# Patient Record
Sex: FEMALE | Race: BLACK OR AFRICAN AMERICAN | NOT HISPANIC OR LATINO | Employment: OTHER | URBAN - METROPOLITAN AREA
[De-identification: names, ages, dates, MRNs, and addresses within clinical notes are randomized per-mention and may not be internally consistent; named-entity substitution may affect disease eponyms.]

---

## 2017-02-16 ENCOUNTER — PATIENT MESSAGE (OUTPATIENT)
Dept: INTERNAL MEDICINE | Facility: CLINIC | Age: 72
End: 2017-02-16

## 2017-02-16 DIAGNOSIS — Z12.31 OTHER SCREENING MAMMOGRAM: Primary | ICD-10-CM

## 2017-02-24 ENCOUNTER — HOSPITAL ENCOUNTER (OUTPATIENT)
Dept: RADIOLOGY | Facility: HOSPITAL | Age: 72
Discharge: HOME | End: 2017-02-24
Attending: INTERNAL MEDICINE
Payer: MEDICARE

## 2017-02-24 DIAGNOSIS — Z12.31 OTHER SCREENING MAMMOGRAM: ICD-10-CM

## 2017-02-24 PROCEDURE — 77063 BREAST TOMOSYNTHESIS BI: CPT | Mod: 26,,, | Performed by: RADIOLOGY

## 2017-02-24 PROCEDURE — 77067 SCR MAMMO BI INCL CAD: CPT | Mod: TC

## 2017-02-24 PROCEDURE — 77067 SCR MAMMO BI INCL CAD: CPT | Mod: 26,,, | Performed by: RADIOLOGY

## 2017-03-01 ENCOUNTER — OFFICE VISIT (OUTPATIENT)
Dept: INTERNAL MEDICINE | Facility: CLINIC | Age: 72
End: 2017-03-01
Payer: MEDICARE

## 2017-03-01 VITALS
WEIGHT: 219.56 LBS | TEMPERATURE: 99 F | DIASTOLIC BLOOD PRESSURE: 94 MMHG | SYSTOLIC BLOOD PRESSURE: 147 MMHG | BODY MASS INDEX: 38.9 KG/M2 | HEART RATE: 63 BPM

## 2017-03-01 DIAGNOSIS — R03.0 ELEVATED BLOOD PRESSURE READING WITHOUT DIAGNOSIS OF HYPERTENSION: ICD-10-CM

## 2017-03-01 DIAGNOSIS — E78.5 DYSLIPIDEMIA: ICD-10-CM

## 2017-03-01 DIAGNOSIS — G47.33 OSA ON CPAP: ICD-10-CM

## 2017-03-01 DIAGNOSIS — E03.9 ACQUIRED HYPOTHYROIDISM: Primary | ICD-10-CM

## 2017-03-01 DIAGNOSIS — Z00.00 ENCOUNTER FOR PREVENTIVE HEALTH EXAMINATION: ICD-10-CM

## 2017-03-01 PROCEDURE — 99397 PER PM REEVAL EST PAT 65+ YR: CPT | Mod: S$GLB,,, | Performed by: INTERNAL MEDICINE

## 2017-03-01 PROCEDURE — 99999 PR PBB SHADOW E&M-EST. PATIENT-LVL III: CPT | Mod: PBBFAC,,, | Performed by: INTERNAL MEDICINE

## 2017-03-01 NOTE — PROGRESS NOTES
History of present illness:  71-year-old female in today for general health assessment.    Current medications:  All medications noted reviewed the electronic medical record medication list.    Review of systems:  General: no fever, chills, generalized body aches. No unexpected weight loss.  Eyes:  No visual disturbances.  HEENT:  No hoarseness, dysphagia, ear pain.  Respiratory:  No cough, no shortness of breath.  Cardiovascular: no chest pain, palpitations, cough, exertional limb pain. No edema.  GI: no nausea, vomiting.  No abdominal pain. No change in bowel habits.  No melena, no hematochezia.  : no dysuria. No change in the color or character of the urine. No urinary frequency.  Musculoskeletal: no joint pain or swelling.  Neurologic:  No focal neurological complaints.  No headaches.  Skin:  No rashes or other concerns.  Psych:  No emotional issues    Past medical history:  Hypothyroidism.  Dyslipidemia  GERD  Depression  Anxiety  History of sarcoidosis    Past surgical history, family medical history and social histories are all noted reviewed and are electronic medical record history sections.    Health screenings:  Colonoscopy August 2013.  Mammogram February 2017.  She has had 23 and 13 Valent pneumococcal vaccines.  Bone densitometry couple years ago normal    Physical examination:  GENERAL:  Alert, appropriately groomed, no acute distress.  VS: Blood pressure taken manually by this examiner is 142/86.  EYES: sclerae white ,nonicteric. PERRL.  HEENT:  Normocephalic. Ear canals and tympanic membranes normal. Mouth and pharynx normal. No thyromegaly. Trachea midline and freely mobile.  LUNGS:  Clear to ascultation and normal to percussion.  CARDIOVASCULAR:  Normal heart sounds.  No significant murmur. Carotids full bilaterally without bruit.  Pedal pulses intact .  No abdominal bruit.  No peripheral extremity edema.  GI: the abdomen is obese, soft, no distension. No masses , tenderness,  organomegaly.  LYMPHATIC:  No axillary, inguinal , cervical adenopathy.  MUSCULOSKELETAL:  Range of motion, stability and strength of the right and left upper and lower extremities normal. No swollen or tender joints  NEUROLOGIC:  DTR's normal. No gross motor or sensory deficits apparent, gait normal.  SKIN:  No rashes.   MS:  Alert, oriented , affect and mood all appropriate    Data:  Lab data noted and reviewed from February 24, 2017.    Impression:  71-year-old lady with obesity with associated comorbidities obstructive sleep apnea, dyslipidemia.  Hypothyroidism on replacement therapy.  Dyslipidemia on statin therapy.  Depression is recently controlled.  History of sarcoidosis quiescent.  Elevated blood pressure reading today without a diagnosis of hypertension.    Plan:  Encourage increased physical activity exercise and weight loss.  She does have a home blood pressure monitor and she'll start monitoring her pressures on a regular basis and keep a log.  She is advised to return to clinic in 2 months with that log and her blood pressure monitor for recheck and review.

## 2017-06-01 ENCOUNTER — OFFICE VISIT (OUTPATIENT)
Dept: INTERNAL MEDICINE | Facility: CLINIC | Age: 72
End: 2017-06-01
Payer: MEDICARE

## 2017-06-01 VITALS
HEIGHT: 63 IN | RESPIRATION RATE: 16 BRPM | BODY MASS INDEX: 38.09 KG/M2 | HEART RATE: 72 BPM | SYSTOLIC BLOOD PRESSURE: 145 MMHG | WEIGHT: 214.94 LBS | TEMPERATURE: 99 F | DIASTOLIC BLOOD PRESSURE: 89 MMHG

## 2017-06-01 DIAGNOSIS — I10 HTN (HYPERTENSION), BENIGN: Primary | ICD-10-CM

## 2017-06-01 DIAGNOSIS — E66.01 SEVERE OBESITY (BMI 35.0-39.9) WITH COMORBIDITY: ICD-10-CM

## 2017-06-01 PROCEDURE — 1159F MED LIST DOCD IN RCRD: CPT | Mod: S$GLB,,, | Performed by: INTERNAL MEDICINE

## 2017-06-01 PROCEDURE — 99999 PR PBB SHADOW E&M-EST. PATIENT-LVL III: CPT | Mod: PBBFAC,,, | Performed by: INTERNAL MEDICINE

## 2017-06-01 PROCEDURE — 99499 UNLISTED E&M SERVICE: CPT | Mod: S$GLB,,, | Performed by: INTERNAL MEDICINE

## 2017-06-01 PROCEDURE — 99214 OFFICE O/P EST MOD 30 MIN: CPT | Mod: S$GLB,,, | Performed by: INTERNAL MEDICINE

## 2017-06-01 RX ORDER — LOSARTAN POTASSIUM 25 MG/1
25 TABLET ORAL DAILY
Qty: 90 TABLET | Refills: 3 | Status: SHIPPED | OUTPATIENT
Start: 2017-06-01 | End: 2017-11-07 | Stop reason: DRUGHIGH

## 2017-06-01 RX ORDER — BRIMONIDINE TARTRATE, TIMOLOL MALEATE 2; 5 MG/ML; MG/ML
1 SOLUTION/ DROPS OPHTHALMIC 3 TIMES DAILY
COMMUNITY
Start: 2017-05-24 | End: 2018-07-31

## 2017-06-01 NOTE — PROGRESS NOTES
This office note has been dictated.  HISTORY OF PRESENT ILLNESS:  This is a 71-year-old lady following up today on   blood pressure issue.  On her last visit in March, her blood pressure reading   was a bit elevated.  She does not have a prior history of hypertension.  She   returns today with her home monitor for review and also home blood pressure   readings.  She does report she has modified her eating habits somewhat.  She has   lost 3 or 4 pounds since her last visit, though she remains obese at 38.08 BMI.    She feels fine with no chest pain, palpitations, syncope, cough, shortness of   breath.    CURRENT MEDICATIONS:  Noted and reviewed in the electronic medical record   medication list.    REVIEW OF SYSTEMS:  CONSTITUTIONAL:  No fever, no chills, no generalized body aches.  CARDIOVASCULAR:  No chest pain, no palpitations, no syncope.  RESPIRATORY:  No cough, no shortness of breath.    PAST MEDICAL HISTORY, PAST SURGICAL HISTORY, FAMILY AND SOCIAL HISTORY:  All   noted and reviewed in the electronic medical record history sections.    PHYSICAL EXAMINATION:  GENERAL:  Alert, pleasant, appropriately groomed lady in no acute distress.  VITAL SIGNS:  Blood pressure taken manually by this examiner 140/90 and blood   pressure taken by the patient's monitor here in the office today 140/85 and   131/93.  CARDIOVASCULAR:  Regular rate and rhythm.  No murmur, click, rub or gallop.  LUNGS:  Clear to auscultation.    ADDITIONAL DATA:  Home blood pressure readings are noted and reviewed and   majority of them are elevated.    IMPRESSION:  Mild primary hypertension, currently untreated.    PLAN:  1.  Begin losartan 25 mg daily.  2.  Continue efforts at weight loss.  3.  Return to clinic in 6-8 weeks for recheck and followup.      RICHIE  dd: 06/01/2017 11:07:31 (CDT)  td: 06/02/2017 02:12:28 (CDT)  Doc ID   #[id number]  Job ID #[id number]    CC:

## 2017-06-12 RX ORDER — SIMVASTATIN 40 MG/1
40 TABLET, FILM COATED ORAL NIGHTLY
Qty: 90 TABLET | Refills: 1 | OUTPATIENT
Start: 2017-06-12

## 2017-06-14 ENCOUNTER — PATIENT MESSAGE (OUTPATIENT)
Dept: INTERNAL MEDICINE | Facility: CLINIC | Age: 72
End: 2017-06-14

## 2017-06-15 ENCOUNTER — PATIENT MESSAGE (OUTPATIENT)
Dept: INTERNAL MEDICINE | Facility: CLINIC | Age: 72
End: 2017-06-15

## 2017-06-15 RX ORDER — SIMVASTATIN 40 MG/1
40 TABLET, FILM COATED ORAL NIGHTLY
Qty: 90 TABLET | Refills: 1 | Status: SHIPPED | OUTPATIENT
Start: 2017-06-15 | End: 2017-12-09 | Stop reason: SDUPTHER

## 2017-06-15 RX ORDER — SIMVASTATIN 40 MG/1
40 TABLET, FILM COATED ORAL NIGHTLY
Qty: 90 TABLET | Refills: 1 | OUTPATIENT
Start: 2017-06-15

## 2017-06-17 ENCOUNTER — PATIENT MESSAGE (OUTPATIENT)
Dept: INTERNAL MEDICINE | Facility: CLINIC | Age: 72
End: 2017-06-17

## 2017-06-19 RX ORDER — ESCITALOPRAM OXALATE 20 MG/1
20 TABLET ORAL DAILY
Qty: 90 TABLET | Refills: 1 | Status: SHIPPED | OUTPATIENT
Start: 2017-06-19 | End: 2017-12-18 | Stop reason: SDUPTHER

## 2017-06-24 ENCOUNTER — PATIENT MESSAGE (OUTPATIENT)
Dept: INTERNAL MEDICINE | Facility: CLINIC | Age: 72
End: 2017-06-24

## 2017-06-27 RX ORDER — LEVOTHYROXINE SODIUM 100 UG/1
100 TABLET ORAL DAILY
Qty: 90 TABLET | Refills: 1 | Status: SHIPPED | OUTPATIENT
Start: 2017-06-27 | End: 2017-10-03 | Stop reason: SDUPTHER

## 2017-07-12 ENCOUNTER — OFFICE VISIT (OUTPATIENT)
Dept: INTERNAL MEDICINE | Facility: CLINIC | Age: 72
End: 2017-07-12
Payer: MEDICARE

## 2017-07-12 VITALS
TEMPERATURE: 99 F | SYSTOLIC BLOOD PRESSURE: 98 MMHG | BODY MASS INDEX: 38.62 KG/M2 | DIASTOLIC BLOOD PRESSURE: 60 MMHG | HEART RATE: 71 BPM | HEIGHT: 63 IN | WEIGHT: 218 LBS | RESPIRATION RATE: 16 BRPM

## 2017-07-12 DIAGNOSIS — E03.9 ACQUIRED HYPOTHYROIDISM: ICD-10-CM

## 2017-07-12 DIAGNOSIS — H25.13 NUCLEAR SCLEROSIS OF BOTH EYES: ICD-10-CM

## 2017-07-12 DIAGNOSIS — H40.50X0 GLAUCOMA SECONDARY TO OTHER EYE DISORDER, UNSPECIFIED GLAUCOMA STAGE, UNSPECIFIED LATERALITY: ICD-10-CM

## 2017-07-12 DIAGNOSIS — I51.89 LEFT VENTRICULAR DIASTOLIC DYSFUNCTION WITH PRESERVED SYSTOLIC FUNCTION: ICD-10-CM

## 2017-07-12 DIAGNOSIS — F32.5 MAJOR DEPRESSIVE DISORDER WITH SINGLE EPISODE, IN FULL REMISSION: ICD-10-CM

## 2017-07-12 DIAGNOSIS — Z00.00 ENCOUNTER FOR PREVENTIVE HEALTH EXAMINATION: ICD-10-CM

## 2017-07-12 DIAGNOSIS — D86.89 SARCOID OF NOSE: ICD-10-CM

## 2017-07-12 DIAGNOSIS — E78.5 HYPERLIPIDEMIA, UNSPECIFIED HYPERLIPIDEMIA TYPE: ICD-10-CM

## 2017-07-12 DIAGNOSIS — G47.33 OSA ON CPAP: Primary | ICD-10-CM

## 2017-07-12 PROBLEM — H40.9 GLAUCOMA (INCREASED EYE PRESSURE): Status: ACTIVE | Noted: 2017-07-12

## 2017-07-12 PROCEDURE — 99499 UNLISTED E&M SERVICE: CPT | Mod: S$GLB,,, | Performed by: NURSE PRACTITIONER

## 2017-07-12 PROCEDURE — G0439 PPPS, SUBSEQ VISIT: HCPCS | Mod: S$GLB,,, | Performed by: NURSE PRACTITIONER

## 2017-07-12 PROCEDURE — 99999 PR PBB SHADOW E&M-EST. PATIENT-LVL V: CPT | Mod: PBBFAC,,, | Performed by: NURSE PRACTITIONER

## 2017-07-12 NOTE — PROGRESS NOTES
"Eboni Nicholas presented for a  Medicare AWV and comprehensive Health Risk Assessment today. The following components were reviewed and updated:    · Medical history  · Family History  · Social history  · Allergies and Current Medications  · Health Risk Assessment  · Health Maintenance  · Care Team   Followed by external ENT,OPTH- care team updated in epic    ** See Completed Assessments for Annual Wellness Visit within the encounter summary.**       The following assessments were completed:  · Living Situation  · CAGE  · Depression Screening  · Timed Get Up and Go  · Whisper Test  · Cognitive Function Screening  · Nutrition Screening  · ADL Screening  · PAQ Screening    Vitals:    07/12/17 1000   BP: 98/60   Pulse: 71   Resp: 16   Temp: 98.6 °F (37 °C)   TempSrc: Oral   Weight: 98.9 kg (218 lb)   Height: 5' 3" (1.6 m)     Body mass index is 38.62 kg/m².  Physical Exam   Constitutional: She is oriented to person, place, and time. She appears well-developed and well-nourished.   HENT:   Head: Normocephalic and atraumatic.   Cardiovascular: Normal rate, regular rhythm and normal heart sounds.    No murmur heard.  Pulmonary/Chest: Effort normal and breath sounds normal.   Abdominal: Soft. Bowel sounds are normal. There is no tenderness.   Musculoskeletal: She exhibits no edema.   Neurological: She is alert and oriented to person, place, and time.   Skin: Skin is warm and dry.   Psychiatric: She has a normal mood and affect. Her behavior is normal. Judgment and thought content normal.   Nursing note and vitals reviewed.        Diagnoses and health risks identified today and associated recommendations/orders:    1. CHIDI on CPAP  Stable- followed by PCP    2. Obesity, Class II, BMI 35-39.9, with comorbidity  CHronic with no recent weight loss.Reviewed current BMI & ideal BMI range. Encouraged weight loss,  diet and exercise. Followed by PCP.    3. Nuclear sclerosis of both eyes  Stable- followed by opthalmology    4. 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 Left ventricular diastolic dysfunction with preserved systolic function  Stable- followed by PCP    6. Hyperlipidemia, unspecified hyperlipidemia type  Stable- followed by PCP      7. Acquired hypothyroidism  Stable- followed by PCP      8. Encounter for preventive health examination  Assessments completed  Preventative health recommendations reviewed       9. 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 Sarcoid of nose  Stable- followed by external ENT      Provided Eboni with a 5-10 year written screening schedule and personal prevention plan. Recommendations were developed using the USPSTF age appropriate recommendations. Education, counseling, and referrals were provided as needed. After Visit Summary printed and given to patient which includes a list of additional screenings\tests needed.    Return in about 1 year (around 7/12/2018) for HRA.    Lori Valerio NP

## 2017-07-12 NOTE — Clinical Note
Primary Care Providers: CAMILO Elizondo MD, MD (General)  Your patient was seen today for a HRA visit. No Gap(s) in care (HEDIS gaps) have been identified during this visit that require additional testing and possible follow up.  No orders of the defined types were placed in this encounter.   I have included a copy of my visit note; please review the note and feel free to contact me with any questions.   Thank you for allowing me to participate in the care of your patients. Lori Valerio NP

## 2017-07-12 NOTE — PATIENT INSTRUCTIONS
Counseling and Referral of Other Preventative  (Italic type indicates deductible and co-insurance are waived)    Patient Name: Eboni Nicholas  Today's Date: 7/12/2017      SERVICE LIMITATIONS RECOMMENDATION    Vaccines    · Pneumococcal (once after 65)    · Influenza (annually)    · Hepatitis B (if medium/high risk)    · Prevnar 13      Hepatitis B medium/high risk factors:       - End-stage renal disease       - Hemophiliacs who received Factor VII or         IX concentrates       - Clients of institutions for the mentally             retarded       - Persons who live in the same house as          a HepB carrier       - Homosexual men       - Illicit injectable drug abusers     Pneumococcal: current     Influenza: current     Hepatitis B: N/A     Prevnar 13: current    Mammogram (biennial age 50-74)  Annually (age 40 or over)  2/24/17    Pap (up to age 70 and after 70 if unknown history or abnormal study last 10 years)      declined       Colorectal cancer screening (to age 75)    · Fecal occult blood test (annual)  · Flexible sigmoidoscopy (5y)  · Screening colonoscopy (10y)  · Barium enema   8/27/15- due again in 2025    Diabetes self-management training (no USPSTF recommendations)  Requires referral by treating physician for patient with diabetes or renal disease. 10 hours of initial DSMT sessions of no less than 30 minutes each in a continuous 12-month period. 2 hours of follow-up DSMT in subsequent years.  N/A    Bone mass measurements (age 65 & older, biennial)  Requires diagnosis related to osteoporosis or estrogen deficiency. Biennial benefit unless patient has history of long-term glucocorticoid  1/15/15- due in 2019    Glaucoma screening (no USPSTF recommendation)  Diabetes mellitus, family history   , age 50 or over    American, age 65 or over  current    Medical nutrition therapy for diabetes or renal disease (no recommended schedule)  Requires referral by treating physician for  patient with diabetes or renal disease or kidney transplant within the past 3 years.  Can be provided in same year as diabetes self-management training (DSMT), and CMS recommends medical nutrition therapy take place after DSMT. Up to 3 hours for initial year and 2 hours in subsequent years.  N/A    Cardiovascular screening blood tests (every 5 years)  · Fasting lipid panel  Order as a panel if possible  current    Diabetes screening tests (at least every 3 years, Medicare covers annually or at 6-month intervals for prediabetic patients)  · Fasting blood sugar (FBS) or glucose tolerance test (GTT)  Patient must be diagnosed with one of the following:       - Hypertension       - Dyslipidemia       - Obesity (BMI 30kg/m2)       - Previous elevated impaired FBS or GTT       ... or any two of the following:       - Overweight (BMI 25 but <30)       - Family history of diabetes       - Age 65 or older       - History of gestational diabetes or birth of baby weighing more than 9 pounds  current    Abdominal aortic aneurysm screening (once)  · Sonogram   Limited to patients who meet one of the following criteria:       - Men who are 65-75 years old and have smoked more than 100 cigarette in their lifetime       - Anyone with a family history of abdominal aortic aneurysm       - Anyone recommended for screening by the USPSTF  N/A    HIV screening (annually for increased risk patients)  · HIV-1 and HIV-2 by EIA, or ROBERTA, rapid antibody test or oral mucosa transudate  Patients must be at increased risk for HIV infection per USPSTF guidelines or pregnant. Tests covered annually for patient at increased risk or as requested by the patient. Pregnant patients may receive up to 3 tests during pregnancy. declined    Smoking cessation counseling (up to 8 sessions per year)  Patients must be asymptomatic of tobacco-related conditions to receive as a preventative service.  N/A    Subsequent annual wellness visit  At least 12 months  since last AWV  Return in one year     The following information is provided to all patients.  This information is to help you find resources for any of the problems found today that may be affecting your health:                Living healthy guide: www.[facility].[place].[facility]      Understanding Diabetes: www.diabetes.org      Eating healthy: www.cdc.gov/healthyweight      CDC home safety checklist: www.cdc.gov/steadi/patient.html      Agency on Aging: www.goea.[place].[facility]      Alcoholics anonymous (AA): www.aa.org      Physical Activity: www.bao.nih.gov/so4naji      Tobacco use: www.quitwithusla.org

## 2017-07-21 ENCOUNTER — LAB VISIT (OUTPATIENT)
Dept: LAB | Facility: HOSPITAL | Age: 72
End: 2017-07-21
Attending: INTERNAL MEDICINE
Payer: MEDICARE

## 2017-07-21 ENCOUNTER — OFFICE VISIT (OUTPATIENT)
Dept: INTERNAL MEDICINE | Facility: CLINIC | Age: 72
End: 2017-07-21
Payer: MEDICARE

## 2017-07-21 VITALS
DIASTOLIC BLOOD PRESSURE: 75 MMHG | HEIGHT: 63 IN | WEIGHT: 218.25 LBS | TEMPERATURE: 98 F | RESPIRATION RATE: 16 BRPM | HEART RATE: 80 BPM | BODY MASS INDEX: 38.67 KG/M2 | SYSTOLIC BLOOD PRESSURE: 116 MMHG

## 2017-07-21 DIAGNOSIS — I10 HTN (HYPERTENSION), BENIGN: Primary | ICD-10-CM

## 2017-07-21 DIAGNOSIS — I10 HTN (HYPERTENSION), BENIGN: ICD-10-CM

## 2017-07-21 PROBLEM — I15.9 SECONDARY HYPERTENSION: Status: ACTIVE | Noted: 2017-07-21

## 2017-07-21 LAB
ANION GAP SERPL CALC-SCNC: 7 MMOL/L
BUN SERPL-MCNC: 15 MG/DL
CALCIUM SERPL-MCNC: 9.5 MG/DL
CHLORIDE SERPL-SCNC: 106 MMOL/L
CO2 SERPL-SCNC: 28 MMOL/L
CREAT SERPL-MCNC: 1 MG/DL
EST. GFR  (AFRICAN AMERICAN): >60 ML/MIN/1.73 M^2
EST. GFR  (NON AFRICAN AMERICAN): 57.6 ML/MIN/1.73 M^2
GLUCOSE SERPL-MCNC: 80 MG/DL
POTASSIUM SERPL-SCNC: 4 MMOL/L
SODIUM SERPL-SCNC: 141 MMOL/L

## 2017-07-21 PROCEDURE — 99499 UNLISTED E&M SERVICE: CPT | Mod: S$GLB,,, | Performed by: INTERNAL MEDICINE

## 2017-07-21 PROCEDURE — 1159F MED LIST DOCD IN RCRD: CPT | Mod: S$GLB,,, | Performed by: INTERNAL MEDICINE

## 2017-07-21 PROCEDURE — 99213 OFFICE O/P EST LOW 20 MIN: CPT | Mod: S$GLB,,, | Performed by: INTERNAL MEDICINE

## 2017-07-21 PROCEDURE — 80048 BASIC METABOLIC PNL TOTAL CA: CPT

## 2017-07-21 PROCEDURE — 1126F AMNT PAIN NOTED NONE PRSNT: CPT | Mod: S$GLB,,, | Performed by: INTERNAL MEDICINE

## 2017-07-21 PROCEDURE — 99999 PR PBB SHADOW E&M-EST. PATIENT-LVL III: CPT | Mod: PBBFAC,,, | Performed by: INTERNAL MEDICINE

## 2017-07-21 PROCEDURE — 36415 COLL VENOUS BLD VENIPUNCTURE: CPT | Mod: PO

## 2017-07-21 NOTE — PROGRESS NOTES
This office note has been dictated.  HISTORY:  This is a 71-year-old lady following up on blood pressure.  We started   her on losartan recently for initially treated primary hypertension.  She   returns for followup with readings and also reporting no side effects or issues.    She states she feels good.    CURRENT MEDICATIONS:  Noted and reviewed in the electronic medical record   medication list.    REVIEW OF SYSTEMS:  RESPIRATORY:  No cough or shortness of breath.  HEENT:  No hoarseness, no dysphagia.    PAST MEDICAL HISTORY, PAST SURGICAL HISTORY, FAMILY HISTORY AND SOCIAL HISTORY:    All noted and reviewed in the electronic medical record history sections and   updated.    PHYSICAL EXAMINATION:  GENERAL:  Pleasant, alert, appropriately groomed lady, in no acute distress.  VITAL SIGNS:  Blood pressure taken manually by this examiner is 114/72.  Other   vital signs normal.  CARDIOVASCULAR:  Regular rate and rhythm.  No significant murmur.    IMPRESSION:  Hypertension, well controlled.    PLAN:  1.  Continue current medical regimen.  2.  Update BMP today.  3.  Return to clinic in January 2018 for general health assessment or before if   needed.      RICHIE  dd: 07/21/2017 15:16:51 (CDT)  td: 07/22/2017 03:04:26 (CDT)  Doc ID   #[id number]  Job ID #[id number]    CC:

## 2017-08-30 PROBLEM — E66.01 SEVERE OBESITY (BMI 35.0-39.9) WITH COMORBIDITY: Status: ACTIVE | Noted: 2017-03-01

## 2017-10-03 ENCOUNTER — PATIENT MESSAGE (OUTPATIENT)
Dept: INTERNAL MEDICINE | Facility: CLINIC | Age: 72
End: 2017-10-03

## 2017-10-03 RX ORDER — LEVOTHYROXINE SODIUM 100 UG/1
100 TABLET ORAL DAILY
Qty: 90 TABLET | Refills: 1 | Status: SHIPPED | OUTPATIENT
Start: 2017-10-03 | End: 2018-03-31 | Stop reason: SDUPTHER

## 2017-11-04 ENCOUNTER — NURSE TRIAGE (OUTPATIENT)
Dept: ADMINISTRATIVE | Facility: CLINIC | Age: 72
End: 2017-11-04

## 2017-11-04 NOTE — TELEPHONE ENCOUNTER
"    Reason for Disposition   BP  >= 160/100    Answer Assessment - Initial Assessment Questions  1. BLOOD PRESSURE: "What is the blood pressure?" "Did you take at least two measurements 5 minutes apart?"      B/P 183/116 rest 2 hours 152/101  2. ONSET: "When did you take your blood pressure?"      Today   3. HOW: "How did you obtain the blood pressure?" (e.g., visiting nurse, automatic home BP monitor)      Home monitor   4. HISTORY: "Do you have a history of high blood pressure?"      Yes   5. MEDICATIONS: "Are you taking any medications for blood pressure?" "Have you missed any doses recently?"      Yes   6. OTHER SYMPTOMS: "Do you have any symptoms?" (e.g., headache, chest pain, blurred vision, difficulty breathing, weakness)      No   7. PREGNANCY: "Is there any chance you are pregnant?" "When was your last menstrual period?"      --Age Consideration--    Protocols used: ST HIGH BLOOD PRESSURE-A-      "

## 2017-11-05 ENCOUNTER — OFFICE VISIT (OUTPATIENT)
Dept: URGENT CARE | Facility: CLINIC | Age: 72
End: 2017-11-05
Payer: MEDICARE

## 2017-11-05 VITALS
WEIGHT: 218 LBS | RESPIRATION RATE: 16 BRPM | TEMPERATURE: 99 F | HEIGHT: 63 IN | BODY MASS INDEX: 38.62 KG/M2 | DIASTOLIC BLOOD PRESSURE: 90 MMHG | OXYGEN SATURATION: 97 % | HEART RATE: 85 BPM | SYSTOLIC BLOOD PRESSURE: 140 MMHG

## 2017-11-05 DIAGNOSIS — Z86.79 HISTORY OF HYPERTENSION: ICD-10-CM

## 2017-11-05 DIAGNOSIS — R03.0 ELEVATED BLOOD PRESSURE READING: Primary | ICD-10-CM

## 2017-11-05 PROCEDURE — 99214 OFFICE O/P EST MOD 30 MIN: CPT | Mod: S$GLB,,, | Performed by: NURSE PRACTITIONER

## 2017-11-05 PROCEDURE — 99499 UNLISTED E&M SERVICE: CPT | Mod: S$GLB,,, | Performed by: NURSE PRACTITIONER

## 2017-11-05 NOTE — PROGRESS NOTES
Subjective:       Patient ID: Eboni Nicholas is a 72 y.o. female.    Vitals:    11/05/17 1005   BP: (!) 140/90   Pulse:    Resp:    Temp:        Chief Complaint: Hypertension    Pt states she has been monitoring her BP at home and it was 186/114 this am. Pt has an appt. on 11/7/17 with her PCP to address and re evaluate  BP control.  Pt has NO symptoms.  Denies changes to vision, speech, gait, headache, weakness, or numbness.  She did become anxious yesterday and monitored her BP more than 6 times, advised to just monitor daily and record and to also recheck if she does not feel well.        Hypertension   This is a new problem. The current episode started yesterday. The problem has been waxing and waning since onset. The problem is controlled. Pertinent negatives include no blurred vision, chest pain, headaches, malaise/fatigue or shortness of breath. Risk factors for coronary artery disease include obesity.     Review of Systems   Constitution: Negative for chills, fever and malaise/fatigue.   HENT: Negative for sore throat.    Eyes: Negative for blurred vision.   Cardiovascular: Negative for chest pain, dyspnea on exertion and leg swelling.   Respiratory: Negative for cough and shortness of breath.    Skin: Negative for rash.   Musculoskeletal: Negative for back pain and joint pain.   Gastrointestinal: Negative for abdominal pain, diarrhea, nausea and vomiting.   Neurological: Negative for disturbances in coordination, dizziness, focal weakness, headaches, light-headedness, loss of balance, numbness, paresthesias and sensory change.   Psychiatric/Behavioral: The patient is not nervous/anxious.        Objective:      Physical Exam   Constitutional: She is oriented to person, place, and time. Vital signs are normal. She appears well-developed and well-nourished. She is cooperative.  Non-toxic appearance. She does not have a sickly appearance. She does not appear ill. No distress.   HENT:   Head: Normocephalic and  atraumatic.   Right Ear: Hearing, tympanic membrane, external ear and ear canal normal.   Left Ear: Hearing, tympanic membrane, external ear and ear canal normal.   Nose: Nose normal. No mucosal edema, rhinorrhea or nasal deformity. No epistaxis. Right sinus exhibits no maxillary sinus tenderness and no frontal sinus tenderness. Left sinus exhibits no maxillary sinus tenderness and no frontal sinus tenderness.   Mouth/Throat: Uvula is midline, oropharynx is clear and moist and mucous membranes are normal. No trismus in the jaw. Normal dentition. No uvula swelling. No posterior oropharyngeal erythema.   Eyes: Conjunctivae, EOM and lids are normal. Pupils are equal, round, and reactive to light. No scleral icterus.   Sclera clear bilat   Neck: Trachea normal, full passive range of motion without pain and phonation normal. Neck supple.   Cardiovascular: Normal rate, regular rhythm, normal heart sounds and normal pulses.    Pulmonary/Chest: Effort normal and breath sounds normal. No respiratory distress.   Abdominal: Soft. Normal appearance and bowel sounds are normal. She exhibits no distension. There is no tenderness.   Musculoskeletal: Normal range of motion. She exhibits no edema or deformity.   Neurological: She is alert and oriented to person, place, and time. She has normal strength and normal reflexes. No cranial nerve deficit or sensory deficit. She exhibits normal muscle tone. Coordination and gait normal.   Skin: Skin is warm, dry and intact. She is not diaphoretic. No pallor.   Psychiatric: She has a normal mood and affect. Her speech is normal and behavior is normal. Judgment and thought content normal. Cognition and memory are normal.   Nursing note and vitals reviewed.      Assessment:       1. Elevated blood pressure reading    2. History of hypertension        Plan:       Eboni was seen today for hypertension.    Diagnoses and all orders for this visit:    Elevated blood pressure reading    History of  hypertension    Continue taking your current medications as directed.  Keep your follow up with your PCP on Tuesday.  Go to the ER for symptoms including headache, blurred vision, changes to vision/speech/gait, weakness, numbness, or tingling.  Otherwise continue to monitor your BP at home and write it down for your follow up daily.  Bring in your blood pressure cuff to your appointment to check accuracy.

## 2017-11-05 NOTE — PATIENT INSTRUCTIONS
Keep your follow up with your PCP on Tuesday.  Go to the ER for symptoms including headache, blurred vision, changes to vision/speech/gait, weakness, numbness, or tingling.  Otherwise continue to monitor your BP at home and write it down for your follow up daily.  Bring in your blood pressure cuff to your appointment to check accuracy.  Taking Your Blood Pressure  Blood pressure is the force of blood against the artery wall as it moves from the heart through the blood vessels. You can take your own blood pressure reading using a digital monitor. Take your readings the same each time, using the same arm. Take readings as often as your healthcare provider instructs.  About blood pressure monitors  Blood pressure monitors are designed for certain ages and cases. You can find monitors for older adults, for pregnant women, and for children. Make sure the one you choose is the right one for your age and situation.  The American Heart Association recommends an automatic cuff monitor that fits on your upper arm (bicep). The cuff should fit your arm size. A cuff thats too large or too small will not give an accurate reading. Measure around your upper arm to find your size.  Monitors that attach to your finger or wrist are not as accurate as monitors for your upper arm.  Ask your healthcare provider for help in choosing a monitor. Bring your monitor to your next provider visit if you need help in using it the correct way.  The steps below are general instructions for using an automatic digital monitor.  Step 1. Relax    · Take your blood pressure at the same time every day, such as in the morning or evening, or at the time your healthcare provider recommends.  · Wait at least a half-hour after smoking, eating, or exercising. Don't drink coffee, tea, soda, or other caffeinated beverages before checking your blood pressure.  · Sit comfortably at a table with both feet on the floor. Do not cross your legs or feet. Place the  monitor near you.  · Rest for a few minutes before you begin.  Step 2. Wrap the cuff    · Place your arm on the table, palm up. Your arm should be at the level of your heart. Wrap the cuff around your upper arm, just above your elbow. Its best done on bare skin, not over clothing. Most cuffs will indicate where the brachial artery (the blood vessel in the middle of the arm at the inner side of the elbow) should line up with the cuff. Look in your monitor's instruction booklet for an illustration. You can also bring your cuff to your healthcare provider and have them show you how to correctly place the cuff.  Step 3. Inflate the cuff    · Push the button that starts the pump.  · The cuff will tighten, then loosen.  · The numbers will change. When they stop changing, your blood pressure reading will appear.  · Take 2 or 3 readings one minute apart.  Step 4. Write down the results of each reading    · Write down your blood pressure numbers for each reading. Note the date and time. Keep your results in one place, such as a notebook. Even if your monitor has a built-in memory, keep a hard copy of the readings.  · Remove the cuff from your arm. Turn off the machine.  · Bring your blood pressure records with your healthcare providers at each visit.  · If you start a new blood pressure medicine, note the day you started the new medicine. Also note the day if you change the dose of your medicine. This information goes on your blood pressure recording sheet. This will help your healthcare provider monitor how well the medicine changes are working.  · Ask your healthcare provider what numbers should prompt you to call him or her. Also ask what numbers should prompt you to get help right away.  Date Last Reviewed: 11/1/2016  © 6488-0591 [employer]. [address] [place] [address], [place] [address]. All rights reserved. This information is not intended as a substitute for professional medical care. Always follow your  healthcare professional's instructions.        Uncontrolled High Blood Pressure (Established)    Your blood pressure was unusually high today. This can occur if youve missed doses of your blood pressure medicine. Or it can happen if you are taking other medicines. These include some asthma inhalers, decongestants, diet pills, and street drugs like cocaine and amphetamine.  Other causes include:  · Weight gain  · More salt in your diet  · Smoking  · Caffeine  Your blood pressure can also rise if you are emotionally upset or in intense pain. It may go back to normal after a period of rest.  A blood pressure reading is made up of 2 numbers. There is a top number over a bottom number. The top number is the systolic pressure. The bottom number is the diastolic pressure. A normal blood pressure is a systolic pressure of less than 120 over a diastolic pressure of less than 80. High blood pressure (hypertension) is when the top number is 140 or higher. Or it is when the bottom number is 90 or higher. You will see your blood pressure readings written together. For example, a person with a systolic pressure of 118 and a diastolic pressure of 78 will have 118/78 written in the medical record. To be high blood pressure, the numbers must be higher when tested over a period of time. The blood pressures between normal and hypertension are called prehypertension. Prehypertension is a warning sign. The information gives you a chance to make lifestyle changes (weight loss, more exercise) that can keep your blood pressure from going higher.  Home care  Its important to take steps to lower your blood pressure. If you are taking blood pressure medicine, the guidelines below may help you need less or no medicines in the future.  · Begin a weight-loss program if you are overweight.  · Cut back on the amount of salt in your diet:  ¨ Avoid high-salt foods like olives, pickles, smoked meats, and salted potato chips.  ¨ Dont add salt to  your food at the table.  ¨ Use only small amounts of salt when cooking.  · Begin an exercise program. Talk with your health care provider about what exercise program is best for you. It doesnt have to be difficult. Even brisk walking for 20 minutes 3 times a week is a good form of exercise.  · Avoid medicines that stimulates the heart. This includes many over-the-counter cold and sinus decongestant pills and sprays, as well as diet pills. Check the warnings about hypertension on the label. Before purchasing any over-the-counter medicines or supplements, always ask the pharmacist about the product's potential interaction with your high blood pressure and your medicines.  · Stimulants such as amphetamine or cocaine could be lethal for someone with hypertension. Never take these.  · Limit how much caffeine you drink. Or switch to noncaffeinated beverages.  · Stop smoking. If you are a long-time smoker, this can be hard. Enroll in a stop-smoking program to make it more likely that you will succeed. Talk with your provider about ways to quit.  · Learn how to handle stress better. This is an important part of any program to lower blood pressure. Learn ways to relax. These include meditation, yoga, and biofeedback.  · If medicines were prescribed, take them exactly as directed. Missing doses may cause your blood pressure to get out of control.  · If you miss a dose or doses of your medicines, check with your healthcare provider or pharmacist about what to do.  · Consider buying an automatic blood pressure machine. Your provider may recommend a certain type. You can get one of these at most pharmacies. Measure your blood pressure twice a day, in the morning, and in the late afternoon. Keep a written record of your home blood pressure readings and take the record to your medical appointments.  Here are some additional guidelines on home blood pressure monitoring from the American Heart Association.  · Don't smoke or drink  coffee for 30 minutes  · Go to the bathroom before the test.  · Relax for 5 minutes before taking the measurement.  · Sit correctly. Be sure your back is supported. Don't sit on a couch or soft chair. Uncross your feet and place them flat on the floor. Place your arm on a solid, flat surface like a table with the upper arm at heart level. Make certain the middle of the cuff is directly above the eye of the elbow. Check the monitor's instruction manual for an illustration.  · Take multiple readings. When you measure, take 2 or 3 readings one minute apart and record all of the results.  · Take your blood pressure at the same time every day, or as your healthcare provider recommends.  · Record the date, time, and blood pressure reading.  · Take the record with you to your next appointment. If your blood pressure monitor has a built-in memory, simply take the monitor with you to your next appointment.  · Call your provider if you have several high readings. Don't be frightened by a single high reading, but if you get several high readings, check in with your healthcare provider.  · Note: When blood pressure reaches a systolic (top number) of 180 or higher or a diastolic (bottom number) of 110 or higher, emergency medical treatment is required. Call your healthcare provider immediately.  Follow-up care  Regular visits to your own healthcare provider for blood pressure and medicine checks are an important part of your care. Make a follow-up appointment as directed. Bring the record of your home blood pressure readings to the appointment.  When to seek medical advice  Call your healthcare provider right away if any of these occur:  · Blood pressure reaches a systolic (top number) of 180 or higher or diastolic (bottom number) of 110 or higher, emergency medical treatment is required.  · Chest, arm, shoulder, neck, or upper back pain  · Shortness of breath  · Severe headache  · Throbbing or rushing sound in the  ears  · Nosebleed  · Extreme drowsiness, confusion, or fainting  · Dizziness or dizziness with spinning sensation (vertigo)  · Weakness in an arm or leg or on one side of the face  · Trouble speaking or seeing   Date Last Reviewed: 1/1/2017  © 9844-7527 [employer]. [address] [place] [address], [place] [address]. All rights reserved. This information is not intended as a substitute for professional medical care. Always follow your healthcare professional's instructions.

## 2017-11-07 ENCOUNTER — OFFICE VISIT (OUTPATIENT)
Dept: INTERNAL MEDICINE | Facility: CLINIC | Age: 72
End: 2017-11-07
Payer: MEDICARE

## 2017-11-07 VITALS
TEMPERATURE: 98 F | DIASTOLIC BLOOD PRESSURE: 86 MMHG | RESPIRATION RATE: 18 BRPM | WEIGHT: 215.63 LBS | HEIGHT: 63 IN | SYSTOLIC BLOOD PRESSURE: 126 MMHG | BODY MASS INDEX: 38.21 KG/M2 | HEART RATE: 85 BPM

## 2017-11-07 DIAGNOSIS — E66.01 SEVERE OBESITY WITH BODY MASS INDEX (BMI) OF 35.0 TO 39.9 WITH COMORBIDITY: ICD-10-CM

## 2017-11-07 DIAGNOSIS — I10 HTN (HYPERTENSION), BENIGN: Primary | ICD-10-CM

## 2017-11-07 PROCEDURE — 99999 PR PBB SHADOW E&M-EST. PATIENT-LVL III: CPT | Mod: PBBFAC,,, | Performed by: INTERNAL MEDICINE

## 2017-11-07 PROCEDURE — 99213 OFFICE O/P EST LOW 20 MIN: CPT | Mod: S$GLB,,, | Performed by: INTERNAL MEDICINE

## 2017-11-07 RX ORDER — LOSARTAN POTASSIUM 50 MG/1
50 TABLET ORAL DAILY
Qty: 90 TABLET | Refills: 3 | Status: SHIPPED | OUTPATIENT
Start: 2017-11-07 | End: 2018-01-19 | Stop reason: SDUPTHER

## 2017-11-08 NOTE — PROGRESS NOTES
This office note has been dictated.  HISTORY:  This is a 72-year-old lady with hypertension, hypothyroidism, in today   because of some recent elevated blood pressure readings.  She reports she has   been having some high blood pressure readings at home in the last few days and   brings in her numbers for review.  These are confirmed and are elevated; some of   the readings are in the 170 to 100 range.  She reports feeling fine with no   chest pain or palpitations.  No significant headache; though occasionally has a   feeling of fullness in her frontal region.  She denies any changes in her eating   habits.  No significant weight gain.  No anti-inflammatory medication use.  No   new medications, supplements or others.  She is taking medication as directed.    She did bring her home monitor in today for validation.    CURRENT MEDICATIONS:  Noted and reviewed in the electronic medical record   medication list.    REVIEW OF SYSTEMS:  CONSTITUTIONAL:  No fever, no chills, no generalized body aches.  CARDIOVASCULAR:  No chest pain, palpitation, syncope, edema.  NEUROLOGIC:  No significant headaches or focal neurological symptomatologies.    PAST MEDICAL HISTORY, PAST SURGICAL HISTORY, FAMILY MEDICAL HISTORY AND SOCIAL   HISTORY:  All noted and reviewed in the electronic medical record history   sections.    PHYSICAL EXAMINATION:  GENERAL:  Pleasant, alert, appropriately groomed lady, in no acute distress.  VITAL SIGNS:  Blood pressure taken manually by this examiner is 126/86, 122/82;   we took additional readings with the home blood pressure monitor with the   patient and obtained readings of 117/86 and 121/88.    IMPRESSION:  Normotensive today in the office, possibly some elevated readings   outside the office recently, etiology uncertain.    PLAN:  1.  Her blood pressure readings are a bit borderline regardless and thus we will   increase her losartan to 50 mg daily.  2.  She will observe numbers over the next few  weeks and give us an update.    Otherwise, we will see her back in January 2018 for general health assessment.      PB/HN  dd: 11/07/2017 18:26:50 (CST)  td: 11/08/2017 10:31:02 (CST)  Doc ID   #[id number]  Job ID #[id number]    CC:

## 2017-11-10 ENCOUNTER — TELEPHONE (OUTPATIENT)
Dept: URGENT CARE | Facility: CLINIC | Age: 72
End: 2017-11-10

## 2017-12-11 RX ORDER — SIMVASTATIN 40 MG/1
40 TABLET, FILM COATED ORAL NIGHTLY
Qty: 90 TABLET | Refills: 1 | Status: SHIPPED | OUTPATIENT
Start: 2017-12-11 | End: 2017-12-13 | Stop reason: SDUPTHER

## 2017-12-13 RX ORDER — SIMVASTATIN 40 MG/1
40 TABLET, FILM COATED ORAL NIGHTLY
Qty: 90 TABLET | Refills: 1 | Status: SHIPPED | OUTPATIENT
Start: 2017-12-13 | End: 2017-12-18 | Stop reason: SDUPTHER

## 2017-12-16 ENCOUNTER — PATIENT MESSAGE (OUTPATIENT)
Dept: INTERNAL MEDICINE | Facility: CLINIC | Age: 72
End: 2017-12-16

## 2017-12-18 RX ORDER — SIMVASTATIN 40 MG/1
40 TABLET, FILM COATED ORAL NIGHTLY
Qty: 90 TABLET | Refills: 1 | Status: SHIPPED | OUTPATIENT
Start: 2017-12-18 | End: 2018-06-18 | Stop reason: SDUPTHER

## 2017-12-18 RX ORDER — ESCITALOPRAM OXALATE 20 MG/1
20 TABLET ORAL DAILY
Qty: 90 TABLET | Refills: 1 | Status: SHIPPED | OUTPATIENT
Start: 2017-12-18 | End: 2018-06-13 | Stop reason: SDUPTHER

## 2018-01-04 ENCOUNTER — OFFICE VISIT (OUTPATIENT)
Dept: INTERNAL MEDICINE | Facility: CLINIC | Age: 73
End: 2018-01-04
Payer: MEDICARE

## 2018-01-04 VITALS
SYSTOLIC BLOOD PRESSURE: 102 MMHG | TEMPERATURE: 99 F | HEART RATE: 89 BPM | BODY MASS INDEX: 38.98 KG/M2 | OXYGEN SATURATION: 96 % | DIASTOLIC BLOOD PRESSURE: 66 MMHG | WEIGHT: 220 LBS | HEIGHT: 63 IN | RESPIRATION RATE: 16 BRPM

## 2018-01-04 DIAGNOSIS — H93.8X2 EAR FULLNESS, LEFT: ICD-10-CM

## 2018-01-04 DIAGNOSIS — J06.9 VIRAL URI WITH COUGH: Primary | ICD-10-CM

## 2018-01-04 PROCEDURE — 99213 OFFICE O/P EST LOW 20 MIN: CPT | Mod: S$GLB,,, | Performed by: INTERNAL MEDICINE

## 2018-01-04 PROCEDURE — 99999 PR PBB SHADOW E&M-EST. PATIENT-LVL III: CPT | Mod: PBBFAC,,,

## 2018-01-04 NOTE — PROGRESS NOTES
Subjective:       Patient ID: Eboni Nicholas is a 72 y.o. female.    Chief Complaint: URI (cough; ears feel clogged)    Ms Nicholas is a 72 F with a past medical history of HTN, glaucoma, sarcoid with nasal involvement who presents with a week long (7 day hx) of URI symptoms to inlcude cough w/ intermittent yellow to clear sputum worse at night, coryza but the patient denies fevers, myalgias, bloody nasal discharge that began acutely while she was on a cruise to [place]. She reports no sick contacts, and her chief complaint as her reason for presenting to clinic was ear fullness on the left, but she denies otalgia, discharge. She does mention some muffled hearing on that side. She reports that her symptoms are currently improving. She has nasal mucosal irritation from sarcoid disease and reports inability to tolerate Flonase because of bleeding. She does nasal saline daily. She is taking OTC decongestants with relief.       Review of Systems   Constitutional: Negative for chills, diaphoresis, fatigue, fever and unexpected weight change.   HENT: Positive for congestion, hearing loss and rhinorrhea. Negative for drooling, ear discharge, ear pain, sinus pain, sinus pressure, sore throat and trouble swallowing.    Eyes: Negative for photophobia and visual disturbance.   Respiratory: Positive for cough. Negative for apnea, chest tightness, shortness of breath and wheezing.    Cardiovascular: Negative for chest pain, palpitations and leg swelling.   Gastrointestinal: Negative for abdominal distention, blood in stool, constipation, diarrhea, nausea and vomiting.   Endocrine: Negative for polydipsia, polyphagia and polyuria.   Genitourinary: Negative for frequency, genital sores, menstrual problem and vaginal bleeding.   Musculoskeletal: Negative for arthralgias, myalgias, neck pain and neck stiffness.   Skin: Negative for pallor, rash and wound.   Allergic/Immunologic: Negative for immunocompromised state.   Neurological:  "Negative for dizziness, syncope, weakness and headaches.   Hematological: Negative for adenopathy.   Psychiatric/Behavioral: Negative for agitation.     /66 (BP Location: Right arm, Patient Position: Sitting, BP Method: Large (Manual))   Pulse 89   Temp 98.8 °F (37.1 °C) (Oral)   Resp 16   Ht 5' 3" (1.6 m)   Wt 99.8 kg (220 lb 0.3 oz)   SpO2 96%   BMI 38.97 kg/m²     Objective:      Physical Exam   Constitutional: She is oriented to person, place, and time. No distress.   HENT:   Right Ear: External ear normal.   Nose: Nose normal.   Mouth/Throat: Oropharynx is clear and moist. No oropharyngeal exudate.   Mild blunting of the light reflex on the L TM, mild erythema, no discharge, no effusion    Eyes: EOM are normal. Pupils are equal, round, and reactive to light. Right eye exhibits no discharge. Left eye exhibits no discharge. No scleral icterus.   Neck: Normal range of motion. No JVD present. No tracheal deviation present.   Cardiovascular: Normal rate, regular rhythm, normal heart sounds and intact distal pulses.  Exam reveals no gallop and no friction rub.    No murmur heard.  Pulmonary/Chest: Effort normal and breath sounds normal. No respiratory distress. She has no wheezes. She has no rales. She exhibits no tenderness.   Abdominal: Soft. Bowel sounds are normal. She exhibits no distension and no mass. There is no tenderness. There is no rebound and no guarding.   Musculoskeletal: Normal range of motion. She exhibits no edema, tenderness or deformity.   Neurological: She is alert and oriented to person, place, and time. She displays normal reflexes. No cranial nerve deficit or sensory deficit.   Skin: Skin is warm and dry. Capillary refill takes less than 2 seconds. No rash noted. She is not diaphoretic. No erythema. No pallor.   Psychiatric: She has a normal mood and affect.       Assessment:       1. Viral URI with cough    2. Ear fullness, left        Plan:       1. Viral URI with cough  -ear " complaints likely related to URI-relate mucosal inflammation; ear exam benign without concern for OM/serous effusion  -will call if no improvement with time, will prescribe antibiotics. Counseled on continued OTC therapy, discontinuing daytime decongestants because of HTN. Cough improving currently     Joce rDew MD  PGY-2 House Staff, Internal Medicine

## 2018-01-18 ENCOUNTER — PATIENT MESSAGE (OUTPATIENT)
Dept: INTERNAL MEDICINE | Facility: CLINIC | Age: 73
End: 2018-01-18

## 2018-01-19 ENCOUNTER — OFFICE VISIT (OUTPATIENT)
Dept: INTERNAL MEDICINE | Facility: CLINIC | Age: 73
End: 2018-01-19
Payer: MEDICARE

## 2018-01-19 ENCOUNTER — PATIENT MESSAGE (OUTPATIENT)
Dept: INTERNAL MEDICINE | Facility: CLINIC | Age: 73
End: 2018-01-19

## 2018-01-19 VITALS
HEART RATE: 102 BPM | TEMPERATURE: 99 F | OXYGEN SATURATION: 98 % | RESPIRATION RATE: 16 BRPM | BODY MASS INDEX: 39.3 KG/M2 | WEIGHT: 221.81 LBS | SYSTOLIC BLOOD PRESSURE: 114 MMHG | HEIGHT: 63 IN | DIASTOLIC BLOOD PRESSURE: 75 MMHG

## 2018-01-19 DIAGNOSIS — E66.01 SEVERE OBESITY (BMI 35.0-39.9) WITH COMORBIDITY: ICD-10-CM

## 2018-01-19 DIAGNOSIS — E03.9 ACQUIRED HYPOTHYROIDISM: ICD-10-CM

## 2018-01-19 DIAGNOSIS — I10 HYPERTENSION, UNSPECIFIED TYPE: Primary | ICD-10-CM

## 2018-01-19 DIAGNOSIS — I51.89 LEFT VENTRICULAR DIASTOLIC DYSFUNCTION WITH PRESERVED SYSTOLIC FUNCTION: ICD-10-CM

## 2018-01-19 PROCEDURE — 99214 OFFICE O/P EST MOD 30 MIN: CPT | Mod: S$GLB,,, | Performed by: INTERNAL MEDICINE

## 2018-01-19 PROCEDURE — 93010 ELECTROCARDIOGRAM REPORT: CPT | Mod: S$GLB,,, | Performed by: INTERNAL MEDICINE

## 2018-01-19 PROCEDURE — 99999 PR PBB SHADOW E&M-EST. PATIENT-LVL III: CPT | Mod: PBBFAC,,, | Performed by: INTERNAL MEDICINE

## 2018-01-19 PROCEDURE — 93005 ELECTROCARDIOGRAM TRACING: CPT | Mod: S$GLB,,, | Performed by: INTERNAL MEDICINE

## 2018-01-19 PROCEDURE — 99499 UNLISTED E&M SERVICE: CPT | Mod: S$GLB,,, | Performed by: INTERNAL MEDICINE

## 2018-01-19 RX ORDER — LOSARTAN POTASSIUM 50 MG/1
TABLET ORAL
Qty: 180 TABLET | Refills: 3 | Status: SHIPPED | OUTPATIENT
Start: 2018-01-19 | End: 2019-02-08 | Stop reason: SDUPTHER

## 2018-01-19 RX ORDER — IBUPROFEN 800 MG/1
TABLET ORAL
COMMUNITY
Start: 2017-11-28 | End: 2018-03-06 | Stop reason: ALTCHOICE

## 2018-01-19 RX ORDER — HYDROCODONE BITARTRATE AND ACETAMINOPHEN 5; 325 MG/1; MG/1
TABLET ORAL
COMMUNITY
Start: 2017-11-28 | End: 2018-03-06

## 2018-01-19 NOTE — TELEPHONE ENCOUNTER
"The following LINDA message sent to the pt to advise:    " Cristopher,    Dr. Eliznodo advised:    "Appears she came in today and saw Dr Leung.".    If this is not the case, please advise.    Thanks."    "

## 2018-01-19 NOTE — PROGRESS NOTES
CC: HTN ( spouse +)    72 y.o. female presents for HTN  Tx :losartan 50mg  Denied HA or dizziness; occ fullness in back of head  Denied recent  intake salt, extra w/ cruise in 12/2017  BP today==>114/75  BP @ home==>171/106  URi x 12/26 and coughing hard enough to cause incontinence    Co-morbidities:  HLD, LV diastolic dysfx w/ preserved systolic fx, CHIDI,  thyroid ds, and severe obesity  No PND or orthopnea or LE increased LE edema  No skipped heart beats , fast heartbeat  No heat or cold intolerance, increased fatigue- last TSH-1.997    MEDCARD: Reviewed  ROS:  No fever, chills ,or night sweats  No chest pain or palpitations  No cough or wheezing  No focal deficits or paresthesia  Remainder of review negative except as previously noted    PMHX: Reviewed  SHX: Reviewed  FHX: Reviewed    PE:  VSS:   GEN: WDWN, A&O, NAD, conversant and co-operative; pleasant  EYES: Conj/lids unremarkable, sclera anicteric, Pupils reactive  ENT: O/P w/o exudate or erythema  NECK: Supple w/o lymphadenopathy or thyromegaly  RESP: Efforts unlabored, lungs CTA  Cardiovascular: Heart RRR, no carotid bruits, 1+ pedal pulses, trace edema  MSK: Gait normal. No CCE  NEURO: CAMACHO. No tremor or facial asymmetry  SKIN: Warm and dry    IMPRESSION:  HTN, normal today  LV DD w/ preserved systolic fx, asx  Thyroid ds ,asx  Severe Obesity, w/ co-morbidity    PLAN:  EKG- no acute findings  Increase losartan to BID , take 2nd dose if BP >140/90  Avoid all salt  Pt to monitor BP and report back w/ readings next week

## 2018-03-06 ENCOUNTER — OFFICE VISIT (OUTPATIENT)
Dept: INTERNAL MEDICINE | Facility: CLINIC | Age: 73
End: 2018-03-06
Payer: MEDICARE

## 2018-03-06 ENCOUNTER — LAB VISIT (OUTPATIENT)
Dept: LAB | Facility: HOSPITAL | Age: 73
End: 2018-03-06
Attending: INTERNAL MEDICINE
Payer: MEDICARE

## 2018-03-06 VITALS
SYSTOLIC BLOOD PRESSURE: 110 MMHG | HEART RATE: 72 BPM | WEIGHT: 222.69 LBS | TEMPERATURE: 99 F | DIASTOLIC BLOOD PRESSURE: 80 MMHG | BODY MASS INDEX: 39.46 KG/M2 | HEIGHT: 63 IN

## 2018-03-06 DIAGNOSIS — I10 HTN (HYPERTENSION), BENIGN: ICD-10-CM

## 2018-03-06 DIAGNOSIS — Z12.39 BREAST SCREENING: ICD-10-CM

## 2018-03-06 DIAGNOSIS — E78.5 DYSLIPIDEMIA: ICD-10-CM

## 2018-03-06 DIAGNOSIS — E03.9 ACQUIRED HYPOTHYROIDISM: ICD-10-CM

## 2018-03-06 DIAGNOSIS — E66.01 SEVERE OBESITY WITH BODY MASS INDEX (BMI) OF 35.0 TO 39.9 WITH COMORBIDITY: ICD-10-CM

## 2018-03-06 DIAGNOSIS — Z00.00 ENCOUNTER FOR PREVENTIVE HEALTH EXAMINATION: Primary | ICD-10-CM

## 2018-03-06 LAB
ALBUMIN SERPL BCP-MCNC: 3.9 G/DL
ALP SERPL-CCNC: 57 U/L
ALT SERPL W/O P-5'-P-CCNC: 42 U/L
ANION GAP SERPL CALC-SCNC: 9 MMOL/L
AST SERPL-CCNC: 43 U/L
BASOPHILS # BLD AUTO: 0.05 K/UL
BASOPHILS NFR BLD: 0.8 %
BILIRUB SERPL-MCNC: 0.5 MG/DL
BUN SERPL-MCNC: 17 MG/DL
CALCIUM SERPL-MCNC: 10.2 MG/DL
CHLORIDE SERPL-SCNC: 105 MMOL/L
CHOLEST SERPL-MCNC: 191 MG/DL
CHOLEST/HDLC SERPL: 3.7 {RATIO}
CO2 SERPL-SCNC: 27 MMOL/L
CREAT SERPL-MCNC: 1 MG/DL
DIFFERENTIAL METHOD: ABNORMAL
EOSINOPHIL # BLD AUTO: 0.2 K/UL
EOSINOPHIL NFR BLD: 3.6 %
ERYTHROCYTE [DISTWIDTH] IN BLOOD BY AUTOMATED COUNT: 14.3 %
EST. GFR  (AFRICAN AMERICAN): >60 ML/MIN/1.73 M^2
EST. GFR  (NON AFRICAN AMERICAN): 57.2 ML/MIN/1.73 M^2
GLUCOSE SERPL-MCNC: 117 MG/DL
HCT VFR BLD AUTO: 39.4 %
HDLC SERPL-MCNC: 51 MG/DL
HDLC SERPL: 26.7 %
HGB BLD-MCNC: 12.9 G/DL
IMM GRANULOCYTES # BLD AUTO: 0.04 K/UL
IMM GRANULOCYTES NFR BLD AUTO: 0.6 %
LDLC SERPL CALC-MCNC: 112.4 MG/DL
LYMPHOCYTES # BLD AUTO: 1.6 K/UL
LYMPHOCYTES NFR BLD: 25.9 %
MCH RBC QN AUTO: 31.3 PG
MCHC RBC AUTO-ENTMCNC: 32.7 G/DL
MCV RBC AUTO: 96 FL
MONOCYTES # BLD AUTO: 0.5 K/UL
MONOCYTES NFR BLD: 8.1 %
NEUTROPHILS # BLD AUTO: 3.8 K/UL
NEUTROPHILS NFR BLD: 61 %
NONHDLC SERPL-MCNC: 140 MG/DL
NRBC BLD-RTO: 0 /100 WBC
PLATELET # BLD AUTO: 207 K/UL
PMV BLD AUTO: 11.2 FL
POTASSIUM SERPL-SCNC: 4.3 MMOL/L
PROT SERPL-MCNC: 7 G/DL
RBC # BLD AUTO: 4.12 M/UL
SODIUM SERPL-SCNC: 141 MMOL/L
T4 FREE SERPL-MCNC: 0.87 NG/DL
TRIGL SERPL-MCNC: 138 MG/DL
TSH SERPL DL<=0.005 MIU/L-ACNC: 5.04 UIU/ML
WBC # BLD AUTO: 6.17 K/UL

## 2018-03-06 PROCEDURE — 80061 LIPID PANEL: CPT

## 2018-03-06 PROCEDURE — 84439 ASSAY OF FREE THYROXINE: CPT

## 2018-03-06 PROCEDURE — 36415 COLL VENOUS BLD VENIPUNCTURE: CPT | Mod: PO

## 2018-03-06 PROCEDURE — 84443 ASSAY THYROID STIM HORMONE: CPT

## 2018-03-06 PROCEDURE — 85025 COMPLETE CBC W/AUTO DIFF WBC: CPT

## 2018-03-06 PROCEDURE — 80053 COMPREHEN METABOLIC PANEL: CPT

## 2018-03-06 PROCEDURE — 99999 PR PBB SHADOW E&M-EST. PATIENT-LVL III: CPT | Mod: PBBFAC,,, | Performed by: INTERNAL MEDICINE

## 2018-03-06 PROCEDURE — 99397 PER PM REEVAL EST PAT 65+ YR: CPT | Mod: S$GLB,,, | Performed by: INTERNAL MEDICINE

## 2018-03-06 PROCEDURE — 99499 UNLISTED E&M SERVICE: CPT | Mod: S$GLB,,, | Performed by: INTERNAL MEDICINE

## 2018-03-06 NOTE — PROGRESS NOTES
History of present illness:  72-year-old female in today for general health assessment.  Next    Current medication:  All medications are noted reviewed the electronic medical record medication list.    Review of systems:  General: no fever, chills, generalized body aches. No unexpected weight loss.  Eyes:  No visual disturbances.  HEENT:  No hoarseness, dysphagia, ear pain.  Respiratory:  No cough, no shortness of breath.  Cardiovascular: no chest pain, palpitations, cough, exertional limb pain. No edema.  GI: no nausea, vomiting.  No abdominal pain. No change in bowel habits.  No melena, no hematochezia.  : no dysuria. No change in the color or character of the urine. No urinary frequency.  Musculoskeletal: no joint pain or swelling.  Neurologic:  No focal neurological complaints.  No headaches.  Skin:  No rashes or other concerns.  Psych:  No emotional issues    Past medical history, past surgical history, family medical history and social histories are all noted and reviewed in the electronic medical record history section.    Health screenings:  Colonoscopy August 2013.  Mammogram February 2017.  Eye exam is up-to-date.  She's had 23 and 13 Valent pneumococcal vaccines.    Physical examination:  GENERAL:  Alert, appropriately groomed, no acute distress.  VS: Blood pressure taken manually by this examiner is 118/70  EYES: sclerae white ,nonicteric. PERRL.  HEENT:  Normocephalic. Ear canals and tympanic membranes normal. Mouth and pharynx normal. No thyromegaly. Trachea midline and freely mobile.  LUNGS:  Clear to ascultation and normal to percussion.  CARDIOVASCULAR:  Normal heart sounds.  No significant murmur. Carotids full bilaterally without bruit.  Pedal pulses intact .  No abdominal bruit.  No peripheral extremity edema.  GI: the abdomen is obese, soft, no distension. No masses , tenderness, organomegaly.   LYMPHATIC:  No axillary, inguinal , cervical adenopathy.  MUSCULOSKELETAL:  Range of motion,  stability and strength of the right and left upper and lower extremities normal. No swollen or tender joints  NEUROLOGIC:  DTR's normal. No gross motor or sensory deficits apparent, gait normal.  SKIN:  No rashes.   MS:  Alert, oriented , affect and mood all appropriate    Impression:  Generally healthy 72-year-old lady with a couple of chronic medical conditions.  Hypertension is well controlled on her current medications and home blood pressure readings in the last several weeks of all been normal.  Dyslipidemia on statin therapy.  Severe obesity with associated comorbidities.  Hypothyroidism on replacement therapy.  Anxiety is reasonably controlled.  GERD controlled.    Plan:  Update health maintenance laboratory data with CBC, chemistry profile, lipid profile, TSH, urinalysis.  Mammogram update.  Encouraged increase efforts at physical activity exercise and weight loss.  Return to clinic 6 months for follow-up

## 2018-03-08 ENCOUNTER — HOSPITAL ENCOUNTER (OUTPATIENT)
Dept: RADIOLOGY | Facility: HOSPITAL | Age: 73
Discharge: HOME | End: 2018-03-08
Attending: INTERNAL MEDICINE
Payer: MEDICARE

## 2018-03-08 DIAGNOSIS — Z12.39 BREAST SCREENING: ICD-10-CM

## 2018-03-08 PROCEDURE — 77063 BREAST TOMOSYNTHESIS BI: CPT | Mod: 26,,, | Performed by: RADIOLOGY

## 2018-03-08 PROCEDURE — 77067 SCR MAMMO BI INCL CAD: CPT | Mod: TC,PO

## 2018-03-08 PROCEDURE — 77067 SCR MAMMO BI INCL CAD: CPT | Mod: 26,,, | Performed by: RADIOLOGY

## 2018-04-02 RX ORDER — LEVOTHYROXINE SODIUM 100 UG/1
100 TABLET ORAL DAILY
Qty: 90 TABLET | Refills: 1 | Status: SHIPPED | OUTPATIENT
Start: 2018-04-02 | End: 2018-09-07 | Stop reason: SDUPTHER

## 2018-04-10 ENCOUNTER — PES CALL (OUTPATIENT)
Dept: ADMINISTRATIVE | Facility: CLINIC | Age: 73
End: 2018-04-10

## 2018-06-13 RX ORDER — ESCITALOPRAM OXALATE 20 MG/1
20 TABLET ORAL DAILY
Qty: 90 TABLET | Refills: 1 | Status: SHIPPED | OUTPATIENT
Start: 2018-06-13 | End: 2018-06-18 | Stop reason: SDUPTHER

## 2018-06-18 ENCOUNTER — PATIENT MESSAGE (OUTPATIENT)
Dept: INTERNAL MEDICINE | Facility: CLINIC | Age: 73
End: 2018-06-18

## 2018-06-18 RX ORDER — ESCITALOPRAM OXALATE 20 MG/1
20 TABLET ORAL DAILY
Qty: 90 TABLET | Refills: 1 | Status: SHIPPED | OUTPATIENT
Start: 2018-06-18 | End: 2018-09-17 | Stop reason: SDUPTHER

## 2018-06-18 RX ORDER — SIMVASTATIN 40 MG/1
40 TABLET, FILM COATED ORAL NIGHTLY
Qty: 90 TABLET | Refills: 1 | Status: SHIPPED | OUTPATIENT
Start: 2018-06-18 | End: 2018-09-17 | Stop reason: SDUPTHER

## 2018-06-21 ENCOUNTER — OFFICE VISIT (OUTPATIENT)
Dept: INTERNAL MEDICINE | Facility: CLINIC | Age: 73
End: 2018-06-21
Payer: MEDICARE

## 2018-06-21 VITALS
HEART RATE: 75 BPM | SYSTOLIC BLOOD PRESSURE: 114 MMHG | HEIGHT: 63 IN | OXYGEN SATURATION: 97 % | DIASTOLIC BLOOD PRESSURE: 70 MMHG | WEIGHT: 220 LBS | TEMPERATURE: 99 F | BODY MASS INDEX: 38.98 KG/M2 | RESPIRATION RATE: 15 BRPM

## 2018-06-21 DIAGNOSIS — E78.5 HYPERLIPIDEMIA, UNSPECIFIED HYPERLIPIDEMIA TYPE: ICD-10-CM

## 2018-06-21 DIAGNOSIS — E66.01 SEVERE OBESITY WITH BODY MASS INDEX (BMI) OF 35.0 TO 39.9 WITH COMORBIDITY: ICD-10-CM

## 2018-06-21 DIAGNOSIS — G47.33 OSA ON CPAP: ICD-10-CM

## 2018-06-21 DIAGNOSIS — E03.9 ACQUIRED HYPOTHYROIDISM: ICD-10-CM

## 2018-06-21 DIAGNOSIS — H40.10X0 OPEN-ANGLE GLAUCOMA, UNSPECIFIED GLAUCOMA STAGE, UNSPECIFIED LATERALITY, UNSPECIFIED OPEN-ANGLE GLAUCOMA TYPE: ICD-10-CM

## 2018-06-21 DIAGNOSIS — I15.9 SECONDARY HYPERTENSION: ICD-10-CM

## 2018-06-21 DIAGNOSIS — H25.13 NUCLEAR SCLEROSIS OF BOTH EYES: ICD-10-CM

## 2018-06-21 DIAGNOSIS — E66.01 SEVERE OBESITY (BMI 35.0-39.9) WITH COMORBIDITY: ICD-10-CM

## 2018-06-21 DIAGNOSIS — I51.89 LEFT VENTRICULAR DIASTOLIC DYSFUNCTION WITH PRESERVED SYSTOLIC FUNCTION: ICD-10-CM

## 2018-06-21 DIAGNOSIS — F32.5 MAJOR DEPRESSIVE DISORDER WITH SINGLE EPISODE, IN FULL REMISSION: ICD-10-CM

## 2018-06-21 DIAGNOSIS — Z00.00 ENCOUNTER FOR PREVENTIVE HEALTH EXAMINATION: Primary | ICD-10-CM

## 2018-06-21 DIAGNOSIS — D86.89 SARCOID OF NOSE: ICD-10-CM

## 2018-06-21 DIAGNOSIS — E78.5 DYSLIPIDEMIA: ICD-10-CM

## 2018-06-21 DIAGNOSIS — I10 HTN (HYPERTENSION), BENIGN: ICD-10-CM

## 2018-06-21 PROCEDURE — 99499 UNLISTED E&M SERVICE: CPT | Mod: S$GLB,,, | Performed by: NURSE PRACTITIONER

## 2018-06-21 PROCEDURE — 99999 PR PBB SHADOW E&M-EST. PATIENT-LVL IV: CPT | Mod: PBBFAC,,, | Performed by: NURSE PRACTITIONER

## 2018-06-21 PROCEDURE — 3074F SYST BP LT 130 MM HG: CPT | Mod: CPTII,S$GLB,, | Performed by: NURSE PRACTITIONER

## 2018-06-21 PROCEDURE — G0439 PPPS, SUBSEQ VISIT: HCPCS | Mod: S$GLB,,, | Performed by: NURSE PRACTITIONER

## 2018-06-21 PROCEDURE — 3078F DIAST BP <80 MM HG: CPT | Mod: CPTII,S$GLB,, | Performed by: NURSE PRACTITIONER

## 2018-06-21 NOTE — PROGRESS NOTES
"Eboni Nicholas presented for a  Medicare AWV and comprehensive Health Risk Assessment today. The following components were reviewed and updated:    · Medical history  · Family History  · Social history  · Allergies and Current Medications  · Health Risk Assessment  · Health Maintenance  · Care Team     ** See Completed Assessments for Annual Wellness Visit within the encounter summary.**       The following assessments were completed:  · Living Situation  · CAGE  · Depression Screening  · Timed Get Up and Go  · Whisper Test  · Cognitive Function Screening Patient performed clock drawing test - results-  Pt able to perform task with precision & without difficulty. Clock Drawing forwarded to medical records to be scanned into  Patient's chart & EPIC.  · Nutrition Screening  · ADL Screening  · PAQ Screening    Vitals:    06/21/18 1119   BP: 114/70   Pulse: 75   Resp: 15   Temp: 98.5 °F (36.9 °C)   TempSrc: Oral   SpO2: 97%   Weight: 99.8 kg (220 lb)   Height: 5' 3" (1.6 m)     Body mass index is 38.97 kg/m².  Physical Exam   Constitutional: She is oriented to person, place, and time. She appears well-developed and well-nourished.   HENT:   Head: Normocephalic and atraumatic.   Cardiovascular: Normal rate, regular rhythm and normal heart sounds.    No murmur heard.  Pulmonary/Chest: Effort normal and breath sounds normal.   Abdominal: Soft. Bowel sounds are normal. She exhibits no distension.   obese   Musculoskeletal: She exhibits no edema.   Neurological: She is alert and oriented to person, place, and time.   Skin: Skin is warm and dry.   Psychiatric: She has a normal mood and affect. Her behavior is normal. Judgment and thought content normal.   Nursing note and vitals reviewed.        Diagnoses and health risks identified today and associated recommendations/orders:    1. Left ventricular diastolic dysfunction with preserved systolic function  stable followed by PCP    2. Major depressive disorder with single " episode, in full remission  stable followed by PCP    3. Acquired hypothyroidism  stable followed by PCP    4. Sarcoid of nose  stable followed by external ENT    5. Nuclear sclerosis of both eyes  stable followed by external eye MD    6. Hyperlipidemia, unspecified hyperlipidemia type  stable followed by PCP    7. CHIDI -intolerant to cpap  stable followed by PCP    8. Severe obesity (BMI 35.0-39.9) with comorbidity  CHronic with no recent weight loss.Reviewed current BMI & ideal BMI range. Encouraged weight loss,low fat  diet and exercise. Followed by PCP.    9. Open-angle glaucoma, unspecified glaucoma stage, unspecified laterality, unspecified open-angle glaucoma type  stable followed by external eye MD    10. HTN (hypertension), benign  stable followed by PCP    11.  Dyslipidemia  stable followed by PCP    12. Encounter for preventive health examination  Assessments completed  Preventative health recommendations reviewed         Provided Eboni with a 5-10 year written screening schedule and personal prevention plan. Recommendations were developed using the USPSTF age appropriate recommendations. Education, counseling, and referrals were provided as needed. After Visit Summary printed and given to patient which includes a list of additional screenings\tests needed. Risk factor modification plan: obesity : Medical fitness program info given; [facility] program with [employer] contact info for program HTN & obesity; wt Dr kesha bush program nonsurgical mgt & to check with [employer] on wt loss program approved for members. Fall prevention handouts. Pt education on untreated sleep apnea- she will check into options : splint w dentist-intolerant to CPAP, wt loss encouraged.    Follow-up in about 1 year (around 6/21/2019) for HRA.    Lori Valerio NP

## 2018-06-21 NOTE — PATIENT INSTRUCTIONS
Counseling and Referral of Other Preventative  (Italic type indicates deductible and co-insurance are waived)    Patient Name: Eboni Nicholas  Today's Date: 6/21/2018    Health Maintenance       Date Due Completion Date    Zoster Vaccine cdc handout given   ---    TETANUS VACCINE In future for injury   ---    DEXA SCAN 1/15/19   1/15/2015    Influenza Vaccine 08/01/2018 in fall   9/12/2017    Mammogram 03/08/2019   3/8/2018    Lipid Panel 03/06/2019   3/6/2018    Colonoscopy 08/27/2023 8/27/2013        No orders of the defined types were placed in this encounter.    The following information is provided to all patients.  This information is to help you find resources for any of the problems found today that may be affecting your health:                Living healthy guide: www.[facility].[place].[facility]      Understanding Diabetes: www.diabetes.org      Eating healthy: www.cdc.gov/healthyweight      [facility] [place]note 2[place] home safety checklist: www.cdc.gov/steadi/patient.html      Agency on Aging: www.goea.[place].[facility]      Alcoholics anonymous (AA): www.aa.org      Physical Activity: www.bao.nih.gov/pt4nwxr      Tobacco use: www.quitwithusla.org     Understanding Advance Care Planning  Advance care planning is the process of deciding ones own future medical care. It helps to make sure that if you cant speak for yourself, your wishes can still be carried out. The plan is a series of legal documents that note a persons wishes. The documents vary by state. Advance care planning should be discussed at a regular office visit with your primary care provider before an acute illness. Advance care planning is encouraged when a person has a serious illness that is expected to get worse. It may also be done before major surgery. And it can help you and your family be prepared in case of a major illness or injury. Advance care planning helps with making decisions at these times.    A healthcare proxy is a person who acts as the voice of a patient  when the patient cant speak for himself or herself. The name of this role varies by state. It may be called a Durable Medical Power of  or Durable Power of  for Healthcare. It may be called an agent, surrogate, or advocate. Or it may be called a representative or decision maker. It is an official duty that is identified by a legal document. The document also varies by state.   Why is advance care planning important?  If a person communicates his or her healthcare wishes:  · He or she will be given medical care that matches his or her values and goals.  · Family members will not be forced to make decisions in a crisis with no guidance.  Creating a plan  Making an advance care plan is often done in 3 steps:  · Thinking about ones wishes. To create an advance care plan, you should think about what kind of medical treatment you would want if you lose the ability to communicate. Are there any situations in which you would refuse or stop treatment? Are there therapies you would want or not want? And whom do you want to make decisions for you? There are many places to learn more about how to plan for your care. Ask your healthcare provider or  for resources.  · Picking a healthcare proxy. This means choosing a trusted person to speak for you only when you cant speak for yourself. When you cannot make medical decisions, your proxy makes sure the instructions in your advance care plan are followed. A proxy does not make decisions based on his or her own opinions. They must put aside those opinions and values if needed, and carry out your wishes.  · Filling out the legal documents. There are several kinds of legal documents for advance care planning. Each one tells healthcare providers your wishes. The documents may vary by state. They must be signed and may need to be witnessed or notarized. You can cancel or change them whenever you wish. Depending on your state, the documents may include a  Healthcare Proxy form, Living Will, Durable Medical Power of , Advance Directive, or others.  The familys role  The best help a family can give is to support their loved ones wishes. Open and honest communication is vital. Family should express any concerns they have about the patients choices while the patient can still make decisions in the event that his or her illness prevents him or her from communicating those wishes at a later time.   Date Last Reviewed: 4/1/2017 © 2000-2017 [employer]. [address] [place] [id number]. All rights reserved. This information is not intended as a substitute for professional medical care. Always follow your healthcare professional's instructions.        Advance Medical Directive    An advance medical directive is a form that lets you plan ahead for the care youd want if you could no longer express your wishes. This statement outlines the medical treatment youd want or names the person youd wish to make healthcare decisions for you. Be aware that laws vary from state to state, and it may be worthwhile to talk with an .  Writing down your wishes  · An advance directive is important whether youre young or old. Injury or illness can strike at any age.  · Decide what is important to you and the kind of treatment youd want, or not want to have.  · Some states allow only one kind of advance directive. Some let you do both a Durable Power of  for Health Care and a Living Will. Some states put both kinds on the same form.  A Durable Power of  for Health Care  · This form lets you name someone else to be your agent.  · This person can decide on treatment for you only when you cant speak for yourself.  · You do not need to be at the end of your life. He or she could speak for you if you were in a coma but were likely to recover.  A Living Will  · This form lets you list the care you want at the end of your life.  · A living  will applies only if you wont live without medical treatment. It would apply if you had advanced cancer, a massive stroke, or other serious illness from which you will not recover.  · It takes effect only when you can no longer express your wishes yourself.  Date Last Reviewed: 2/13/2016  © 2093-5182 [employer]. [address] [place] [address]. All rights reserved. This information is not intended as a substitute for professional medical care. Always follow your healthcare professional's instructions.        Choosing an Agent    A durable power of  for health care is only as good as the person you name to be your agent. If this person knows your treatment wishes and is willing to carry them out, youll probably be well-represented. Be sure to tell your agent whats important to you.  Who to choose  Here are suggestions for choosing an agent:  · You can name a family member, close friend, , , or rabbi.  · You should name one person as your agent. Then name one or two alternates. You need a backup person in case your first choice cant be reached when needed.  · Talk to each person you are thinking of naming as your agent or alternate. Do this before you decide who should carry out your wishes.  Your agent should be...  · A competent adult, 18 years or older.  · Someone you trust and can talk to about the care you want and what is important to you.  · Someone who supports your treatment choices.  In many states, your agent cant be...  · Your healthcare provider.  · An employee of your healthcare provider or of a hospital, nursing home, or hospice program where you receive care.  Some states list other restrictions about who can be named as an agent for an advance directive.  Tip: It's a good idea to write down your wishes and give a copy to your agent.   Date Last Reviewed: 2/14/2016  © 7678-9365 [employer]. [address] [place] [address]. All  rights reserved. This information is not intended as a substitute for professional medical care. Always follow your healthcare professional's instructions.        Preventing Falls: Making Changes in Your Living Space  Is your living space filled with hazards that could cause you to fall? Changes can make you safer. They could even save your life. Take a careful look around your home. Change what you can on your own. Hire someone or ask friends or family to help with harder tasks.      Be sure to add a nonslip mat to the inside of your shower or bathtub. Always keep a nightlight on. Keep a clear path from your bed to the bathroom. Move items from higher shelves to lower ones.   Remove hazards  · Remove things that can trip you, like throw rugs, boxes, piles of paper, or cords.  · Nail down rugs or carpeting if you don't want to remove them.  · Don't store items on stairs.  · Keep walkways clear.  · Clean up spills right away.  · Replace glass tables with wooden ones. They're safer if you fall.  Add safety devices  · Add handrails to both sides of stairs.  · Buy a raised toilet seat.  · Add grab bars near the toilet and in the shower.  · Get grabbers to help you reach things and avoid climbing.  Improve lighting  · Add nightlights to halls, bedrooms, and bathrooms.   · Put light switches at the top and bottom of stairs.  · Be sure each room and flight of stairs has proper lighting.  · Use shades or curtains to cut glare from windows.  · Put flashlights in each room. Replace burned-out bulbs.  · Get glowing light switches for room entrances.  Take other precautions  · Use nonskid floor wax.  · Buy a nonslip mat and a liquid soap dispenser for the shower.  · Tack down carpets or use slip-resistant backing.  · Put most-used items within easy reach.  · Add bright paint or tape on the top front edge of steps.  · Save big jobs, such as moving furniture or other heavy objects, for family or friends.  · Get professional help  installing grab bars. They can be unsafe if not installed the right way.  Fix riskier rooms first  Don't tackle everything at once. Focus on one room at a time. The bathroom is a common spot for falls, so you may start there. Or start with a room you spend lots of time in, such as your bedroom. Make only a few changes at once. This will give you time to adjust to them.     Outside your home  You might arrange for these changes yourself, or you might need to talk to your  or homeowners' association about them.  · Have loose boards on porches or damaged stairs repaired.  · Have rough edges, holes, or large cracks in sidewalks or driveways repaired.  · Have hazards that could trip you, such as hoses or bran, removed.  · Use high-wattage light bulbs (100 or greater) near outside doors and stairs.  · Get handrails added to outside stairs. Have them extend beyond the bottom step.  · Get help in winter weather with ice or snow removal.   Date Last Reviewed: 6/12/2015  © 8400-9191 [employer]. [address] [place], [place] [id number]. All rights reserved. This information is not intended as a substitute for professional medical care. Always follow your healthcare professional's instructions.        Exercises to Prevent Falls  Certain types of exercises may help make you less likely to fall. Try the ones below. Or do other exercises that your health care provider suggests. Depending on your health, you may need to start slowly. Don't let that stop you. Even small amounts of exercise can help you. Be sure to talk to your health care provider before starting any exercise program.       Improve balance  Many types of exercise can help improve balance. Rosendo chi and yoga are good examples. Here's another one to try. You can do it anytime and almost anywhere.  · Stand next to a counter or solid support.  · Push yourself up onto your tiptoes.  · Hold for 5 seconds. If you start to lose your balance,  "hold on to the counter.  · Rest and repeat 5 times. Work up to holding for 20 to 30 seconds, if you can. Increase flexibility  Being more flexible makes it easier for you to move around safely. Try exercises like the seated hamstring stretch.  · Sit in a chair and put one foot on a stool.  · Straighten your leg and reach with both hands down either side of your leg. Reach as far down your leg as you can.  · Hold for about 20 seconds.  · Go back to the starting position. Then repeat 5 times. Switch legs. Build strength  "Resistance" exercises help build strength. You can do them without equipment. Or you can use weights, elastic bands, or special machines. One such exercise is called the biceps curl. You can hold a 1-pound weight or even a can of soup. Do this exercise at least 3 times a week. Strive for every day.  · Sit up straight in a chair.  · Keep your elbow close to your body and your wrist straight.  · Bend your arm, moving your hand up to your shoulder. Then slowly lower your arm.  · Repeat 5 times. Switch to the other arm.   Build your staying power  Aerobic exercises make your heart and lungs stronger so you can keep moving longer. Walking and swimming are two of the best types of exercises you can do. Using a stationary bike is great, too. Find an aerobic exercise that you enjoy. Start slowly and build up. Even 5 minutes is helpful. Aim for a goal of 30 minutes, at least 3 times a week. You don't have to do 30 minutes in 1 session. Break it up and walk a little throughout the day.  More helpful tips  · Start easy. Slowly work up to doing more.  · Talk with your health care provider about the best exercises for you.  · Call senior centers or health clubs about exercise programs.  · If needed, have a family member watch you walk every so often to check your stability.  · Exercise with a friend. Choose an activity you both enjoy.  · Consider albino chi or yoga to strengthen your balance.  · Try exercises that " you can do anytime, anywhere. Here are 2 examples. Have someone with you when you first try these:  ¨ Practice walking by placing 1 foot right in front of the other.  ¨ Stand up and sit down 10 times. Repeat this throughout the day.   Date Last Reviewed: 6/13/2015  © 9276-4540 [employer]. [address] [place] [address], [place] [address]. All rights reserved. This information is not intended as a substitute for professional medical care. Always follow your healthcare professional's instructions.

## 2018-07-30 ENCOUNTER — HOSPITAL ENCOUNTER (EMERGENCY)
Facility: HOSPITAL | Age: 73
Discharge: HOME | End: 2018-07-30
Attending: EMERGENCY MEDICINE
Payer: MEDICARE

## 2018-07-30 VITALS
BODY MASS INDEX: 38.98 KG/M2 | HEART RATE: 78 BPM | HEIGHT: 63 IN | SYSTOLIC BLOOD PRESSURE: 169 MMHG | RESPIRATION RATE: 20 BRPM | WEIGHT: 220 LBS | DIASTOLIC BLOOD PRESSURE: 71 MMHG | OXYGEN SATURATION: 96 % | TEMPERATURE: 99 F

## 2018-07-30 DIAGNOSIS — I10 HYPERTENSION: Primary | ICD-10-CM

## 2018-07-30 LAB
BUN SERPL-MCNC: 12 MG/DL (ref 6–30)
CHLORIDE SERPL-SCNC: 103 MMOL/L (ref 95–110)
CREAT SERPL-MCNC: 0.8 MG/DL (ref 0.5–1.4)
GLUCOSE SERPL-MCNC: 105 MG/DL (ref 70–110)
HCT VFR BLD CALC: 43 %PCV (ref 36–54)
POC IONIZED CALCIUM: 1.13 MMOL/L (ref 1.06–1.42)
POC TCO2 (MEASURED): 29 MMOL/L (ref 23–29)
POTASSIUM BLD-SCNC: 4.2 MMOL/L (ref 3.5–5.1)
SAMPLE: NORMAL
SODIUM BLD-SCNC: 140 MMOL/L (ref 136–145)

## 2018-07-30 PROCEDURE — 99284 EMERGENCY DEPT VISIT MOD MDM: CPT | Mod: 25

## 2018-07-30 PROCEDURE — 93005 ELECTROCARDIOGRAM TRACING: CPT

## 2018-07-30 PROCEDURE — 99284 EMERGENCY DEPT VISIT MOD MDM: CPT | Mod: ,,, | Performed by: EMERGENCY MEDICINE

## 2018-07-30 PROCEDURE — 93010 ELECTROCARDIOGRAM REPORT: CPT | Mod: ,,, | Performed by: INTERNAL MEDICINE

## 2018-07-31 ENCOUNTER — PATIENT MESSAGE (OUTPATIENT)
Dept: ADMINISTRATIVE | Facility: OTHER | Age: 73
End: 2018-07-31

## 2018-07-31 ENCOUNTER — OFFICE VISIT (OUTPATIENT)
Dept: INTERNAL MEDICINE | Facility: CLINIC | Age: 73
End: 2018-07-31
Payer: MEDICARE

## 2018-07-31 VITALS
HEART RATE: 83 BPM | TEMPERATURE: 99 F | SYSTOLIC BLOOD PRESSURE: 111 MMHG | WEIGHT: 223.13 LBS | RESPIRATION RATE: 14 BRPM | DIASTOLIC BLOOD PRESSURE: 75 MMHG | BODY MASS INDEX: 39.54 KG/M2 | HEIGHT: 63 IN

## 2018-07-31 DIAGNOSIS — I10 HTN (HYPERTENSION), BENIGN: Primary | ICD-10-CM

## 2018-07-31 PROCEDURE — 99214 OFFICE O/P EST MOD 30 MIN: CPT | Mod: S$GLB,,, | Performed by: INTERNAL MEDICINE

## 2018-07-31 PROCEDURE — 3078F DIAST BP <80 MM HG: CPT | Mod: CPTII,S$GLB,, | Performed by: INTERNAL MEDICINE

## 2018-07-31 PROCEDURE — 3074F SYST BP LT 130 MM HG: CPT | Mod: CPTII,S$GLB,, | Performed by: INTERNAL MEDICINE

## 2018-07-31 PROCEDURE — 99999 PR PBB SHADOW E&M-EST. PATIENT-LVL III: CPT | Mod: PBBFAC,,, | Performed by: INTERNAL MEDICINE

## 2018-07-31 NOTE — ED TRIAGE NOTES
Pt c/o Blood pressure increased at home. Pt denies any changes in Bp medication lately. Pt denies any diet changes recently. Pt states was 182/114 at one point at home. Pt denies any chest pain, SOB, headache, vision changes, dizziness, light headedness, or falls. Pt A&O x4 during triage. Pt ambulatory per self through facility without difficulty.

## 2018-07-31 NOTE — ED NOTES
Pt c/o Blood pressure increased at home. Pt denies any changes in Bp medication lately. Pt denies any diet changes recently. Pt states was 182/114 at one point at home. Pt denies any chest pain, SOB, headache, vision changes, dizziness, light headedness, or falls.    LOC: The patient is awake, alert, and oriented to place, time, situation. Affect is appropriate.  Speech is appropriate and clear.     APPEARANCE: Patient resting comfortably in no acute distress.  Patient is clean and well groomed.    SKIN: The skin is warm and dry; color consistent with ethnicity.  Patient has normal skin turgor and moist mucus membranes.  Skin intact; no breakdown or bruising noted.     MUSCULOSKELETAL: Patient moving upper and lower extremities without difficulty.  Denies weakness.     RESPIRATORY: Airway is open and patent. Respirations spontaneous, even, easy, and non-labored.  Patient has a normal effort and rate.  No accessory muscle use noted. Denies cough.     CARDIAC:  Normal rhythm and rate noted.  No peripheral edema noted. No complaints of chest pain.      ABDOMEN: Soft and non tender to palpation.  No distention noted.     NEUROLOGIC: Eyes open spontaneously.  Behavior appropriate to situation.  Follows commands; facial expression symmetrical.  Purposeful motor response noted; normal sensation in all extremities.

## 2018-07-31 NOTE — PROGRESS NOTES
"Subjective:       Patient ID: Eboni Nicholas is a 72 y.o. female.    Chief Complaint: Follow-up (ER, blood pressure)    HPI    She presents for follow up of ER visit on 7/30/18 for HTN.     HTN- She is currently taking losartan 50mg daily. She went to the ER yesterday for elevated BP of 179/110 and later 182/114 - she was asymptomatic at the time. She denies chest pain, headache, dyspnea. Home BP ranges from 110s-150s/70s-100s for the past month. She uses an arm BP cuff. She reports that she was traveling the two weeks prior to ER visit, but denies eating out more often or change in salt intake. ECG and BMP normal in ER yesterday.     Review of Systems   Constitutional: Negative for activity change and unexpected weight change.   Eyes: Negative for visual disturbance.   Respiratory: Negative for chest tightness and shortness of breath.    Cardiovascular: Negative for chest pain and leg swelling.   Gastrointestinal: Negative for abdominal pain and nausea.   Genitourinary: Negative for decreased urine volume and difficulty urinating.   Musculoskeletal: Negative for arthralgias and myalgias.   Skin: Negative for color change.   Allergic/Immunologic: Negative for immunocompromised state.   Neurological: Negative for weakness, numbness and headaches.   Hematological: Does not bruise/bleed easily.       Objective:        Vitals:    07/31/18 1132   BP: 111/75   BP Location: Left arm   Patient Position: Sitting   BP Method: Large (Automatic)   Pulse: 83   Resp: 14   Temp: 99 °F (37.2 °C)   TempSrc: Oral   Weight: 101.2 kg (223 lb 1.7 oz)   Height: 5' 3" (1.6 m)       Body mass index is 39.52 kg/m².    Physical Exam   Constitutional: She is oriented to person, place, and time. She appears well-developed and well-nourished. No distress.   HENT:   Head: Normocephalic and atraumatic.   Nose: Nose normal.   Eyes: Conjunctivae and EOM are normal. Right eye exhibits no discharge. Left eye exhibits no discharge.   Neck: Normal " range of motion. Neck supple.   Cardiovascular: Normal rate, regular rhythm, normal heart sounds and intact distal pulses.    Pulmonary/Chest: Effort normal and breath sounds normal.   Abdominal: Soft. Bowel sounds are normal.   Musculoskeletal: Normal range of motion. She exhibits no edema.   Neurological: She is alert and oriented to person, place, and time.   Skin: Skin is warm and dry. She is not diaphoretic. No erythema.   Psychiatric: She has a normal mood and affect. Her behavior is normal. Thought content normal.       Assessment:     1. HTN (hypertension), benign           Plan:         1. HTN (hypertension), benign  - continue losartan 50mg daily. I am hesitant to increase medication at this time given occasional low-normal BP at home and today in clinic. We discussed that although her BP ranges at home, most of the readings that were elevated were not high enough to cause acute symptoms (with the exception of yesterday) and that I would rather monitor her BP closely than overtreat and cause hypotension, which could cause acute problems such as syncope. She agrees with the plan and is eager to join the digital HTN program.   - Hypertension Digital Medicine (HDMP) Enrollment Order  - Hypertension Digital Medicine (HDMP): Assign Onboarding Questionnaires

## 2018-07-31 NOTE — PATIENT INSTRUCTIONS
HYPERTENSION (High Blood Pressure)  High blood pressure is one of the silent killers.  It usually causes no symptoms, but it can cause serious problems if left untreated--such as stroke, heart failure, heart attack, vision problems and kidney failure.    Lifestyle Changes are FUNDAMENTAL!  Lower your salt intake.  Ideally <2g (2000mg) per day.  Don't smoke cigarettes or use any tobacco product.  Lose weight if you're overweight.  Exercise >30 min per day most days of the week  Eat a healthy diet that includes lots of fruits and vegetables and is low in fat.  Limit your alcohol and caffeine intake.    [email or web address].[email or web address]    http://www.heart.org/HEARTORG/HealthyLiving/HealthyEating/Nutrition/The-American-Heart-Associations-Diet-and-Lifestyle-Recommendations_[facility]_305855_Article.jsp#.Ws-yrPmnGpo    Blood pressure monitor  Omron brand monitors are consistently good, but whichever brand you get, I recommend the arm monitor, not the wrist kind (tend to be inaccurate).  Available in any local drugstore.      Monitoring Your Blood Pressure    Both numbers are important, so if one number is too high, we should adjust your medication.  Keep a log of the pressures and what time of day you took them.  Even if you've been doing well for a while, still check maybe once a week just to make sure since changes can occur at any time.      http://www.heart.org/HEARTORG/Conditions/HighBloodPressure/KnowYourNumbers/Understanding-Blood-Pressure-Readings_[facility]_301764_Article.jsp#.Ws-wmvmnGpo     Don't Forget Your Eye Exam!  *Make sure to get a dilated Eye Exam yearly to check for any damage from high blood pressure.  Don't wait till you have vision problems, or it may be much harder or even impossible to treat at that point!        Let me know if you would be interested in [facility]'s digital HTN program. You can find out more information here: [email or web address]

## 2018-07-31 NOTE — ED PROVIDER NOTES
Encounter Date: 7/30/2018    SCRIBE #1 NOTE: I, Bhanu Treadwell, am scribing for, and in the presence of, Dr. Olmstead.       History     Chief Complaint   Patient presents with    Hypertension     Patient reports that her blood pressure has been elevated, reports a hx of HTN. Pt has been compliant with meds. Denies any symptoms     Time patient was seen by the provider: 7:39 PM    The patient is a 72 y.o. female with co-morbidities including: anxiety and GERD who presents to the ED with a complaint of an abnormally high blood pressure of initial onset earlier today. Pt states she has been complaint with all blood pressure medications including her 50 mg losartan. Patient denies any neurologic deficits, visual changes, lightheadedness, headaches, neck pain, falls or any trauma. Denies fevers, chills, nausea, vomiting, headache, chest pain, SOB, changes in medication, any recent trauma, history of kidney disease, and any change in diet/appetite.         The history is provided by the patient, the spouse and medical records.     Review of patient's allergies indicates:   Allergen Reactions    Phenobarbital Rash    Sulfa (sulfonamide antibiotics) Rash     Past Medical History:   Diagnosis Date    Anxiety     Benign essential HTN     Depression     GERD (gastroesophageal reflux disease)     Hyperlipidemia     Hypothyroid     Sarcoidosis      Past Surgical History:   Procedure Laterality Date    HYSTERECTOMY      THYROID SURGERY       Family History   Problem Relation Age of Onset    Heart disease Brother     Diabetes Maternal Grandmother     Stroke Mother     Transient ischemic attack Mother     Cancer Father         lung ca    Rheum arthritis Sister     No Known Problems Daughter     Diabetes Son     No Known Problems Daughter     Heart disease Brother     Liver disease Brother      Social History   Substance Use Topics    Smoking status: Never Smoker    Smokeless tobacco: Never Used    Alcohol use  Yes      Comment: rarely drink glass of wine with dinner/ not weekly     Review of Systems   Constitutional: Negative for fever.   HENT: Negative for sore throat.    Respiratory: Negative for shortness of breath.    Cardiovascular: Negative for chest pain.        Pt endorses high blood pressure.   Gastrointestinal: Negative for nausea and vomiting.   Genitourinary: Negative for dysuria.   Musculoskeletal: Negative for back pain.   Skin: Negative for rash.   Neurological: Negative for weakness.   Hematological: Does not bruise/bleed easily.       Physical Exam     Initial Vitals [07/30/18 1917]   BP Pulse Resp Temp SpO2   (!) 169/71 78 20 98.9 °F (37.2 °C) 96 %      MAP       --         Physical Exam    Nursing note and vitals reviewed.    Gen/Constitutional: Interactive. No acute distress  Head: Normocephalic, Atraumatic  Neck: supple, no masses or LAD  Eyes: PERRLA, conjunctiva clear  Ears, Nose and Throat: No rhinorrhea or stridor.  Cardiac: Reg Rhythm, No murmur  Pulmonary: CTA Bilat, no wheezes, rhonchi, rales.  GI: Abdomen soft, non-tender, non-distended; no rebound or guarding  : No CVA tenderness.  Musculoskeletal: Extremities warm, well perfused, no erythema, no edema  Skin: No rashes  Neuro: Alert and Oriented x 3; No focal motor or sensory deficits.    Psych: Normal affect      ED Course   Procedures  Labs Reviewed   ISTAT PROCEDURE   ISTAT CHEM8     EKG Readings: (Independently Interpreted)   NSR with a heart rate of 67 bpm. Normal intervals. No STEMI.         Medical Decision Making:   History:   Old Medical Records: I decided to obtain old medical records.  Initial Assessment:   The patient is a 72 y.o. female with co-morbidities including: anxiety and GERD who presents to the ED with a complaint of an abnormally high blood pressure of initial onset earlier today.  Differential Diagnosis:   My initial differential diagnoses include but are not limited to:  HTN, ACS, RABIA, hypertensive encephalopathy,  hypertensive emergency and PRES.   Independently Interpreted Test(s):   I have ordered and independently interpreted EKG Reading(s) - see prior notes  Clinical Tests:   Lab Tests: Ordered and Reviewed  This is a well-appearing female in no acute distress.  Asymptomatic hypertension currently on antihypertensive at home.  patient at 1-2 episodes of elevated blood pressure in the setting of blood pressure medication use.  Remained asymptomatic with no signs of end-organ dysfunction or neurologic dysfunction.  No signs of TIA or CVA.  No altered mental status, i-STAT Chem 8 panel with no elevated creatinine and normal electrolytes.  Given asymptomatic hypertension, will continue having patient monitor her blood pressures at home and follow up with PCP as scheduled tomorrow for repeat evaluation and further discussion regarding antihypertensive therapy.  Patient agreeable to outpatient plan and close follow-up. Patient agreeable to discharge plan. Strict ED precautions and return instructions discussed at length and patient verbalized understanding. All questions were answered and ample time was given for questions.           Scribe Attestation:   Scribe #1: I performed the above scribed service and the documentation accurately describes the services I performed. I attest to the accuracy of the note.    I, Dr. Brayan Vuong, personally performed the services described in this documentation. All medical record entries made by the scribe were at my direction and in my presence.  I have reviewed the chart and agree that the record reflects my personal performance and is accurate and complete.              Clinical Impression:   The encounter diagnosis was Hypertension.      Disposition:   Disposition: Discharged  Condition: Stable       Brayan Vuong DO  Dept of Emergency Medicine   [facility]  Spectralink: [id number]                   Brayan Vuong DO  07/30/18 2207

## 2018-07-31 NOTE — DISCHARGE INSTRUCTIONS
Please follow-up with your primary care physician as scheduled for repeat evaluation and continued blood pressure monitoring.  If you have any concerns, worsening symptoms or stroke-like symptoms return to the emergency department.

## 2018-07-31 NOTE — ED NOTES
Pt alert, oriented, and stable for discharge. Pt discharged with spouse. Discharge instructions, medications, and follow up car reviewed with patient.

## 2018-08-02 ENCOUNTER — PATIENT MESSAGE (OUTPATIENT)
Dept: ADMINISTRATIVE | Facility: OTHER | Age: 73
End: 2018-08-02

## 2018-08-09 ENCOUNTER — PATIENT OUTREACH (OUTPATIENT)
Dept: OTHER | Facility: OTHER | Age: 73
End: 2018-08-09

## 2018-08-09 NOTE — PROGRESS NOTES
Last 5 Patient Entered Readings                                      Current 30 Day Average: 128/84     Recent Readings 8/9/2018 8/8/2018 8/7/2018 8/6/2018 8/5/2018    SBP (mmHg) 112 119 141 136 150    DBP (mmHg) 81 90 84 89 86    Pulse 83 83 97 94 75        08/08: RCB in 30minutes

## 2018-08-09 NOTE — LETTER
Melissa Siegel, PharmD  [address] [place] [address]     Dear Eboni Nicholas,    Welcome to the [facility] Digital [facility] Program!           My name is Melissa Siegel PharmD and I am your dedicated Digital Medicine clinician.  As an expert in medication management, I will help ensure that the medications you are taking continue to provide you with the intended benefits.        I am Reese Elizondo and I will be your health  for the duration of the program.  My  job is to help you identify lifestyle changes to improve your blood pressure control.  We will talk about nutrition, exercise, and other ways that you may be able to adjust your current habits to better your health. Together, we will work to improve your overall health and encourage you to meet your goals for a healthier lifestyle.    What we expect from YOU:    You will need to take blood pressure readings multiple times a week and no less than one reading per week.   It is important that you take your measurements at different times during the day, when possible.     What you should expect from your Digital Medicine Care Team:   We will provide you with education about high blood pressure, including lifestyle changes that could help you to control your blood pressure.   We will review your weekly readings and provide you with monthly blood pressure progress reports after you have been in the program for more than 30 days.   We will send monthly progress reports on your blood pressure control to your physician so they can follow along with your progress as well.    You will be able to reach me by phone at [phone] or through your [facility] account by clicking my name under Care Team on the right side of the home screen.    I look forward to working with you to achieve your blood pressure goals!    Sincerely,    Melissa Siegel PharmD  Your personal clinician    Please visit  [email or web address] to learn more about high blood pressure and what you can do lower your blood pressure.                                                                                           Eboni Guooix  [address] [place] [address]

## 2018-08-09 NOTE — PROGRESS NOTES
Last 5 Patient Entered Readings                                      Current 30 Day Average: 128/84     Recent Readings 8/9/2018 8/8/2018 8/7/2018 8/6/2018 8/5/2018    SBP (mmHg) 112 119 141 136 150    DBP (mmHg) 81 90 84 89 86    Pulse 83 83 97 94 75          Mrs. Eboni Nicholas is a 72 y.o. female who is newly enrolled in the [facility]pertension [facility].     The following information was reviewed/updated:  Preferred pharmacy   [id number] - [place] - [address]  [facility]  [address]  [facility] [address]  Phone: [phone] Fax: [phone]    [id number] [place] [facility] [address]  [address]  [facility] [place] [address]  Phone: [phone] Fax: [phone]      Patient prefers a 90 days supply    HYPERTENSION    Explained that we expect patient to obtain several blood pressures per week at random times of day.   Our goal is to get  BP to consistently below 130/80mmHg and make the process convenient so patient can avoid extra trips to the office. Getting your blood pressure below 130/80mmHg (definition of control) will reduce your risk for heart attack, kidney failure, stroke and death (as well as kidney failure, eye disease, & dementia).     Patient is not meeting the goal already. Current BP average 128/84 mmHg.  When asked what the patient thinks is causing BP to be elevated, she states: not sure    Discussed appropriate BP measuring technique:  Before taking your blood pressure, find a quiet place. You will need to listen for your heartbeat.  Roll up the sleeve on your left arm or remove any tight-sleeved clothing, if needed. (It's best to take your blood pressure from your left arm if you are right-handed.You can use the other arm if you have been told by your health care provider to do so.)  Rest in a chair next to a table for 5 to 10 minutes. (Your left arm should rest comfortably at heart level.)  Sit up straight with your back against the  chair, legs uncrossed and on the ground.  Rest your forearm on the table with the palm of your hand facing up.  You should not talk, read the newspaper, or watch television during this process.    Last 5 Patient Entered Readings                                      Current 30 Day Average: 128/84     Recent Readings 8/9/2018 8/8/2018 8/7/2018 8/6/2018 8/5/2018    SBP (mmHg) 112 119 141 136 150    DBP (mmHg) 81 90 84 89 86    Pulse 83 83 97 94 75          Instructed patient not to allow anyone else to use phone and BP cuff or glucometer.   I'm not available for emergencies. Patient will call [facility] on-call ([phone] or [phone]) or [phone] if needed.     Patient and I agreed that she will continue to monitor blood pressure and sodium intake and continue to remain adherent to medications.     I will plan to follow-up with the patient in 3 weeks.   Emailed patient link to [facility] Hypertension webpages and my contact information in case she has any questions.    Lifestyle Assessment:    Diet: <2,000mg of sodium.  Patient states she has been diligent about sodium intake.  She and  have high blood pressure.  Loves to cook from scratch and will go out occasionally.  Will make chicken, beans, turkey and salad.  Interested in receiving low sodium recipes.  Will mail quick and easy low sodium recipes.    Exercise: Patient states that she would like to get more exercise.  She and  are members of [employer], but have not been back in a while due to  being sick.  Has been taking care of .  Encouraged patient to focus on diet and once  get better to go back to [place] and walk in the pool.    Alcohol/Tobacco: Reports no alcohol or tobacco use.    Medication: Patient reports no complaints with medication.    Reviewed technique.  Patient has been taking readings on sofa and resting arm on side table.  Informed patient to wait 5-10 minutes before taking a reading and to wait at least  an hour after taking medication.

## 2018-08-17 ENCOUNTER — PATIENT OUTREACH (OUTPATIENT)
Dept: OTHER | Facility: OTHER | Age: 73
End: 2018-08-17

## 2018-08-17 DIAGNOSIS — I10 HTN (HYPERTENSION), BENIGN: Primary | ICD-10-CM

## 2018-08-17 NOTE — PROGRESS NOTES
Last 5 Patient Entered Readings                                      Current 30 Day Average: 126/83     Recent Readings 8/17/2018 8/16/2018 8/15/2018 8/14/2018 8/13/2018    SBP (mmHg) 143 131 114 125 130    DBP (mmHg) 89 80 81 81 88    Pulse 86 87 85 81 74      Patient's BP average is above goal of <130/80.     Patient denies s/s of hypotension (lightheadedness, dizziness, nausea, fatigue) associated with low readings. Instructed patient to inform me if this occurs, patient confirms understanding.      Patient denies s/s of hypertension (SOB, CP, severe headaches, changes in vision) associated with high readings. Instructed patient to go to the ED if BP > 180/110 and accompanied by hypertensive s/s, patient confirms understanding.    Will continue to monitor regularly. Will follow up in 2-3 weeks, sooner if BP begins to trend upward or downward.    Patient has my contact information and knows to call with any concerns or clinical changes.     Current HTN regimen:  Hypertension Medications             losartan (COZAAR) 50 MG tablet Take one tablet daily, then may repeat the dose if BP > 140/90      71 yo female with a PMH of HTN, depression, HLD, CHIDI, glaucoma. Patient is not using a CPAP machine due to irritation. I sent a referral to [facility] sleep clinic for newer technology for treatment of CHIDI. Patient is taking her readings while sitting in a recliner and using a TV stand. I advised her to take her readings at a dinner room table sitting in a dining room chair versus in a recliner. She will begin the proper technique today. She started losartan 50mg daily in 2017 and has tolerated the medication.

## 2018-08-30 ENCOUNTER — PATIENT OUTREACH (OUTPATIENT)
Dept: OTHER | Facility: OTHER | Age: 73
End: 2018-08-30

## 2018-08-30 NOTE — PROGRESS NOTES
Last 5 Patient Entered Readings                                      Current 30 Day Average: 124/81     Recent Readings 8/30/2018 8/29/2018 8/29/2018 8/28/2018 8/27/2018    SBP (mmHg) 125 131 137 136 109    DBP (mmHg) 83 86 98 66 80    Pulse 80 78 80 74 72          08/30: Unable to LVM.  Will call back next week.

## 2018-08-30 NOTE — PROGRESS NOTES
Last 5 Patient Entered Readings                                      Current 30 Day Average: 124/81     Recent Readings 8/30/2018 8/29/2018 8/29/2018 8/28/2018 8/27/2018    SBP (mmHg) 125 131 137 136 109    DBP (mmHg) 83 86 98 66 80    Pulse 80 78 80 74 72      Chart reviewed and no BP medications require adjustments at this time. She is close to goal and on a downward trend. A repeat BMP needed in 3/19.

## 2018-09-06 NOTE — PROGRESS NOTES
Last 5 Patient Entered Readings                                      Current 30 Day Average: 122/80     Recent Readings 9/5/2018 9/2/2018 8/31/2018 8/30/2018 8/29/2018    SBP (mmHg) 120 127 118 125 131    DBP (mmHg) 80 82 74 83 86    Pulse 76 74 80 80 78          Digital Medicine: Health  Follow Up    Lifestyle Modifications:    1.Dietary Modifications (Sodium intake <2,000mg/day, food labels, dining out): States she is aware of what she is eating.  Cut out processed foods and fried foods.  Trying to drink more water but finds it difficult.  Encouraged patient to take small sips and put some fruit in the cup to add flavor.  States she will try cucumber.  Had questions about list of low sodium foods.  Expressed interest in grocery list and will send it through mail.  Has not tried recipes yet, but interested in pork chops.  Will follow up with recipes.    2.Physical Activity: Reports walking 30 min every other day.   has htn too so they are working together on this.    3.Medication Therapy: Patient has been compliant with the medication regimen.    4.Patient has the following medication side effects/concerns: none  (Frequency/Alleviating factors/Precipitating factors, etc.)     Follow up with Mrs. Eboni Guochloe completed. No further questions or concerns. Will continue follow up to achieve health goals.    States she is happy with her readings and has been working hard on lifestyle.

## 2018-09-07 RX ORDER — LEVOTHYROXINE SODIUM 100 UG/1
100 TABLET ORAL DAILY
Qty: 90 TABLET | Refills: 1 | Status: SHIPPED | OUTPATIENT
Start: 2018-09-07 | End: 2018-09-17 | Stop reason: SDUPTHER

## 2018-09-17 RX ORDER — ESCITALOPRAM OXALATE 20 MG/1
20 TABLET ORAL DAILY
Qty: 90 TABLET | Refills: 1 | Status: SHIPPED | OUTPATIENT
Start: 2018-09-17 | End: 2019-09-06 | Stop reason: SDUPTHER

## 2018-09-17 RX ORDER — LEVOTHYROXINE SODIUM 100 UG/1
100 TABLET ORAL DAILY
Qty: 90 TABLET | Refills: 1 | Status: SHIPPED | OUTPATIENT
Start: 2018-09-17 | End: 2019-02-25 | Stop reason: SDUPTHER

## 2018-09-17 RX ORDER — SIMVASTATIN 40 MG/1
40 TABLET, FILM COATED ORAL NIGHTLY
Qty: 90 TABLET | Refills: 1 | Status: SHIPPED | OUTPATIENT
Start: 2018-09-17 | End: 2019-12-07 | Stop reason: SDUPTHER

## 2018-09-28 ENCOUNTER — PATIENT OUTREACH (OUTPATIENT)
Dept: OTHER | Facility: OTHER | Age: 73
End: 2018-09-28

## 2018-09-28 NOTE — PROGRESS NOTES
Last 5 Patient Entered Readings                                      Current 30 Day Average: 119/79     Recent Readings 9/26/2018 9/24/2018 9/23/2018 9/21/2018 9/20/2018    SBP (mmHg) 100 117 133 121 100    DBP (mmHg) 52 79 85 87 70    Pulse 72 84 80 77 94          09/28: Unable to LVM.  Will follow up next week.

## 2018-10-01 NOTE — PROGRESS NOTES
Last 5 Patient Entered Readings                                      Current 30 Day Average: 118/78     Recent Readings 9/30/2018 9/28/2018 9/26/2018 9/24/2018 9/23/2018    SBP (mmHg) 120 114 100 117 133    DBP (mmHg) 78 73 52 79 85    Pulse 72 84 72 84 80          Digital Medicine: Health  Follow Up    Lifestyle Modifications:    1.Dietary Modifications (Sodium intake <2,000mg/day, food labels, dining out): States she is being more aware of the salt intake and what she is eating.  States she controls salt intake when going out by asking restaurants to not add salt to food.    2.Physical Activity: deferred    3.Medication Therapy: Patient has been compliant with the medication regimen.    4.Patient has the following medication side effects/concerns: none  (Frequency/Alleviating factors/Precipitating factors, etc.)     Follow up with Mrs. Eboni Nicholas completed. No further questions or concerns. Will continue to follow up to achieve health goals.    States she is so proud of her blood pressure numbers because she has been working hard on lifestyle.  States she will be in [place] from 10/4-10/13.  Asked to be placed on hiatus.  Will take blood pressure with husbands cuff.

## 2018-10-22 ENCOUNTER — CLINICAL SUPPORT (OUTPATIENT)
Dept: URGENT CARE | Facility: CLINIC | Age: 73
End: 2018-10-22
Payer: MEDICARE

## 2018-10-22 VITALS — TEMPERATURE: 99 F

## 2018-10-22 PROCEDURE — G0008 ADMIN INFLUENZA VIRUS VAC: HCPCS | Mod: S$GLB,,, | Performed by: EMERGENCY MEDICINE

## 2018-10-22 PROCEDURE — 90662 IIV NO PRSV INCREASED AG IM: CPT | Mod: S$GLB,,, | Performed by: EMERGENCY MEDICINE

## 2018-10-30 ENCOUNTER — PATIENT OUTREACH (OUTPATIENT)
Dept: OTHER | Facility: OTHER | Age: 73
End: 2018-10-30

## 2018-10-30 NOTE — PROGRESS NOTES
Last 5 Patient Entered Readings                                      Current 30 Day Average: 110/78     Recent Readings 10/28/2018 10/26/2018 10/25/2018 10/23/2018 10/22/2018    SBP (mmHg) 105 102 96 105 107    DBP (mmHg) 77 70 66 74 79    Pulse 77 81 80 77 83          10/30: LVM. Will call in 2 weeks.  Patient at goal.

## 2018-11-14 NOTE — PROGRESS NOTES
"Last 5 Patient Entered Readings                                      Current 30 Day Average: 110/78     Recent Readings 11/14/2018 11/13/2018 11/12/2018 11/10/2018 11/9/2018    SBP (mmHg) 86 107 124 110 95    DBP (mmHg) 62 79 81 78 67    Pulse 77 77 78 75 79          Digital Medicine: Health  Follow Up    Lifestyle Modifications:    1.Dietary Modifications (Sodium intake <2,000mg/day, food labels, dining out): States she has been doing well with her eating habits.  Cooking salmon with brussels sprouts for dinner tonight and using no salt seasonings and garlic.  States she is constantly aware of foods and  helps keep her accountable.     2.Physical Activity: States she has been trying to go on walks.  Feels she is not as successful as she would like to be due to what she calls "laziness."  States she would like to start walking around that block and just needs to put shoes on and "just do it."  Encouraged patient to think about how good she will feel after the walk and use it for motivation.    3.Medication Therapy: Patient has been compliant with the medication regimen.    4.Patient has the following medication side effects/concerns: none  (Frequency/Alleviating factors/Precipitating factors, etc.)     Follow up with Mrs. Eboni Guochloe completed. No further questions or concerns. Will continue to follow up to achieve health goals.    Had low reading today, but states she will take it again.  States she has a little headache today, but lower reading may be from walking inside from the cold weather.  "

## 2018-12-03 NOTE — PROGRESS NOTES
Last 5 Patient Entered Readings                                      Current 30 Day Average: 107/75     Recent Readings 12/2/2018 12/1/2018 12/1/2018 12/1/2018 11/30/2018    SBP (mmHg) 97 103 108 108 93    DBP (mmHg) 74 76 81 68 68    Pulse 77 82 89 89 89      Chart reviewed and no BP medications require adjustments at this time.

## 2018-12-19 ENCOUNTER — PATIENT OUTREACH (OUTPATIENT)
Dept: OTHER | Facility: OTHER | Age: 73
End: 2018-12-19

## 2018-12-19 NOTE — PROGRESS NOTES
Last 5 Patient Entered Readings                                      Current 30 Day Average: 107/75     Recent Readings 12/17/2018 12/16/2018 12/14/2018 12/14/2018 12/13/2018    SBP (mmHg) 105 114 113 86 108    DBP (mmHg) 74 82 64 76 70    Pulse 65 92 93 68 63          12/19: Unable to LVM.  Will call back in 4 weeks.  Patient at goal.

## 2018-12-27 ENCOUNTER — TELEPHONE (OUTPATIENT)
Dept: INTERNAL MEDICINE | Facility: CLINIC | Age: 73
End: 2018-12-27

## 2018-12-27 DIAGNOSIS — I10 ESSENTIAL HYPERTENSION, BENIGN: Primary | ICD-10-CM

## 2018-12-27 DIAGNOSIS — E78.5 HYPERLIPIDEMIA, UNSPECIFIED HYPERLIPIDEMIA TYPE: ICD-10-CM

## 2018-12-27 NOTE — TELEPHONE ENCOUNTER
----- Message from Sheila Fernandez sent at 12/27/2018 10:15 AM CST -----  Contact: Self [phone]  Patient is scheduled on 04- for labs, please link orders.  Physical scheduled on 04-.    Thanks

## 2019-01-16 NOTE — PROGRESS NOTES
"Last 5 Patient Entered Readings                                      Current 30 Day Average: 115/79     Recent Readings 1/16/2019 1/15/2019 1/14/2019 1/11/2019 1/11/2019    SBP (mmHg) 100 97 139 137 148    DBP (mmHg) 66 67 86 88 85    Pulse 69 69 64 62 64          Digital Medicine: Health  Follow Up    Lifestyle Modifications:    1.Dietary Modifications (Sodium intake <2,000mg/day, food labels, dining out): Patient states she is proud of herself for making good decisions while they were out of town.  States they were at a casino and stuck with good food choices, and assisted  to stay on track as well since he has diabetes and hypertension.    2.Physical Activity: Patient reports exercise has been "good and bad."  State she and her  are going to start going to [employer] MWF to swim.  Set as SM[employer].  Also going to try to walk around the block as instructed by patient's 's PCP.   has parkinson's so she is trying to motivate him to exercise which is helping her stay focused on exercise as well.    3.Medication Therapy: Patient has been compliant with the medication regimen.    4.Patient has the following medication side effects/concerns: none  (Frequency/Alleviating factors/Precipitating factors, etc.)     Follow up with Mrs. Eboni BROOKS Cristopher completed. No further questions or concerns. Will continue to follow up to achieve health goals.    Patient asked about fluctuations in readings.  Reviewed technique and informed patient to charge BP cuff 1x/2weeks.  "

## 2019-01-28 ENCOUNTER — INITIAL CONSULT (OUTPATIENT)
Dept: OPTOMETRY | Facility: CLINIC | Age: 74
End: 2019-01-28
Payer: MEDICARE

## 2019-01-28 DIAGNOSIS — H40.1131 PRIMARY OPEN ANGLE GLAUCOMA (POAG) OF BOTH EYES, MILD STAGE: Primary | ICD-10-CM

## 2019-01-28 PROCEDURE — 99999 PR PBB SHADOW E&M-EST. PATIENT-LVL II: CPT | Mod: PBBFAC,HCNC,, | Performed by: OPTOMETRIST

## 2019-01-28 PROCEDURE — 99999 PR PBB SHADOW E&M-EST. PATIENT-LVL II: ICD-10-PCS | Mod: PBBFAC,HCNC,, | Performed by: OPTOMETRIST

## 2019-01-28 PROCEDURE — 92004 COMPRE OPH EXAM NEW PT 1/>: CPT | Mod: HCNC,S$GLB,, | Performed by: OPTOMETRIST

## 2019-01-28 PROCEDURE — 92004 PR EYE EXAM, NEW PATIENT,COMPREHESV: ICD-10-PCS | Mod: HCNC,S$GLB,, | Performed by: OPTOMETRIST

## 2019-01-29 NOTE — PROGRESS NOTES
HPI     Pt is here for an IOP check OU. Pt sts she has been on Latanoprost 1 drop   OU qhs x 1 year from Dr. Collier and was seeing Dr. Collier q 3 mos   for glaucoma until recently when he stopped accepting her insurance. Pt   sts she also had laser surgery for narrow angles x many years ago. Pt had   a complete eye exam in Nov-Dec 2018. Pt denies any changes in vision. Pt   denies flashes and floaters OU.    Last edited by Sheri Montano, OD on 1/28/2019  4:12 PM. (History)            Assessment /Plan     For exam results, see Encounter Report.    Primary open angle glaucoma (POAG) of both eyes, mild stage  -     [place] Visual Field - Intermediate - OU - Both Eyes; Future  -     OCT, Optic Nerve - OU - Both Eyes; Future      Educated pt on findings. Large C/D OD>OS. Higher IOP OD>OS. Patient currently taking latanoprost 1gtt OU QHS. Patient to continue gtt and RTC for RNFL OCT + HVF.   Complete Pachy + gonio at f/u. IOP check as well to determine if another IOP lowering gtt needs to be added.   Requested patient to get records from previous eye care provider.     RTC in 2 weeks for further glc w/u or prn.

## 2019-02-04 ENCOUNTER — PES CALL (OUTPATIENT)
Dept: ADMINISTRATIVE | Facility: CLINIC | Age: 74
End: 2019-02-04

## 2019-02-06 ENCOUNTER — CLINICAL SUPPORT (OUTPATIENT)
Dept: OPHTHALMOLOGY | Facility: CLINIC | Age: 74
End: 2019-02-06
Payer: MEDICARE

## 2019-02-06 ENCOUNTER — OFFICE VISIT (OUTPATIENT)
Dept: OPTOMETRY | Facility: CLINIC | Age: 74
End: 2019-02-06
Payer: MEDICARE

## 2019-02-06 DIAGNOSIS — H40.039 ANATOMICAL NARROW ANGLE: ICD-10-CM

## 2019-02-06 DIAGNOSIS — H40.1131 PRIMARY OPEN ANGLE GLAUCOMA (POAG) OF BOTH EYES, MILD STAGE: ICD-10-CM

## 2019-02-06 DIAGNOSIS — H40.1131 PRIMARY OPEN ANGLE GLAUCOMA (POAG) OF BOTH EYES, MILD STAGE: Primary | ICD-10-CM

## 2019-02-06 PROCEDURE — 92082 INTERMEDIATE VISUAL FIELD XM: CPT | Mod: HCNC,S$GLB,, | Performed by: OPTOMETRIST

## 2019-02-06 PROCEDURE — 76514 ECHO EXAM OF EYE THICKNESS: CPT | Mod: HCNC,S$GLB,, | Performed by: OPTOMETRIST

## 2019-02-06 PROCEDURE — 99999 PR PBB SHADOW E&M-EST. PATIENT-LVL I: ICD-10-PCS | Mod: PBBFAC,HCNC,,

## 2019-02-06 PROCEDURE — 99999 PR PBB SHADOW E&M-EST. PATIENT-LVL I: CPT | Mod: PBBFAC,HCNC,,

## 2019-02-06 PROCEDURE — 99999 PR PBB SHADOW E&M-EST. PATIENT-LVL II: CPT | Mod: PBBFAC,HCNC,, | Performed by: OPTOMETRIST

## 2019-02-06 PROCEDURE — 92020 PR SPECIAL EYE EVAL,GONIOSCOPY: ICD-10-PCS | Mod: HCNC,S$GLB,, | Performed by: OPTOMETRIST

## 2019-02-06 PROCEDURE — 92133 CPTRZD OPH DX IMG PST SGM ON: CPT | Mod: HCNC,S$GLB,, | Performed by: OPTOMETRIST

## 2019-02-06 PROCEDURE — 92014 COMPRE OPH EXAM EST PT 1/>: CPT | Mod: HCNC,S$GLB,, | Performed by: OPTOMETRIST

## 2019-02-06 PROCEDURE — 92082 HUMPHREY VISUAL FIELD-OU-INTERMEDIATE-BOTH EYES: ICD-10-PCS | Mod: HCNC,S$GLB,, | Performed by: OPTOMETRIST

## 2019-02-06 PROCEDURE — 92020 GONIOSCOPY: CPT | Mod: HCNC,S$GLB,, | Performed by: OPTOMETRIST

## 2019-02-06 PROCEDURE — 92014 PR EYE EXAM, EST PATIENT,COMPREHESV: ICD-10-PCS | Mod: HCNC,S$GLB,, | Performed by: OPTOMETRIST

## 2019-02-06 PROCEDURE — 99999 PR PBB SHADOW E&M-EST. PATIENT-LVL II: ICD-10-PCS | Mod: PBBFAC,HCNC,, | Performed by: OPTOMETRIST

## 2019-02-06 PROCEDURE — 92133 OCT, OPTIC NERVE - OU - BOTH EYES: ICD-10-PCS | Mod: HCNC,S$GLB,, | Performed by: OPTOMETRIST

## 2019-02-06 PROCEDURE — 76514 PR  US, EYE, FOR CORNEAL THICKNESS: ICD-10-PCS | Mod: HCNC,S$GLB,, | Performed by: OPTOMETRIST

## 2019-02-06 RX ORDER — TIMOLOL MALEATE 2.5 MG/ML
1 SOLUTION/ DROPS OPHTHALMIC 2 TIMES DAILY
Qty: 5 ML | Refills: 3 | Status: SHIPPED | OUTPATIENT
Start: 2019-02-06 | End: 2019-06-10 | Stop reason: SDUPTHER

## 2019-02-06 NOTE — PROGRESS NOTES
HPI     DLS:19  Pt is here for her 2 week f/u for glc check, pty did her HVF and OCT   today.   No f/f   Latanoprost gtts ( can you add to her meds)     Last edited by Oralia Gipson MA on 2019  9:52 AM. (History)            Assessment /Plan     For exam results, see Encounter Report.    Primary open angle glaucoma (POAG) of both eyes, mild stage    Anatomical narrow angle    Other orders  -     timolol maleate 0.25% (TIMOPTIC) 0.25 % Drop; Place 1 drop into the right eye 2 (two) times daily.  Dispense: 5 mL; Refill: 3      Educated pt on findings. Significant RNFL thinning S/T/I OD + Inferior altitudinal VF defect OD. No thinning or visual field defect OS. Pachy WNL OU.   IOP still high today, 20 OU. Plan to  IOP OD to 12-14. Rx timolol 1 gtt BID OD to be added with latanoprost 1gtt OU QHS. Monitor IOP 1 month and decided if combo gtt should be initiated vs continue timolol vs SLT.   H/O narrow angles with PI OU. Gonio shows open angle. Discussed symptoms of angle closure and to RTC ASAP.     RTC in 1 month for IOP check + DFE or prn.

## 2019-02-08 RX ORDER — LOSARTAN POTASSIUM 50 MG/1
TABLET ORAL
Qty: 180 TABLET | Refills: 3 | Status: SHIPPED | OUTPATIENT
Start: 2019-02-08 | End: 2019-04-29 | Stop reason: SDUPTHER

## 2019-02-15 ENCOUNTER — PATIENT OUTREACH (OUTPATIENT)
Dept: OTHER | Facility: OTHER | Age: 74
End: 2019-02-15

## 2019-02-15 NOTE — PROGRESS NOTES
Last 5 Patient Entered Readings                                      Current 30 Day Average: 111/76     Recent Readings 2/14/2019 2/13/2019 2/12/2019 2/11/2019 2/10/2019    SBP (mmHg) 122 112 114 137 123    DBP (mmHg) 77 76 83 82 88    Pulse 67 65 81 72 81          Digital Medicine: Health  Follow Up    Lifestyle Modifications:    1.Dietary Modifications (Sodium intake <2,000mg/day, food labels, dining out): deferred    2.Physical Activity: States she and her  have been trying to walk 3x/week by the pool.  States they do not swim.  Feels she has more energy.  Patient on track with [facility] [place].    3.Medication Therapy: Patient has been compliant with the medication regimen.    4.Patient has the following medication side effects/concerns: none  (Frequency/Alleviating factors/Precipitating factors, etc.)     Follow up with Mrs. Eboni BROOKS Cristopher completed. No further questions or concerns. Will continue to follow up to achieve health goals.

## 2019-02-18 ENCOUNTER — PATIENT OUTREACH (OUTPATIENT)
Dept: OTHER | Facility: OTHER | Age: 74
End: 2019-02-18

## 2019-02-18 NOTE — PROGRESS NOTES
Last 5 Patient Entered Readings                                      Current 30 Day Average: 111/76     Recent Readings 2/17/2019 2/14/2019 2/13/2019 2/12/2019 2/11/2019    SBP (mmHg) 96 122 112 114 137    DBP (mmHg) 64 77 76 83 82    Pulse 72 67 65 81 72      HPI:  Called patient to follow up on her low blood pressure reading 2/17. Patient endorses adherence to medication regimen. Patient denies hypotensive s/sx (lightheadedness, dizziness, nausea, fatigue); patient denies hypertensive s/sx (SOB, CP, severe headaches, changes in vision). Instructed patient to seek medical care if BP > 180/110 and is accompanied by hypertensive s/sx associated, patient confirms understanding.     Assessment:  Reviewed recent readings. Per 2017 ACC/ AHA HTN guidelines (goal of BP < 130/80), current 30-day average is well controlled.     Plan:  Continue current medication regimen. I will continue to monitor regularly and will follow-up in 12 weeks, sooner if blood pressure begins to trend upward or downward. She will undergo a BMP 4/5.    Current medication regimen:  Hypertension Medications             losartan (COZAAR) 50 MG tablet Take one tablet daily, then may repeat the dose if BP > 140/90          Patient denies having questions or concerns. Patient has my contact information and knows to call with any concerns or clinical changes.

## 2019-02-19 ENCOUNTER — PATIENT MESSAGE (OUTPATIENT)
Dept: ADMINISTRATIVE | Facility: OTHER | Age: 74
End: 2019-02-19

## 2019-02-25 RX ORDER — LEVOTHYROXINE SODIUM 100 UG/1
100 TABLET ORAL DAILY
Qty: 90 TABLET | Refills: 1 | Status: SHIPPED | OUTPATIENT
Start: 2019-02-25 | End: 2019-04-29 | Stop reason: SDUPTHER

## 2019-03-06 ENCOUNTER — OFFICE VISIT (OUTPATIENT)
Dept: OPTOMETRY | Facility: CLINIC | Age: 74
End: 2019-03-06
Payer: MEDICARE

## 2019-03-06 DIAGNOSIS — H40.1112 PRIMARY OPEN ANGLE GLAUCOMA (POAG) OF RIGHT EYE, MODERATE STAGE: Primary | ICD-10-CM

## 2019-03-06 DIAGNOSIS — H40.039 ANATOMICAL NARROW ANGLE: ICD-10-CM

## 2019-03-06 DIAGNOSIS — H25.13 NUCLEAR SCLEROSIS OF BOTH EYES: ICD-10-CM

## 2019-03-06 DIAGNOSIS — H40.1121 PRIMARY OPEN ANGLE GLAUCOMA (POAG) OF LEFT EYE, MILD STAGE: ICD-10-CM

## 2019-03-06 PROCEDURE — 99999 PR PBB SHADOW E&M-EST. PATIENT-LVL II: CPT | Mod: PBBFAC,HCNC,, | Performed by: OPTOMETRIST

## 2019-03-06 PROCEDURE — 92014 COMPRE OPH EXAM EST PT 1/>: CPT | Mod: HCNC,S$GLB,, | Performed by: OPTOMETRIST

## 2019-03-06 PROCEDURE — 92014 PR EYE EXAM, EST PATIENT,COMPREHESV: ICD-10-PCS | Mod: HCNC,S$GLB,, | Performed by: OPTOMETRIST

## 2019-03-06 PROCEDURE — 99999 PR PBB SHADOW E&M-EST. PATIENT-LVL II: ICD-10-PCS | Mod: PBBFAC,HCNC,, | Performed by: OPTOMETRIST

## 2019-03-06 NOTE — PROGRESS NOTES
HPI     Timolol OD BID + Latanoprost OU QHS. Has not missed a dosing.  No issue   with current gtts.    Last edited by Sheri Montano, OD on 3/6/2019  1:13 PM. (History)            Assessment /Plan     For exam results, see Encounter Report.    Primary open angle glaucoma (POAG) of right eye, moderate stage    Primary open angle glaucoma (POAG) of left eye, mild stage    Anatomical narrow angle    Nuclear sclerosis of both eyes      1-2. Educated pt on findings. OD IOP dropped to target pressure of 14. Continue Timolol BID OD + Latanoprost QHS OU. Monitor IOP 3 months.    3. H/O PI OU. Monitor.   4. Educated pt on findings. Not visually significant. No need for removal at this time. Monitor yearly.     Pt has h/o of sarcoid but has never had ocular involvement. No signs on today's DFE as well. Monitor yearly.     RTC in 3 months for IOP check or prn.

## 2019-03-22 ENCOUNTER — PATIENT OUTREACH (OUTPATIENT)
Dept: OTHER | Facility: OTHER | Age: 74
End: 2019-03-22

## 2019-03-22 NOTE — PROGRESS NOTES
Last 5 Patient Entered Readings                                      Current 30 Day Average: 114/79     Recent Readings 3/22/2019 3/21/2019 3/20/2019 3/18/2019 3/17/2019    SBP (mmHg) 95 104 117 111 110    DBP (mmHg) 67 70 71 81 82    Pulse 69 78 72 61 93        3/22: Not available.  Will call back in 1 week.

## 2019-04-01 NOTE — PROGRESS NOTES
Last 5 Patient Entered Readings                                      Current 30 Day Average: 116/80     Recent Readings 3/31/2019 3/30/2019 3/29/2019 3/28/2019 3/27/2019    SBP (mmHg) 113 105 126 117 107    DBP (mmHg) 78 77 79 85 74    Pulse 76 77 74 80 63          Digital Medicine: Health  Follow Up    Lifestyle Modifications:    1.Dietary Modifications (Sodium intake <2,000mg/day, food labels, dining out): Patient reports she continues to watch her salt in her diet.    2.Physical Activity: Patient states she is about to take a walk.  Reports she and her  are walking 3x/week, meeting her SMG.      3.Medication Therapy: Patient has been compliant with the medication regimen.    4.Patient has the following medication side effects/concerns: none  (Frequency/Alleviating factors/Precipitating factors, etc.)     Follow up with Mrs. Eboni BROOKS Cristopher completed. No further questions or concerns. Will continue to follow up to achieve health goals.

## 2019-04-05 ENCOUNTER — LAB VISIT (OUTPATIENT)
Dept: LAB | Facility: HOSPITAL | Age: 74
End: 2019-04-05
Attending: INTERNAL MEDICINE
Payer: MEDICARE

## 2019-04-05 DIAGNOSIS — I10 ESSENTIAL HYPERTENSION, BENIGN: ICD-10-CM

## 2019-04-05 DIAGNOSIS — E78.5 HYPERLIPIDEMIA, UNSPECIFIED HYPERLIPIDEMIA TYPE: ICD-10-CM

## 2019-04-05 LAB
ALBUMIN SERPL BCP-MCNC: 4.1 G/DL (ref 3.5–5.2)
ALP SERPL-CCNC: 58 U/L (ref 55–135)
ALT SERPL W/O P-5'-P-CCNC: 42 U/L (ref 10–44)
ANION GAP SERPL CALC-SCNC: 8 MMOL/L (ref 8–16)
AST SERPL-CCNC: 54 U/L (ref 10–40)
BASOPHILS # BLD AUTO: 0.03 K/UL (ref 0–0.2)
BASOPHILS NFR BLD: 0.4 % (ref 0–1.9)
BILIRUB SERPL-MCNC: 0.6 MG/DL (ref 0.1–1)
BUN SERPL-MCNC: 15 MG/DL (ref 8–23)
CALCIUM SERPL-MCNC: 10 MG/DL (ref 8.7–10.5)
CHLORIDE SERPL-SCNC: 104 MMOL/L (ref 95–110)
CHOLEST SERPL-MCNC: 201 MG/DL (ref 120–199)
CHOLEST/HDLC SERPL: 3.6 {RATIO} (ref 2–5)
CO2 SERPL-SCNC: 27 MMOL/L (ref 23–29)
CREAT SERPL-MCNC: 0.9 MG/DL (ref 0.5–1.4)
DIFFERENTIAL METHOD: ABNORMAL
EOSINOPHIL # BLD AUTO: 0.3 K/UL (ref 0–0.5)
EOSINOPHIL NFR BLD: 4.4 % (ref 0–8)
ERYTHROCYTE [DISTWIDTH] IN BLOOD BY AUTOMATED COUNT: 14.3 % (ref 11.5–14.5)
EST. GFR  (AFRICAN AMERICAN): >60 ML/MIN/1.73 M^2
EST. GFR  (NON AFRICAN AMERICAN): >60 ML/MIN/1.73 M^2
GLUCOSE SERPL-MCNC: 110 MG/DL (ref 70–110)
HCT VFR BLD AUTO: 43.4 % (ref 37–48.5)
HDLC SERPL-MCNC: 56 MG/DL (ref 40–75)
HDLC SERPL: 27.9 % (ref 20–50)
HGB BLD-MCNC: 13.5 G/DL (ref 12–16)
IMM GRANULOCYTES # BLD AUTO: 0.04 K/UL (ref 0–0.04)
IMM GRANULOCYTES NFR BLD AUTO: 0.6 % (ref 0–0.5)
LDLC SERPL CALC-MCNC: 116 MG/DL (ref 63–159)
LYMPHOCYTES # BLD AUTO: 2.1 K/UL (ref 1–4.8)
LYMPHOCYTES NFR BLD: 29.8 % (ref 18–48)
MCH RBC QN AUTO: 30.8 PG (ref 27–31)
MCHC RBC AUTO-ENTMCNC: 31.1 G/DL (ref 32–36)
MCV RBC AUTO: 99 FL (ref 82–98)
MONOCYTES # BLD AUTO: 0.5 K/UL (ref 0.3–1)
MONOCYTES NFR BLD: 7.1 % (ref 4–15)
NEUTROPHILS # BLD AUTO: 4 K/UL (ref 1.8–7.7)
NEUTROPHILS NFR BLD: 57.7 % (ref 38–73)
NONHDLC SERPL-MCNC: 145 MG/DL
NRBC BLD-RTO: 0 /100 WBC
PLATELET # BLD AUTO: 241 K/UL (ref 150–350)
PMV BLD AUTO: 11.1 FL (ref 9.2–12.9)
POTASSIUM SERPL-SCNC: 4.8 MMOL/L (ref 3.5–5.1)
PROT SERPL-MCNC: 7.7 G/DL (ref 6–8.4)
RBC # BLD AUTO: 4.38 M/UL (ref 4–5.4)
SODIUM SERPL-SCNC: 139 MMOL/L (ref 136–145)
T4 FREE SERPL-MCNC: 1.02 NG/DL (ref 0.71–1.51)
TRIGL SERPL-MCNC: 145 MG/DL (ref 30–150)
TSH SERPL DL<=0.005 MIU/L-ACNC: 5.11 UIU/ML (ref 0.4–4)
WBC # BLD AUTO: 6.88 K/UL (ref 3.9–12.7)

## 2019-04-05 PROCEDURE — 80061 LIPID PANEL: CPT | Mod: HCNC

## 2019-04-05 PROCEDURE — 36415 COLL VENOUS BLD VENIPUNCTURE: CPT | Mod: HCNC,PO

## 2019-04-05 PROCEDURE — 80053 COMPREHEN METABOLIC PANEL: CPT | Mod: HCNC

## 2019-04-05 PROCEDURE — 84443 ASSAY THYROID STIM HORMONE: CPT | Mod: HCNC

## 2019-04-05 PROCEDURE — 84439 ASSAY OF FREE THYROXINE: CPT | Mod: HCNC

## 2019-04-05 PROCEDURE — 85025 COMPLETE CBC W/AUTO DIFF WBC: CPT | Mod: HCNC

## 2019-04-12 ENCOUNTER — HOSPITAL ENCOUNTER (OUTPATIENT)
Dept: RADIOLOGY | Facility: HOSPITAL | Age: 74
Discharge: HOME | End: 2019-04-12
Attending: INTERNAL MEDICINE
Payer: MEDICARE

## 2019-04-12 ENCOUNTER — OFFICE VISIT (OUTPATIENT)
Dept: INTERNAL MEDICINE | Facility: CLINIC | Age: 74
End: 2019-04-12
Payer: MEDICARE

## 2019-04-12 VITALS
WEIGHT: 222.25 LBS | TEMPERATURE: 99 F | SYSTOLIC BLOOD PRESSURE: 92 MMHG | RESPIRATION RATE: 16 BRPM | BODY MASS INDEX: 39.38 KG/M2 | HEIGHT: 63 IN | DIASTOLIC BLOOD PRESSURE: 70 MMHG

## 2019-04-12 DIAGNOSIS — E78.5 DYSLIPIDEMIA: ICD-10-CM

## 2019-04-12 DIAGNOSIS — Z12.39 BREAST CANCER SCREENING: ICD-10-CM

## 2019-04-12 DIAGNOSIS — I10 HTN (HYPERTENSION), BENIGN: ICD-10-CM

## 2019-04-12 DIAGNOSIS — Z00.00 ENCOUNTER FOR PREVENTIVE HEALTH EXAMINATION: Primary | ICD-10-CM

## 2019-04-12 DIAGNOSIS — E03.9 ACQUIRED HYPOTHYROIDISM: ICD-10-CM

## 2019-04-12 DIAGNOSIS — E66.01 SEVERE OBESITY WITH BODY MASS INDEX (BMI) OF 35.0 TO 39.9 WITH COMORBIDITY: ICD-10-CM

## 2019-04-12 DIAGNOSIS — G47.33 OSA (OBSTRUCTIVE SLEEP APNEA): ICD-10-CM

## 2019-04-12 PROCEDURE — 99999 PR PBB SHADOW E&M-EST. PATIENT-LVL III: ICD-10-PCS | Mod: PBBFAC,HCNC,, | Performed by: INTERNAL MEDICINE

## 2019-04-12 PROCEDURE — 77067 SCR MAMMO BI INCL CAD: CPT | Mod: 26,HCNC,, | Performed by: RADIOLOGY

## 2019-04-12 PROCEDURE — 99397 PER PM REEVAL EST PAT 65+ YR: CPT | Mod: HCNC,S$GLB,, | Performed by: INTERNAL MEDICINE

## 2019-04-12 PROCEDURE — 99499 UNLISTED E&M SERVICE: CPT | Mod: HCNC,S$GLB,, | Performed by: INTERNAL MEDICINE

## 2019-04-12 PROCEDURE — 99499 RISK ADDL DX/OHS AUDIT: ICD-10-PCS | Mod: HCNC,S$GLB,, | Performed by: INTERNAL MEDICINE

## 2019-04-12 PROCEDURE — 99999 PR PBB SHADOW E&M-EST. PATIENT-LVL III: CPT | Mod: PBBFAC,HCNC,, | Performed by: INTERNAL MEDICINE

## 2019-04-12 PROCEDURE — 77067 SCR MAMMO BI INCL CAD: CPT | Mod: TC,HCNC,PO

## 2019-04-12 PROCEDURE — 3074F SYST BP LT 130 MM HG: CPT | Mod: HCNC,CPTII,S$GLB, | Performed by: INTERNAL MEDICINE

## 2019-04-12 PROCEDURE — 3078F PR MOST RECENT DIASTOLIC BLOOD PRESSURE < 80 MM HG: ICD-10-PCS | Mod: HCNC,CPTII,S$GLB, | Performed by: INTERNAL MEDICINE

## 2019-04-12 PROCEDURE — 3074F PR MOST RECENT SYSTOLIC BLOOD PRESSURE < 130 MM HG: ICD-10-PCS | Mod: HCNC,CPTII,S$GLB, | Performed by: INTERNAL MEDICINE

## 2019-04-12 PROCEDURE — 77063 BREAST TOMOSYNTHESIS BI: CPT | Mod: 26,HCNC,, | Performed by: RADIOLOGY

## 2019-04-12 PROCEDURE — 3078F DIAST BP <80 MM HG: CPT | Mod: HCNC,CPTII,S$GLB, | Performed by: INTERNAL MEDICINE

## 2019-04-12 PROCEDURE — 77067 MAMMO DIGITAL SCREENING BILAT WITH TOMOSYNTHESIS_CAD: ICD-10-PCS | Mod: 26,HCNC,, | Performed by: RADIOLOGY

## 2019-04-12 PROCEDURE — 99397 PR PREVENTIVE VISIT,EST,65 & OVER: ICD-10-PCS | Mod: HCNC,S$GLB,, | Performed by: INTERNAL MEDICINE

## 2019-04-12 PROCEDURE — 77063 MAMMO DIGITAL SCREENING BILAT WITH TOMOSYNTHESIS_CAD: ICD-10-PCS | Mod: 26,HCNC,, | Performed by: RADIOLOGY

## 2019-04-12 RX ORDER — LATANOPROST 50 UG/ML
SOLUTION/ DROPS OPHTHALMIC
COMMUNITY
Start: 2019-04-06 | End: 2019-08-01 | Stop reason: ALTCHOICE

## 2019-04-12 NOTE — PROGRESS NOTES
History of present illness:  73-year-old lady in today for general health assessment.    Current medications:  All medications are noted in reviewed in the electronic medical record medication list.    Review of systems:  General: no fever, chills, generalized body aches. No unexpected weight loss.  Eyes:  No visual disturbances.  HEENT:  No hoarseness, dysphagia, ear pain. The patient does snore.  Previously diagnosed with obstructive sleep apnea years ago but does not have CPAP is not use such.  She does complain of daytime drowsiness and  describes episodes of apnea.  Respiratory:  No cough, no shortness of breath.  Cardiovascular: no chest pain, palpitations, cough, exertional limb pain. No edema.  GI: no nausea, vomiting.  No abdominal pain. No change in bowel habits.  No melena, no hematochezia.  : no dysuria. No change in the color or character of the urine. No urinary frequency.  Musculoskeletal: no joint pain or swelling.  Neurologic:  No focal neurological complaints.  No headaches.  Skin:  No rashes or other concerns.  Psych:  No emotional issues      Past medical history:  Hypertension.  Dyslipidemia.  Obesity.  GERD.  Hypothyroidism.  Obstructive sleep apnea currently untreated.  Distant past history of sarcoidosis.    Past surgical history, family medical history social history is are all noted reviewed the electronic medical record history sections.    Health screenings:  Colonoscopy 2013.  She has had both pneumococcal vaccines.  Mammogram March 2018.  Eye exam March 2019.  Bone densitometry 2014 normal    Physical examination:  GENERAL:  Alert, appropriately groomed, no acute distress.  VS:  Blood pressure taken manually by this examiner 118/62.  EYES: sclerae white ,nonicteric. PERRL.  HEENT:  Normocephalic. Ear canals and tympanic membranes normal. Mouth and pharynx normal. No thyromegaly. Trachea midline and freely mobile.  LUNGS:  Clear to ascultation and normal to  percussion.  CARDIOVASCULAR:  Normal heart sounds.  No significant murmur. Carotids full bilaterally without bruit.  Pedal pulses intact .  No abdominal bruit.  No peripheral extremity edema.  GI: the abdomen is obese, soft, no distension. No masses , tenderness, organomegaly.  LYMPHATIC:  No axillary, inguinal , cervical adenopathy.  MUSCULOSKELETAL:  Range of motion, stability and strength of the right and left upper and lower extremities normal. No swollen or tender joints  NEUROLOGIC:  DTR's normal. No gross motor or sensory deficits apparent, gait normal.  SKIN:  No rashes.   MS:  Alert, oriented , affect and mood all appropriate    Data:  Health maintenance laboratory data noted reviewed from April 5, 2018. All reasonable.    Impression:  73-year-old lady in reasonably good health with several chronic medical conditions.    Hypertension is well controlled.    Dyslipidemia on statin therapy.    Severe obesity with associated comorbidities-BMI 39.37    GERD controlled.    Hypothyroidism on replacement therapy.    Distant history sarcoidosis.    Obstructive sleep apnea currently untreated.      Plan:  The patient prefers annual mammogram in such is ordered.  Referral to Sleep Medicine.  Encouraged increased physical activity weight loss.  Return to clinic 6 months for follow-up.

## 2019-04-29 RX ORDER — LOSARTAN POTASSIUM 50 MG/1
TABLET ORAL
Qty: 180 TABLET | Refills: 3 | Status: SHIPPED | OUTPATIENT
Start: 2019-04-29 | End: 2020-12-08 | Stop reason: SDUPTHER

## 2019-04-29 RX ORDER — LEVOTHYROXINE SODIUM 100 UG/1
100 TABLET ORAL DAILY
Qty: 90 TABLET | Refills: 1 | Status: SHIPPED | OUTPATIENT
Start: 2019-04-29 | End: 2020-01-15

## 2019-05-06 ENCOUNTER — PATIENT OUTREACH (OUTPATIENT)
Dept: OTHER | Facility: OTHER | Age: 74
End: 2019-05-06

## 2019-05-10 ENCOUNTER — PATIENT MESSAGE (OUTPATIENT)
Dept: SLEEP MEDICINE | Facility: CLINIC | Age: 74
End: 2019-05-10

## 2019-05-10 ENCOUNTER — TELEPHONE (OUTPATIENT)
Dept: INTERNAL MEDICINE | Facility: CLINIC | Age: 74
End: 2019-05-10

## 2019-05-13 ENCOUNTER — OFFICE VISIT (OUTPATIENT)
Dept: INTERNAL MEDICINE | Facility: CLINIC | Age: 74
End: 2019-05-13
Payer: MEDICARE

## 2019-05-13 VITALS
WEIGHT: 224.63 LBS | HEIGHT: 63 IN | BODY MASS INDEX: 39.8 KG/M2 | HEART RATE: 75 BPM | DIASTOLIC BLOOD PRESSURE: 80 MMHG | SYSTOLIC BLOOD PRESSURE: 120 MMHG

## 2019-05-13 DIAGNOSIS — E66.01 SEVERE OBESITY (BMI 35.0-39.9) WITH COMORBIDITY: ICD-10-CM

## 2019-05-13 DIAGNOSIS — E78.5 HYPERLIPIDEMIA, UNSPECIFIED HYPERLIPIDEMIA TYPE: ICD-10-CM

## 2019-05-13 DIAGNOSIS — I10 HTN (HYPERTENSION), BENIGN: ICD-10-CM

## 2019-05-13 DIAGNOSIS — I51.89 LEFT VENTRICULAR DIASTOLIC DYSFUNCTION WITH PRESERVED SYSTOLIC FUNCTION: ICD-10-CM

## 2019-05-13 DIAGNOSIS — G47.33 OSA (OBSTRUCTIVE SLEEP APNEA): ICD-10-CM

## 2019-05-13 DIAGNOSIS — Z00.00 ENCOUNTER FOR PREVENTIVE HEALTH EXAMINATION: Primary | ICD-10-CM

## 2019-05-13 DIAGNOSIS — Z78.0 ASYMPTOMATIC POSTMENOPAUSAL STATE: ICD-10-CM

## 2019-05-13 DIAGNOSIS — F32.5 MAJOR DEPRESSIVE DISORDER WITH SINGLE EPISODE, IN FULL REMISSION: ICD-10-CM

## 2019-05-13 DIAGNOSIS — H40.10X0 OPEN-ANGLE GLAUCOMA, UNSPECIFIED GLAUCOMA STAGE, UNSPECIFIED LATERALITY, UNSPECIFIED OPEN-ANGLE GLAUCOMA TYPE: ICD-10-CM

## 2019-05-13 DIAGNOSIS — E03.9 ACQUIRED HYPOTHYROIDISM: ICD-10-CM

## 2019-05-13 PROCEDURE — 99499 RISK ADDL DX/OHS AUDIT: ICD-10-PCS | Mod: HCNC,S$GLB,, | Performed by: NURSE PRACTITIONER

## 2019-05-13 PROCEDURE — 3074F PR MOST RECENT SYSTOLIC BLOOD PRESSURE < 130 MM HG: ICD-10-PCS | Mod: HCNC,CPTII,S$GLB, | Performed by: NURSE PRACTITIONER

## 2019-05-13 PROCEDURE — 99499 UNLISTED E&M SERVICE: CPT | Mod: HCNC,S$GLB,, | Performed by: NURSE PRACTITIONER

## 2019-05-13 PROCEDURE — 3074F SYST BP LT 130 MM HG: CPT | Mod: HCNC,CPTII,S$GLB, | Performed by: NURSE PRACTITIONER

## 2019-05-13 PROCEDURE — G0439 PPPS, SUBSEQ VISIT: HCPCS | Mod: HCNC,S$GLB,, | Performed by: NURSE PRACTITIONER

## 2019-05-13 PROCEDURE — G0439 PR MEDICARE ANNUAL WELLNESS SUBSEQUENT VISIT: ICD-10-PCS | Mod: HCNC,S$GLB,, | Performed by: NURSE PRACTITIONER

## 2019-05-13 PROCEDURE — 99999 PR PBB SHADOW E&M-EST. PATIENT-LVL IV: ICD-10-PCS | Mod: PBBFAC,HCNC,, | Performed by: NURSE PRACTITIONER

## 2019-05-13 PROCEDURE — 3079F PR MOST RECENT DIASTOLIC BLOOD PRESSURE 80-89 MM HG: ICD-10-PCS | Mod: HCNC,CPTII,S$GLB, | Performed by: NURSE PRACTITIONER

## 2019-05-13 PROCEDURE — 3079F DIAST BP 80-89 MM HG: CPT | Mod: HCNC,CPTII,S$GLB, | Performed by: NURSE PRACTITIONER

## 2019-05-13 PROCEDURE — 99999 PR PBB SHADOW E&M-EST. PATIENT-LVL IV: CPT | Mod: PBBFAC,HCNC,, | Performed by: NURSE PRACTITIONER

## 2019-05-13 NOTE — PROGRESS NOTES
"Eboni Nicholas presented for a  Medicare AWV and comprehensive Health Risk Assessment today. The following components were reviewed and updated:    · Medical history  · Family History  · Social history  · Allergies and Current Medications  · Health Risk Assessment  · Health Maintenance  · Care Team     ** See Completed Assessments for Annual Wellness Visit within the encounter summary.**       The following assessments were completed:  · Living Situation  · CAGE  · Depression Screening  · Timed Get Up and Go  · Whisper Test  · Cognitive Function Screening  · Nutrition Screening  · ADL Screening  · PAQ Screening        Vitals:    05/13/19 1421   BP: 120/80   BP Location: Right arm   Patient Position: Sitting   BP Method: Large (Manual)   Pulse: 75   Weight: 101.9 kg (224 lb 10.4 oz)   Height: 5' 3" (1.6 m)     Body mass index is 39.79 kg/m².     Physical Exam   Constitutional: She is oriented to person, place, and time. She appears well-developed and well-nourished. No distress.   Obese stature   HENT:   Head: Normocephalic and atraumatic.   Eyes: No scleral icterus.   Neck: Normal range of motion.   Cardiovascular: Normal rate, regular rhythm, normal heart sounds and intact distal pulses.   Pulmonary/Chest: Effort normal and breath sounds normal. No respiratory distress.   Abdominal: She exhibits no distension.   Musculoskeletal: She exhibits no edema.   Neurological: She is alert and oriented to person, place, and time.   Skin: Skin is warm and dry. She is not diaphoretic.   Psychiatric: She has a normal mood and affect. Her behavior is normal.       Diagnoses and health risks identified today and associated recommendations/orders:    1. Encounter for preventive health examination  Assessments completed  Preventive health recommendations reviewed  Shingles and tetanus vaccines discussed    2. Major depressive disorder with single episode, in full remission  Stable.   Controlled with current medical therapy  Followed " by PCP.     3. Severe obesity (BMI 35.0-39.9) with comorbidity  Stable.   Healthy lifestyle and exercise encouaraged  Followed by PCP.     4. HTN (hypertension), benign  Stable.   Controlled with current medical therapy  Followed by PCP.     5. Hyperlipidemia, unspecified hyperlipidemia type  Stable.   Controlled with current medical therapy  Followed by PCP.     6. Left ventricular diastolic dysfunction with preserved systolic function  Stable.   Followed by PCP.     7. Acquired hypothyroidism  Stable.   Controlled with current medical therapy  Followed by PCP.     8. Open-angle glaucoma, unspecified glaucoma stage, unspecified laterality, unspecified open-angle glaucoma type  Stable.   Controlled with current medical therapy  Followed by optometry     9. CHIDI (obstructive sleep apnea)  Stable.   Not currently wearing a cpap machine, has an appointment scheduled with sleep medicine  Followed by sleep medicine.     10. Asymptomatic postmenopausal state  - DXA Bone Density Spine And Hip; Future      Provided Eboni with a 5-10 year written screening schedule and personal prevention plan. Recommendations were developed using the USPSTF age appropriate recommendations. Education, counseling, and referrals were provided as needed. After Visit Summary printed and given to patient which includes a list of additional screenings\tests needed.    Follow up in about 5 months (around 10/13/2019) for a routine visit with your primary care provider or sooner if problems arise. .    Chhaya Fry NP

## 2019-05-13 NOTE — PATIENT INSTRUCTIONS
Counseling and Referral of Other Preventative  (Italic type indicates deductible and co-insurance are waived)    Patient Name: Eboni Nicholas  Today's Date: 5/13/2019    Health Maintenance       Date Due Completion Date    TETANUS VACCINE 10/10/1965 ---    Shingles Vaccine (1 of 2) 10/10/1997 ---    DEXA SCAN 01/15/2018 1/15/2015    Influenza Vaccine 08/01/2019 10/22/2018    Mammogram 04/12/2021 4/12/2019    Colonoscopy 08/27/2023 8/27/2013    Lipid Panel 04/05/2024 4/5/2019        Orders Placed This Encounter   Procedures    DXA Bone Density Spine And Hip     The following information is provided to all patients.  This information is to help you find resources for any of the problems found today that may be affecting your health:                Living healthy guide: www.[facility].[place].[facility]      Understanding Diabetes: www.diabetes.org      Eating healthy: www.cdc.gov/healthyweight      CDC home safety checklist: www.cdc.gov/steadi/patient.html      Agency on Aging: www.goea.[place].[facility]      Alcoholics anonymous (AA): www.aa.org      Physical Activity: www.bao.nih.gov/sy4ncuc      Tobacco use: www.quitwithusla.org

## 2019-05-14 ENCOUNTER — OFFICE VISIT (OUTPATIENT)
Dept: SLEEP MEDICINE | Facility: CLINIC | Age: 74
End: 2019-05-14
Payer: MEDICARE

## 2019-05-14 VITALS
BODY MASS INDEX: 39.54 KG/M2 | HEIGHT: 63 IN | SYSTOLIC BLOOD PRESSURE: 108 MMHG | DIASTOLIC BLOOD PRESSURE: 76 MMHG | HEART RATE: 72 BPM | WEIGHT: 223.13 LBS

## 2019-05-14 DIAGNOSIS — Z71.89 CPAP USE COUNSELING: ICD-10-CM

## 2019-05-14 DIAGNOSIS — G47.30 SLEEP APNEA, UNSPECIFIED TYPE: Primary | ICD-10-CM

## 2019-05-14 PROCEDURE — 99203 PR OFFICE/OUTPT VISIT, NEW, LEVL III, 30-44 MIN: ICD-10-PCS | Mod: HCNC,S$GLB,, | Performed by: NURSE PRACTITIONER

## 2019-05-14 PROCEDURE — 99999 PR PBB SHADOW E&M-EST. PATIENT-LVL IV: ICD-10-PCS | Mod: PBBFAC,HCNC,, | Performed by: NURSE PRACTITIONER

## 2019-05-14 PROCEDURE — 3078F PR MOST RECENT DIASTOLIC BLOOD PRESSURE < 80 MM HG: ICD-10-PCS | Mod: HCNC,CPTII,S$GLB, | Performed by: NURSE PRACTITIONER

## 2019-05-14 PROCEDURE — 99999 PR PBB SHADOW E&M-EST. PATIENT-LVL IV: CPT | Mod: PBBFAC,HCNC,, | Performed by: NURSE PRACTITIONER

## 2019-05-14 PROCEDURE — 3074F SYST BP LT 130 MM HG: CPT | Mod: HCNC,CPTII,S$GLB, | Performed by: NURSE PRACTITIONER

## 2019-05-14 PROCEDURE — 3074F PR MOST RECENT SYSTOLIC BLOOD PRESSURE < 130 MM HG: ICD-10-PCS | Mod: HCNC,CPTII,S$GLB, | Performed by: NURSE PRACTITIONER

## 2019-05-14 PROCEDURE — 99203 OFFICE O/P NEW LOW 30 MIN: CPT | Mod: HCNC,S$GLB,, | Performed by: NURSE PRACTITIONER

## 2019-05-14 PROCEDURE — 3078F DIAST BP <80 MM HG: CPT | Mod: HCNC,CPTII,S$GLB, | Performed by: NURSE PRACTITIONER

## 2019-05-14 PROCEDURE — 1101F PT FALLS ASSESS-DOCD LE1/YR: CPT | Mod: HCNC,CPTII,S$GLB, | Performed by: NURSE PRACTITIONER

## 2019-05-14 PROCEDURE — 1101F PR PT FALLS ASSESS DOC 0-1 FALLS W/OUT INJ PAST YR: ICD-10-PCS | Mod: HCNC,CPTII,S$GLB, | Performed by: NURSE PRACTITIONER

## 2019-05-14 NOTE — PROGRESS NOTES
Eboni Nicholas  was seen as a new patient at the request of CAMILO Elizondo MD for the evaluation of obstructive sleep apnea.    CHIEF COMPLAINT:    Chief Complaint   Patient presents with    Sleep Apnea       05/14/2019 KRISTYN Madrid NP: Initial HISTORY OF PRESENT ILLNESS: Eboni Nicholas is a 73 y.o. female is here for sleep evaluation.      Previously diagnosed with CHIDI via PSG prompted by snoring, witnessed apneas, unrefreshing sleep, daytime sleepiness and fatigue.   Used CPAP x 2 years with resolution of sleep complaints. Mask began to have overt air leaks and air hunger, pt stopped use  Per patient results of 2010 PSG never explained to her. Didn't know maintenance of therapy  Would like to resume PAP treatment   Discussed in detail PSG results. [    Due to break in usage, pt understands requirement to complete requal PSG  Face-to-face visit completed today     Denies symptoms of restless legs or kicking during sleep.    Occupation: retired     [place] Sleepiness Scale score during initial sleep evaluation was 16.    SLEEP ROUTINE:    Bed partner:     Time to bed:  11 pm   Sleep onset latency: quickly         Disruptions or awakenings:  1 - 2, bathroom, not difficult to fall back asleep    Wakeup time:    8:30 am  Perceived sleep quality: poor         02/11/2010 Split  lb. The overall AHI was 86.6 with an oxygen ashley of 72%. Effective control of sleep disordered breathing was seen at a pressure of 16 cm of water, including supine REM stage sleep.      PAST MEDICAL HISTORY:    Active Ambulatory Problems     Diagnosis Date Noted    Acquired hypothyroidism 01/22/2016    Major depressive disorder with single episode, in full remission 10/17/2016    Sarcoid of nose 10/17/2016    Left ventricular diastolic dysfunction with preserved systolic function 10/17/2016    Nuclear sclerosis of both eyes 10/17/2016    Hyperlipemia 12/15/2016    Severe obesity (BMI 35.0-39.9) with comorbidity 03/01/2017     Glaucoma (increased eye pressure) 07/12/2017    Secondary hypertension 07/21/2017    HTN (hypertension), benign 07/21/2017    Dyslipidemia 03/06/2018    CHIDI (obstructive sleep apnea) 04/12/2019     Resolved Ambulatory Problems     Diagnosis Date Noted    Anorectal skin tags 08/29/2013    Left lateral knee pain 02/27/2014    Dyslipidemia 01/22/2016    Non morbid obesity due to excess calories 10/17/2016    Obesity (BMI 30.0-34.9) 10/17/2016     Past Medical History:   Diagnosis Date    Anxiety     Benign essential HTN     Depression     GERD (gastroesophageal reflux disease)     Hyperlipidemia     Hypothyroid     CHIDI (obstructive sleep apnea)     Sarcoidosis                 PAST SURGICAL HISTORY:    Past Surgical History:   Procedure Laterality Date    COLONOSCOPY N/A 8/27/2013    Performed by Tomás Franco [facility] at [facility] (4[facility] F[facility])    HYSTERECTOMY      THYROID SURGERY           FAMILY HISTORY:                Family History   Problem Relation Age of Onset    Heart disease Brother     Diabetes Maternal Grandmother     Stroke Mother     Transient ischemic attack Mother     Cancer Father         lung ca    Rheum arthritis Sister     No Known Problems Daughter     Diabetes Son     No Known Problems Daughter     Heart disease Brother     Liver disease Brother        SOCIAL HISTORY:          Tobacco:   Social History     Tobacco Use   Smoking Status Never Smoker   Smokeless Tobacco Never Used       Alcohol use:    Social History     Substance and Sexual Activity   Alcohol Use Yes    Comment: rarely drink glass of wine with dinner/ not weekly                 ALLERGIES:    Review of patient's allergies indicates:   Allergen Reactions    Phenobarbital Rash    Sulfa (sulfonamide antibiotics) Rash       CURRENT MEDICATIONS:    Current Outpatient Medications   Medication Sig Dispense Refill    aspirin (ECOTRIN) 81 MG EC tablet Take 81 mg by mouth once daily.      calcium-vitamin D  "250-100 mg-unit per tablet Take 1 tablet by mouth once daily.       escitalopram oxalate (LEXAPRO) 20 MG tablet Take 1 tablet (20 mg total) by mouth once daily. 90 tablet 1    latanoprost 0.005 % ophthalmic solution       levothyroxine (SYNTHROID) 100 MCG tablet Take 1 tablet (100 mcg total) by mouth once daily. 90 tablet 1    losartan (COZAAR) 50 MG tablet Take one tablet daily, then may repeat the dose if BP > 140/90 180 tablet 3    multivitamin (THERAGRAN) per tablet Take 1 tablet by mouth once daily.      simvastatin (ZOCOR) 40 MG tablet Take 1 tablet (40 mg total) by mouth every evening. 90 tablet 1    timolol maleate 0.25% (TIMOPTIC) 0.25 % Drop Place 1 drop into the right eye 2 (two) times daily. 5 mL 3     No current facility-administered medications for this visit.                   REVIEW OF SYSTEMS:     Sleep related symptoms as per HPI.  CONST:Denies weight gain    HEENT: Denies sinus congestion  PULM: Denies dyspnea  CARD:  Denies palpitations   GI:  Denies acid reflux  : Denies polyuria  NEURO: Denies headaches  PSYCH: Denies mood disturbance  HEME: Denies anemia    Otherwise, a balance of 10 systems reviewed is negative.          PHYSICAL EXAM:  Vitals:    05/14/19 0942   BP: 108/76   Pulse: 72   Weight: 101.2 kg (223 lb 1.7 oz)   Height: 5' 3" (1.6 m)   PainSc: 0-No pain     Body mass index is 39.52 kg/m².     GENERAL: Obese development, well groomed  HEENT:  Conjunctivae are non-erythematous; Pupils equal, round, and reactive to light; Nose is symmetrical; Nasal mucosa is pink and moist; Septum is midline; Inferior turbinates are normal; Nasal airflow is normal; Posterior pharynx is pink; Modified Mallampati: IV; Posterior palate is normal; Tonsils +1; Uvula is normal and pink;Tongue is normal; Dentition is fair; No TMJ tenderness; Jaw opening and protrusion without click and without discomfort.  NECK: Supple. Neck circumference is 14 inches. No thyromegaly. No palpable nodes.    SKIN: On " face and neck: No abrasions, no rashes, no lesions.  No subcutaneous nodules are palpable.  RESPIRATORY: Chest is clear to auscultation.  Normal chest expansion and non-labored breathing at rest.  CARDIOVASCULAR: Normal S1, S2.  No murmurs, gallops or rubs. No carotid bruits bilaterally.  EXTREMITIES: No edema. No clubbing. No cyanosis. Station normal. Gait normal.        NEURO/PSYCH: Oriented to time, place and person. Normal attention span and concentration. Affect is full. Mood is normal.                                              ASSESSMENT:    Obstructive sleep apnea, severe by AHI.  The patient symptomatically hasnoring, witnessed apneas, unrefreshing sleep, daytime sleepiness and fatigue with findings of crowded oral airway and elevated body mass index. Medical co-morbidities: depression, glaucoma, HLD, HTN, sarcoidosis, and obesity.  This warrants treatment for obstructive sleep apnea. Due to break in CPAP usage proceed with requal PSG then set up with PAP.      PLAN:    Diagnostic: Requal Polysomnogram. The nature of this procedure and its indication was discussed with the patient. Patient will be contacted after sleep study is done.  Call with results, order PAP machine @ [facility]. RTC 31 - 90 days after PAP .     Education: During our discussion today, we talked about the etiology of obstructive sleep apnea as well as the potential ramifications of untreated sleep apnea, which could include daytime sleepiness, hypertension, heart disease and/or stroke. We discussed potential treatment options, which could include weight loss, body positioning, continuous positive airway pressure (CPAP), OA, EPAP, or referral for surgical consideration.     Precautions: The patient was advised to abstain from driving should they feel sleepy  or drowsy.     Thank you for allowing me the opportunity to participate in the care of your patient.

## 2019-05-14 NOTE — LETTER
May 14, 2019      CAMILO Elizondo MD  [address] [place]  [place] [address]           [facility] [place] [address] [facility] [address]  [address]  [facility] [address]  Phone: [phone]          Patient: Eboni Nicholas   MR Number: [id number]   YOB: 1945   Date of Visit: 5/14/2019       Dear Dr. CAMILO Elizondo:    Thank you for referring Eboni Nicholas to me for evaluation. Attached you will find relevant portions of my assessment and plan of care.    If you have questions, please do not hesitate to call me. I look forward to following Eboni Nicholas along with you.    Sincerely,    Avery Madrid NP    Enclosure  CC:  No Recipients    If you would like to receive this communication electronically, please contact [email or web address] or [phone] to request more information on [employer] access.    For providers and/or their staff who would like to refer a patient to [facility], please contact us through our one-stop-shop provider referral line, [facility], at [phone].    If you feel you have received this communication in error or would no longer like to receive these types of communications, please e-mail [email or web address]

## 2019-05-14 NOTE — PROGRESS NOTES
Last 5 Patient Entered Readings                                      Current 30 Day Average:      Recent Readings 4/4/2019 4/4/2019 4/3/2019 4/2/2019 4/1/2019    SBP (mmHg) 120 122 104 112 101    DBP (mmHg) 85 95 70 79 77    Pulse 77 61 75 80 78        5/14: Following up about lack of readings.  Patient states she has been taking readings every day. States she has not logged into [employer] on her phone recently.  Informed patient to log in at least 1x/week so readings can transmit.  Patient confirmed understanding.  States she has an appt this morning.  Will check later today if readings transmit.

## 2019-05-14 NOTE — PATIENT INSTRUCTIONS
Ruddy or Arielle will contact you to schedule your sleep study. Their number is [phone] (ext 2). The [facility] Sleep Lab is located on 7th floor of the [facility].    If you have not been contacted regarding scheduling your sleep test after one week from today, please notify me via [facility] or by phone by calling [phone] (ext 1).     We will call you when the sleep study results are ready - if you have not heard from us by 2 weeks from the date of the study, please call [phone] (ext 1).    You are advised to abstain from driving should you feel sleepy or drowsy.

## 2019-05-15 NOTE — PROGRESS NOTES
Last 5 Patient Entered Readings                                      Current 30 Day Average: 121/82     Recent Readings 5/14/2019 5/13/2019 5/12/2019 5/11/2019 5/9/2019    SBP (mmHg) 108 120 113 125 120    DBP (mmHg) 76 80 82 73 85    Pulse 78 90 73 59 74          5/15: readings are transmitting.  Will follow up in 4 weeks.  Patient close to goal.

## 2019-05-27 ENCOUNTER — TELEPHONE (OUTPATIENT)
Dept: SLEEP MEDICINE | Facility: OTHER | Age: 74
End: 2019-05-27

## 2019-06-10 ENCOUNTER — OFFICE VISIT (OUTPATIENT)
Dept: OPTOMETRY | Facility: CLINIC | Age: 74
End: 2019-06-10
Payer: MEDICARE

## 2019-06-10 ENCOUNTER — HOSPITAL ENCOUNTER (OUTPATIENT)
Dept: SLEEP MEDICINE | Facility: OTHER | Age: 74
Discharge: HOME | End: 2019-06-10
Attending: INTERNAL MEDICINE
Payer: MEDICARE

## 2019-06-10 DIAGNOSIS — G47.30 SLEEP APNEA, UNSPECIFIED TYPE: ICD-10-CM

## 2019-06-10 DIAGNOSIS — G47.33 OSA (OBSTRUCTIVE SLEEP APNEA): ICD-10-CM

## 2019-06-10 DIAGNOSIS — H40.1112 PRIMARY OPEN ANGLE GLAUCOMA (POAG) OF RIGHT EYE, MODERATE STAGE: Primary | ICD-10-CM

## 2019-06-10 DIAGNOSIS — H40.1121 PRIMARY OPEN ANGLE GLAUCOMA (POAG) OF LEFT EYE, MILD STAGE: ICD-10-CM

## 2019-06-10 DIAGNOSIS — H40.039 ANATOMICAL NARROW ANGLE: ICD-10-CM

## 2019-06-10 PROCEDURE — 92012 PR EYE EXAM, EST PATIENT,INTERMED: ICD-10-PCS | Mod: HCNC,S$GLB,, | Performed by: OPTOMETRIST

## 2019-06-10 PROCEDURE — 92012 INTRM OPH EXAM EST PATIENT: CPT | Mod: HCNC,S$GLB,, | Performed by: OPTOMETRIST

## 2019-06-10 PROCEDURE — 99999 PR PBB SHADOW E&M-EST. PATIENT-LVL I: CPT | Mod: PBBFAC,HCNC,, | Performed by: OPTOMETRIST

## 2019-06-10 PROCEDURE — 99999 PR PBB SHADOW E&M-EST. PATIENT-LVL I: ICD-10-PCS | Mod: PBBFAC,HCNC,, | Performed by: OPTOMETRIST

## 2019-06-10 PROCEDURE — 95811 PR POLYSOMNOGRAPHY W/CPAP: ICD-10-PCS | Mod: 26,HCNC,, | Performed by: PSYCHIATRY & NEUROLOGY

## 2019-06-10 PROCEDURE — 95811 POLYSOM 6/>YRS CPAP 4/> PARM: CPT | Mod: HCNC

## 2019-06-10 PROCEDURE — 95811 POLYSOM 6/>YRS CPAP 4/> PARM: CPT | Mod: 26,HCNC,, | Performed by: PSYCHIATRY & NEUROLOGY

## 2019-06-10 RX ORDER — TIMOLOL MALEATE 2.5 MG/ML
1 SOLUTION/ DROPS OPHTHALMIC 2 TIMES DAILY
Qty: 5 ML | Refills: 3 | Status: SHIPPED | OUTPATIENT
Start: 2019-06-10 | End: 2019-06-25 | Stop reason: SDUPTHER

## 2019-06-10 NOTE — PROGRESS NOTES
HPI     No issues with taking gtts.  Timolol OU Qday  Latanotprost OU QHS    Last edited by Sheri Montano, OD on 6/10/2019 12:59 PM. (History)            Assessment /Plan     For exam results, see Encounter Report.    Primary open angle glaucoma (POAG) of right eye, moderate stage    Primary open angle glaucoma (POAG) of left eye, mild stage    Anatomical narrow angle    Other orders  -     timolol maleate 0.25% (TIMOPTIC) 0.25 % Drop; Place 1 drop into the right eye 2 (two) times daily.  Dispense: 5 mL; Refill: 3      1-3. Educated pt on findings. Continue using latanoprost 1gtt QHS OU. Increase timolol to BID OD in order to decreased IOP OD. Target IOP 12-14. Last exam (3 months ago) IOP was 14. Today was 17. Monitor 1 month and determine if continue timolol vs start combo gtt vs. SLT.     RTC in 1 month for IOP check or prn.

## 2019-06-11 PROBLEM — G47.30 SLEEP APNEA: Status: ACTIVE | Noted: 2019-04-12

## 2019-06-12 ENCOUNTER — PATIENT OUTREACH (OUTPATIENT)
Dept: OTHER | Facility: OTHER | Age: 74
End: 2019-06-12

## 2019-06-12 NOTE — PROGRESS NOTES
Last 5 Patient Entered Readings                                      Current 30 Day Average: 118/78     Recent Readings 6/11/2019 6/9/2019 6/7/2019 6/5/2019 6/2/2019    SBP (mmHg) 111 134 132 130 122    DBP (mmHg) 85 86 90 66 78    Pulse 77 60 76 69 65        HPI:  Called patient to follow up on her upward trend.   Patient endorses adherence to medication regimen.   Patient denies hypotensive s/sx (lightheadedness, dizziness, nausea, fatigue); patient denies hypertensive s/sx (SOB, CP, severe headaches, changes in vision). Instructed patient to seek medical care if BP > 180/110 and is accompanied by hypertensive s/sx associated, patient confirms understanding.     Assessment:  Reviewed recent readings. Per 2017 ACC/ AHA HTN guidelines (goal of BP < 130/80), current 30-day average needs to be addressed more thoroughly today.   She was in [place] for two weeks attributing to her upward trend.    Plan:  Continue current medication regimen.   Charge your BP cuff to confirm accuracy of your BP readings.  I will continue to monitor regularly and will follow-up in 2 to 3 weeks, sooner if blood pressure begins to trend upward or downward.     Current medication regimen:  Hypertension Medications             losartan (COZAAR) 50 MG tablet Take one tablet daily, then may repeat the dose if BP > 140/90          Patient denies having questions or concerns. Patient has my contact information and knows to call with any concerns or clinical changes.

## 2019-06-19 ENCOUNTER — PATIENT OUTREACH (OUTPATIENT)
Dept: OTHER | Facility: OTHER | Age: 74
End: 2019-06-19

## 2019-06-19 NOTE — PROGRESS NOTES
Last 5 Patient Entered Readings                                      Current 30 Day Average: 121/80     Recent Readings 6/18/2019 6/17/2019 6/14/2019 6/13/2019 6/12/2019    SBP (mmHg) 114 107 131 120 120    DBP (mmHg) 78 77 90 76 78    Pulse 67 64 79 65 71          6/19: Unable to LVM.  Will call in 4 weeks.  Patient at goal.  Spoke with PharmD last week.

## 2019-06-20 ENCOUNTER — PATIENT MESSAGE (OUTPATIENT)
Dept: SLEEP MEDICINE | Facility: CLINIC | Age: 74
End: 2019-06-20

## 2019-06-20 DIAGNOSIS — G47.33 OSA (OBSTRUCTIVE SLEEP APNEA): Primary | ICD-10-CM

## 2019-06-20 NOTE — PROCEDURES
"Dear Referring Provider,    The sleep study that you ordered is complete. You have ordered sleep LAB services to perform the sleep study for Eboni Nicholas.     Please find Sleep Study result in "Chart Review" under the "Media tab."     As the ordering provider, you are responsible for reviewing the results and implementing a treatment plan with your patient. If you need a Sleep Medicine provider to explain the sleep study findings and arrange treatment for the patient, please refer patient for consultation to our Sleep Clinic via [facility] with Ambulatory Consult Sleep.    To do that please place an order for an "Ambulatory Consult Sleep" - it will go to our clinic work queue for our Medical Assistant to contact the patient for an appointment.     For any questions, please contact our clinic staff at [phone] to talk to clinical staff.      "

## 2019-06-20 NOTE — TELEPHONE ENCOUNTER
Please notify pt that 06/10/2019 sleep study still consistent with severe sleep apnea with 91 breathing events per hour and lowest oxygen level of 75%. I have ordered CPAP as planned. Pending insurance approval in 10 - 14 business days, [facility] will contact pt regarding cost/set up. RTC 31 - 90 days after PAP , make appt after actually receiving machine.

## 2019-06-25 RX ORDER — TIMOLOL MALEATE 2.5 MG/ML
1 SOLUTION/ DROPS OPHTHALMIC 2 TIMES DAILY
Qty: 5 ML | Refills: 3 | Status: SHIPPED | OUTPATIENT
Start: 2019-06-25 | End: 2019-07-24 | Stop reason: ALTCHOICE

## 2019-07-17 NOTE — PROGRESS NOTES
"Last 5 Patient Entered Readings                                      Current 30 Day Average: 120/80     Recent Readings 7/15/2019 7/13/2019 7/12/2019 7/11/2019 7/9/2019    SBP (mmHg) 112 110 121 114 122    DBP (mmHg) 79 80 83 85 89    Pulse 70 72 68 70 69          Digital Medicine: Health  Follow Up    Lifestyle Modifications:    1.Dietary Modifications (Sodium intake <2,000mg/day, food labels, dining out): Reports she has a hard time drinking water.  States she drinks about 16oz per day.  States she does not like the taste of water.  Encouraged patient to try to add fruit like cucumber or lemon.  States it is not the taste, but feels "intimidated" by a full bottle of water.  Encouraged patient to slowly drink it throughout the day.    2.Physical Activity: Reports she is having a pain in her leg, so she has not been walking.  Reports she has tried taking baths with epsom salt and that has helped.  Encouraged patient to drink water and stretch.    3.Medication Therapy: Patient has been compliant with the medication regimen.    4.Patient has the following medication side effects/concerns: none  (Frequency/Alleviating factors/Precipitating factors, etc.)     Follow up with Mrs. Eboni Guochloe completed. No further questions or concerns. Will continue to follow up to achieve health goals.  "

## 2019-07-24 ENCOUNTER — OFFICE VISIT (OUTPATIENT)
Dept: OPTOMETRY | Facility: CLINIC | Age: 74
End: 2019-07-24
Payer: MEDICARE

## 2019-07-24 ENCOUNTER — PATIENT MESSAGE (OUTPATIENT)
Dept: OPTOMETRY | Facility: CLINIC | Age: 74
End: 2019-07-24

## 2019-07-24 DIAGNOSIS — H40.039 ANATOMICAL NARROW ANGLE: ICD-10-CM

## 2019-07-24 DIAGNOSIS — H40.1121 PRIMARY OPEN ANGLE GLAUCOMA (POAG) OF LEFT EYE, MILD STAGE: ICD-10-CM

## 2019-07-24 DIAGNOSIS — H40.1112 PRIMARY OPEN ANGLE GLAUCOMA (POAG) OF RIGHT EYE, MODERATE STAGE: Primary | ICD-10-CM

## 2019-07-24 PROCEDURE — 92012 PR EYE EXAM, EST PATIENT,INTERMED: ICD-10-PCS | Mod: HCNC,S$GLB,, | Performed by: OPTOMETRIST

## 2019-07-24 PROCEDURE — 99999 PR PBB SHADOW E&M-EST. PATIENT-LVL II: ICD-10-PCS | Mod: PBBFAC,HCNC,, | Performed by: OPTOMETRIST

## 2019-07-24 PROCEDURE — 99999 PR PBB SHADOW E&M-EST. PATIENT-LVL II: CPT | Mod: PBBFAC,HCNC,, | Performed by: OPTOMETRIST

## 2019-07-24 PROCEDURE — 92012 INTRM OPH EXAM EST PATIENT: CPT | Mod: HCNC,S$GLB,, | Performed by: OPTOMETRIST

## 2019-07-24 RX ORDER — BRIMONIDINE TARTRATE AND TIMOLOL MALEATE 2; 5 MG/ML; MG/ML
1 SOLUTION OPHTHALMIC 2 TIMES DAILY
Qty: 5 ML | Refills: 3 | Status: SHIPPED | OUTPATIENT
Start: 2019-07-24 | End: 2019-07-26 | Stop reason: ALTCHOICE

## 2019-07-24 NOTE — PROGRESS NOTES
HPI     IOP Check. Pt using drops correctly. Timolol BID OD + latanoprost QHS OU       Last edited by Sheri Montano, OD on 7/24/2019  1:24 PM. (History)            Assessment /Plan     For exam results, see Encounter Report.    Primary open angle glaucoma (POAG) of right eye, moderate stage    Primary open angle glaucoma (POAG) of left eye, mild stage    Anatomical narrow angle    Other orders  -     brimonidine-timolol (COMBIGAN) 0.2-0.5 % Drop; Place 1 drop into the right eye 2 (two) times daily.  Dispense: 5 mL; Refill: 3      1-3. Educated pt on findings. Continue using latanoprost 1gtt QHS OU. Change timolol to combigan BID OD in order to decrease IOP OD. Target IOP OD 12-14. Timolol only OD would not provide target pressure. Last two exams IOP was 17/18 OD. Monitor 1 month and determine if combo gtt will work or if need referral to specialist to consider SLT.      RTC in 1 month for IOP check or prn.     *Addendum: Combigan too expensive for patient. Will order the combo drops separately. Pt to take timolol 1gtt OD QAM + brimonidine 1gtt OD BID. Monitor IOP in 1 month.*    *Addendum #2: Brimonidine too expensive for patient. Will continue timolol 1gtt BID OD and switch latanoprost to lumigan 1gtt QHS OU. Keep IOP check in a few weeks to monitor IOP.*     *Addendum #3: Pt willing to shop around for best price on drop. Return to original plan of 1gtt latanoprost QHS OU + 1gtt combigan BID OD. Pt will come to  printed Rx at [facility].

## 2019-07-26 RX ORDER — BRIMONIDINE TARTRATE 1.5 MG/ML
1 SOLUTION/ DROPS OPHTHALMIC 2 TIMES DAILY
Qty: 1 BOTTLE | Refills: 2 | Status: SHIPPED | OUTPATIENT
Start: 2019-07-26 | End: 2019-08-01

## 2019-07-26 RX ORDER — TIMOLOL MALEATE 5 MG/ML
1 SOLUTION/ DROPS OPHTHALMIC DAILY
Qty: 3 ML | Refills: 2 | Status: SHIPPED | OUTPATIENT
Start: 2019-07-26 | End: 2019-08-05 | Stop reason: ALTCHOICE

## 2019-07-30 ENCOUNTER — OFFICE VISIT (OUTPATIENT)
Dept: INTERNAL MEDICINE | Facility: CLINIC | Age: 74
End: 2019-07-30
Payer: MEDICARE

## 2019-07-30 ENCOUNTER — HOSPITAL ENCOUNTER (OUTPATIENT)
Dept: RADIOLOGY | Facility: HOSPITAL | Age: 74
Discharge: HOME | End: 2019-07-30
Attending: INTERNAL MEDICINE
Payer: MEDICARE

## 2019-07-30 ENCOUNTER — TELEPHONE (OUTPATIENT)
Dept: INTERNAL MEDICINE | Facility: CLINIC | Age: 74
End: 2019-07-30

## 2019-07-30 VITALS
TEMPERATURE: 99 F | DIASTOLIC BLOOD PRESSURE: 86 MMHG | WEIGHT: 224.19 LBS | SYSTOLIC BLOOD PRESSURE: 122 MMHG | BODY MASS INDEX: 39.72 KG/M2 | HEIGHT: 63 IN | HEART RATE: 71 BPM

## 2019-07-30 DIAGNOSIS — M25.552 LEFT HIP PAIN: ICD-10-CM

## 2019-07-30 DIAGNOSIS — M25.552 LEFT HIP PAIN: Primary | ICD-10-CM

## 2019-07-30 DIAGNOSIS — S76.012A STRAIN OF LEFT HIP, INITIAL ENCOUNTER: Primary | ICD-10-CM

## 2019-07-30 PROCEDURE — 1101F PR PT FALLS ASSESS DOC 0-1 FALLS W/OUT INJ PAST YR: ICD-10-PCS | Mod: HCNC,CPTII,S$GLB, | Performed by: INTERNAL MEDICINE

## 2019-07-30 PROCEDURE — 3074F SYST BP LT 130 MM HG: CPT | Mod: HCNC,CPTII,S$GLB, | Performed by: INTERNAL MEDICINE

## 2019-07-30 PROCEDURE — 99999 PR PBB SHADOW E&M-EST. PATIENT-LVL III: ICD-10-PCS | Mod: PBBFAC,HCNC,, | Performed by: INTERNAL MEDICINE

## 2019-07-30 PROCEDURE — 99999 PR PBB SHADOW E&M-EST. PATIENT-LVL III: CPT | Mod: PBBFAC,HCNC,, | Performed by: INTERNAL MEDICINE

## 2019-07-30 PROCEDURE — 1101F PT FALLS ASSESS-DOCD LE1/YR: CPT | Mod: HCNC,CPTII,S$GLB, | Performed by: INTERNAL MEDICINE

## 2019-07-30 PROCEDURE — 73502 XR HIP 2 VIEW LEFT: ICD-10-PCS | Mod: 26,HCNC,LT, | Performed by: RADIOLOGY

## 2019-07-30 PROCEDURE — 73502 X-RAY EXAM HIP UNI 2-3 VIEWS: CPT | Mod: 26,HCNC,LT, | Performed by: RADIOLOGY

## 2019-07-30 PROCEDURE — 73502 X-RAY EXAM HIP UNI 2-3 VIEWS: CPT | Mod: TC,HCNC,PO,LT

## 2019-07-30 PROCEDURE — 3079F DIAST BP 80-89 MM HG: CPT | Mod: HCNC,CPTII,S$GLB, | Performed by: INTERNAL MEDICINE

## 2019-07-30 PROCEDURE — 99213 PR OFFICE/OUTPT VISIT, EST, LEVL III, 20-29 MIN: ICD-10-PCS | Mod: HCNC,S$GLB,, | Performed by: INTERNAL MEDICINE

## 2019-07-30 PROCEDURE — 99213 OFFICE O/P EST LOW 20 MIN: CPT | Mod: HCNC,S$GLB,, | Performed by: INTERNAL MEDICINE

## 2019-07-30 PROCEDURE — 3079F PR MOST RECENT DIASTOLIC BLOOD PRESSURE 80-89 MM HG: ICD-10-PCS | Mod: HCNC,CPTII,S$GLB, | Performed by: INTERNAL MEDICINE

## 2019-07-30 PROCEDURE — 3074F PR MOST RECENT SYSTOLIC BLOOD PRESSURE < 130 MM HG: ICD-10-PCS | Mod: HCNC,CPTII,S$GLB, | Performed by: INTERNAL MEDICINE

## 2019-07-30 RX ORDER — MELOXICAM 7.5 MG/1
TABLET ORAL
Qty: 30 TABLET | Refills: 1 | Status: SHIPPED | OUTPATIENT
Start: 2019-07-30 | End: 2019-09-22 | Stop reason: SDUPTHER

## 2019-07-30 NOTE — PROGRESS NOTES
This office note has been dictated.  HISTORY OF PRESENT ILLNESS:  This is a 73-year-old lady with hypothyroidism,   hypertension and others, is in today primarily because of left hip pain.  The   patient gives a two-week history of left anterior hip pain.  It hurts with   certain activities, transitions.  No history of trauma.  No change in her   activities.  The pain radiates to the medial thigh.  There is no change in   appearance.  No prior history of same.    CURRENT MEDICATIONS:  All medications are noted and reviewed in the electronic   medical record medication list.    REVIEW OF SYSTEMS:  CONSTITUTIONAL:  No fever, no chills, no generalized body aches.  CARDIOVASCULAR:  No chest pain, no palpitations, no syncope.  No claudication.  GASTROINTESTINAL:  No nausea or vomiting, no abdominal pain, no diarrhea, no   change in bowel habits.  GENITOURINARY:  No dysuria, frequency or change in color or character of urine.    PAST MEDICAL HISTORY, PAST SURGICAL HISTORY, FAMILY MEDICAL HISTORY AND SOCIAL   HISTORY:  All noted and reviewed in the electronic medical record history   sections.    PHYSICAL EXAMINATION:  GENERAL:  Alert, pleasant and appropriately groomed lady, in no acute distress.  VITAL SIGNS:  All noted and reviewed as normal.  MUSCULOSKELETAL:  Left hip, no gross deformity.  There is mild tenderness to   palpation anteriorly into resisted hip flexion.  Range of motion is generally   full.  The lower extremities are vascularly intact.    IMPRESSION:  Left anterior hip pain - possible joint related pain versus   musculoligamentous strain.    PLAN:  1.  X-ray of the left hip.  2.  Meloxicam 7.5 mg daily with food.  3.  Review films and consider referral to Orthopedics for possible physical   therapy and other.      CATHERINE/JEANINE  dd: 07/30/2019 12:09:39 (CDT)  td: 07/31/2019 01:01:02 (CDT)  Doc ID   #[id number]  Job ID #[id number]    CC:

## 2019-07-30 NOTE — TELEPHONE ENCOUNTER
Advise pt hip x-ray looks fine.  I rec PT since I suspect this a musculoligamentous strain .  Order entered

## 2019-07-31 NOTE — TELEPHONE ENCOUNTER
Spoke with pt to advise of results and MD recommendations.    Verbalized understanding and states that she was contacted by PT this morning.

## 2019-08-05 ENCOUNTER — PATIENT MESSAGE (OUTPATIENT)
Dept: OPTOMETRY | Facility: CLINIC | Age: 74
End: 2019-08-05

## 2019-08-05 RX ORDER — BRIMONIDINE TARTRATE AND TIMOLOL MALEATE 2; 5 MG/ML; MG/ML
1 SOLUTION OPHTHALMIC 2 TIMES DAILY
Qty: 3 ML | Refills: 3 | Status: SHIPPED | OUTPATIENT
Start: 2019-08-05 | End: 2019-08-06 | Stop reason: SDUPTHER

## 2019-08-05 RX ORDER — BRIMONIDINE TARTRATE AND TIMOLOL MALEATE 2; 5 MG/ML; MG/ML
1 SOLUTION OPHTHALMIC 2 TIMES DAILY
Qty: 3 ML | Refills: 3 | Status: SHIPPED | OUTPATIENT
Start: 2019-08-05 | End: 2019-08-05 | Stop reason: SDUPTHER

## 2019-08-05 RX ORDER — LATANOPROST 50 UG/ML
1 SOLUTION/ DROPS OPHTHALMIC DAILY
Qty: 2.5 ML | Refills: 1 | Status: SHIPPED | OUTPATIENT
Start: 2019-08-05 | End: 2019-08-06 | Stop reason: SDUPTHER

## 2019-08-06 ENCOUNTER — CLINICAL SUPPORT (OUTPATIENT)
Dept: REHABILITATION | Facility: HOSPITAL | Age: 74
End: 2019-08-06
Attending: INTERNAL MEDICINE
Payer: MEDICARE

## 2019-08-06 DIAGNOSIS — M62.89 MUSCLE TIGHTNESS: ICD-10-CM

## 2019-08-06 DIAGNOSIS — M25.552 LEFT HIP PAIN: ICD-10-CM

## 2019-08-06 DIAGNOSIS — M62.81 MUSCLE WEAKNESS OF LOWER EXTREMITY: ICD-10-CM

## 2019-08-06 PROCEDURE — G8978 MOBILITY CURRENT STATUS: HCPCS | Mod: CK,HCNC,PO

## 2019-08-06 PROCEDURE — G8979 MOBILITY GOAL STATUS: HCPCS | Mod: CK,HCNC,PO

## 2019-08-06 PROCEDURE — 97110 THERAPEUTIC EXERCISES: CPT | Mod: HCNC,PO

## 2019-08-06 PROCEDURE — 97161 PT EVAL LOW COMPLEX 20 MIN: CPT | Mod: HCNC,PO

## 2019-08-06 RX ORDER — LATANOPROST 50 UG/ML
1 SOLUTION/ DROPS OPHTHALMIC DAILY
Qty: 2.5 ML | Refills: 3 | Status: SHIPPED | OUTPATIENT
Start: 2019-08-06 | End: 2019-12-17 | Stop reason: SDUPTHER

## 2019-08-06 RX ORDER — BRIMONIDINE TARTRATE AND TIMOLOL MALEATE 2; 5 MG/ML; MG/ML
1 SOLUTION OPHTHALMIC 2 TIMES DAILY
Qty: 3 ML | Refills: 3 | Status: SHIPPED | OUTPATIENT
Start: 2019-08-06 | End: 2020-09-03 | Stop reason: SDUPTHER

## 2019-08-06 NOTE — PROGRESS NOTES
[facility]  Physical Therapy Initial Evaluation    Name: Eboni Nicholas  Clinic Number: [id number]    Therapy Diagnosis:   Encounter Diagnoses   Name Primary?    Left hip pain     Muscle tightness     Muscle weakness of lower extremity      Physician: CAMILO Elizondo MD    Physician Orders: PT Eval and Treat  Medical Diagnosis from Referral: Strain of left hip, initial encounter  Evaluation Date: 8/6/2019  Authorization Period Expiration: 7/29/20  Plan of Care Expiration: 10/6/19  Visit # / Visits authorized: 1/ 1    Time In: 1100  Time Out: 1148  Total Billable Time: 48 minutes    Precautions: Standard     See full physical therapy evaluation in POC.     CMS Impairment/Limitation/Restriction for FOTO hip Survey    Therapist reviewed FOTO scores for Eboni Nicholas on 8/6/2019.   FOTO documents entered into [employer] - see Media section.    Limitation Score: 56%  Category: Mobility    Current : CK = at least 40% but < 60% impaired, limited or restricted  Goal: CK = at least 40% but < 60% impaired, limited or restricted         TREATMENT   Treatment Time In: 1120  Treatment Time Out: 1148  Total Treatment time separate from Evaluation: 28 minutes    Eboni received therapeutic exercises to develop strength, endurance, ROM, flexibility, posture and core stabilization for 28 minutes including:    Supine L hip flexor str EOB w strap 3 x 30 sec  Supine L hip flexor stretch oscillations EOB by therapist x 30  Supine march 2 x 10  Bridges x 20  Supine hip abduction on slideboard x 20  Supine hip flexion on slideboard x 20  Prone HS curls x 20    Home Exercises and Patient Education Provided    Education provided:   - hip flexor pathology  - importance of performing HEP to tolerance    Written Home Exercises Provided: yes. (supine hip flexor stretch EOB, supine march, bridges, supine hip abd, heel slides, prone quad stretch, prone HS curls, standing hip flexor stretch on wall)  Exercises were  reviewed and Eboni was able to demonstrate them prior to the end of the session.  Eboni demonstrated good  understanding of the education provided.     See EMR under Patient Instructions for exercises provided 8/6/2019.    Assessment   Eboni is a 73 y.o. female referred to outpatient Physical Therapy with a medical diagnosis of Strain of left hip, initial encounter. Pt presents with reports of L hip pain, tenderness in distal L hip flexor tendons, LE weakness, muscle tightness, and postural imbalance. Improvement in symptoms following treatment.     Pt prognosis is Good.   Pt will benefit from skilled outpatient Physical Therapy to address the deficits stated above and in the chart below, provide pt/family education, and to maximize pt's level of independence.     Plan of care discussed with patient: Yes  Pt's spiritual, cultural and educational needs considered and patient is agreeable to the plan of care and goals as stated below:     Anticipated Barriers for therapy: none    Medical Necessity is demonstrated by the following  History  Co-morbidities and personal factors that may impact the plan of care Co-morbidities:   high BMI    Personal Factors:   lifestyle     moderate   Examination  Body Structures and Functions, activity limitations and participation restrictions that may impact the plan of care Body Regions:   back  lower extremities  trunk    Body Systems:    gross symmetry  ROM  strength  motor control  motor learning    Participation Restrictions:   ADLs, IADLs, domestic duties    Activity limitations:   Learning and applying knowledge  no deficits    General Tasks and Commands  no deficits    Communication  no deficits    Mobility  walking    Self care  no deficits    Domestic Life  cooking  doing house work (cleaning house, washing dishes, laundry)    Interactions/Relationships  no deficits    Life Areas  no deficits    Community and Social Life  recreation and leisure         high   Clinical  Presentation stable and uncomplicated low   Decision Making/ Complexity Score: low     Medical necessity is demonstrated by the following IMPAIRMENTS/PROBLEM LIST:   1) Increase in pain level limiting function   2) LE weakness   3) Difficulty walking long distances   4) Difficulty with car transfers   5) Lack of HEP    GOALS: Short Term Goals:  4 weeks  1. Report decreased L hip pain  <  / =  5/10 at worst to increase tolerance for prolonged standing.   2. Pt will be able to tolerate multi-directional LE strengthening in order to improve ability to perform household chores.  3. Pt will report 50% improvement in ability to walk long distances since start of care to indicate improved functional mobility.   4. Pt will perform marches without pain to indicate improvement in ability to lift leg into car for transfers.   5. Pt to tolerate HEP to improve ROM and independence with ADL's.    Long Term Goals: 8 weeks  1. Report decreased L hip pain  <  / =  3/10 at worst to increase tolerance for prolonged standing.   2. Pt will be able to perform 2 x 10 multi-directional LE strengthening without fatigue in order to improve ability to perform household chores.  3. Pt will report 80% improvement in ability to walk long distances since start of care to indicate improved functional mobility.   4. Pt will report no difficulties with car transfers to indicate improvement in hip flexor strength.   5. Pt to be Independent with HEP to improve ROM and independence with ADL's.    Plan   Plan of care Certification: 8/6/2019 to 10/6/19.    Outpatient Physical Therapy 2 times weekly for 8 weeks to include the following interventions: Cervical/Lumbar Traction, Gait Training, Manual Therapy, Moist Heat/ Ice, Neuromuscular Re-ed, Patient Education, Therapeutic Activites and Therapeutic Exercise.     Shelby Bauer, PT

## 2019-08-07 ENCOUNTER — PATIENT MESSAGE (OUTPATIENT)
Dept: OPTOMETRY | Facility: CLINIC | Age: 74
End: 2019-08-07

## 2019-08-07 ENCOUNTER — PATIENT MESSAGE (OUTPATIENT)
Dept: ADMINISTRATIVE | Facility: OTHER | Age: 74
End: 2019-08-07

## 2019-08-07 PROBLEM — M62.89 MUSCLE TIGHTNESS: Status: ACTIVE | Noted: 2019-08-07

## 2019-08-07 PROBLEM — M25.552 LEFT HIP PAIN: Status: ACTIVE | Noted: 2019-08-07

## 2019-08-07 PROBLEM — M62.81 MUSCLE WEAKNESS OF LOWER EXTREMITY: Status: ACTIVE | Noted: 2019-08-07

## 2019-08-07 NOTE — PLAN OF CARE
"[facility]  Physical Therapy Initial Evaluation    Name: Eboni Nicholas  Clinic Number: [id number]    Therapy Diagnosis:   Encounter Diagnoses   Name Primary?    Left hip pain     Muscle tightness     Muscle weakness of lower extremity      Physician: CAMILO Elizondo MD    Physician Orders: PT Eval and Treat  Medical Diagnosis from Referral: Strain of left hip, initial encounter  Evaluation Date: 2019  Authorization Period Expiration: 20  Plan of Care Expiration: 10/6/19  Visit # / Visits authorized:     Time In: 1100  Time Out: 1148  Total Billable Time: 48 minutes    Precautions: Standard    Subjective   Date of onset: insidious about 3 weeks ago  History of current condition - Eboni reports: since initial onset her pain has been progressively worsening. Her symptoms feel muscular in natural and starts from anterior L hip through groin and stops at anterior thigh. She used to sleep on her stomach but not sleeps on er back and side due to use of CPAP. It is hard for her to lift her leg at times. Her back is bothersome after standing for a long time.      Medical History:   Past Medical History:   Diagnosis Date    Anxiety     Benign essential HTN     Depression     GERD (gastroesophageal reflux disease)     Hyperlipidemia     Hypothyroid     CHIDI (obstructive sleep apnea)     Sarcoidosis      Surgical History:   Eboni Nicholas  has a past surgical history that includes Hysterectomy and Thyroid surgery.    Medications:   Eboni has a current medication list which includes the following prescription(s): aspirin, brimonidine-timolol, calcium-vitamin d, escitalopram oxalate, latanoprost, levothyroxine, losartan, meloxicam, multivitamin, and simvastatin.    Allergies:   Review of patient's allergies indicates:   Allergen Reactions    Phenobarbital Rash    Sulfa (sulfonamide antibiotics) Rash      Imagin19 L hip x-ray: "Hip joint spaces appear adequately " "maintained on both sides, without significant narrowing. No conventional radiographic evidence to specifically suggest avascular necrosis of either femoral head.  Remaining osseous structures are unremarkable as well, with no evidence of recent or healing fracture or lytic destructive process observed. SI joints appear unremarkable.  Some facet arthropathy at L4-5 and L5-S1 is suggested."    Prior Therapy: none  Physical activity: walked in pool 2-3x/pool at [employer], every other day walking about 30 minutes  Social History: lives with   Home environment:  1 story home with 1 step to enter  Occupation: not currently employed  Prior Level of Function: independent with all ADLs, currently driving  Current Level of Function: difficulty with car transfers, walking long distances, prolonged standing, household chores    Pain:  Current 0/10, worst 8/10, best 0/10   Location: left hip   Description: Sharp  Aggravating Factors: Standing, Walking and Getting out of bed/chair  Easing Factors: sitting, rest, hot shower   Radicular symptoms: none  No increase in pain when cough/sneeze    Pts goals: to get back to walking without pain    Objective     Observation: pleasant and cooperative    Posture: forward head, rounded shoulders, anterior pelvic tilt    Hip Range of Motion:   Right AROM Left AROM   Flexion  deg   Abduction WNL 40 deg   Extension WNL 15 deg   Ext. Rotation WNL 35 deg   Int. Rotation WNL 30 deg     Lower Extremity Strength  Right LE  Left LE    Knee extension: 5/5 Knee extension: 4+/5   Knee flexion: 5/5 Knee flexion: 5/5   Hip flexion: 4/5 Hip flexion: 4-/5*   Hip Internal Rotation:  4+/5    Hip Internal Rotation: 4+/5      Hip External Rotation: 5/5    Hip External Rotation: 4+/5      Hip extension:  5/5 Hip extension: 5/5   Hip abduction: 4/5 Hip abduction: 4-/5*   Hip adduction: 4-/5 Hip adduction: 4-/5   Ankle dorsiflexion: 5/5 Ankle dorsiflexion: 5/5   Ankle plantarflexion: 5/5 " Ankle plantarflexion: 5/5       Flexibility:    Ely's test: R = 100 degrees ; L = 100 degrees   Hamstring length 90/90: R = -35 degrees ; L = -45 degrees    Palpation: L hip flexor distal tendon TTP    Sensation: B LEs grossly intact to light touch    CMS Impairment/Limitation/Restriction for FOTO hip Survey    Therapist reviewed FOTO scores for Eboni Nicholas on 8/6/2019.   FOTO documents entered into [employer] - see Media section.    Limitation Score: 56%  Category: Mobility    Current : CK = at least 40% but < 60% impaired, limited or restricted  Goal: CK = at least 40% but < 60% impaired, limited or restricted         TREATMENT   Treatment Time In: 1120  Treatment Time Out: 1148  Total Treatment time separate from Evaluation: 28 minutes    Eboni received therapeutic exercises to develop strength, endurance, ROM, flexibility, posture and core stabilization for 28 minutes including:    Supine L hip flexor str EOB w strap 3 x 30 sec  Supine L hip flexor stretch oscillations EOB by therapist x 30  Supine march 2 x 10  Bridges x 20  Supine hip abduction on slideboard x 20  Supine hip flexion on slideboard x 20  Prone HS curls x 20    Home Exercises and Patient Education Provided    Education provided:   - hip flexor pathology  - importance of performing HEP to tolerance    Written Home Exercises Provided: yes. (supine hip flexor stretch EOB, supine march, bridges, supine hip abd, heel slides, prone quad stretch, prone HS curls, standing hip flexor stretch on wall)  Exercises were reviewed and Eboni was able to demonstrate them prior to the end of the session.  Eboni demonstrated good  understanding of the education provided.     See EMR under Patient Instructions for exercises provided 8/6/2019.    Assessment   Eboni is a 73 y.o. female referred to outpatient Physical Therapy with a medical diagnosis of Strain of left hip, initial encounter. Pt presents with reports of L hip pain, tenderness in distal L hip flexor  tendons, LE weakness, muscle tightness, and postural imbalance. Improvement in symptoms following treatment.     Pt prognosis is Good.   Pt will benefit from skilled outpatient Physical Therapy to address the deficits stated above and in the chart below, provide pt/family education, and to maximize pt's level of independence.     Plan of care discussed with patient: Yes  Pt's spiritual, cultural and educational needs considered and patient is agreeable to the plan of care and goals as stated below:     Anticipated Barriers for therapy: none    Medical Necessity is demonstrated by the following  History  Co-morbidities and personal factors that may impact the plan of care Co-morbidities:   high BMI    Personal Factors:   lifestyle     moderate   Examination  Body Structures and Functions, activity limitations and participation restrictions that may impact the plan of care Body Regions:   back  lower extremities  trunk    Body Systems:    gross symmetry  ROM  strength  motor control  motor learning    Participation Restrictions:   ADLs, IADLs, domestic duties    Activity limitations:   Learning and applying knowledge  no deficits    General Tasks and Commands  no deficits    Communication  no deficits    Mobility  walking    Self care  no deficits    Domestic Life  cooking  doing house work (cleaning house, washing dishes, laundry)    Interactions/Relationships  no deficits    Life Areas  no deficits    Community and Social Life  recreation and leisure         high   Clinical Presentation stable and uncomplicated low   Decision Making/ Complexity Score: low     Medical necessity is demonstrated by the following IMPAIRMENTS/PROBLEM LIST:   1) Increase in pain level limiting function   2) LE weakness   3) Difficulty walking long distances   4) Difficulty with car transfers   5) Lack of HEP    GOALS: Short Term Goals:  4 weeks  1. Report decreased L hip pain  <  / =  5/10 at worst to increase tolerance for prolonged  standing.   2. Pt will be able to tolerate multi-directional LE strengthening in order to improve ability to perform household chores.  3. Pt will report 50% improvement in ability to walk long distances since start of care to indicate improved functional mobility.   4. Pt will perform marches without pain to indicate improvement in ability to lift leg into car for transfers.   5. Pt to tolerate HEP to improve ROM and independence with ADL's.    Long Term Goals: 8 weeks  1. Report decreased L hip pain  <  / =  3/10 at worst to increase tolerance for prolonged standing.   2. Pt will be able to perform 2 x 10 multi-directional LE strengthening without fatigue in order to improve ability to perform household chores.  3. Pt will report 80% improvement in ability to walk long distances since start of care to indicate improved functional mobility.   4. Pt will report no difficulties with car transfers to indicate improvement in hip flexor strength.   5. Pt to be Independent with HEP to improve ROM and independence with ADL's.    Plan   Plan of care Certification: 8/6/2019 to 10/6/19.    Outpatient Physical Therapy 2 times weekly for 8 weeks to include the following interventions: Cervical/Lumbar Traction, Gait Training, Manual Therapy, Moist Heat/ Ice, Neuromuscular Re-ed, Patient Education, Therapeutic Activites and Therapeutic Exercise.     Shelby Bauer, PT

## 2019-08-09 ENCOUNTER — TELEPHONE (OUTPATIENT)
Dept: OPTOMETRY | Facility: CLINIC | Age: 74
End: 2019-08-09

## 2019-08-12 ENCOUNTER — PATIENT OUTREACH (OUTPATIENT)
Dept: ADMINISTRATIVE | Facility: OTHER | Age: 74
End: 2019-08-12

## 2019-08-12 ENCOUNTER — PATIENT OUTREACH (OUTPATIENT)
Dept: OTHER | Facility: OTHER | Age: 74
End: 2019-08-12

## 2019-08-12 NOTE — PROGRESS NOTES
Last 5 Patient Entered Readings                                      Current 30 Day Average: 121/79     Recent Readings 8/11/2019 8/11/2019 8/9/2019 8/9/2019 8/8/2019    SBP (mmHg) 125 144 158 155 130    DBP (mmHg) 77 86 83 91 73    Pulse 54 56 55 55 73        HPI:  Called patient to follow up on her upward trend.   Patient endorses adherence to medication regimen.   Patient pulled a muscle in her hip and is in pain most likely causing her elevated BP. She has been suffering with the pain for about two weeks and the last couple of days have been the worst. She is taking an NSAID, meloxicam, and is undergoing PT.  Patient denies hypotensive s/sx (lightheadedness, dizziness, nausea, fatigue); patient denies hypertensive s/sx (SOB, CP, severe headaches, changes in vision). Instructed patient to seek medical care if BP > 180/110 and is accompanied by hypertensive s/sx associated, patient confirms understanding.     Assessment:  Reviewed recent readings. Per 2017 ACC/ AHA HTN guidelines (goal of BP < 130/80), current 30-day average is well controlled.     Plan:  Continue current medication regimen.   I will continue to monitor regularly and will follow-up in 2 to 3 weeks, sooner if blood pressure begins to trend upward or downward.     Current medication regimen:  Hypertension Medications             losartan (COZAAR) 50 MG tablet Take one tablet daily, then may repeat the dose if BP > 140/90          Patient denies having questions or concerns. Patient has my contact information and knows to call with any concerns or clinical changes.

## 2019-08-13 ENCOUNTER — PATIENT MESSAGE (OUTPATIENT)
Dept: INTERNAL MEDICINE | Facility: CLINIC | Age: 74
End: 2019-08-13

## 2019-08-13 ENCOUNTER — PATIENT MESSAGE (OUTPATIENT)
Dept: SLEEP MEDICINE | Facility: CLINIC | Age: 74
End: 2019-08-13

## 2019-08-14 ENCOUNTER — OFFICE VISIT (OUTPATIENT)
Dept: SLEEP MEDICINE | Facility: CLINIC | Age: 74
End: 2019-08-14
Payer: MEDICARE

## 2019-08-14 VITALS
SYSTOLIC BLOOD PRESSURE: 123 MMHG | WEIGHT: 223.13 LBS | HEART RATE: 57 BPM | BODY MASS INDEX: 39.54 KG/M2 | HEIGHT: 63 IN | DIASTOLIC BLOOD PRESSURE: 79 MMHG

## 2019-08-14 DIAGNOSIS — G47.33 OSA (OBSTRUCTIVE SLEEP APNEA): Primary | ICD-10-CM

## 2019-08-14 PROCEDURE — 99214 OFFICE O/P EST MOD 30 MIN: CPT | Mod: HCNC,S$GLB,, | Performed by: INTERNAL MEDICINE

## 2019-08-14 PROCEDURE — 99999 PR PBB SHADOW E&M-EST. PATIENT-LVL III: CPT | Mod: PBBFAC,HCNC,, | Performed by: INTERNAL MEDICINE

## 2019-08-14 PROCEDURE — 99999 PR PBB SHADOW E&M-EST. PATIENT-LVL III: ICD-10-PCS | Mod: PBBFAC,HCNC,, | Performed by: INTERNAL MEDICINE

## 2019-08-14 PROCEDURE — 3074F PR MOST RECENT SYSTOLIC BLOOD PRESSURE < 130 MM HG: ICD-10-PCS | Mod: HCNC,CPTII,S$GLB, | Performed by: INTERNAL MEDICINE

## 2019-08-14 PROCEDURE — 1101F PT FALLS ASSESS-DOCD LE1/YR: CPT | Mod: HCNC,CPTII,S$GLB, | Performed by: INTERNAL MEDICINE

## 2019-08-14 PROCEDURE — 99214 PR OFFICE/OUTPT VISIT, EST, LEVL IV, 30-39 MIN: ICD-10-PCS | Mod: HCNC,S$GLB,, | Performed by: INTERNAL MEDICINE

## 2019-08-14 PROCEDURE — 3074F SYST BP LT 130 MM HG: CPT | Mod: HCNC,CPTII,S$GLB, | Performed by: INTERNAL MEDICINE

## 2019-08-14 PROCEDURE — 1101F PR PT FALLS ASSESS DOC 0-1 FALLS W/OUT INJ PAST YR: ICD-10-PCS | Mod: HCNC,CPTII,S$GLB, | Performed by: INTERNAL MEDICINE

## 2019-08-14 PROCEDURE — 3078F DIAST BP <80 MM HG: CPT | Mod: HCNC,CPTII,S$GLB, | Performed by: INTERNAL MEDICINE

## 2019-08-14 PROCEDURE — 3078F PR MOST RECENT DIASTOLIC BLOOD PRESSURE < 80 MM HG: ICD-10-PCS | Mod: HCNC,CPTII,S$GLB, | Performed by: INTERNAL MEDICINE

## 2019-08-14 NOTE — PROGRESS NOTES
08/14/2019    Pt is following in regards to the CHIDI has hx of severe CHIDI AHI 91 on CPAP 15 cm H20 and she is having dry mouth and does have nocturia. She denies having headache on waking up. She feels the mode is good and memory is fine. She denies drowsy driving. She denies bloated on waking up.       Compliance Summary  Days with Device Usage: 25 days  Percentage of Days >=4 Hours: 80.0%  Average Usage (Days Used): 6 hrs. 56 mins. 25 secs.  Average Usage (All Days): 5 hrs. 47 mins. 1 secs.  Apnea Indices  Average AHI: 11.8  Average OA Index: 7.4  Average CA Index: 0.4  Ventilator Statistics  Average Breath Rate: 15.5 bpm  Average % Patient Triggered Breaths: N/A  Average Tidal Volume: 291.6 ml  Average Minute Vent: N/A    Large Leak  Average Time in Large Leak: 2 mins. 5 secs.  Average % of Night in Large Leak: 0.5%  Periodic Breathing  Average % of Night in PB: 4.9%         Respiratory: Loud snoring was present. There was significant CHIDI (obstructive sleep apnea) based on AHI (apnea hypopnea index) criteria. The overall AHI was 91.5 with an oxygen ashley of 75.0%.        IMPRESSION:   1. Significant CHIDI (G47.33), severe, based on AHI criteria. The patient qualified for split night.   2. Adequate control of sleep disordered breathing was achieved with CPAP at 15 cm of water. An effective pressure was not conclusively determined.     RECOMMENDATION:   1. CPAP at 15 cm, full face mask of patients choice, chin strap, and heated humidification, which is essential in conjunction with PAP to prevent airway drying and irritation.   2. Consider dedicated titration sleep study to confirm optimum pressure setting.       EPWORTH SLEEPINESS SCALE 8/13/2019   Sitting and reading 2   Watching TV 2   Sitting, inactive in a public place (e.g. a theatre or a meeting) 2   As a passenger in a car for an hour without a break 2   Lying down to rest in the afternoon when circumstances permit 2   Sitting and talking to someone 0    Sitting quietly after a lunch without alcohol 1   In a car, while stopped for a few minutes in traffic 0   Total score 11         Eboni Nicholas  was seen as a new patient at the request of No ref. provider found for the evaluation of obstructive sleep apnea.      CHIEF COMPLAINT:    No chief complaint on file.      05/14/2019 KRISTYN Madrid NP: Initial HISTORY OF PRESENT ILLNESS: Eboni Nicholas is a 73 y.o. female is here for sleep evaluation.      Previously diagnosed with CHIDI via PSG prompted by snoring, witnessed apneas, unrefreshing sleep, daytime sleepiness and fatigue.   Used CPAP x 2 years with resolution of sleep complaints. Mask began to have overt air leaks and air hunger, pt stopped use  Per patient results of 2010 PSG never explained to her. Didn't know maintenance of therapy  Would like to resume PAP treatment   Discussed in detail PSG results. [    Due to break in usage, pt understands requirement to complete requal PSG  Face-to-face visit completed today     Denies symptoms of restless legs or kicking during sleep.    Occupation: retired     [place] Sleepiness Scale score during initial sleep evaluation was 16.    SLEEP ROUTINE:    Bed partner:     Time to bed:  11 pm   Sleep onset latency: quickly         Disruptions or awakenings:  1 - 2, bathroom, not difficult to fall back asleep    Wakeup time:    8:30 am  Perceived sleep quality: poor         02/11/2010 Split  lb. The overall AHI was 86.6 with an oxygen ashley of 72%. Effective control of sleep disordered breathing was seen at a pressure of 16 cm of water, including supine REM stage sleep.      PAST MEDICAL HISTORY:    Active Ambulatory Problems     Diagnosis Date Noted    Acquired hypothyroidism 01/22/2016    Major depressive disorder with single episode, in full remission 10/17/2016    Sarcoid of nose 10/17/2016    Left ventricular diastolic dysfunction with preserved systolic function 10/17/2016    Nuclear sclerosis of both  eyes 10/17/2016    Hyperlipemia 12/15/2016    Severe obesity (BMI 35.0-39.9) with comorbidity 03/01/2017    Glaucoma (increased eye pressure) 07/12/2017    Secondary hypertension 07/21/2017    HTN (hypertension), benign 07/21/2017    Dyslipidemia 03/06/2018    CHIDI (obstructive sleep apnea) 04/12/2019    Left hip pain 08/07/2019    Muscle tightness 08/07/2019    Muscle weakness of lower extremity 08/07/2019     Resolved Ambulatory Problems     Diagnosis Date Noted    Anorectal skin tags 08/29/2013    Left lateral knee pain 02/27/2014    Dyslipidemia 01/22/2016    Non morbid obesity due to excess calories 10/17/2016    Obesity (BMI 30.0-34.9) 10/17/2016     Past Medical History:   Diagnosis Date    Anxiety     Benign essential HTN     Depression     GERD (gastroesophageal reflux disease)     Hyperlipidemia     Hypothyroid     CHIDI (obstructive sleep apnea)     Sarcoidosis                 PAST SURGICAL HISTORY:    Past Surgical History:   Procedure Laterality Date    COLONOSCOPY N/A 8/27/2013    Performed by Tomás Franco MD at [facility] (4TH FLR)    HYSTERECTOMY      THYROID SURGERY           FAMILY HISTORY:                Family History   Problem Relation Age of Onset    Heart disease Brother     Diabetes Maternal Grandmother     Stroke Mother     Transient ischemic attack Mother     Cancer Father         lung ca    Rheum arthritis Sister     No Known Problems Daughter     Diabetes Son     No Known Problems Daughter     Heart disease Brother     Liver disease Brother        SOCIAL HISTORY:          Tobacco:   Social History     Tobacco Use   Smoking Status Never Smoker   Smokeless Tobacco Never Used       Alcohol use:    Social History     Substance and Sexual Activity   Alcohol Use Yes    Comment: rarely drink glass of wine with dinner/ not weekly                 ALLERGIES:    Review of patient's allergies indicates:   Allergen Reactions    Phenobarbital Rash    Sulfa  "(sulfonamide antibiotics) Rash       CURRENT MEDICATIONS:    Current Outpatient Medications   Medication Sig Dispense Refill    aspirin (ECOTRIN) 81 MG EC tablet Take 81 mg by mouth once daily.      brimonidine-timolol (COMBIGAN) 0.2-0.5 % Drop Place 1 drop into the right eye 2 (two) times daily. 3 mL 3    calcium-vitamin D 250-100 mg-unit per tablet Take 1 tablet by mouth once daily.       escitalopram oxalate (LEXAPRO) 20 MG tablet Take 1 tablet (20 mg total) by mouth once daily. 90 tablet 1    latanoprost (XALATAN) 0.005 % ophthalmic solution Place 1 drop into both eyes once daily. 2.5 mL 3    levothyroxine (SYNTHROID) 100 MCG tablet Take 1 tablet (100 mcg total) by mouth once daily. 90 tablet 1    losartan (COZAAR) 50 MG tablet Take one tablet daily, then may repeat the dose if BP > 140/90 180 tablet 3    meloxicam (MOBIC) 7.5 MG tablet 1 po qd with food for joint pain 30 tablet 1    multivitamin (THERAGRAN) per tablet Take 1 tablet by mouth once daily.      simvastatin (ZOCOR) 40 MG tablet Take 1 tablet (40 mg total) by mouth every evening. 90 tablet 1     No current facility-administered medications for this visit.                   REVIEW OF SYSTEMS:     Sleep related symptoms as per HPI.            PHYSICAL EXAM:  Vitals:    08/14/19 1156   BP: 123/79   Pulse: (!) 57   Weight: 101.2 kg (223 lb 1.7 oz)   Height: 5' 3" (1.6 m)   PainSc:   6   PainLoc: Hip     Body mass index is 39.52 kg/m².     GENERAL: Well groomed; Normally developed;  Physical Exam  Neck size: 14 inch  Oral: barry IV, high arch, mild over bite.  Nose: mild nasal congestion b/l  CVS: S1+S2 ,negative murmur/ gallop, regularly regular  Lungs: CTA B/L  Abdomen: BS+, no guarding, - rigidity.  Ext: negative pedal edema B/L LE     ASSESSMENT:    Obstructive sleep apnea, severe by AHI.  The patient symptomatically hasnoring, witnessed apneas, unrefreshing sleep, daytime sleepiness and fatigue with findings of crowded oral airway and " elevated body mass index. Medical co-morbidities: depression, glaucoma, HLD, HTN, sarcoidosis, and obesity. Currently on CPAP 15 cm water pressure with >4 hours (80%) compliance and residual AHI 11.8. Will suggest following    PLAN:  CHIDI  Discussed the results of the compliance with patient in view of elevated AHI. Will switch patient to auto CPAP between 12 to 20 cm water pressure. Suggested to avoid supine sleep. Suggested to use xylimelts for dry mouth and provided prescription for heated tubing for dry mouth. Suggested to lose weight and follow good sleep hygiene. Suggested to avoid drowsy driving, if continue to have elevated ESS despite of having AHI within normal limit, will require stimulant treatment to stay awake.       Education: During our discussion today, we talked about the etiology of obstructive sleep apnea as well as the potential ramifications of untreated sleep apnea, which could include daytime sleepiness, hypertension, heart disease and/or stroke. We discussed potential treatment options, which could include weight loss, body positioning, continuous positive airway pressure (CPAP), OA, EPAP, or referral for surgical consideration.     Precautions: The patient was advised to abstain from driving should they feel sleepy  or drowsy.     Thank you for allowing me the opportunity to participate in the care of your patient.    Follow up- 1 month      Samira Mejias MD, FACP  Phone: [phone]  Fax: [phone]

## 2019-08-14 NOTE — TELEPHONE ENCOUNTER
Do not have any good medication recommendations at this time. If PT is not gradually helpful then I would rec ref to ortho

## 2019-08-14 NOTE — PATIENT INSTRUCTIONS
XyliMelts Discs for Dry Mouth Mild Mint.        Sleep Hygiene Practices    1. Try going to bed only when you are drowsy.    ?    2. If you are unable to fall asleep or stay asleep, leave your bedroom and engage in a quiet activity elsewhere. Do not permit yourself to fall asleep outside the bedroom. Return to bed when and only when you are sleepy. Repeat this process as often as necessary throughout night.    3. Maintain regular wake-up time, even on days off work & weekends    4. Use your bedroom for sleep and sex    5. Avoid napping during the daytime. If daytime sleepiness becomes overwhelming, limit nap time to a single nap of less than 1hr, no later than 3pm.    6. Distract your mind. Avoid clock watching. Lying in bed unable to sleep and frustrated needs to be avoided. Try reading or watching a videotape or listening to books on tape. It may be necessary to go into another room to do these.    7. Avoid caffeine within 4-6hrs of bedtime    8. Avoid use of nicotine close to bedtime    9. do not drink alcoholic beverages within 4-6hrs of bedtime    10. While a light snack before bedtime can help promote sound sleep, avoid large meals.    11. Obtain regular exercise, but avoid strenuous exercise within 4hrs of bedtime    12. Minimize light, noise, and extremes in temperature in the bedroom.    13. Precautions: The patient was advised to abstain from driving should they feel sleepy or drowsy.  ?    * This information is provided had been directly obtained from the American Academy of [employer] wellness booklet on sleep hygiene. ([facility], [address] [place] [address]

## 2019-08-15 ENCOUNTER — PATIENT OUTREACH (OUTPATIENT)
Dept: OTHER | Facility: OTHER | Age: 74
End: 2019-08-15

## 2019-08-15 NOTE — PROGRESS NOTES
Last 5 Patient Entered Readings                                      Current 30 Day Average: 123/78     Recent Readings 8/14/2019 8/11/2019 8/11/2019 8/9/2019 8/9/2019    SBP (mmHg) 127 125 144 158 155    DBP (mmHg) 78 77 86 83 91    Pulse 58 54 56 55 55          Digital Medicine: Health  Follow Up    Lifestyle Modifications:    1.Dietary Modifications (Sodium intake <2,000mg/day, food labels, dining out): Patient reports she has been focusing on diet since she is not able to walk.    2.Physical Activity: Reports her leg has been hurting her for 3 weeks.  States she started PT and is doing the exercises at home.  Encouraged patient to continue with exercises and focus on diet.    3.Medication Therapy: Patient has been compliant with the medication regimen.    4.Patient has the following medication side effects/concerns: none  (Frequency/Alleviating factors/Precipitating factors, etc.)     Follow up with Mrs. Eboni BROOKS Cristopher completed. No further questions or concerns. Will continue to follow up to achieve health goals.

## 2019-08-20 ENCOUNTER — CLINICAL SUPPORT (OUTPATIENT)
Dept: REHABILITATION | Facility: HOSPITAL | Age: 74
End: 2019-08-20
Attending: INTERNAL MEDICINE
Payer: MEDICARE

## 2019-08-20 DIAGNOSIS — M62.81 MUSCLE WEAKNESS OF LOWER EXTREMITY: ICD-10-CM

## 2019-08-20 DIAGNOSIS — M62.89 MUSCLE TIGHTNESS: ICD-10-CM

## 2019-08-20 DIAGNOSIS — M25.552 LEFT HIP PAIN: ICD-10-CM

## 2019-08-20 PROCEDURE — 97110 THERAPEUTIC EXERCISES: CPT | Mod: HCNC,PO

## 2019-08-20 NOTE — PROGRESS NOTES
"  Physical Therapy Daily Treatment Note     Name: Eboni Nicholas  Clinic Number: [id number]    Therapy Diagnosis:   Encounter Diagnoses   Name Primary?    Left hip pain     Muscle tightness     Muscle weakness of lower extremity      Physician: CAMILO Elizondo MD    Visit Date: 8/20/2019    Physician Orders: PT Eval and Treat  Medical Diagnosis from Referral: Strain of left hip, initial encounter  Evaluation Date: 8/6/2019  Authorization Period Expiration: 7/29/20  Plan of Care Expiration: 10/6/19  Visit # / Visits authorized: 2 /   PTA Visit Number: 1    Time In: 10:00 am   Time Out: 10:  Total Billable Time:  minutes  Charges: TE - 2     Precautions: Standard    Subjective     Pt reports: Increased pain currently. States " I can hardly walk"  She was somewhat compliant with home exercise program due to pain on some days   Response to previous treatment: Tolerated evaluation   Functional change: Increased pain     Pain: 8/10  Location: left leg and left hip     Objective     Eboni received therapeutic exercises to develop strength, endurance, ROM, flexibility, posture and core stabilization for 35 minutes including:    Recumbent bike: x 7 minutes   Supine L hip flexor str EOB w strap 3 x 30 sec  Supine L hip flexor stretch oscillations EOB by therapist x 30  Supine march 2 x 10  Bridges x 20  Supine hip abduction on slideboard x 20  Supine hip flexion on slideboard x 20  Prone HS curls x 20       Home Exercises Provided and Patient Education Provided     Education provided:       Written Home Exercises Provided: Patient instructed to cont prior HEP.  Exercises were reviewed and Eboni was able to demonstrate them prior to the end of the session.  Eboni demonstrated fair  understanding of the education provided.     See EMR under Patient Instructions for exercises provided prior visit.    Assessment     Patient with fair / limited tolerance for therapy session due to increased pain today. Pt presents with " reports of L hip pain, tenderness in distal L hip flexor tendons, LE weakness, muscle tightness, and postural imbalance. Improvement in symptoms following treatment.      Eboni is progressing well towards her goals.   Pt prognosis is Good.     Pt will continue to benefit from skilled outpatient physical therapy to address the deficits listed in the problem list box on initial evaluation, provide pt/family education and to maximize pt's level of independence in the home and community environment.     Pt's spiritual, cultural and educational needs considered and pt agreeable to plan of care and goals.     Anticipated barriers to physical therapy: None     Goals:     GOALS: Short Term Goals:  4 weeks  1. Report decreased L hip pain  <  / =  5/10 at worst to increase tolerance for prolonged standing.   2. Pt will be able to tolerate multi-directional LE strengthening in order to improve ability to perform household chores.  3. Pt will report 50% improvement in ability to walk long distances since start of care to indicate improved functional mobility.   4. Pt will perform marches without pain to indicate improvement in ability to lift leg into car for transfers.   5. Pt to tolerate HEP to improve ROM and independence with ADL's.     Long Term Goals: 8 weeks  1. Report decreased L hip pain  <  / =  3/10 at worst to increase tolerance for prolonged standing.   2. Pt will be able to perform 2 x 10 multi-directional LE strengthening without fatigue in order to improve ability to perform household chores.  3. Pt will report 80% improvement in ability to walk long distances since start of care to indicate improved functional mobility.   4. Pt will report no difficulties with car transfers to indicate improvement in hip flexor strength.   5. Pt to be Independent with HEP to improve ROM and independence with ADL's.    Plan     Cont with PT POC     Evelyn Chadwick, ANNALISE

## 2019-08-21 ENCOUNTER — HOSPITAL ENCOUNTER (OUTPATIENT)
Dept: RADIOLOGY | Facility: HOSPITAL | Age: 74
Discharge: HOME | End: 2019-08-21
Attending: INTERNAL MEDICINE
Payer: MEDICARE

## 2019-08-21 DIAGNOSIS — M25.562 ACUTE PAIN OF LEFT KNEE: ICD-10-CM

## 2019-08-21 DIAGNOSIS — M25.562 ACUTE PAIN OF LEFT KNEE: Primary | ICD-10-CM

## 2019-08-21 PROCEDURE — 73560 X-RAY EXAM OF KNEE 1 OR 2: CPT | Mod: TC,HCNC,PO,LT

## 2019-08-21 PROCEDURE — 73560 X-RAY EXAM OF KNEE 1 OR 2: CPT | Mod: 26,HCNC,LT, | Performed by: RADIOLOGY

## 2019-08-21 PROCEDURE — 73560 XR KNEE 1 OR 2 VIEW LEFT: ICD-10-PCS | Mod: 26,HCNC,LT, | Performed by: RADIOLOGY

## 2019-08-21 RX ORDER — TRAMADOL HYDROCHLORIDE 50 MG/1
50 TABLET ORAL EVERY 6 HOURS PRN
Qty: 30 TABLET | Refills: 0 | Status: SHIPPED | OUTPATIENT
Start: 2019-08-21 | End: 2021-05-10

## 2019-08-22 ENCOUNTER — HOSPITAL ENCOUNTER (OUTPATIENT)
Dept: RADIOLOGY | Facility: HOSPITAL | Age: 74
Discharge: HOME | End: 2019-08-22
Attending: FAMILY MEDICINE
Payer: MEDICARE

## 2019-08-22 ENCOUNTER — OFFICE VISIT (OUTPATIENT)
Dept: SPORTS MEDICINE | Facility: CLINIC | Age: 74
End: 2019-08-22
Payer: MEDICARE

## 2019-08-22 VITALS — WEIGHT: 223 LBS | TEMPERATURE: 99 F | HEIGHT: 63 IN | BODY MASS INDEX: 39.51 KG/M2

## 2019-08-22 DIAGNOSIS — M25.562 CHRONIC PAIN OF LEFT KNEE: ICD-10-CM

## 2019-08-22 DIAGNOSIS — M17.12 PRIMARY OSTEOARTHRITIS OF LEFT KNEE: Primary | ICD-10-CM

## 2019-08-22 DIAGNOSIS — G89.29 CHRONIC PAIN OF LEFT KNEE: ICD-10-CM

## 2019-08-22 DIAGNOSIS — M16.12 PRIMARY OSTEOARTHRITIS OF LEFT HIP: ICD-10-CM

## 2019-08-22 PROCEDURE — 73564 X-RAY EXAM KNEE 4 OR MORE: CPT | Mod: 26,HCNC,RT, | Performed by: RADIOLOGY

## 2019-08-22 PROCEDURE — 99999 PR PBB SHADOW E&M-EST. PATIENT-LVL III: CPT | Mod: PBBFAC,HCNC,, | Performed by: FAMILY MEDICINE

## 2019-08-22 PROCEDURE — 1101F PT FALLS ASSESS-DOCD LE1/YR: CPT | Mod: HCNC,CPTII,S$GLB, | Performed by: FAMILY MEDICINE

## 2019-08-22 PROCEDURE — 1101F PR PT FALLS ASSESS DOC 0-1 FALLS W/OUT INJ PAST YR: ICD-10-PCS | Mod: HCNC,CPTII,S$GLB, | Performed by: FAMILY MEDICINE

## 2019-08-22 PROCEDURE — 73564 XR KNEE ORTHO RIGHT WITH FLEXION: ICD-10-PCS | Mod: 26,HCNC,RT, | Performed by: RADIOLOGY

## 2019-08-22 PROCEDURE — 20611 DRAIN/INJ JOINT/BURSA W/US: CPT | Mod: HCNC,LT,S$GLB, | Performed by: FAMILY MEDICINE

## 2019-08-22 PROCEDURE — 20611 LARGE JOINT ASPIRATION/INJECTION: L KNEE: ICD-10-PCS | Mod: HCNC,LT,S$GLB, | Performed by: FAMILY MEDICINE

## 2019-08-22 PROCEDURE — 99204 PR OFFICE/OUTPT VISIT, NEW, LEVL IV, 45-59 MIN: ICD-10-PCS | Mod: 25,HCNC,S$GLB, | Performed by: FAMILY MEDICINE

## 2019-08-22 PROCEDURE — 73562 X-RAY EXAM OF KNEE 3: CPT | Mod: TC,HCNC,FY,PO,LT

## 2019-08-22 PROCEDURE — 73562 X-RAY EXAM OF KNEE 3: CPT | Mod: 26,59,HCNC,LT | Performed by: RADIOLOGY

## 2019-08-22 PROCEDURE — 99999 PR PBB SHADOW E&M-EST. PATIENT-LVL III: ICD-10-PCS | Mod: PBBFAC,HCNC,, | Performed by: FAMILY MEDICINE

## 2019-08-22 PROCEDURE — 73562 XR KNEE ORTHO RIGHT WITH FLEXION: ICD-10-PCS | Mod: 26,59,HCNC,LT | Performed by: RADIOLOGY

## 2019-08-22 PROCEDURE — 99204 OFFICE O/P NEW MOD 45 MIN: CPT | Mod: 25,HCNC,S$GLB, | Performed by: FAMILY MEDICINE

## 2019-08-22 RX ORDER — TRIAMCINOLONE ACETONIDE 40 MG/ML
40 INJECTION, SUSPENSION INTRA-ARTICULAR; INTRAMUSCULAR
Status: DISCONTINUED | OUTPATIENT
Start: 2019-08-22 | End: 2019-08-22 | Stop reason: HOSPADM

## 2019-08-22 RX ADMIN — TRIAMCINOLONE ACETONIDE 40 MG: 40 INJECTION, SUSPENSION INTRA-ARTICULAR; INTRAMUSCULAR at 12:08

## 2019-08-22 NOTE — PROGRESS NOTES
Eboni Nicholas, a 73 y.o. female, presents today for evaluation of her Left knee. Patient was seen by Ortho in 2014 for bilateral knee pain, got a Rx for mobic and did not receive any injections at that time. She is currently in fPT for her left hip     History of Present Illness (HPI)  Location: global knee, left  Onset: Chronic, 3 weeks  Palliative:    Relative rest   Oral analgesics  Provocative:    ADLS   Prolonged ambulation  Prior: No hx of Orthopaedic surgery  Progression: worsening discomfort  Quality:    sharp pain  Radiation: none  Severity: per nursing documentation  Timing: intermittent w/ use  Trauma: none    Review of Systems (ROS)  A 10+ review of systems was performed with pertinent positives and negatives noted above in the history of present illness. Other systems were negative unless otherwise specified.    Physical Examination (PE)  General:  The patient is alert and oriented x 3. Mood is pleasant. Observation of ears, eyes and nose reveal no gross abnormalities. HEENT: NCAT, sclera anicteric.   Lungs: Respirations are equal and unlabored.  Gait is coordinated. Patient can toe walk and heel walk without difficulty.    LEFT KNEE EXAMINATION    Observation/Inspection  Gait:   Nonantalgic   Alignment:  Neutral   Scars:   None   Muscle atrophy: Mild  Effusion:  None   Warmth:  None   Discoloration:   none     Tenderness / Crepitus (T / C):         T / C      T / C  Patella   - / -   Lateral joint line   - / -     Peripatellar medial  -  Medial joint line    + / -  Peripatellar lateral -  Medial plica   - / -  Patellar tendon -   Popliteal fossa   - / -  Quad tendon   -   Gastrocnemius   -  Prepatellar Bursa - / -   Quadricep   -  Tibial tubercle  -  Thigh/hamstring  -  Pes anserine/HS -  Fibula    -  ITB   - / -  Tibia     -  Tib/fib joint  - / -  LCL    -    MFC   - / -   MCL: Proximal  -    LFC   - / -   Distal    -          ROM: (* = pain)  PASSIVE   ACTIVE    Left :   5 / 0 / 145   5 / 0 / 145      Right :    5 / 0 / 145   5 / 0 / 145    Patellofemoral examination:  See above noted areas of tenderness.   Patella position    Subluxation / dislocation: Centered        Sup. / Inf;   Normal   Crepitus (PF):    Absent   Patellar Mobility:       Medial-lateral:   Normal    Superior-inferior:  Normal    Inferior tilt   Normal    Patellar tendon:  Normal   Lateral tilt:    Normal   J-sign:     None   Patellofemoral grind:   No pain     Meniscal Signs:     Pain on terminal extension:  +  Pain on terminal flexion:  +  Carsons maneuver:  +*  Squat     NT  Thessaly    NT    Ligament Examination:  ACL / Lachman:  WNL  PCL-Post.  drawer: normal 0 to 2mm  MCL- Valgus:  normal 0 to 2mm  LCL- Varus:    normal 0 to 2mm  Pivot shift:  guarding   Dial Test:   difference c/w other side   At 30° flexion: normal (< 5°)    At 90° flexion: normal (< 5°)   Reverse Pivot Shift:   normal (Equal)     Strength: (* = with pain) Painful Side  Quadriceps   5/5  Hamstrin/5    Extremity Neuro-vascular Examination:   Sensation:  Grossly intact to light touch all dermatomal regions.   Motor Function:  Fully intact motor function at hip, knee, foot and ankle    DTRs;  quadriceps and  achilles 2+.  No clonus and downgoing Babinski.    Vascular status:  DP and PT pulses 2+, brisk capillary refill, symmetric.     Other Findings:    ASSESSMENT & PLAN  Assessment:   #1 Tonnis Grade II osteoarthritis of hip, left  #2 Kellgren-Robert Grade II osteoarthritis of knee, brianne ant compartment, left    No evidence of vascular pathology    Imaging studies reviewed:  X-ray hip, left 19.07   X-ray knee, bilateral 19.08    Plan:    We discussed the importance of appropriate diet, weight, and regular exercise including quadriceps strengthening     We discussed options including:  #1 watchful waiting  #2 t physical therapy aimed at:   Core stability   RoM knee   Strengthening quadriceps   Gait training   #3 injection therapy:   JETT rader     Right,      Left,    VSI iaknee    Right,     Left,    Orthobiologics   #4 consultation      The patient chooses #2 and #3 csi iaknee left    Pain management: handout given  Bracing: rolling walker, continue  Physical therapy: [facility], @ [facility], t as above   Activity (e.g. sports, work) restrictions: as tolerated  school/vocation:   Retired  Train to [place] in 10/2019    Follow up   X-RAY SPINE LUMBAR  1) for hip  2) for knee  Should symptoms worsen or fail to resolve, consider:  Revisiting the above options

## 2019-08-22 NOTE — LETTER
August 22, 2019      CAMILO Elizondo MD  [address] [facility] [place] [address]           [address]  [facility] [address]  Phone: [phone]          Patient: Eboni Nicholas   MR Number: [id number]   YOB: 1945   Date of Visit: 8/22/2019       Dear Dr. CAMILO Elizondo:    Thank you for referring Eboni Nicholas to me for evaluation. Attached you will find relevant portions of my assessment and plan of care.    If you have questions, please do not hesitate to call me. I look forward to following Eboni Nicholas along with you.    Sincerely,    Wm Lewis MD    Enclosure  CC:  No Recipients    If you would like to receive this communication electronically, please contact [email or web address] or [phone] to request more information on [employer] access.    For providers and/or their staff who would like to refer a patient to [facility], please contact us through our one-stop-shop provider referral line, [facility], at [phone].    If you feel you have received this communication in error or would no longer like to receive these types of communications, please e-mail [email or web address]

## 2019-08-22 NOTE — PROCEDURES
"Large Joint Aspiration/Injection: L knee  Date/Time: 8/22/2019 12:38 PM  Performed by: Wm Lewis MD  Authorized by: Wm Lewis MD     Consent Done?:  Yes (Verbal)  Indications:  Pain  Procedure site marked: Yes    Timeout: Prior to procedure the correct patient, procedure, and site was verified      Location:  Knee  Site:  L knee  Prep: Patient was prepped and draped in usual sterile fashion    Needle size:  20 G  Ultrasonic Guidance for needle placement: Yes  Images are saved and documented.  Approach:  Lateral  Medications:  40 mg triamcinolone acetonide 40 mg/mL  Patient tolerance:  Patient tolerated the procedure well with no immediate complications    Additional Comments: Description of ultrasound utilization for needle guidance:   Ultrasound guidance used for needle localization. Images saved and stored for documentation. The knee joint was visualized. Dynamic visualization of the 20g x 3.5" needle was continuous throughout the procedure.      "

## 2019-08-27 ENCOUNTER — CLINICAL SUPPORT (OUTPATIENT)
Dept: REHABILITATION | Facility: HOSPITAL | Age: 74
End: 2019-08-27
Attending: INTERNAL MEDICINE
Payer: MEDICARE

## 2019-08-27 DIAGNOSIS — M62.81 MUSCLE WEAKNESS OF LOWER EXTREMITY: ICD-10-CM

## 2019-08-27 DIAGNOSIS — M25.552 LEFT HIP PAIN: ICD-10-CM

## 2019-08-27 DIAGNOSIS — M62.89 MUSCLE TIGHTNESS: ICD-10-CM

## 2019-08-27 PROCEDURE — 97110 THERAPEUTIC EXERCISES: CPT | Mod: HCNC,PO

## 2019-08-27 NOTE — PROGRESS NOTES
Physical Therapy Daily Treatment Note     Name: Eboni Nicholas  Clinic Number: [id number]    Therapy Diagnosis:   Encounter Diagnoses   Name Primary?    Left hip pain     Muscle tightness     Muscle weakness of lower extremity      Physician: CAMILO Elizondo MD    Visit Date: 8/27/2019    Physician Orders: PT Eval and Treat  Medical Diagnosis from Referral: Strain of left hip, initial encounter  Evaluation Date: 8/6/2019  Authorization Period Expiration: 7/29/20  Plan of Care Expiration: 10/6/19  Visit # / Visits authorized: 3 / 20   PTA Visit Number: 2    Time In: 11:25 am   Time Out: 12:00 am   Total Billable Time:  35 minutes  Charges: TE - 2     Precautions: Standard    Subjective     Pt reports: Continued left knee pain and left groin / hip. Patient reports she received an injection in the left knee last Thursday. States the MD put in PT orders for the knee as well.   She was somewhat compliant with home exercise program due to pain on some days   Response to previous treatment: Tolerated evaluation   Functional change: Increased pain     Pain: 9 / 10  Location: left leg and left hip     Objective     Eboni received therapeutic exercises to develop strength, endurance, ROM, flexibility, posture and core stabilization for 35 minutes including:      Recumbent bike: x 5 minutes   Supine L hip flexor str EOB w strap 3 x 30 sec  Supine L hip flexor stretch oscillations EOB by therapist x 30 - Not performed today   Supine march 2 x 10  Bridges x 20  Supine hip abduction on slideboard x 20  Supine hip flexion on slideboard x 20  Prone HS curls x 20       Home Exercises Provided and Patient Education Provided     Education provided:       Written Home Exercises Provided: Patient instructed to cont prior HEP.  Exercises were reviewed and Eboni was able to demonstrate them prior to the end of the session.  Eboni demonstrated fair  understanding of the education provided.     See EMR under Patient Instructions  for exercises provided prior visit.    Assessment     Patient with fair / limited tolerance for therapy session due to increased pain today. Pt presents with reports of L hip pain, tenderness in distal L hip flexor tendons, LE weakness, muscle tightness, and postural imbalance. Improvement in symptoms following treatment      Eboni is progressing well towards her goals.   Pt prognosis is Good.     Pt will continue to benefit from skilled outpatient physical therapy to address the deficits listed in the problem list box on initial evaluation, provide pt/family education and to maximize pt's level of independence in the home and community environment.     Pt's spiritual, cultural and educational needs considered and pt agreeable to plan of care and goals.     Anticipated barriers to physical therapy: None     Goals:     GOALS: Short Term Goals:  4 weeks  1. Report decreased L hip pain  <  / =  5/10 at worst to increase tolerance for prolonged standing.   2. Pt will be able to tolerate multi-directional LE strengthening in order to improve ability to perform household chores.  3. Pt will report 50% improvement in ability to walk long distances since start of care to indicate improved functional mobility.   4. Pt will perform marches without pain to indicate improvement in ability to lift leg into car for transfers.   5. Pt to tolerate HEP to improve ROM and independence with ADL's.     Long Term Goals: 8 weeks  1. Report decreased L hip pain  <  / =  3/10 at worst to increase tolerance for prolonged standing.   2. Pt will be able to perform 2 x 10 multi-directional LE strengthening without fatigue in order to improve ability to perform household chores.  3. Pt will report 80% improvement in ability to walk long distances since start of care to indicate improved functional mobility.   4. Pt will report no difficulties with car transfers to indicate improvement in hip flexor strength.   5. Pt to be Independent with HEP  to improve ROM and independence with ADL's.    Plan     Cont with PT POC     Evelyn Chadwick, PTA

## 2019-08-28 ENCOUNTER — OFFICE VISIT (OUTPATIENT)
Dept: OPTOMETRY | Facility: CLINIC | Age: 74
End: 2019-08-28
Payer: MEDICARE

## 2019-08-28 DIAGNOSIS — H40.039 ANATOMICAL NARROW ANGLE: ICD-10-CM

## 2019-08-28 DIAGNOSIS — H40.1112 PRIMARY OPEN ANGLE GLAUCOMA (POAG) OF RIGHT EYE, MODERATE STAGE: Primary | ICD-10-CM

## 2019-08-28 DIAGNOSIS — H40.1121 PRIMARY OPEN ANGLE GLAUCOMA (POAG) OF LEFT EYE, MILD STAGE: ICD-10-CM

## 2019-08-28 PROCEDURE — 99999 PR PBB SHADOW E&M-EST. PATIENT-LVL II: CPT | Mod: PBBFAC,HCNC,, | Performed by: OPTOMETRIST

## 2019-08-28 PROCEDURE — 99999 PR PBB SHADOW E&M-EST. PATIENT-LVL II: ICD-10-PCS | Mod: PBBFAC,HCNC,, | Performed by: OPTOMETRIST

## 2019-08-28 PROCEDURE — 92012 INTRM OPH EXAM EST PATIENT: CPT | Mod: HCNC,S$GLB,, | Performed by: OPTOMETRIST

## 2019-08-28 PROCEDURE — 92012 PR EYE EXAM, EST PATIENT,INTERMED: ICD-10-PCS | Mod: HCNC,S$GLB,, | Performed by: OPTOMETRIST

## 2019-08-28 NOTE — PROGRESS NOTES
SARAH BETH     BERNARDO: 07/24/19 with Dr. Montano  Ms Nicholas is here for an IOP check. She is using Combigan OD BID and   Latanoprost OU QHS. She reports good compliance.       Last edited by Alexsandra Zurita on 8/28/2019  1:52 PM. (History)            Assessment /Plan     For exam results, see Encounter Report.    Primary open angle glaucoma (POAG) of right eye, moderate stage    Primary open angle glaucoma (POAG) of left eye, mild stage    Anatomical narrow angle      Educated pt on findings. IOP OD at target pressure with addition of combo drop. Continue use of latanoprost 1 gtt OU QHS + Combigan 1 gtt BID OD only. Monitor 3 months.     RTC in 3 months for IOP check or prn.

## 2019-08-29 ENCOUNTER — CLINICAL SUPPORT (OUTPATIENT)
Dept: REHABILITATION | Facility: HOSPITAL | Age: 74
End: 2019-08-29
Attending: INTERNAL MEDICINE
Payer: MEDICARE

## 2019-08-29 DIAGNOSIS — M25.552 LEFT HIP PAIN: ICD-10-CM

## 2019-08-29 DIAGNOSIS — M62.89 MUSCLE TIGHTNESS: ICD-10-CM

## 2019-08-29 DIAGNOSIS — M62.81 MUSCLE WEAKNESS OF LOWER EXTREMITY: ICD-10-CM

## 2019-08-29 PROCEDURE — 97110 THERAPEUTIC EXERCISES: CPT | Mod: HCNC,PO

## 2019-08-29 NOTE — PROGRESS NOTES
"  Physical Therapy Daily Treatment Note     Name: Eboni Nicholas  Clinic Number: [id number]    Therapy Diagnosis:   Encounter Diagnoses   Name Primary?    Left hip pain     Muscle tightness     Muscle weakness of lower extremity      Physician: CAMILO Elizondo MD    Visit Date: 8/29/2019    Physician Orders: PT Eval and Treat  Medical Diagnosis from Referral: Strain of left hip, initial encounter  Evaluation Date: 8/6/2019  Authorization Period Expiration: 7/29/20  Plan of Care Expiration: 10/6/19  Visit # / Visits authorized: 4 / 20   PTA Visit Number: 3    Time In: 12:00 pm   Time Out: 01:00  pm   Total Billable Time:  35 minutes  Charges: TE - 2     Precautions: Standard    Subjective     Pt reports: "I feel much better today"  She was somewhat compliant with home exercise program due to pain on some days   Response to previous treatment: Tolerated evaluation   Functional change: Increased pain     Pain: 6 / 10  Location: left leg and left hip     Objective     Eboni received therapeutic exercises to develop strength, endurance, ROM, flexibility, posture and core stabilization for 35 minutes including:      Recumbent bike: x 5 minutes   Supine L hip flexor str EOB w strap 3 x 30 sec  Supine L hip flexor stretch oscillations EOB by therapist x 30  Supine march 2 x 10  Bridges x 20  Supine hip abduction on slideboard x 20  Supine hip flexion on slideboard x 20  Prone HS curls x 20  Lateral Weight shifts: x 2 minutes   Standing hip abduction: 2 x 10      Gait training on level surface with emphasis and cueing to improve step length, stance time and decreased gait derivations        Home Exercises Provided and Patient Education Provided     Education provided:       Written Home Exercises Provided: Patient instructed to cont prior HEP.  Exercises were reviewed and Eboni was able to demonstrate them prior to the end of the session.  Eboni demonstrated fair  understanding of the education provided.     See EMR " under Patient Instructions for exercises provided prior visit.    Assessment     Patient with improved  tolerance for therapy session, able to progress with closed chain activities today. . Pt presents with reports of L hip pain, tenderness in distal L hip flexor tendons, LE weakness, muscle tightness, and postural imbalance. Improvement in symptoms following treatment      Eboni is progressing well towards her goals.   Pt prognosis is Good.     Pt will continue to benefit from skilled outpatient physical therapy to address the deficits listed in the problem list box on initial evaluation, provide pt/family education and to maximize pt's level of independence in the home and community environment.     Pt's spiritual, cultural and educational needs considered and pt agreeable to plan of care and goals.     Anticipated barriers to physical therapy: None     Goals:     GOALS: Short Term Goals:  4 weeks  1. Report decreased L hip pain  <  / =  5/10 at worst to increase tolerance for prolonged standing.   2. Pt will be able to tolerate multi-directional LE strengthening in order to improve ability to perform household chores.  3. Pt will report 50% improvement in ability to walk long distances since start of care to indicate improved functional mobility.   4. Pt will perform marches without pain to indicate improvement in ability to lift leg into car for transfers.   5. Pt to tolerate HEP to improve ROM and independence with ADL's.     Long Term Goals: 8 weeks  1. Report decreased L hip pain  <  / =  3/10 at worst to increase tolerance for prolonged standing.   2. Pt will be able to perform 2 x 10 multi-directional LE strengthening without fatigue in order to improve ability to perform household chores.  3. Pt will report 80% improvement in ability to walk long distances since start of care to indicate improved functional mobility.   4. Pt will report no difficulties with car transfers to indicate improvement in hip  flexor strength.   5. Pt to be Independent with HEP to improve ROM and independence with ADL's.    Plan     Cont with PT POC     Evelyn Chadwick, PTA

## 2019-09-04 ENCOUNTER — CLINICAL SUPPORT (OUTPATIENT)
Dept: REHABILITATION | Facility: HOSPITAL | Age: 74
End: 2019-09-04
Attending: INTERNAL MEDICINE
Payer: MEDICARE

## 2019-09-04 DIAGNOSIS — M25.552 LEFT HIP PAIN: ICD-10-CM

## 2019-09-04 DIAGNOSIS — M62.81 MUSCLE WEAKNESS OF LOWER EXTREMITY: ICD-10-CM

## 2019-09-04 DIAGNOSIS — M62.89 MUSCLE TIGHTNESS: ICD-10-CM

## 2019-09-04 PROCEDURE — 97110 THERAPEUTIC EXERCISES: CPT | Mod: HCNC,PO

## 2019-09-04 NOTE — PROGRESS NOTES
Physical Therapy Daily Treatment Note     Name: Eboni Nicholas  Clinic Number: [id number]    Therapy Diagnosis:   Encounter Diagnoses   Name Primary?    Left hip pain     Muscle tightness     Muscle weakness of lower extremity      Physician: CAMILO Elizondo MD    Visit Date: 9/4/2019    Physician Orders: PT Eval and Treat  Medical Diagnosis from Referral: Strain of left hip, initial encounter  Evaluation Date: 8/6/2019  Authorization Period Expiration: 7/29/20  Plan of Care Expiration: 10/6/19  Visit # / Visits authorized: 5 / 20   PTA Visit Number: 4    Time In: 01:00 pm   Time Out: 02:00  pm   Total Billable Time:  30 minutes  Charges: TE - 2     Precautions: Standard    Subjective     Pt reports: Continued left LE pain but states it has gotten better since starting therapy   She was somewhat compliant with home exercise program due to pain on some days   Response to previous treatment: Tolerated evaluation   Functional change: Increased pain     Pain: 3 / 10  Location: left leg and left hip     Objective     Eboni received therapeutic exercises to develop strength, endurance, ROM, flexibility, posture and core stabilization for 35 minutes including:      Recumbent bike: x 5 minutes   Supine L hip flexor str EOB w strap 3 x 30 sec  Supine L hip flexor stretch oscillations EOB by therapist x 30  Supine march 2 x 10  Bridges x 20  Supine hip abduction on slideboard x 20  Supine hip flexion on slideboard x 20  Prone HS curls x 20  Lateral Weight shifts: x 2 minutes   Standing hip abduction: 2 x 10  Sit to stand from gold chair: 2 x 10       Gait training on level surface with emphasis and cueing to improve step length, stance time and decreased gait derivations        Home Exercises Provided and Patient Education Provided     Education provided:       Written Home Exercises Provided: Patient instructed to cont prior HEP.  Exercises were reviewed and Eboni was able to demonstrate them prior to the end of the  session.  Eboni demonstrated fair  understanding of the education provided.     See EMR under Patient Instructions for exercises provided prior visit.    Assessment     Patient tolerated therapy session fairly well, requires extra time for task due to LLE pain however able to complete all therex. Remains with significant LE weakness and will continue to benefit from skilled physical therapy to address.      Eboni is progressing well towards her goals.   Pt prognosis is Good.     Pt will continue to benefit from skilled outpatient physical therapy to address the deficits listed in the problem list box on initial evaluation, provide pt/family education and to maximize pt's level of independence in the home and community environment.     Pt's spiritual, cultural and educational needs considered and pt agreeable to plan of care and goals.     Anticipated barriers to physical therapy: None     Goals:     GOALS: Short Term Goals:  4 weeks  1. Report decreased L hip pain  <  / =  5/10 at worst to increase tolerance for prolonged standing.   2. Pt will be able to tolerate multi-directional LE strengthening in order to improve ability to perform household chores.  3. Pt will report 50% improvement in ability to walk long distances since start of care to indicate improved functional mobility.   4. Pt will perform marches without pain to indicate improvement in ability to lift leg into car for transfers.   5. Pt to tolerate HEP to improve ROM and independence with ADL's.     Long Term Goals: 8 weeks  1. Report decreased L hip pain  <  / =  3/10 at worst to increase tolerance for prolonged standing.   2. Pt will be able to perform 2 x 10 multi-directional LE strengthening without fatigue in order to improve ability to perform household chores.  3. Pt will report 80% improvement in ability to walk long distances since start of care to indicate improved functional mobility.   4. Pt will report no difficulties with car transfers  to indicate improvement in hip flexor strength.   5. Pt to be Independent with HEP to improve ROM and independence with ADL's.    Plan     Cont with PT POC     Evelyn Chadwick, PTA

## 2019-09-05 ENCOUNTER — DOCUMENTATION ONLY (OUTPATIENT)
Dept: REHABILITATION | Facility: HOSPITAL | Age: 74
End: 2019-09-05

## 2019-09-06 RX ORDER — ESCITALOPRAM OXALATE 20 MG/1
TABLET ORAL
Qty: 90 TABLET | Refills: 1 | Status: SHIPPED | OUTPATIENT
Start: 2019-09-06 | End: 2020-03-09

## 2019-09-10 ENCOUNTER — CLINICAL SUPPORT (OUTPATIENT)
Dept: REHABILITATION | Facility: HOSPITAL | Age: 74
End: 2019-09-10
Attending: INTERNAL MEDICINE
Payer: MEDICARE

## 2019-09-10 DIAGNOSIS — M62.81 MUSCLE WEAKNESS OF LOWER EXTREMITY: ICD-10-CM

## 2019-09-10 DIAGNOSIS — M25.552 LEFT HIP PAIN: ICD-10-CM

## 2019-09-10 DIAGNOSIS — M62.89 MUSCLE TIGHTNESS: ICD-10-CM

## 2019-09-10 PROCEDURE — G8978 MOBILITY CURRENT STATUS: HCPCS | Mod: CK,HCNC,PO

## 2019-09-10 PROCEDURE — 97110 THERAPEUTIC EXERCISES: CPT | Mod: HCNC,PO

## 2019-09-10 PROCEDURE — G8979 MOBILITY GOAL STATUS: HCPCS | Mod: CK,HCNC,PO

## 2019-09-10 NOTE — PROGRESS NOTES
Physical Therapy Daily Treatment Note     Name: Eboni Nicholas  Clinic Number: [id number]    Therapy Diagnosis:   Encounter Diagnoses   Name Primary?    Left hip pain     Muscle tightness     Muscle weakness of lower extremity      Physician: CAMILO Elizondo MD    Visit Date: 9/10/2019    Physician Orders: PT Eval and Treat  Medical Diagnosis from Referral: Strain of left hip, initial encounter  Evaluation Date: 8/6/2019  Last PN: 9/10/19 (visit 6)  Authorization Period Expiration: 7/29/20  Plan of Care Expiration: 10/6/19  Visit # / Visits authorized: 6 / 20   PTA Visit Number: 0    Time In: 1102  Time Out: 1157   Total Billable Time: 27 minutes  Charges: TE - 2     Precautions: Standard    Subjective     Pt reports: she was hurting yesterday due to stiffness and couldn't walk. She couldn't performed HEP yesterday due to pain. 40% improvement in ability to walk long distances since start of care. Pain is at worst 6/10.   She was somewhat compliant with home exercise program due to pain on some days.  Response to previous treatment: good  Functional change: walking is better and easier    Pain: 5 / 10  Location: left leg and left hip     Objective     Eboni received therapeutic exercises to develop strength, endurance, ROM, flexibility, posture and core stabilization for 55 minutes including:    +Upright bike: x 8 minutes   +SLR x 20 ea (L with min A for 1st 10)  Supine L hip flexor str EOB w strap 3 x 30 sec  Supine L hip flexor stretch oscillations EOB by therapist x 30  Supine march 2 x 10  Bridges x 20  +SL clams x 20 ea  Supine hip abduction on slideboard x 20  Supine hip flexion on slideboard x 20  Prone HS curls x 20  +Prone quad stretch w strap 3 x 30 sec  Lateral Weight shifts: x 2 minutes   Standing hip abduction: 2 x 10 ea  +Standing march x 20 ea  +Shuttle squats 2.5 bands x 30  Sit to stand from gold chair: 2 x 10     Gait training on level surface with emphasis and cueing to improve step  length, stance time and decreased gait derivations - NP     Home Exercises Provided and Patient Education Provided     Education provided:   - importance of performing HEP to tolerance    Written Home Exercises Provided: yes. (standing march, standing hip abd, SLR, prone quad stretch, SL clams)  Exercises were reviewed and Eboni was able to demonstrate them prior to the end of the session.  Eboni demonstrated fair  understanding of the education provided.     See EMR under Patient Instructions for exercises provided prior visit.    CMS Impairment/Limitation/Restriction for FOTO hip Survey    Therapist reviewed FOTO scores for Eboni Nicholas on 9/10/2019.   FOTO documents entered into [facility] [employer] - see Media section.    Limitation Score: 51%  Category: Mobility    Current : CK = at least 40% but < 60% impaired, limited or restricted  Goal: CK = at least 40% but < 60% impaired, limited or restricted       Assessment     Patient was re-assessed today with 3/5 STGs being met indicating improvements in tolerance for LE strengthening and HEP and ability to perform march without pain since start of care. She continues with LE weakness, L hip pain, muscle tightness, and impaired functional mobility. Pt could benefit from continued physical therapy services to address deficits.     She had good tolerance to treatment today with no adverse effects. Post-treatment L hip pain rated as 3/10. Pt with initial pain with standing marches but improvements with repetition. She reports significant targeted pull with prone quad stretching indicating some tightness. No difficulties with addition of new therapeutic exercises to program. Good response to SL clams.      Eboni is progressing well towards her goals.   Pt prognosis is Good.     Pt will continue to benefit from skilled outpatient physical therapy to address the deficits listed in the problem list box on initial evaluation, provide pt/family education and to maximize pt's level of  independence in the home and community environment.     Pt's spiritual, cultural and educational needs considered and pt agreeable to plan of care and goals.     Anticipated barriers to physical therapy: None     Goals:     GOALS: Short Term Goals:  4 weeks  1. Report decreased L hip pain  <  / =  5/10 at worst to increase tolerance for prolonged standing. - in progress  2. Pt will be able to tolerate multi-directional LE strengthening in order to improve ability to perform household chores.- met 9/10/19  3. Pt will report 50% improvement in ability to walk long distances since start of care to indicate improved functional mobility.- in progress   4. Pt will perform marches without pain to indicate improvement in ability to lift leg into car for transfers.- met 9/10/19   5. Pt to tolerate HEP to improve ROM and independence with ADL's.- met 9/10/19     Long Term Goals: 8 weeks  1. Report decreased L hip pain  <  / =  3/10 at worst to increase tolerance for prolonged standing.- in progress   2. Pt will be able to perform 2 x 10 multi-directional LE strengthening without fatigue in order to improve ability to perform household chores.- in progress  3. Pt will report 80% improvement in ability to walk long distances since start of care to indicate improved functional mobility.- in progress   4. Pt will report no difficulties with car transfers to indicate improvement in hip flexor strength.- in progress   5. Pt to be Independent with HEP to improve ROM and independence with ADL's.- in progress    Plan     Progress LE strengthening.     Shelby Bauer, PT

## 2019-09-12 ENCOUNTER — CLINICAL SUPPORT (OUTPATIENT)
Dept: REHABILITATION | Facility: HOSPITAL | Age: 74
End: 2019-09-12
Attending: INTERNAL MEDICINE
Payer: MEDICARE

## 2019-09-12 DIAGNOSIS — M25.552 LEFT HIP PAIN: ICD-10-CM

## 2019-09-12 DIAGNOSIS — M62.89 MUSCLE TIGHTNESS: ICD-10-CM

## 2019-09-12 DIAGNOSIS — M62.81 MUSCLE WEAKNESS OF LOWER EXTREMITY: ICD-10-CM

## 2019-09-12 PROCEDURE — 97110 THERAPEUTIC EXERCISES: CPT | Mod: HCNC,PO

## 2019-09-12 NOTE — PROGRESS NOTES
Physical Therapy Daily Treatment Note     Name: Eboni Nicholas  Clinic Number: [id number]    Therapy Diagnosis:   Encounter Diagnoses   Name Primary?    Left hip pain     Muscle tightness     Muscle weakness of lower extremity      Physician: CAMILO Elizondo MD    Visit Date: 9/12/2019    Physician Orders: PT Eval and Treat  Medical Diagnosis from Referral: Strain of left hip, initial encounter  Evaluation Date: 8/6/2019  Last PN: 9/10/19 (visit 6)  Authorization Period Expiration: 7/29/20  Plan of Care Expiration: 10/6/19  Visit # / Visits authorized: 7 / 20   PTA Visit Number: 0    Time In: 1300  Time Out: 1358  Total Billable Time: 45 minutes  Charges: TE - 3    Precautions: Standard    Subjective     Pt reports: she has difficulty lifting leg up.  She was somewhat compliant with home exercise program due to pain on some days.  Response to previous treatment: good- no soreness  Functional change: none to note    Pain: 3 / 10  Location: left leg and left hip     Objective     BOLD= performed today    Eboni received therapeutic exercises to develop strength, endurance, ROM, flexibility, posture and core stabilization for 58 minutes including:    Upright bike: x 8 minutes   SLR x 20 ea (L with min A for 1st 10)  Supine L hip flexor str EOB w strap 3 x 30 sec  Supine L hip flexor stretch oscillations EOB by therapist x 30  Supine march 2 x 10  Bridges x 20  SL clams x 20 ea  SL hip abduction x 20 ea  Supine hip abduction on slideboard x 20  Supine hip flexion on slideboard x 20  Prone HS curls x 20  Prone quad stretch w strap 3 x 30 sec  +Prone hip ext x 20 ea  +Stair tap 6 in x 20  +Standing L hip flexor stretch 3 x 30 sec  Lateral Weight shifts: x 2 minutes   Standing hip abduction: 2 x 10 ea  Standing march x 20 ea  Shuttle squats 2.5 bands x 30  Sit to stand from gold chair: 2 x 10     Gait training on level surface with emphasis and cueing to improve step length, stance time and decreased gait  derivations - NP     Home Exercises Provided and Patient Education Provided     Education provided:   - importance of performing HEP to tolerance    Written Home Exercises Provided: yes. (standing march, standing hip abd, SLR, prone quad stretch, SL clams)  Exercises were reviewed and Eboni was able to demonstrate them prior to the end of the session.  Eboni demonstrated fair  understanding of the education provided.     See EMR under Patient Instructions for exercises provided prior visit.    Assessment     Patient had good tolerance to treatment today with no adverse effects. Post-treatment L hip pain rated as 0/10. Pt continues to be challenged with SLR requiring manual assist. Improvements in hip flexor strength with treatment. Good response to standing hip flexor stretching. Able to progress with exercise program without exacerbation of symptoms.     Eboni is progressing well towards her goals.   Pt prognosis is Good.     Pt will continue to benefit from skilled outpatient physical therapy to address the deficits listed in the problem list box on initial evaluation, provide pt/family education and to maximize pt's level of independence in the home and community environment.     Pt's spiritual, cultural and educational needs considered and pt agreeable to plan of care and goals.     Anticipated barriers to physical therapy: None     Goals:     GOALS: Short Term Goals:  4 weeks  1. Report decreased L hip pain  <  / =  5/10 at worst to increase tolerance for prolonged standing. - in progress  2. Pt will be able to tolerate multi-directional LE strengthening in order to improve ability to perform household chores.- met 9/10/19  3. Pt will report 50% improvement in ability to walk long distances since start of care to indicate improved functional mobility.- in progress   4. Pt will perform marches without pain to indicate improvement in ability to lift leg into car for transfers.- met 9/10/19   5. Pt to tolerate  HEP to improve ROM and independence with ADL's.- met 9/10/19     Long Term Goals: 8 weeks  1. Report decreased L hip pain  <  / =  3/10 at worst to increase tolerance for prolonged standing.- in progress   2. Pt will be able to perform 2 x 10 multi-directional LE strengthening without fatigue in order to improve ability to perform household chores.- in progress  3. Pt will report 80% improvement in ability to walk long distances since start of care to indicate improved functional mobility.- in progress   4. Pt will report no difficulties with car transfers to indicate improvement in hip flexor strength.- in progress   5. Pt to be Independent with HEP to improve ROM and independence with ADL's.- in progress    Plan     Progress LE strengthening and hip flexor stretching.     Shelby Bauer, PT

## 2019-09-12 NOTE — PROGRESS NOTES
Physical Therapy Daily Treatment Note     Name: Eboni Nicholas  Clinic Number: [id number]    Therapy Diagnosis:   Encounter Diagnoses   Name Primary?    Left hip pain     Muscle tightness     Muscle weakness of lower extremity      Physician: CAMILO Elizondo MD    Visit Date: 9/17/2019    Physician Orders: PT Eval and Treat  Medical Diagnosis from Referral: Strain of left hip, initial encounter  Evaluation Date: 8/6/2019  Last PN: 9/10/19 (visit 6)  Authorization Period Expiration: 7/29/20  Plan of Care Expiration: 10/6/19  Visit # / Visits authorized: 8 / 20   PTA Visit Number: 0    Time In: 1100  Time Out: 1156  Total Billable Time: 56 minutes  Charges: TE - 4    Precautions: Standard    Subjective     Pt reports:she had a good weekend and feels a lot better since last treatment.   She was compliant with home exercise program.  Response to previous treatment: good- slight soreness that has resolved  Functional change: walking better    Pain: 2 / 10  Location: left leg and left hip     Objective     BOLD= performed today    Eboni received therapeutic exercises to develop strength, endurance, ROM, flexibility, posture and core stabilization for 56 minutes including:    Upright bike: x 8 minutes level 2  SLR x 20 ea  Supine L hip flexor str EOB w strap 3 x 30 sec  Supine L hip flexor stretch oscillations EOB by therapist x 30  Supine march 2 x 10  Bridges x 20  SL clams x 20 ea  SL hip abduction x 20 ea  Supine hip abduction on slideboard x 20  Supine hip flexion on slideboard x 20  Prone HS curls x 20  Prone quad stretch w strap 3 x 30 sec  Prone hip ext x 20 ea  Stair tap 6 in x 20  Standing L hip flexor stretch 3 x 30 sec  Lateral Weight shifts: x 2 minutes   Standing hip abduction: 2 x 10 ea  Standing march x 20 ea   Shuttle squats 2.5 bands x 30  Sit to stand from gold chair: 2 x 10     Gait training on level surface with emphasis and cueing to improve step length, stance time and decreased gait  deviations - NP     Home Exercises Provided and Patient Education Provided     Education provided:   - importance of performing HEP to tolerance    Written Home Exercises Provided: yes. (standing march, standing hip abd, SLR, prone quad stretch, SL clams)  Exercises were reviewed and Eboni was able to demonstrate them prior to the end of the session.  Eboni demonstrated fair  understanding of the education provided.     See EMR under Patient Instructions for exercises provided prior visit.    Assessment     Patient had good tolerance to treatment today with no adverse effects. Post-treatment L hip pain rated as 0/10. Pt with appropriate exercise-induced fatigue achieved by end of session. She required no assistance with SLR on L indicating improvements in L hip flexor strength and endurance. Verbal cueing required to increased L weight shift with standing hip strengthening. She continues with gait impairments but no pain. Able to improve gait with verbal cueing to increase L weight shift and promote equal step length.     Eboni is progressing well towards her goals.   Pt prognosis is Good.     Pt will continue to benefit from skilled outpatient physical therapy to address the deficits listed in the problem list box on initial evaluation, provide pt/family education and to maximize pt's level of independence in the home and community environment.     Pt's spiritual, cultural and educational needs considered and pt agreeable to plan of care and goals.     Anticipated barriers to physical therapy: None     Goals:     GOALS: Short Term Goals:  4 weeks  1. Report decreased L hip pain  <  / =  5/10 at worst to increase tolerance for prolonged standing. - in progress  2. Pt will be able to tolerate multi-directional LE strengthening in order to improve ability to perform household chores.- met 9/10/19  3. Pt will report 50% improvement in ability to walk long distances since start of care to indicate improved functional  mobility.- in progress   4. Pt will perform marches without pain to indicate improvement in ability to lift leg into car for transfers.- met 9/10/19   5. Pt to tolerate HEP to improve ROM and independence with ADL's.- met 9/10/19     Long Term Goals: 8 weeks  1. Report decreased L hip pain  <  / =  3/10 at worst to increase tolerance for prolonged standing.- in progress   2. Pt will be able to perform 2 x 10 multi-directional LE strengthening without fatigue in order to improve ability to perform household chores.- in progress  3. Pt will report 80% improvement in ability to walk long distances since start of care to indicate improved functional mobility.- in progress   4. Pt will report no difficulties with car transfers to indicate improvement in hip flexor strength.- in progress   5. Pt to be Independent with HEP to improve ROM and independence with ADL's.- in progress    Plan     Progress LE strengthening and hip flexor stretching.     Shelby Bauer, PT

## 2019-09-17 ENCOUNTER — CLINICAL SUPPORT (OUTPATIENT)
Dept: REHABILITATION | Facility: HOSPITAL | Age: 74
End: 2019-09-17
Attending: INTERNAL MEDICINE
Payer: MEDICARE

## 2019-09-17 DIAGNOSIS — M62.81 MUSCLE WEAKNESS OF LOWER EXTREMITY: ICD-10-CM

## 2019-09-17 DIAGNOSIS — M25.552 LEFT HIP PAIN: ICD-10-CM

## 2019-09-17 DIAGNOSIS — M62.89 MUSCLE TIGHTNESS: ICD-10-CM

## 2019-09-17 PROCEDURE — 97110 THERAPEUTIC EXERCISES: CPT | Mod: HCNC,PO

## 2019-09-18 ENCOUNTER — HOSPITAL ENCOUNTER (OUTPATIENT)
Dept: RADIOLOGY | Facility: HOSPITAL | Age: 74
Discharge: HOME | End: 2019-09-18
Attending: FAMILY MEDICINE
Payer: MEDICARE

## 2019-09-18 ENCOUNTER — OFFICE VISIT (OUTPATIENT)
Dept: SPORTS MEDICINE | Facility: CLINIC | Age: 74
End: 2019-09-18
Payer: MEDICARE

## 2019-09-18 VITALS — HEIGHT: 63 IN | TEMPERATURE: 98 F | WEIGHT: 223 LBS | BODY MASS INDEX: 39.51 KG/M2

## 2019-09-18 DIAGNOSIS — M54.50 LUMBAR SPINE PAIN: ICD-10-CM

## 2019-09-18 DIAGNOSIS — M70.62 GREATER TROCHANTERIC BURSITIS, LEFT: ICD-10-CM

## 2019-09-18 DIAGNOSIS — M25.552 LEFT HIP PAIN: ICD-10-CM

## 2019-09-18 DIAGNOSIS — M16.12 PRIMARY OSTEOARTHRITIS OF LEFT HIP: Primary | ICD-10-CM

## 2019-09-18 PROCEDURE — 1101F PR PT FALLS ASSESS DOC 0-1 FALLS W/OUT INJ PAST YR: ICD-10-PCS | Mod: HCNC,CPTII,S$GLB, | Performed by: FAMILY MEDICINE

## 2019-09-18 PROCEDURE — 99214 OFFICE O/P EST MOD 30 MIN: CPT | Mod: 25,HCNC,S$GLB, | Performed by: FAMILY MEDICINE

## 2019-09-18 PROCEDURE — 99999 PR PBB SHADOW E&M-EST. PATIENT-LVL III: CPT | Mod: PBBFAC,HCNC,, | Performed by: FAMILY MEDICINE

## 2019-09-18 PROCEDURE — 99999 PR PBB SHADOW E&M-EST. PATIENT-LVL III: ICD-10-PCS | Mod: PBBFAC,HCNC,, | Performed by: FAMILY MEDICINE

## 2019-09-18 PROCEDURE — 72110 X-RAY EXAM L-2 SPINE 4/>VWS: CPT | Mod: TC,HCNC,FY,PO

## 2019-09-18 PROCEDURE — 20611 LARGE JOINT ASPIRATION/INJECTION: L GREATER TROCHANTERIC BURSA: ICD-10-PCS | Mod: HCNC,LT,S$GLB, | Performed by: FAMILY MEDICINE

## 2019-09-18 PROCEDURE — 1101F PT FALLS ASSESS-DOCD LE1/YR: CPT | Mod: HCNC,CPTII,S$GLB, | Performed by: FAMILY MEDICINE

## 2019-09-18 PROCEDURE — 72110 XR LUMBAR SPINE COMPLETE 5 VIEW: ICD-10-PCS | Mod: 26,HCNC,, | Performed by: RADIOLOGY

## 2019-09-18 PROCEDURE — 20611 DRAIN/INJ JOINT/BURSA W/US: CPT | Mod: HCNC,LT,S$GLB, | Performed by: FAMILY MEDICINE

## 2019-09-18 PROCEDURE — 72110 X-RAY EXAM L-2 SPINE 4/>VWS: CPT | Mod: 26,HCNC,, | Performed by: RADIOLOGY

## 2019-09-18 PROCEDURE — 99214 PR OFFICE/OUTPT VISIT, EST, LEVL IV, 30-39 MIN: ICD-10-PCS | Mod: 25,HCNC,S$GLB, | Performed by: FAMILY MEDICINE

## 2019-09-18 RX ORDER — TRIAMCINOLONE ACETONIDE 40 MG/ML
40 INJECTION, SUSPENSION INTRA-ARTICULAR; INTRAMUSCULAR
Status: DISCONTINUED | OUTPATIENT
Start: 2019-09-18 | End: 2019-09-18 | Stop reason: HOSPADM

## 2019-09-18 RX ADMIN — TRIAMCINOLONE ACETONIDE 40 MG: 40 INJECTION, SUSPENSION INTRA-ARTICULAR; INTRAMUSCULAR at 09:09

## 2019-09-18 NOTE — PROGRESS NOTES
Eboni Nicholas, a 73 y.o. female, is here for evaluation of left hip.     HISTORY OF PRESENT ILLNESS   Location: anterior/lateral thigh, left  Onset: chronic  Palliative:    Relative rest   Oral analgesics   fPT, duration: 8/20 vistis , moderate Improvement  Provocative:   ADLs  ambulation  Prior: No hx of Orthopaedic surgery  Progression: worsening discomfort   Quality:    sharp  Radiation: none  Severity: per nursing documentation  Timing: intermittent with use  Trauma: none specific      Review of systems (ROS):  A 10+ review of systems was performed with pertinent positives and negatives noted above in the history of present illness. Other systems were negative unless otherwise specified.      PHYSICAL EXAMINATION  General:  The patient is alert and oriented x 3.  Mood is pleasant.  Observation of ears, eyes and nose reveal no gross abnormalities.  HEENT: NCAT, sclera nonicteric  Lungs: Respirations are equal and unlabored.   Gait is coordinated. Patient can toe walk and heel walk without difficulty.    HIP/PELVIS EXAMINATION    Observation/Inspection  Gait:   Nonantalgic   Alignment:  Neutral   Scars:   None   Muscle atrophy: None   Effusion:  None   Warmth:  None   Discoloration:   None   Leg lengths:   Equal   Pelvis:    Level     Tenderness/Crepitus (T/C):      T / C  Trochanteric bursa   + / -  Piriformis    - / -  SI joint    - / -  Psoas tendon   - / -  Rectus insertion  - / -  Adductor insertion  - / -  Pubic symphysis  - / -    ROM: (* = pain)    Flexion:      120 degrees *  External rotation:   40 degrees *  Internal rotation with axial load:  30 degrees *  Internal rotation without axial load:  40 degrees *  Abduction:    45 degrees  Adduction:     20 degrees    Special Tests:  Pain w/ forced internal rotation (FADIR):  +  Pain w/ forced external rotation (FAMILIA):  +   Circumduction test:     -  Stinchfield test:     -   Log roll:       -   Snapping hip (internal):    -   Sit-up pain:      -    Resisted sit-up pain:     -   Resisted sit-up with adductor contraction pain:  -   Step-down test:     +  Trendelenburg test:     -  Bridge test      +     Extremity Neuro-vascular Examination:   Sensation:  Grossly intact to light touch all dermatomal regions.   Motor Function:  Fully intact motor function at hip, knee, foot and ankle    DTRs;  quadriceps and  achilles 2+.  No clonus and downgoing Babinski.    Vascular status:  DP and PT pulses 2+, brisk capillary refill, symmetric.    Skin:  intact, compartments soft.    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, brianne L5/S1  #2 Tonnis Grade II osteoarthritis of hip, left   W/ greater trochanteric bursitis    No evidence of neurologic pathology  No evidence of vascular pathology    Imaging studies reviewed:   X-ray pelvis and hip, left 19.07  lumbar spine 19.09    Plan:    We discussed the importance of appropriate diet, weight, and regular exercise including quadriceps strengthening     We discussed options including:  #1 watchful waiting  #2 t physical therapy aimed at:   Core stability   RoM hip   Strengthening quadriceps   Gait training   #3 injection therapy:   CSI GTB    Right,     Left,    CSI iaHip    Right,     Left,    Orthobiologics   #4 MRI for further evaluation      The patient chooses #2 and #3 csi gtb left    Pain management: handout given  Bracing:   Physical therapy: fPT, @ [facility], t as above   Activity (e.g. sports, work) restrictions: as tolerated  school/vocation:   retired  Train to [place] in 10/2019    Follow up BEFORE trip to [place]  I = csi iahip   Should symptoms worsen or fail to resolve, consider:  Revisiting the above options

## 2019-09-18 NOTE — PROCEDURES
"Large Joint Aspiration/Injection: L greater trochanteric bursa  Date/Time: 9/18/2019 9:44 AM  Performed by: Wm Lewis MD  Authorized by: Wm Lewis MD     Consent Done?:  Yes (Verbal)  Indications:  Pain  Procedure site marked: Yes    Timeout: Prior to procedure the correct patient, procedure, and site was verified      Location:  Hip  Site:  L greater trochanteric bursa  Prep: Patient was prepped and draped in usual sterile fashion    Needle size:  22 G  Ultrasonic Guidance for needle placement: Yes  Images are saved and documented.  Approach:  Lateral  Medications:  40 mg triamcinolone acetonide 40 mg/mL  Patient tolerance:  Patient tolerated the procedure well with no immediate complications    Additional Comments: Description of ultrasound utilization for needle guidance:   Ultrasound guidance used for needle localization. Images saved and stored for documentation. The greater trochanter and its bursa were visualized. Dynamic visualization of the 22g x 3.5" needle was continuous throughout the procedure.      "

## 2019-09-19 ENCOUNTER — CLINICAL SUPPORT (OUTPATIENT)
Dept: REHABILITATION | Facility: HOSPITAL | Age: 74
End: 2019-09-19
Attending: INTERNAL MEDICINE
Payer: MEDICARE

## 2019-09-19 DIAGNOSIS — M62.81 MUSCLE WEAKNESS OF LOWER EXTREMITY: ICD-10-CM

## 2019-09-19 DIAGNOSIS — M62.89 MUSCLE TIGHTNESS: ICD-10-CM

## 2019-09-19 DIAGNOSIS — M25.552 LEFT HIP PAIN: ICD-10-CM

## 2019-09-19 PROCEDURE — 97110 THERAPEUTIC EXERCISES: CPT | Mod: HCNC,PO

## 2019-09-19 NOTE — PROGRESS NOTES
Physical Therapy Daily Treatment Note     Name: Eboni Nicholas  Clinic Number: [id number]    Therapy Diagnosis:   Encounter Diagnoses   Name Primary?    Left hip pain     Muscle tightness     Muscle weakness of lower extremity      Physician: CAMILO Elizondo MD    Visit Date: 9/19/2019    Physician Orders: PT Eval and Treat  Medical Diagnosis from Referral: Strain of left hip, initial encounter  Evaluation Date: 8/6/2019  Last PN: 9/10/19 (visit 6)  Authorization Period Expiration: 7/29/20  Plan of Care Expiration: 10/6/19  Visit # / Visits authorized: 9 / 20   PTA Visit Number: 1    Time In: 1100  Time Out: 1156  Total Billable Time: 56 minutes  Charges: TE - 4    Precautions: Standard    Subjective     Pt reports:overall she is feeling better and walking better   She was compliant with home exercise program.  Response to previous treatment: good- slight soreness that has resolved  Functional change: walking better    Pain: 2 / 10  Location: left leg and left hip     Objective     BOLD= performed today    Eboni received therapeutic exercises to develop strength, endurance, ROM, flexibility, posture and core stabilization for 60 minutes including:    Upright bike: x 8 minutes level 2  SLR x 20 ea  Supine L hip flexor str EOB w strap 3 x 30 sec  Supine L hip flexor stretch oscillations EOB by therapist x 30  Supine march 2 x 10  Bridges x 20  SL clams x 20 each  SL hip abduction x 20 each  Supine hip abduction on slideboard x 20  Supine hip flexion on slideboard x 20  Prone HS curls x 20  Prone quad stretch w strap 3 x 30 seconds   Prone hip ext x 20 ea  Stair tap 6 in x 20  Standing L hip flexor stretch 3 x 30 sec  Standing hip abduction: 2 x 10 ea  Standing march x 20 ea   Shuttle squats 2.5 bands x 30  Sit to stand from gold chair: 2 x 10     Gait training on level surface with emphasis and cueing to improve step length, stance time and decreased gait deviations - NP     Home Exercises Provided and Patient  Education Provided     Education provided:   - importance of performing HEP to tolerance    Written Home Exercises Provided: yes. (standing march, standing hip abd, SLR, prone quad stretch, SL clams)  Exercises were reviewed and Eboni was able to demonstrate them prior to the end of the session.  Eboni demonstrated fair  understanding of the education provided.     See EMR under Patient Instructions for exercises provided prior visit.    Assessment     Patient with good tolerance for todays treatment session. Patient with noted improvements in strength and endurance.     Eboni is progressing well towards her goals.   Pt prognosis is Good.     Pt will continue to benefit from skilled outpatient physical therapy to address the deficits listed in the problem list box on initial evaluation, provide pt/family education and to maximize pt's level of independence in the home and community environment.     Pt's spiritual, cultural and educational needs considered and pt agreeable to plan of care and goals.     Anticipated barriers to physical therapy: None     Goals:     GOALS: Short Term Goals:  4 weeks  1. Report decreased L hip pain  <  / =  5/10 at worst to increase tolerance for prolonged standing. - in progress  2. Pt will be able to tolerate multi-directional LE strengthening in order to improve ability to perform household chores.- met 9/10/19  3. Pt will report 50% improvement in ability to walk long distances since start of care to indicate improved functional mobility.- in progress   4. Pt will perform marches without pain to indicate improvement in ability to lift leg into car for transfers.- met 9/10/19   5. Pt to tolerate HEP to improve ROM and independence with ADL's.- met 9/10/19     Long Term Goals: 8 weeks  1. Report decreased L hip pain  <  / =  3/10 at worst to increase tolerance for prolonged standing.- in progress   2. Pt will be able to perform 2 x 10 multi-directional LE strengthening without  fatigue in order to improve ability to perform household chores.- in progress  3. Pt will report 80% improvement in ability to walk long distances since start of care to indicate improved functional mobility.- in progress   4. Pt will report no difficulties with car transfers to indicate improvement in hip flexor strength.- in progress   5. Pt to be Independent with HEP to improve ROM and independence with ADL's.- in progress    Plan     Progress LE strengthening and hip flexor stretching.     Evelyn Chadwick, PTA

## 2019-09-23 RX ORDER — MELOXICAM 7.5 MG/1
TABLET ORAL
Qty: 30 TABLET | Refills: 1 | Status: SHIPPED | OUTPATIENT
Start: 2019-09-23 | End: 2021-02-12

## 2019-09-24 ENCOUNTER — CLINICAL SUPPORT (OUTPATIENT)
Dept: REHABILITATION | Facility: HOSPITAL | Age: 74
End: 2019-09-24
Attending: INTERNAL MEDICINE
Payer: MEDICARE

## 2019-09-24 DIAGNOSIS — M62.89 MUSCLE TIGHTNESS: ICD-10-CM

## 2019-09-24 DIAGNOSIS — M25.552 LEFT HIP PAIN: ICD-10-CM

## 2019-09-24 DIAGNOSIS — M62.81 MUSCLE WEAKNESS OF LOWER EXTREMITY: ICD-10-CM

## 2019-09-24 PROCEDURE — 97110 THERAPEUTIC EXERCISES: CPT | Mod: HCNC,PO

## 2019-09-24 NOTE — PROGRESS NOTES
Physical Therapy Daily Treatment Note     Name: Eboni Nicholas  Clinic Number: [id number]    Therapy Diagnosis:   Encounter Diagnoses   Name Primary?    Left hip pain     Muscle tightness     Muscle weakness of lower extremity      Physician: CAMILO Elizondo MD    Visit Date: 9/24/2019    Physician Orders: PT Eval and Treat  Medical Diagnosis from Referral: Strain of left hip, initial encounter  Evaluation Date: 8/6/2019  Last PN: 9/10/19 (visit 6)  Authorization Period Expiration: 7/29/20  Plan of Care Expiration: 10/6/19  Visit # / Visits authorized: 10 / 20   PTA Visit Number: 0    Time In: 1100  Time Out: 1157  Total Billable Time: 34 minutes  Charges: TE - 2    Precautions: Standard    Subjective     Pt reports: she just has a cramp on the lateral side of her L hip but no pain. Things are better for her.   She was compliant with home exercise program.  Response to previous treatment: good  Functional change: able to don shoes and socks and able to step into tub with more ease    Pain: 0 / 10  Location: left leg and left hip     Objective     BOLD= performed today    Eboni received therapeutic exercises to develop strength, endurance, ROM, flexibility, posture and core stabilization for 57 minutes including:    Upright bike: x 8 minutes level 2  SLR x 20 ea  Supine L hip flexor str EOB w strap 3 x 30 sec  Supine L hip flexor stretch oscillations EOB by therapist x 30  Supine march 2 x 10  Bridges x 20  SL clams x 20 each  SL hip abduction x 20 each  Supine hip abduction on slideboard x 20  Supine hip flexion on slideboard x 20  Prone HS curls x 20  Prone quad stretch w strap 3 x 30 seconds   Prone hip ext x 20 ea  Stair tap 6 in x 20  Standing L hip flexor stretch 3 x 30 sec  Standing hip abduction: 2 x 10 ea  Standing march x 20 ea   Shuttle squats 2.5 bands x 30  Sit to stand from gold chair: 2 x 10     Gait training on level surface with emphasis and cueing to improve step length, stance time and  decreased gait deviations - NP     Home Exercises Provided and Patient Education Provided     Education provided:   - importance of performing HEP to tolerance    Written Home Exercises Provided: yes. (standing march, standing hip abd, SLR, prone quad stretch, SL clams)  Exercises were reviewed and Eboni was able to demonstrate them prior to the end of the session.  Eboni demonstrated fair  understanding of the education provided.     See EMR under Patient Instructions for exercises provided prior visit.    Assessment     Patient had good tolerance to treatment today with no adverse effects. Post-treatment L hip pain rated as 0/10. Improvements noted in pt's gait pattern when cued to ambulate with equal step length and stance phase. Pt with tendency to limp with first few initial steps. She demonstrates increased ease with hip flexion but decreased endurance noted.     Eboni is progressing well towards her goals.   Pt prognosis is Good.     Pt will continue to benefit from skilled outpatient physical therapy to address the deficits listed in the problem list box on initial evaluation, provide pt/family education and to maximize pt's level of independence in the home and community environment.     Pt's spiritual, cultural and educational needs considered and pt agreeable to plan of care and goals.     Anticipated barriers to physical therapy: None     Goals:     GOALS: Short Term Goals:  4 weeks  1. Report decreased L hip pain  <  / =  5/10 at worst to increase tolerance for prolonged standing. - in progress  2. Pt will be able to tolerate multi-directional LE strengthening in order to improve ability to perform household chores.- met 9/10/19  3. Pt will report 50% improvement in ability to walk long distances since start of care to indicate improved functional mobility.- in progress   4. Pt will perform marches without pain to indicate improvement in ability to lift leg into car for transfers.- met 9/10/19   5.  Pt to tolerate HEP to improve ROM and independence with ADL's.- met 9/10/19     Long Term Goals: 8 weeks  1. Report decreased L hip pain  <  / =  3/10 at worst to increase tolerance for prolonged standing.- in progress   2. Pt will be able to perform 2 x 10 multi-directional LE strengthening without fatigue in order to improve ability to perform household chores.- in progress  3. Pt will report 80% improvement in ability to walk long distances since start of care to indicate improved functional mobility.- in progress   4. Pt will report no difficulties with car transfers to indicate improvement in hip flexor strength.- in progress   5. Pt to be Independent with HEP to improve ROM and independence with ADL's.- in progress    Plan     Progress LE strengthening and endurance.     Shelby Bauer, PT

## 2019-09-26 ENCOUNTER — CLINICAL SUPPORT (OUTPATIENT)
Dept: REHABILITATION | Facility: HOSPITAL | Age: 74
End: 2019-09-26
Attending: INTERNAL MEDICINE
Payer: MEDICARE

## 2019-09-26 DIAGNOSIS — M62.81 MUSCLE WEAKNESS OF LOWER EXTREMITY: ICD-10-CM

## 2019-09-26 DIAGNOSIS — M25.552 LEFT HIP PAIN: ICD-10-CM

## 2019-09-26 DIAGNOSIS — M62.89 MUSCLE TIGHTNESS: ICD-10-CM

## 2019-09-26 PROCEDURE — 97110 THERAPEUTIC EXERCISES: CPT | Mod: HCNC,PO

## 2019-09-26 NOTE — PROGRESS NOTES
"  Physical Therapy Daily Treatment Note     Name: Eboni Nicholas  Clinic Number: [id number]    Therapy Diagnosis:   Encounter Diagnoses   Name Primary?    Left hip pain     Muscle tightness     Muscle weakness of lower extremity      Physician: CAMILO Elizondo MD    Visit Date: 9/26/2019    Physician Orders: PT Eval and Treat  Medical Diagnosis from Referral: Strain of left hip, initial encounter  Evaluation Date: 8/6/2019  Last PN: 9/10/19 (visit 6)  Authorization Period Expiration: 7/29/20  Plan of Care Expiration: 10/6/19  Visit # / Visits authorized: 11 / 20   PTA Visit Number: 1    Time In: 08:00 am  Time Out: 09:00 am   Total Billable Time: 30  minutes  Charges: TE - 2    Precautions: Standard    Subjective     Pt reports: "It's not too bad today"   She was compliant with home exercise program.  Response to previous treatment: good  Functional change: able to don shoes and socks and able to step into tub with more ease    Pain: 1.5 / 10  Location: left leg and left hip     Objective     BOLD= performed today    Eboni received therapeutic exercises to develop strength, endurance, ROM, flexibility, posture and core stabilization for 57 minutes including:    Upright bike: x 8 minutes level 2  SLR x 20 ea  Supine L hip flexor str EOB w strap 3 x 30 sec  Supine L hip flexor stretch oscillations EOB by therapist x 30  Supine march 2 x 10  Bridges x 20  SL clams x 20 each  SL hip abduction x 20 each  Supine hip abduction on slideboard x 20  Supine hip flexion on slideboard x 20  Prone HS curls x 20  Prone quad stretch w strap 3 x 30 seconds   Prone hip ext x 20 each  Stair tap 6 in x 20  Standing L hip flexor stretch 3 x 30 sec  Standing hip abduction: 2 x 10 each  Standing march x 20 each   Shuttle squats 2.5 bands x 30  Sit to stand from gold chair: 2 x 10     Gait training on level surface with emphasis and cueing to improve step length, stance time and decreased gait deviations - NP     Home Exercises " Provided and Patient Education Provided     Education provided:   - importance of performing HEP to tolerance    Written Home Exercises Provided: yes. (standing march, standing hip abd, SLR, prone quad stretch, SL clams)  Exercises were reviewed and Eboni was able to demonstrate them prior to the end of the session.  Eboni demonstrated fair  understanding of the education provided.     See EMR under Patient Instructions for exercises provided prior visit.    Assessment     Patient tolerated therapy session fairly well. Remains with noted antalgic gait brianne with first few initial steps. Encouraged compliance with Home exercise program to aid in improving overall strength and mobility.      Eboni is progressing well towards her goals.   Pt prognosis is Good.     Pt will continue to benefit from skilled outpatient physical therapy to address the deficits listed in the problem list box on initial evaluation, provide pt/family education and to maximize pt's level of independence in the home and community environment.     Pt's spiritual, cultural and educational needs considered and pt agreeable to plan of care and goals.     Anticipated barriers to physical therapy: None     Goals:     GOALS: Short Term Goals:  4 weeks  1. Report decreased L hip pain  <  / =  5/10 at worst to increase tolerance for prolonged standing. - in progress  2. Pt will be able to tolerate multi-directional LE strengthening in order to improve ability to perform household chores.- met 9/10/19  3. Pt will report 50% improvement in ability to walk long distances since start of care to indicate improved functional mobility.- in progress   4. Pt will perform marches without pain to indicate improvement in ability to lift leg into car for transfers.- met 9/10/19   5. Pt to tolerate HEP to improve ROM and independence with ADL's.- met 9/10/19     Long Term Goals: 8 weeks  1. Report decreased L hip pain  <  / =  3/10 at worst to increase tolerance for  prolonged standing.- in progress   2. Pt will be able to perform 2 x 10 multi-directional LE strengthening without fatigue in order to improve ability to perform household chores.- in progress  3. Pt will report 80% improvement in ability to walk long distances since start of care to indicate improved functional mobility.- in progress   4. Pt will report no difficulties with car transfers to indicate improvement in hip flexor strength.- in progress   5. Pt to be Independent with HEP to improve ROM and independence with ADL's.- in progress    Plan     Progress LE strengthening and endurance.     Evelyn Chadwick, PTA

## 2019-10-01 ENCOUNTER — DOCUMENTATION ONLY (OUTPATIENT)
Dept: REHABILITATION | Facility: HOSPITAL | Age: 74
End: 2019-10-01

## 2019-10-01 ENCOUNTER — CLINICAL SUPPORT (OUTPATIENT)
Dept: REHABILITATION | Facility: HOSPITAL | Age: 74
End: 2019-10-01
Attending: INTERNAL MEDICINE
Payer: MEDICARE

## 2019-10-01 DIAGNOSIS — M62.81 MUSCLE WEAKNESS OF LOWER EXTREMITY: ICD-10-CM

## 2019-10-01 DIAGNOSIS — M62.89 MUSCLE TIGHTNESS: ICD-10-CM

## 2019-10-01 DIAGNOSIS — M25.552 LEFT HIP PAIN: ICD-10-CM

## 2019-10-01 PROCEDURE — 97110 THERAPEUTIC EXERCISES: CPT | Mod: HCNC,PO

## 2019-10-01 NOTE — PROGRESS NOTES
Physical Therapy Daily Treatment Note     Name: Eboni Nicholas  Clinic Number: [id number]    Therapy Diagnosis:   Encounter Diagnoses   Name Primary?    Left hip pain     Muscle tightness     Muscle weakness of lower extremity      Physician: CAMILO Elizondo MD    Visit Date: 10/1/2019    Physician Orders: PT Eval and Treat  Medical Diagnosis from Referral: Strain of left hip, initial encounter  Evaluation Date: 8/6/2019  Last PN: 9/10/19 (visit 6)  Authorization Period Expiration: 7/29/20  Plan of Care Expiration: 10/6/19  Visit # / Visits authorized: 12 / 20   PTA Visit Number: 0    Time In: 1100  Time Out: 1200  Total Billable Time: 38 minutes  Charges: TE - 3    Precautions: Standard    Subjective     Pt reports: improvements in symptoms. She feels she is walking with decreased limb.   She was compliant with home exercise program.  Response to previous treatment: good  Functional change: walking better    Pain: 0/10 and 1-2/10  Location: left hip and L knee    Objective     BOLD= performed today    Eboni received therapeutic exercises to develop strength, endurance, ROM, flexibility, posture and core stabilization for 60 minutes including:    Upright bike: x 8 minutes level 2  SLR x 20 ea  Supine L hip flexor str EOB w strap 3 x 30 sec  Supine L hip flexor stretch oscillations EOB by therapist x 30  Supine march 2 x 10  Bridges x 20  SL clams x 20 each  SL hip abduction x 20 each  Prone HS curls x 20  Prone quad stretch w strap 3 x 30 seconds   Prone hip ext x 20 each  Stair tap 6 in x 20  +Step ups 6 in x 20 ea  Standing L hip flexor stretch 3 x 30 sec  Standing hip abduction: 2 x 10 each  Standing march x 20 each   Shuttle squats 2.5 bands x 30  Sit to stand from gold chair: 2 x 10   +Unilateral farmer carries 5# x 1 lap around clinic ea  +SL L hip flexor/IT band stretch w knee bent by therapist 3 x 30 sec  +Stepping over estefani forward x 20 ea  +Stepping over estefani lateral x 20 ea    Gait training on  level surface with emphasis and cueing to improve step length, stance time and decreased gait deviations - NP     Home Exercises Provided and Patient Education Provided     Education provided:   - importance of performing HEP to tolerance    Written Home Exercises Provided: yes. (standing march, standing hip abd, SLR, prone quad stretch, SL clams)  Exercises were reviewed and Eboni was able to demonstrate them prior to the end of the session.  Eboni demonstrated fair  understanding of the education provided.     See EMR under Patient Instructions for exercises provided prior visit.    Assessment     Patient had good tolerance to treatment today with no adverse effects. Post-treatment L hip pain rated as 0/10. Pt demonstrates increased ease with SLR indicating hip flexor strengthening. Improvements in stability with decreased UE support with standing hip strengthening. She continues to require verbal cueing to decrease excessive lateral trunk lean with ambulation. Good response to progression of exercise program.     Eboni is progressing well towards her goals.   Pt prognosis is Good.     Pt will continue to benefit from skilled outpatient physical therapy to address the deficits listed in the problem list box on initial evaluation, provide pt/family education and to maximize pt's level of independence in the home and community environment.     Pt's spiritual, cultural and educational needs considered and pt agreeable to plan of care and goals.     Anticipated barriers to physical therapy: None     Goals:     GOALS: Short Term Goals:  4 weeks  1. Report decreased L hip pain  <  / =  5/10 at worst to increase tolerance for prolonged standing. - in progress  2. Pt will be able to tolerate multi-directional LE strengthening in order to improve ability to perform household chores.- met 9/10/19  3. Pt will report 50% improvement in ability to walk long distances since start of care to indicate improved functional  mobility.- in progress   4. Pt will perform marches without pain to indicate improvement in ability to lift leg into car for transfers.- met 9/10/19   5. Pt to tolerate HEP to improve ROM and independence with ADL's.- met 9/10/19     Long Term Goals: 8 weeks  1. Report decreased L hip pain  <  / =  3/10 at worst to increase tolerance for prolonged standing.- in progress   2. Pt will be able to perform 2 x 10 multi-directional LE strengthening without fatigue in order to improve ability to perform household chores.- in progress  3. Pt will report 80% improvement in ability to walk long distances since start of care to indicate improved functional mobility.- in progress   4. Pt will report no difficulties with car transfers to indicate improvement in hip flexor strength.- in progress   5. Pt to be Independent with HEP to improve ROM and independence with ADL's.- in progress    Plan     Progress LE strengthening and endurance.     Shelby Bauer, PT

## 2019-10-03 ENCOUNTER — CLINICAL SUPPORT (OUTPATIENT)
Dept: REHABILITATION | Facility: HOSPITAL | Age: 74
End: 2019-10-03
Attending: INTERNAL MEDICINE
Payer: MEDICARE

## 2019-10-03 DIAGNOSIS — M62.89 MUSCLE TIGHTNESS: ICD-10-CM

## 2019-10-03 DIAGNOSIS — H40.1121 PRIMARY OPEN ANGLE GLAUCOMA (POAG) OF LEFT EYE, MILD STAGE: ICD-10-CM

## 2019-10-03 DIAGNOSIS — H40.039 ANATOMICAL NARROW ANGLE: ICD-10-CM

## 2019-10-03 DIAGNOSIS — M62.81 MUSCLE WEAKNESS OF LOWER EXTREMITY: ICD-10-CM

## 2019-10-03 DIAGNOSIS — M25.552 LEFT HIP PAIN: ICD-10-CM

## 2019-10-03 DIAGNOSIS — H40.1112 PRIMARY OPEN ANGLE GLAUCOMA (POAG) OF RIGHT EYE, MODERATE STAGE: ICD-10-CM

## 2019-10-03 PROCEDURE — 97110 THERAPEUTIC EXERCISES: CPT | Mod: HCNC,PO

## 2019-10-03 RX ORDER — LATANOPROST 50 UG/ML
1 SOLUTION/ DROPS OPHTHALMIC DAILY
Qty: 2.5 ML | Refills: 1 | OUTPATIENT
Start: 2019-10-03

## 2019-10-03 NOTE — PROGRESS NOTES
Physical Therapy Daily Treatment Note     Name: Eboni Nicholas  Clinic Number: [id number]    Therapy Diagnosis:   Encounter Diagnoses   Name Primary?    Left hip pain     Muscle tightness     Muscle weakness of lower extremity      Physician: CAMILO Elizondo MD    Visit Date: 10/3/2019    Physician Orders: PT Eval and Treat  Medical Diagnosis from Referral: Strain of left hip, initial encounter  Evaluation Date: 8/6/2019  Last PN: 9/10/19 (visit 6)  Authorization Period Expiration: 7/29/20  Plan of Care Expiration: 10/6/19  Visit # / Visits authorized: 13 / 20   PTA Visit Number: 1    Time In: 11:00 am   Time Out: 11:55 am   Total Billable Time: 55 minutes  Charges: TE - 3    Precautions: Standard    Subjective     Pt reports: improvements in symptoms. She feels she is walking with decreased limb.   She was compliant with home exercise program.  Response to previous treatment: good  Functional change: walking better    Pain: 0/10 and 1-2/10  Location: left hip and L knee    Objective     BOLD= performed today    Eboni received therapeutic exercises to develop strength, endurance, ROM, flexibility, posture and core stabilization for 60 minutes including:    Upright bike: x 8 minutes level 2  SLR x 20 ea  Supine L hip flexor str EOB w strap 3 x 30 seconds  Supine L hip flexor stretch oscillations EOB by therapist x 30 seconds   Supine march 2 x 10  Bridges x 20  SL clams x 20 each   SL hip abduction x 20 each  Prone HS curls x 20  Prone quad stretch w strap 3 x 30 seconds   Prone hip ext x 20 each  Stair tap 6 in x 20  +Step ups 6 in x 20 ea  Standing L hip flexor stretch 3 x 30 sec  Standing hip abduction: 2 x 10 each  Standing march x 20 each   Shuttle squats 2.5 bands x 30  Sit to stand from gold chair: 2 x 10   +Unilateral farmer carries 5# x 1 lap around clinic ea  +SL L hip flexor/IT band stretch w knee bent by therapist 3 x 30 sec  +Stepping over estefani forward x 20 ea  +Stepping over estefani lateral x 20  ea    Gait training on level surface with emphasis and cueing to improve step length, stance time and decreased gait deviations - NP     Home Exercises Provided and Patient Education Provided     Education provided:   - importance of performing HEP to tolerance    Written Home Exercises Provided: yes. (standing march, standing hip abd, SLR, prone quad stretch, SL clams)  Exercises were reviewed and Eboni was able to demonstrate them prior to the end of the session.  Eboni demonstrated fair  understanding of the education provided.     See EMR under Patient Instructions for exercises provided prior visit.    Assessment     Patient tolerated therapy session well. Patient with decreased overall pain and improved gait mechanics.     Eboni is progressing well towards her goals.   Pt prognosis is Good.     Pt will continue to benefit from skilled outpatient physical therapy to address the deficits listed in the problem list box on initial evaluation, provide pt/family education and to maximize pt's level of independence in the home and community environment.     Pt's spiritual, cultural and educational needs considered and pt agreeable to plan of care and goals.     Anticipated barriers to physical therapy: None     Goals:     GOALS: Short Term Goals:  4 weeks  1. Report decreased L hip pain  <  / =  5/10 at worst to increase tolerance for prolonged standing. - in progress  2. Pt will be able to tolerate multi-directional LE strengthening in order to improve ability to perform household chores.- met 9/10/19  3. Pt will report 50% improvement in ability to walk long distances since start of care to indicate improved functional mobility.- in progress   4. Pt will perform marches without pain to indicate improvement in ability to lift leg into car for transfers.- met 9/10/19   5. Pt to tolerate HEP to improve ROM and independence with ADL's.- met 9/10/19     Long Term Goals: 8 weeks  1. Report decreased L hip pain  <  / =   3/10 at worst to increase tolerance for prolonged standing.- in progress   2. Pt will be able to perform 2 x 10 multi-directional LE strengthening without fatigue in order to improve ability to perform household chores.- in progress  3. Pt will report 80% improvement in ability to walk long distances since start of care to indicate improved functional mobility.- in progress   4. Pt will report no difficulties with car transfers to indicate improvement in hip flexor strength.- in progress   5. Pt to be Independent with HEP to improve ROM and independence with ADL's.- in progress    Plan     Progress LE strengthening and endurance.     Evelyn Chadwick, PTA

## 2019-10-04 ENCOUNTER — PATIENT OUTREACH (OUTPATIENT)
Dept: OTHER | Facility: OTHER | Age: 74
End: 2019-10-04

## 2019-10-04 NOTE — PROGRESS NOTES
[facility]: Health  Follow-Up    Patient reports she is going on a trip Oct 23-Nov 3.    The history is provided by the patient.     Follow Up  Follow-up reason(s): routine education      Routine Education Topics: physical activity  Reports her leg is feeling a lot better since starting PT.          Physical Activity:   When asked if exercising, patient responded: yes    Patient participates in the following activities: physical therapy/rehab    Reports she has been going to every session of PT and feels it is helping her leg pain.    Medication Adherence:       Reports she is taking pain medication for her leg.      [facility]    INTERVENTION(S)  recommend physical activity and encouragement/support    PLAN  patient verbalizes understanding    Encouraged patient to continue with PT so her leg can progressively heal.      There are no preventive care reminders to display for this patient.    Last 5 Patient Entered Readings                                      Current 30 Day Average: 125/76     Recent Readings 9/30/2019 9/27/2019 9/23/2019 9/20/2019 9/18/2019    SBP (mmHg) 124 122 123 123 122    DBP (mmHg) 73 73 73 74 74    Pulse 59 68 58 63 58

## 2019-10-07 NOTE — PROGRESS NOTES
Physical Therapy Daily Treatment Note     Name: Eboni Nicholas  Clinic Number: [id number]    Therapy Diagnosis:   Encounter Diagnoses   Name Primary?    Left hip pain     Muscle tightness     Muscle weakness of lower extremity      Physician: CAMILO Elizondo MD    Visit Date: 10/8/2019    Physician Orders: PT Eval and Treat  Medical Diagnosis from Referral: Strain of left hip, initial encounter  Evaluation Date: 8/6/2019  Last PN: 10/8/19 (visit 14)  Authorization Period Expiration: 7/29/20  Plan of Care Expiration: 10/6/19  Visit # / Visits authorized: 14 / 20   PTA Visit Number: 0    Time In: 0848  Time Out: 0948  Total Billable Time: 30 minutes  Charges: TE - 2    Precautions: Standard    Subjective     Pt reports: occasional tightness in anterior portion of L hip. Pain is at worst 2/10. 80% improvement in ability to walk long distances since start of care. No longer having difficulties with car transfers. She would like to do 2 more visits and then discharge.   She was compliant with home exercise program.  Response to previous treatment: good  Functional change: walking with less limp    Pain: 0/10 and 0/10  Location: left hip and L knee    Objective     BOLD= performed today    Eboni received therapeutic exercises to develop strength, endurance, ROM, flexibility, posture and core stabilization for 60 minutes including:    Upright bike: x 8 minutes level 2  SLR 3 x 10 ea  Supine L hip flexor str EOB w strap 3 x 30 seconds  Supine L hip flexor stretch oscillations EOB by therapist x 30 seconds   Supine march 2 x 10  Bridges x 20  SL clams x 20 each   SL hip abduction x 20 each  Prone HS curls x 20  Prone quad stretch w strap 3 x 30 seconds   Prone hip ext x 20 each  Stair tap 6 in x 20  Step ups 6 in x 20 ea  Standing L hip flexor stretch 3 x 30 sec  Standing hip abduction: 2 x 10 each  +Standing hip ext x 20 ea  Standing march x 20 each   Shuttle squats 2.5 bands x 30  Sit to stand from gold chair: 2 x  10   Unilateral farmer carries 5# x 1 lap around clinic ea  SL L hip flexor/IT band stretch w knee bent by therapist 3 x 30 sec  Stepping over estefani forward x 20 ea  Stepping over estefani lateral x 20 ea    Gait training on level surface with emphasis and cueing to improve step length, stance time and decreased gait deviations - NP     Home Exercises Provided and Patient Education Provided     Education provided:   - importance of performing HEP to tolerance    Written Home Exercises Provided: yes. (standing march, standing hip abd, SLR, prone quad stretch, SL clams)  Exercises were reviewed and Ebnoi was able to demonstrate them prior to the end of the session.  Eboni demonstrated fair  understanding of the education provided.     See EMR under Patient Instructions for exercises provided prior visit.    CMS Impairment/Limitation/Restriction for FOTO hip Survey    Therapist reviewed FOTO scores for Eboni Nicholas on 10/8/2019.   FOTO documents entered into [employer] - see Media section.    Limitation Score: 44%  Category: Mobility    Current : CK = at least 40% but < 60% impaired, limited or restricted  Goal: CK = at least 40% but < 60% impaired, limited or restricted       Assessment     Patient was re-assessed today with 5/5 STGs and 4/5 LTGs being met indicating improvements in L hip pain, ability to walk long distances, ability to perform car transfers, and LE strengthening since start of care. She continues with impaired gait, LE weakness, and decreased functional mobility. Pt could benefit from continued physical therapy services to address deficits.     She had good tolerance to treatment today with no adverse effects. Post-treatment L hip pain rated as 0/10. Appropriate exercise-induced fatigue achieved by end of session but decreased need for rest breaks indicating improved endurance. Pt demonstrates improvement in gait pattern compared to previous visits. She was able to perform increased repetitions with  SLR with good form.     Eboni is progressing well towards her goals.   Pt prognosis is Good.     Pt will continue to benefit from skilled outpatient physical therapy to address the deficits listed in the problem list box on initial evaluation, provide pt/family education and to maximize pt's level of independence in the home and community environment.     Pt's spiritual, cultural and educational needs considered and pt agreeable to plan of care and goals.     Anticipated barriers to physical therapy: None     Goals:     GOALS: Short Term Goals:  4 weeks  1. Report decreased L hip pain  <  / =  5/10 at worst to increase tolerance for prolonged standing. - met 10/8/19  2. Pt will be able to tolerate multi-directional LE strengthening in order to improve ability to perform household chores.- met 9/10/19  3. Pt will report 50% improvement in ability to walk long distances since start of care to indicate improved functional mobility.- 10/8/19  4. Pt will perform marches without pain to indicate improvement in ability to lift leg into car for transfers.- met 9/10/19   5. Pt to tolerate HEP to improve ROM and independence with ADL's.- met 9/10/19     Long Term Goals: 8 weeks  1. Report decreased L hip pain  <  / =  3/10 at worst to increase tolerance for prolonged standing.- met 10/8/19  2. Pt will be able to perform 2 x 10 multi-directional LE strengthening without fatigue in order to improve ability to perform household chores.- met 10/8/19  3. Pt will report 80% improvement in ability to walk long distances since start of care to indicate improved functional mobility.- met 10/8/19  4. Pt will report no difficulties with car transfers to indicate improvement in hip flexor strength.- met 10/8/19  5. Pt to be Independent with HEP to improve ROM and independence with ADL's.- in progress    Plan     Progress LE strengthening and endurance. Plan for DC on 10/15/19.    Shelby Bauer, PT

## 2019-10-08 ENCOUNTER — TELEPHONE (OUTPATIENT)
Dept: SLEEP MEDICINE | Facility: CLINIC | Age: 74
End: 2019-10-08

## 2019-10-08 ENCOUNTER — CLINICAL SUPPORT (OUTPATIENT)
Dept: REHABILITATION | Facility: HOSPITAL | Age: 74
End: 2019-10-08
Attending: INTERNAL MEDICINE
Payer: MEDICARE

## 2019-10-08 ENCOUNTER — OFFICE VISIT (OUTPATIENT)
Dept: INTERNAL MEDICINE | Facility: CLINIC | Age: 74
End: 2019-10-08
Payer: MEDICARE

## 2019-10-08 VITALS
HEART RATE: 68 BPM | DIASTOLIC BLOOD PRESSURE: 64 MMHG | TEMPERATURE: 98 F | WEIGHT: 222.69 LBS | HEIGHT: 63 IN | SYSTOLIC BLOOD PRESSURE: 116 MMHG | RESPIRATION RATE: 18 BRPM | BODY MASS INDEX: 39.46 KG/M2

## 2019-10-08 DIAGNOSIS — E78.5 DYSLIPIDEMIA: ICD-10-CM

## 2019-10-08 DIAGNOSIS — M25.552 LEFT HIP PAIN: ICD-10-CM

## 2019-10-08 DIAGNOSIS — I10 HTN (HYPERTENSION), BENIGN: Primary | ICD-10-CM

## 2019-10-08 DIAGNOSIS — E66.01 SEVERE OBESITY (BMI 35.0-39.9) WITH COMORBIDITY: ICD-10-CM

## 2019-10-08 DIAGNOSIS — M62.89 MUSCLE TIGHTNESS: ICD-10-CM

## 2019-10-08 DIAGNOSIS — M62.81 MUSCLE WEAKNESS OF LOWER EXTREMITY: ICD-10-CM

## 2019-10-08 DIAGNOSIS — E03.9 ACQUIRED HYPOTHYROIDISM: ICD-10-CM

## 2019-10-08 PROCEDURE — G0008 FLU VACCINE - HIGH DOSE (65+) PRESERVATIVE FREE IM: ICD-10-PCS | Mod: HCNC,S$GLB,, | Performed by: INTERNAL MEDICINE

## 2019-10-08 PROCEDURE — 99999 PR PBB SHADOW E&M-EST. PATIENT-LVL III: ICD-10-PCS | Mod: PBBFAC,HCNC,, | Performed by: INTERNAL MEDICINE

## 2019-10-08 PROCEDURE — 90662 IIV NO PRSV INCREASED AG IM: CPT | Mod: HCNC,S$GLB,, | Performed by: INTERNAL MEDICINE

## 2019-10-08 PROCEDURE — 3078F PR MOST RECENT DIASTOLIC BLOOD PRESSURE < 80 MM HG: ICD-10-PCS | Mod: HCNC,CPTII,S$GLB, | Performed by: INTERNAL MEDICINE

## 2019-10-08 PROCEDURE — 3074F PR MOST RECENT SYSTOLIC BLOOD PRESSURE < 130 MM HG: ICD-10-PCS | Mod: HCNC,CPTII,S$GLB, | Performed by: INTERNAL MEDICINE

## 2019-10-08 PROCEDURE — 1101F PT FALLS ASSESS-DOCD LE1/YR: CPT | Mod: HCNC,CPTII,S$GLB, | Performed by: INTERNAL MEDICINE

## 2019-10-08 PROCEDURE — 3074F SYST BP LT 130 MM HG: CPT | Mod: HCNC,CPTII,S$GLB, | Performed by: INTERNAL MEDICINE

## 2019-10-08 PROCEDURE — G8979 MOBILITY GOAL STATUS: HCPCS | Mod: CK,HCNC,PO

## 2019-10-08 PROCEDURE — 99213 PR OFFICE/OUTPT VISIT, EST, LEVL III, 20-29 MIN: ICD-10-PCS | Mod: 25,HCNC,S$GLB, | Performed by: INTERNAL MEDICINE

## 2019-10-08 PROCEDURE — 3078F DIAST BP <80 MM HG: CPT | Mod: HCNC,CPTII,S$GLB, | Performed by: INTERNAL MEDICINE

## 2019-10-08 PROCEDURE — G8978 MOBILITY CURRENT STATUS: HCPCS | Mod: CK,HCNC,PO

## 2019-10-08 PROCEDURE — 90662 FLU VACCINE - HIGH DOSE (65+) PRESERVATIVE FREE IM: ICD-10-PCS | Mod: HCNC,S$GLB,, | Performed by: INTERNAL MEDICINE

## 2019-10-08 PROCEDURE — G0008 ADMIN INFLUENZA VIRUS VAC: HCPCS | Mod: HCNC,S$GLB,, | Performed by: INTERNAL MEDICINE

## 2019-10-08 PROCEDURE — 97110 THERAPEUTIC EXERCISES: CPT | Mod: HCNC,PO

## 2019-10-08 PROCEDURE — 99213 OFFICE O/P EST LOW 20 MIN: CPT | Mod: 25,HCNC,S$GLB, | Performed by: INTERNAL MEDICINE

## 2019-10-08 PROCEDURE — 99999 PR PBB SHADOW E&M-EST. PATIENT-LVL III: CPT | Mod: PBBFAC,HCNC,, | Performed by: INTERNAL MEDICINE

## 2019-10-08 PROCEDURE — 1101F PR PT FALLS ASSESS DOC 0-1 FALLS W/OUT INJ PAST YR: ICD-10-PCS | Mod: HCNC,CPTII,S$GLB, | Performed by: INTERNAL MEDICINE

## 2019-10-08 RX ORDER — ACETAMINOPHEN 500 MG
TABLET ORAL
COMMUNITY
Start: 2019-08-29

## 2019-10-08 NOTE — TELEPHONE ENCOUNTER
----- Message from Meghan Beltran sent at 10/8/2019 11:48 AM CDT -----  Contact: Self   Name of Who is Calling: Eboni Nicholas      What is the request in detail: pt states that she received a messages asking to r/s her 10/18 appointment. Pt would like to be r/s sooner than 12/5/19. Please contact to further discuss and advise.      Can the clinic reply by [facility]: N       What Number to Call Back if not in [facility]: [phone]

## 2019-10-08 NOTE — TELEPHONE ENCOUNTER
Pt was scheduled on 10/18/19. I rescheduled her to December. She was wondering if she could get a travel CPAP machine. She using [facility].Please advise!

## 2019-10-10 ENCOUNTER — CLINICAL SUPPORT (OUTPATIENT)
Dept: REHABILITATION | Facility: HOSPITAL | Age: 74
End: 2019-10-10
Attending: INTERNAL MEDICINE
Payer: MEDICARE

## 2019-10-10 DIAGNOSIS — M62.81 MUSCLE WEAKNESS OF LOWER EXTREMITY: ICD-10-CM

## 2019-10-10 DIAGNOSIS — M25.552 LEFT HIP PAIN: ICD-10-CM

## 2019-10-10 DIAGNOSIS — M62.89 MUSCLE TIGHTNESS: ICD-10-CM

## 2019-10-10 PROCEDURE — 97110 THERAPEUTIC EXERCISES: CPT | Mod: HCNC,PO

## 2019-10-10 NOTE — PROGRESS NOTES
History of present illness:  74-year-old lady with hypertension, dyslipidemia, obesity hypothyroidism following up on those issues six-month follow-up.  The patient reports generally all is doing well at present.  No chest pain palpitations syncope cough shortness of breath or claudication.  Taking all medications as directed.      Current medications:  All medications are noted and reviewed in the electronic medical record medication list.    Review of systems:  Constitutional:  No fever no chills no unexpected weight changes.  Respiratory:  No cough shortness of breath.  Cardiovascular:  No chest pain palpitations syncope claudication.  :  No dysuria frequency change in the color character of her urine.  GI:  No nausea vomiting abdominal pain or changes in bowel habits.    Past medical history, past surgical history, family medical history social history is are all noted and reviewed in the electronic medical record history sections.    Physical examination:  General:  Pleasant alert appropriately groomed lady no acute distress.  Vital signs:  Blood pressure taken manually by this examiner is 122/70.  Other vital signs normal.  HEENT:  Normocephalic.  Neck supple no masses no thyromegaly.  Lungs:  Clear to auscultation.  Cardiovascular:  Regular rate rhythm. No significant murmur.  Carotids full bilaterally bruits.  No ischemic changes lower extremities.  Mental status:  Alert oriented affect mood all appropriate    Impression:  Hypertension well controlled.  Dyslipidemia on statin therapy.  Hypothyroidism on replacement therapy.  Obesity with associated comorbidities-BMI 39.44    Plan:  Continue current pharmacologic regimens.  Encouraged increased physical activity and weight loss.  Return to clinic in April 2020 for general health assessment.

## 2019-10-10 NOTE — PROGRESS NOTES
Physical Therapy Daily Treatment Note     Name: Eboni Nicholas  Clinic Number: [id number]    Therapy Diagnosis:   Encounter Diagnoses   Name Primary?    Left hip pain     Muscle tightness     Muscle weakness of lower extremity      Physician: CAMILO Elizondo MD    Visit Date: 10/10/2019    Physician Orders: PT Eval and Treat  Medical Diagnosis from Referral: Strain of left hip, initial encounter  Evaluation Date: 8/6/2019  Last PN: 10/8/19 (visit 14)  Authorization Period Expiration: 7/29/20  Plan of Care Expiration: 10/6/19  Visit # / Visits authorized: 15 / 20   PTA Visit Number: 1    Time In: 11:00 am   Time Out: 12:00 pm   Total Billable Time: 30 minutes  Charges: TE - 2    Precautions: Standard    Subjective     Pt reports: decreased pain overall. States she is confident in doing home exercise program   She was compliant with home exercise program.  Response to previous treatment: good  Functional change: walking with less limp    Pain: 0/10 and 0/10  Location: left hip and L knee    Objective     BOLD= performed today    Eboni received therapeutic exercises to develop strength, endurance, ROM, flexibility, posture and core stabilization for 60 minutes including:    Upright bike: x 8 minutes level 2  SLR 3 x 10 each  Supine L hip flexor str EOB w strap 3 x 30 seconds  Supine L hip flexor stretch oscillations EOB by therapist x 30 seconds   Supine march 2 x 10  Bridges x 20  SL clams x 20 each   SL hip abduction x 20 each  Prone HS curls x 20  Prone quad stretch w strap 3 x 30 seconds   Prone hip ext x 20 each  Stair tap 6 in x 20  Step ups 6 in x 20 ea  Standing L hip flexor stretch 3 x 30 sec  Standing hip abduction: 2 x 10 each  Standing hip ext x 20 each  Standing march x 20 each   Shuttle squats 2.5 bands x 30  Sit to stand from gold chair: 2 x 10   Unilateral farmer carries 5# x 1 lap around clinic ea  SL L hip flexor/IT band stretch w knee bent by therapist 3 x 30 sec  Stepping over estefani forward  x 20 ea  Stepping over estefani lateral x 20 ea    Gait training on level surface with emphasis and cueing to improve step length, stance time and decreased gait deviations - NP     Home Exercises Provided and Patient Education Provided     Education provided:   - importance of performing HEP to tolerance    Written Home Exercises Provided: yes. (standing march, standing hip abd, SLR, prone quad stretch, SL clams)  Exercises were reviewed and Eboni was able to demonstrate them prior to the end of the session.  Eboni demonstrated fair  understanding of the education provided.     See EMR under Patient Instructions for exercises provided prior visit.    CMS Impairment/Limitation/Restriction for FOTO hip Survey    Therapist reviewed FOTO scores for Eboni Nicholas on 10/10/2019.   FOTO documents entered into [employer] - see Media section.    Limitation Score: 44%  Category: Mobility    Current : CK = at least 40% but < 60% impaired, limited or restricted  Goal: CK = at least 40% but < 60% impaired, limited or restricted       Assessment     Patient with excellent tolerance for therapy session no increase in pain or adverse symptoms. Patient with improved strength and gait mechanics.     Eboni is progressing well towards her goals.   Pt prognosis is Good.     Pt will continue to benefit from skilled outpatient physical therapy to address the deficits listed in the problem list box on initial evaluation, provide pt/family education and to maximize pt's level of independence in the home and community environment.     Pt's spiritual, cultural and educational needs considered and pt agreeable to plan of care and goals.     Anticipated barriers to physical therapy: None     Goals:     GOALS: Short Term Goals:  4 weeks  1. Report decreased L hip pain  <  / =  5/10 at worst to increase tolerance for prolonged standing. - met 10/8/19  2. Pt will be able to tolerate multi-directional LE strengthening in order to improve ability to  perform household chores.- met 9/10/19  3. Pt will report 50% improvement in ability to walk long distances since start of care to indicate improved functional mobility.- 10/8/19  4. Pt will perform marches without pain to indicate improvement in ability to lift leg into car for transfers.- met 9/10/19   5. Pt to tolerate HEP to improve ROM and independence with ADL's.- met 9/10/19     Long Term Goals: 8 weeks  1. Report decreased L hip pain  <  / =  3/10 at worst to increase tolerance for prolonged standing.- met 10/8/19  2. Pt will be able to perform 2 x 10 multi-directional LE strengthening without fatigue in order to improve ability to perform household chores.- met 10/8/19  3. Pt will report 80% improvement in ability to walk long distances since start of care to indicate improved functional mobility.- met 10/8/19  4. Pt will report no difficulties with car transfers to indicate improvement in hip flexor strength.- met 10/8/19  5. Pt to be Independent with HEP to improve ROM and independence with ADL's.- in progress    Plan     Progress LE strengthening and endurance. Plan for DC on 10/15/19.    Evelyn Chadwick, PTA

## 2019-10-14 NOTE — PROGRESS NOTES
Physical Therapy Daily Treatment Note     Name: Eboni Nicholas  Clinic Number: [id number]    Therapy Diagnosis:   Encounter Diagnoses   Name Primary?    Left hip pain     Muscle tightness     Muscle weakness of lower extremity      Physician: CAMILO Elizondo MD    Visit Date: 10/15/2019    Physician Orders: PT Eval and Treat  Medical Diagnosis from Referral: Strain of left hip, initial encounter  Evaluation Date: 8/6/2019  Last PN: 10/8/19 (visit 14)  Authorization Period Expiration: 7/29/20  Plan of Care Expiration: 10/6/19  Visit # / Visits authorized: 16 / 20   PTA Visit Number: 0    Time In: 1300  Time Out: 1354  Total Billable Time: 54 minutes  Charges: TE - 4    Precautions: Standard    Subjective     Pt reports: she feels a lot better and is ready for discharge today.   She was compliant with home exercise program.  Response to previous treatment: good   Functional change: walking with more ease    Pain: 0/10 and 0/10  Location: left hip and L knee    Objective     BOLD= performed today    Eboni received therapeutic exercises to develop strength, endurance, ROM, flexibility, posture and core stabilization for 54 minutes including:    Upright bike: x 10 minutes level 2  SLR 2.5# 3 x 10 each  Supine L hip flexor str EOB w strap 3 x 30 seconds  Supine L hip flexor stretch oscillations EOB by therapist x 30 seconds   Supine march 2 x 10  Bridges x 20  SL clams x 20 each   SL hip abduction x 20 each  Prone HS curls x 20  Prone quad stretch w strap 3 x 30 seconds   Prone hip ext x 20 each  Stair tap 6 in x 20  Step ups 6 in x 20 ea  Standing L hip flexor stretch 3 x 30 sec  Standing hip abduction: 2.5# 2 x 10 each  Standing hip ext 2.5#x 20 each  Standing march 2.5# x 20 each   Shuttle squats 2.5 bands x 30  Sit to stand from gold chair: 2 x 10   Unilateral farmer carries 10# x 1 lap around clinic ea  SL L hip flexor/IT band stretch w knee bent by therapist 3 x 30 sec  Stepping over estefani forward x 20  ea  Stepping over estefani lateral x 20 ea    Gait training on level surface with emphasis and cueing to improve step length, stance time and decreased gait deviations - NP     Home Exercises Provided and Patient Education Provided     Education provided:   - importance of performing HEP to tolerance    Written Home Exercises Provided: yes. (standing march, standing hip abd, SLR, prone quad stretch, SL clams)  Exercises were reviewed and Eboni was able to demonstrate them prior to the end of the session.  Eboni demonstrated fair  understanding of the education provided.     See EMR under Patient Instructions for exercises provided prior visit.    CMS Impairment/Limitation/Restriction for FOTO hip Survey    Therapist reviewed FOTO scores for Eboni Nicholas on 10/15/2019.   FOTO documents entered into [employer] - see Media section.    Limitation Score: 45%  Category: Mobility    Current : CK = at least 40% but < 60% impaired, limited or restricted  Goal: CK = at least 40% but < 60% impaired, limited or restricted  Discharge: CK = at least 40% but < 60% impaired, limited or restricted       Assessment     Patient was re-assessed today with 5/5 STGs and 5/5 LTGs being met indicating improvements in L hip pain, tolerance for LE strength, ability to walk long distances, hip flexor strength, and independence with HEP since start of care. She had good tolerance to treatment today with no adverse effects. Post-treatment L hip pain rated as 0/10. Appropriate exercise-induced fatigue achieved by end of session. Good response to addition of ankle weight to standing hip strengthening and SLR indicating improvement in LE strength. Able to perform farmer carries with heavier weight with little gait deficits. She demonstrates increased independence with exercise program indicating good compliance with HEP. Pt is appropriate for discharge that this time with self-management of condition.     Goals:     GOALS: Short Term Goals:  4  weeks  1. Report decreased L hip pain  <  / =  5/10 at worst to increase tolerance for prolonged standing. - met 10/8/19  2. Pt will be able to tolerate multi-directional LE strengthening in order to improve ability to perform household chores.- met 9/10/19  3. Pt will report 50% improvement in ability to walk long distances since start of care to indicate improved functional mobility.- 10/8/19  4. Pt will perform marches without pain to indicate improvement in ability to lift leg into car for transfers.- met 9/10/19   5. Pt to tolerate HEP to improve ROM and independence with ADL's.- met 9/10/19     Long Term Goals: 8 weeks  1. Report decreased L hip pain  <  / =  3/10 at worst to increase tolerance for prolonged standing.- met 10/8/19  2. Pt will be able to perform 2 x 10 multi-directional LE strengthening without fatigue in order to improve ability to perform household chores.- met 10/8/19  3. Pt will report 80% improvement in ability to walk long distances since start of care to indicate improved functional mobility.- met 10/8/19  4. Pt will report no difficulties with car transfers to indicate improvement in hip flexor strength.- met 10/8/19  5. Pt to be Independent with HEP to improve ROM and independence with ADL's.- met 10/15/19    Plan     Pt is discharged from physical therapy services.    Shelby Bauer, PT

## 2019-10-15 ENCOUNTER — CLINICAL SUPPORT (OUTPATIENT)
Dept: REHABILITATION | Facility: HOSPITAL | Age: 74
End: 2019-10-15
Attending: INTERNAL MEDICINE
Payer: MEDICARE

## 2019-10-15 DIAGNOSIS — M62.89 MUSCLE TIGHTNESS: ICD-10-CM

## 2019-10-15 DIAGNOSIS — M25.552 LEFT HIP PAIN: ICD-10-CM

## 2019-10-15 DIAGNOSIS — M62.81 MUSCLE WEAKNESS OF LOWER EXTREMITY: ICD-10-CM

## 2019-10-15 PROCEDURE — G8979 MOBILITY GOAL STATUS: HCPCS | Mod: CK,HCNC,PO

## 2019-10-15 PROCEDURE — 97110 THERAPEUTIC EXERCISES: CPT | Mod: HCNC,PO

## 2019-10-15 PROCEDURE — G8980 MOBILITY D/C STATUS: HCPCS | Mod: CK,HCNC,PO

## 2019-10-18 ENCOUNTER — PATIENT OUTREACH (OUTPATIENT)
Dept: ADMINISTRATIVE | Facility: OTHER | Age: 74
End: 2019-10-18

## 2019-10-21 ENCOUNTER — OFFICE VISIT (OUTPATIENT)
Dept: SPORTS MEDICINE | Facility: CLINIC | Age: 74
End: 2019-10-21
Payer: MEDICARE

## 2019-10-21 VITALS — HEIGHT: 63 IN | BODY MASS INDEX: 39.34 KG/M2 | WEIGHT: 222 LBS | TEMPERATURE: 98 F

## 2019-10-21 DIAGNOSIS — M25.552 LEFT HIP PAIN: Primary | ICD-10-CM

## 2019-10-21 DIAGNOSIS — M16.12 PRIMARY OSTEOARTHRITIS OF LEFT HIP: ICD-10-CM

## 2019-10-21 PROCEDURE — 99214 PR OFFICE/OUTPT VISIT, EST, LEVL IV, 30-39 MIN: ICD-10-PCS | Mod: 25,HCNC,S$GLB, | Performed by: FAMILY MEDICINE

## 2019-10-21 PROCEDURE — 20611 DRAIN/INJ JOINT/BURSA W/US: CPT | Mod: HCNC,LT,S$GLB, | Performed by: FAMILY MEDICINE

## 2019-10-21 PROCEDURE — 20611 LARGE JOINT ASPIRATION/INJECTION: L HIP JOINT: ICD-10-PCS | Mod: HCNC,LT,S$GLB, | Performed by: FAMILY MEDICINE

## 2019-10-21 PROCEDURE — 99214 OFFICE O/P EST MOD 30 MIN: CPT | Mod: 25,HCNC,S$GLB, | Performed by: FAMILY MEDICINE

## 2019-10-21 PROCEDURE — 1101F PR PT FALLS ASSESS DOC 0-1 FALLS W/OUT INJ PAST YR: ICD-10-PCS | Mod: HCNC,CPTII,S$GLB, | Performed by: FAMILY MEDICINE

## 2019-10-21 PROCEDURE — 99999 PR PBB SHADOW E&M-EST. PATIENT-LVL III: ICD-10-PCS | Mod: PBBFAC,HCNC,, | Performed by: FAMILY MEDICINE

## 2019-10-21 PROCEDURE — 1101F PT FALLS ASSESS-DOCD LE1/YR: CPT | Mod: HCNC,CPTII,S$GLB, | Performed by: FAMILY MEDICINE

## 2019-10-21 PROCEDURE — 99999 PR PBB SHADOW E&M-EST. PATIENT-LVL III: CPT | Mod: PBBFAC,HCNC,, | Performed by: FAMILY MEDICINE

## 2019-10-21 RX ORDER — TRIAMCINOLONE ACETONIDE 40 MG/ML
40 INJECTION, SUSPENSION INTRA-ARTICULAR; INTRAMUSCULAR
Status: DISCONTINUED | OUTPATIENT
Start: 2019-10-21 | End: 2019-10-21 | Stop reason: HOSPADM

## 2019-10-21 RX ADMIN — TRIAMCINOLONE ACETONIDE 40 MG: 40 INJECTION, SUSPENSION INTRA-ARTICULAR; INTRAMUSCULAR at 02:10

## 2019-10-21 NOTE — PROCEDURES
"Large Joint Aspiration/Injection: L hip joint  Date/Time: 10/21/2019 2:15 PM  Performed by: Wm Lewis MD  Authorized by: Wm Lewis MD     Consent Done?:  Yes (Verbal)  Indications:  Pain  Procedure site marked: Yes    Timeout: Prior to procedure the correct patient, procedure, and site was verified      Location:  Hip  Site:  L hip joint  Prep: Patient was prepped and draped in usual sterile fashion    Needle size:  20 G  Ultrasonic Guidance for needle placement: Yes  Images are saved and documented.  Approach:  Anterior  Medications:  40 mg triamcinolone acetonide 40 mg/mL  Patient tolerance:  Patient tolerated the procedure well with no immediate complications    Additional Comments: Description of ultrasound utilization for needle guidance:   Ultrasound guidance used for needle localization. Images saved and stored for documentation. The hip joint was visualized. Dynamic visualization of the 20g x 6.0" needle was continuous throughout the procedure.    Precautionary:  The patient's femoral artery, vein, and nerve were identified, marked with a skin marker, and visualized throughout the procedure, so as to avoid needle contact with those structures.      "

## 2019-10-21 NOTE — PROGRESS NOTES
Eboni Nicholas, a 74 y.o. female, is here for evaluation of left hip.     HISTORY OF PRESENT ILLNESS   Location: anterior/lateral thigh, left  Onset: chronic  Palliative:    Relative rest   Oral analgesics   fPT, duration: 8/20 vistis , moderate Improvement   ADONAY FRAUSTO, 09.18.2019, minimal improvement  Provocative:   ADLs  ambulation  Prior: No hx of Orthopaedic surgery  Progression: worsening discomfort   Quality:    sharp  Radiation: none  Severity: per nursing documentation  Timing: intermittent with use  Trauma: none specific      Review of systems (ROS):  A 10+ review of systems was performed with pertinent positives and negatives noted above in the history of present illness. Other systems were negative unless otherwise specified.      PHYSICAL EXAMINATION  General:  The patient is alert and oriented x 3.  Mood is pleasant.  Observation of ears, eyes and nose reveal no gross abnormalities.  HEENT: NCAT, sclera nonicteric  Lungs: Respirations are equal and unlabored.   Gait is coordinated. Patient can toe walk and heel walk without difficulty.    HIP/PELVIS EXAMINATION    Observation/Inspection  Gait:   Nonantalgic   Alignment:  Neutral   Scars:   None   Muscle atrophy: None   Effusion:  None   Warmth:  None   Discoloration:   None   Leg lengths:   Equal   Pelvis:    Level     Tenderness/Crepitus (T/C):      T / C  Trochanteric bursa   + / -  Piriformis    - / -  SI joint    - / -  Psoas tendon   - / -  Rectus insertion  - / -  Adductor insertion  - / -  Pubic symphysis  - / -    ROM: (* = pain)    Flexion:      120 degrees *  External rotation:   40 degrees *  Internal rotation with axial load:  30 degrees *  Internal rotation without axial load:  40 degrees *  Abduction:    45 degrees  Adduction:     20 degrees    Special Tests:  Pain w/ forced internal rotation (FADIR):  +  Pain w/ forced external rotation (FAMILIA):  +   Circumduction test:     -  Stinchfield test:     -   Log roll:       -   Snapping  hip (internal):    -   Sit-up pain:      -   Resisted sit-up pain:     -   Resisted sit-up with adductor contraction pain:  -   Step-down test:     +  Trendelenburg test:     -  Bridge test      +     Extremity Neuro-vascular Examination:   Sensation:  Grossly intact to light touch all dermatomal regions.   Motor Function:  Fully intact motor function at hip, knee, foot and ankle    DTRs;  quadriceps and  achilles 2+.  No clonus and downgoing Babinski.    Vascular status:  DP and PT pulses 2+, brisk capillary refill, symmetric.    Skin:  intact, compartments soft.    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, brianne L5/S1  #2 Tonnis Grade II osteoarthritis of hip, left   W/ greater trochanteric bursitis    No evidence of neurologic pathology  No evidence of vascular pathology    Imaging studies reviewed:   X-ray pelvis and hip, left 19.07  lumbar spine 19.09    Plan:    We discussed the importance of appropriate diet, weight, and regular exercise including quadriceps strengthening     We discussed options including:  #1 watchful waiting  #2 r physical therapy aimed at:   Core stability   RoM hip   Strengthening quadriceps   Gait training   #3 injection therapy:   CSI GTB    Right,     Left,    CSI iaHip    Right,     Left,    Orthobiologics   #4 MRI for further evaluation      The patient chooses #3 csi iahip left    Pain management: handout given  Bracing:   Physical therapy: fPELISABET, @ [facility], p as above   Activity (e.g. sports, work) restrictions: as tolerated  school/vocation:   retired  Train to [place] in 10/2019    Follow up in 12w  How was train to [place]??  Should symptoms worsen or fail to resolve, consider:  Revisiting the above options

## 2019-11-14 ENCOUNTER — PATIENT OUTREACH (OUTPATIENT)
Dept: ADMINISTRATIVE | Facility: OTHER | Age: 74
End: 2019-11-14

## 2019-11-15 ENCOUNTER — PATIENT OUTREACH (OUTPATIENT)
Dept: OTHER | Facility: OTHER | Age: 74
End: 2019-11-15

## 2019-11-15 NOTE — PROGRESS NOTES
Digital Medicine: Health  Follow-Up    Patient reports she and her  had a great time on their vacation to visit family.  Reports she is not experiencing knee pain and plans to have more injections.    The history is provided by the patient.     Follow Up  Follow-up reason(s): routine education      Routine Education Topics: physical activity        INTERVENTION(S)  recommend physical activity and encouragement/support    PLAN  patient verbalizes understanding      There are no preventive care reminders to display for this patient.    Last 5 Patient Entered Readings                                      Current 30 Day Average: 115/78     Recent Readings 11/15/2019 11/13/2019 11/11/2019 10/22/2019 10/20/2019    SBP (mmHg) 109 120 122 110 108    DBP (mmHg) 88 85 88 66 72    Pulse 60 68 69 54 60                      Physical Activity Screening   When asked if exercising, patient responded: yes4 day(s) a week.      Patient participates in the following activities: physical therapy/rehab    States she is done with PT, but she has been doing exercises every other day.      SDOH

## 2019-11-18 ENCOUNTER — OFFICE VISIT (OUTPATIENT)
Dept: SPORTS MEDICINE | Facility: CLINIC | Age: 74
End: 2019-11-18
Payer: MEDICARE

## 2019-11-18 VITALS — HEIGHT: 63 IN | BODY MASS INDEX: 39.34 KG/M2 | TEMPERATURE: 98 F | WEIGHT: 222 LBS

## 2019-11-18 DIAGNOSIS — M25.562 CHRONIC PAIN OF LEFT KNEE: ICD-10-CM

## 2019-11-18 DIAGNOSIS — G89.29 CHRONIC PAIN OF LEFT KNEE: ICD-10-CM

## 2019-11-18 DIAGNOSIS — M17.12 PRIMARY OSTEOARTHRITIS OF LEFT KNEE: Primary | ICD-10-CM

## 2019-11-18 PROCEDURE — 99999 PR PBB SHADOW E&M-EST. PATIENT-LVL III: ICD-10-PCS | Mod: PBBFAC,HCNC,, | Performed by: FAMILY MEDICINE

## 2019-11-18 PROCEDURE — 99999 PR PBB SHADOW E&M-EST. PATIENT-LVL III: CPT | Mod: PBBFAC,HCNC,, | Performed by: FAMILY MEDICINE

## 2019-11-18 PROCEDURE — 99214 OFFICE O/P EST MOD 30 MIN: CPT | Mod: 25,HCNC,S$GLB, | Performed by: FAMILY MEDICINE

## 2019-11-18 PROCEDURE — 20611 LARGE JOINT ASPIRATION/INJECTION: L KNEE: ICD-10-PCS | Mod: HCNC,LT,S$GLB, | Performed by: FAMILY MEDICINE

## 2019-11-18 PROCEDURE — 20611 DRAIN/INJ JOINT/BURSA W/US: CPT | Mod: HCNC,LT,S$GLB, | Performed by: FAMILY MEDICINE

## 2019-11-18 PROCEDURE — 1101F PT FALLS ASSESS-DOCD LE1/YR: CPT | Mod: HCNC,CPTII,S$GLB, | Performed by: FAMILY MEDICINE

## 2019-11-18 PROCEDURE — 1101F PR PT FALLS ASSESS DOC 0-1 FALLS W/OUT INJ PAST YR: ICD-10-PCS | Mod: HCNC,CPTII,S$GLB, | Performed by: FAMILY MEDICINE

## 2019-11-18 PROCEDURE — 99214 PR OFFICE/OUTPT VISIT, EST, LEVL IV, 30-39 MIN: ICD-10-PCS | Mod: 25,HCNC,S$GLB, | Performed by: FAMILY MEDICINE

## 2019-11-18 RX ORDER — TRIAMCINOLONE ACETONIDE 40 MG/ML
40 INJECTION, SUSPENSION INTRA-ARTICULAR; INTRAMUSCULAR
Status: DISCONTINUED | OUTPATIENT
Start: 2019-11-18 | End: 2019-11-18 | Stop reason: HOSPADM

## 2019-11-18 RX ADMIN — TRIAMCINOLONE ACETONIDE 40 MG: 40 INJECTION, SUSPENSION INTRA-ARTICULAR; INTRAMUSCULAR at 01:11

## 2019-11-18 NOTE — PROGRESS NOTES
Eboni Nicholas, a 74 y.o. female, presents today for evaluation of her Left knee. Patient was seen by Ortho in 2014 for bilateral knee pain, got a Rx for mobic and did not receive any injections at that time. She is currently in fPT for her left hip     History of Present Illness (HPI)  Location: global knee, left  Onset: Chronic, 3 weeks  Palliative:    Relative rest   Oral analgesics   ADONAY FRAUSTO, 08.22.2019,  100% Improvement for 12 weeks  Provocative:    ADLS   Prolonged ambulation  Prior: No hx of Orthopaedic surgery  Progression: worsening discomfort  Quality:    sharp pain  Radiation: none  Severity: per nursing documentation  Timing: intermittent w/ use  Trauma: none    Review of Systems (ROS)  A 10+ review of systems was performed with pertinent positives and negatives noted above in the history of present illness. Other systems were negative unless otherwise specified.    Physical Examination (PE)  General:  The patient is alert and oriented x 3. Mood is pleasant. Observation of ears, eyes and nose reveal no gross abnormalities. HEENT: NCAT, sclera anicteric.   Lungs: Respirations are equal and unlabored.  Gait is coordinated. Patient can toe walk and heel walk without difficulty.    LEFT KNEE EXAMINATION    Observation/Inspection  Gait:   Nonantalgic   Alignment:  Neutral   Scars:   None   Muscle atrophy: Mild  Effusion:  None   Warmth:  None   Discoloration:   none     Tenderness / Crepitus (T / C):         T / C      T / C  Patella   - / -   Lateral joint line   - / -     Peripatellar medial  -  Medial joint line    + / -  Peripatellar lateral -  Medial plica   - / -  Patellar tendon -   Popliteal fossa   - / -  Quad tendon   -   Gastrocnemius   -  Prepatellar Bursa - / -   Quadricep   -  Tibial tubercle  -  Thigh/hamstring  -  Pes anserine/HS -  Fibula    -  ITB   - / -  Tibia     -  Tib/fib joint  - / -  LCL    -    MFC   - / -   MCL: Proximal  -    LFC   - / -   Distal    -          ROM: (* =  pain)  PASSIVE   ACTIVE    Left :   5 / 0 / 145   5 / 0 / 145     Right :    5 / 0 / 145   5 / 0 / 145    Patellofemoral examination:  See above noted areas of tenderness.   Patella position    Subluxation / dislocation: Centered        Sup. / Inf;   Normal   Crepitus (PF):    Absent   Patellar Mobility:       Medial-lateral:   Normal    Superior-inferior:  Normal    Inferior tilt   Normal    Patellar tendon:  Normal   Lateral tilt:    Normal   J-sign:     None   Patellofemoral grind:   No pain     Meniscal Signs:     Pain on terminal extension:  +  Pain on terminal flexion:  +  Carsons maneuver:  +*  Squat     NT  Thessaly    NT    Ligament Examination:  ACL / Lachman:  WNL  PCL-Post.  drawer: normal 0 to 2mm  MCL- Valgus:  normal 0 to 2mm  LCL- Varus:    normal 0 to 2mm  Pivot shift:  guarding   Dial Test:   difference c/w other side   At 30° flexion: normal (< 5°)    At 90° flexion: normal (< 5°)   Reverse Pivot Shift:   normal (Equal)     Strength: (* = with pain) Painful Side  Quadriceps   5/5  Hamstrin/5    Extremity Neuro-vascular Examination:   Sensation:  Grossly intact to light touch all dermatomal regions.   Motor Function:  Fully intact motor function at hip, knee, foot and ankle    DTRs;  quadriceps and  achilles 2+.  No clonus and downgoing Babinski.    Vascular status:  DP and PT pulses 2+, brisk capillary refill, symmetric.     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, brianne ant compartment, left    No evidence of vascular pathology    Imaging studies reviewed:  X-ray knee, bilateral 19.08    Plan:    We discussed the importance of appropriate diet, weight, and regular exercise including quadriceps strengthening     We discussed options including:  #1 watchful waiting  #2 r physical therapy aimed at:   Core stability   RoM knee   Strengthening quadriceps   Gait training   #3 injection therapy:   CSI iashell     Right,     Left,     VSI iaknee    Right,     Left,    Orthobiologics   #4 consultation      The patient chooses #3 csi iaknee left    Pain management: handout given  Bracing: rolling walker, continue  Physical therapy: Mer[facility], @ [facility], p as above   Activity (e.g. sports, work) restrictions: as tolerated  school/vocation:   Retired    Follow up in 12w  Should symptoms worsen or fail to resolve, consider:  Revisiting the above options

## 2019-11-18 NOTE — PROCEDURES
"Large Joint Aspiration/Injection: L knee  Date/Time: 11/18/2019 1:00 PM  Performed by: Wm Lewis MD  Authorized by: Wm Lewis MD     Consent Done?:  Yes (Verbal)  Indications:  Pain  Procedure site marked: Yes    Timeout: Prior to procedure the correct patient, procedure, and site was verified      Location:  Knee  Site:  L knee  Prep: Patient was prepped and draped in usual sterile fashion    Needle size:  20 G  Ultrasonic Guidance for needle placement: Yes  Images are saved and documented.  Approach:  Lateral  Medications:  40 mg triamcinolone acetonide 40 mg/mL  Patient tolerance:  Patient tolerated the procedure well with no immediate complications    Additional Comments: Description of ultrasound utilization for needle guidance:   Ultrasound guidance used for needle localization. Images saved and stored for documentation. The knee joint was visualized. Dynamic visualization of the 20g x 3.5" needle was continuous throughout the procedure.      "

## 2019-12-05 ENCOUNTER — PATIENT OUTREACH (OUTPATIENT)
Dept: ADMINISTRATIVE | Facility: OTHER | Age: 74
End: 2019-12-05

## 2019-12-06 ENCOUNTER — OFFICE VISIT (OUTPATIENT)
Dept: SLEEP MEDICINE | Facility: CLINIC | Age: 74
End: 2019-12-06
Payer: MEDICARE

## 2019-12-06 VITALS
BODY MASS INDEX: 39.16 KG/M2 | WEIGHT: 221 LBS | HEART RATE: 66 BPM | DIASTOLIC BLOOD PRESSURE: 60 MMHG | SYSTOLIC BLOOD PRESSURE: 99 MMHG | HEIGHT: 63 IN

## 2019-12-06 DIAGNOSIS — G47.33 OBSTRUCTIVE SLEEP APNEA: Primary | ICD-10-CM

## 2019-12-06 DIAGNOSIS — E66.01 SEVERE OBESITY (BMI 35.0-39.9) WITH COMORBIDITY: ICD-10-CM

## 2019-12-06 DIAGNOSIS — I10 HTN (HYPERTENSION), BENIGN: ICD-10-CM

## 2019-12-06 PROCEDURE — 99214 OFFICE O/P EST MOD 30 MIN: CPT | Mod: HCNC,S$GLB,, | Performed by: NURSE PRACTITIONER

## 2019-12-06 PROCEDURE — 3074F PR MOST RECENT SYSTOLIC BLOOD PRESSURE < 130 MM HG: ICD-10-PCS | Mod: HCNC,CPTII,S$GLB, | Performed by: NURSE PRACTITIONER

## 2019-12-06 PROCEDURE — 3288F PR FALLS RISK ASSESSMENT DOCUMENTED: ICD-10-PCS | Mod: HCNC,CPTII,S$GLB, | Performed by: NURSE PRACTITIONER

## 2019-12-06 PROCEDURE — 3074F SYST BP LT 130 MM HG: CPT | Mod: HCNC,CPTII,S$GLB, | Performed by: NURSE PRACTITIONER

## 2019-12-06 PROCEDURE — 1100F PTFALLS ASSESS-DOCD GE2>/YR: CPT | Mod: HCNC,CPTII,S$GLB, | Performed by: NURSE PRACTITIONER

## 2019-12-06 PROCEDURE — 1126F AMNT PAIN NOTED NONE PRSNT: CPT | Mod: HCNC,S$GLB,, | Performed by: NURSE PRACTITIONER

## 2019-12-06 PROCEDURE — 99214 PR OFFICE/OUTPT VISIT, EST, LEVL IV, 30-39 MIN: ICD-10-PCS | Mod: HCNC,S$GLB,, | Performed by: NURSE PRACTITIONER

## 2019-12-06 PROCEDURE — 1126F PR PAIN SEVERITY QUANTIFIED, NO PAIN PRESENT: ICD-10-PCS | Mod: HCNC,S$GLB,, | Performed by: NURSE PRACTITIONER

## 2019-12-06 PROCEDURE — 3078F PR MOST RECENT DIASTOLIC BLOOD PRESSURE < 80 MM HG: ICD-10-PCS | Mod: HCNC,CPTII,S$GLB, | Performed by: NURSE PRACTITIONER

## 2019-12-06 PROCEDURE — 99999 PR PBB SHADOW E&M-EST. PATIENT-LVL III: CPT | Mod: PBBFAC,HCNC,, | Performed by: NURSE PRACTITIONER

## 2019-12-06 PROCEDURE — 3288F FALL RISK ASSESSMENT DOCD: CPT | Mod: HCNC,CPTII,S$GLB, | Performed by: NURSE PRACTITIONER

## 2019-12-06 PROCEDURE — 1159F PR MEDICATION LIST DOCUMENTED IN MEDICAL RECORD: ICD-10-PCS | Mod: HCNC,S$GLB,, | Performed by: NURSE PRACTITIONER

## 2019-12-06 PROCEDURE — 3078F DIAST BP <80 MM HG: CPT | Mod: HCNC,CPTII,S$GLB, | Performed by: NURSE PRACTITIONER

## 2019-12-06 PROCEDURE — 99999 PR PBB SHADOW E&M-EST. PATIENT-LVL III: ICD-10-PCS | Mod: PBBFAC,HCNC,, | Performed by: NURSE PRACTITIONER

## 2019-12-06 PROCEDURE — 1100F PR PT FALLS ASSESS DOC 2+ FALLS/FALL W/INJURY/YR: ICD-10-PCS | Mod: HCNC,CPTII,S$GLB, | Performed by: NURSE PRACTITIONER

## 2019-12-06 PROCEDURE — 1159F MED LIST DOCD IN RCRD: CPT | Mod: HCNC,S$GLB,, | Performed by: NURSE PRACTITIONER

## 2019-12-06 NOTE — PROGRESS NOTES
Eboni Nicholas returns today for mgt of CHIDI, accompanied by her . Initial visit with her    She continues to use PAP (setup 7/2019), change to 12-20cm at last visit. Using qhs. Mask leak at times. Wants maybe new mask so she can still use with her glasses. +oral drying despite climate hose/xylimints. Does not feel sleepy anymore during daytime and her BP is reduced. Snoring resolved  Interrogation- avg use  8-9.3h/n. Mago view. 90-94% mask fit. 2-3% PB. 30/30d>4h. AHI 4.1  wgt loss and bp is optimal today      08/14/2019  Pt is following in regards to the CHIDI has hx of severe CHIDI AHI 91 on CPAP 15 cm H20 and she is having dry mouth and does have nocturia. She denies having headache on waking up. She feels the mode is good and memory is fine. She denies drowsy driving. She denies bloated on waking up.       Compliance Summary  Days with Device Usage: 25 days  Percentage of Days >=4 Hours: 80.0%  Average Usage (Days Used): 6 hrs. 56 mins. 25 secs.  Average Usage (All Days): 5 hrs. 47 mins. 1 secs.  Apnea Indices  Average AHI: 11.8  Average OA Index: 7.4  Average CA Index: 0.4  Ventilator Statistics  Average Breath Rate: 15.5 bpm  Average % Patient Triggered Breaths: N/A  Average Tidal Volume: 291.6 ml  Average Minute Vent: N/A    Large Leak  Average Time in Large Leak: 2 mins. 5 secs.  Average % of Night in Large Leak: 0.5%  Periodic Breathing  Average % of Night in PB: 4.9%         Respiratory: Loud snoring was present. There was significant CHIDI (obstructive sleep apnea) based on AHI (apnea hypopnea index) criteria. The overall AHI was 91.5 with an oxygen ashley of 75.0%.        IMPRESSION:   1. Significant CHIDI (G47.33), severe, based on AHI criteria. The patient qualified for split night.   2. Adequate control of sleep disordered breathing was achieved with CPAP at 15 cm of water. An effective pressure was not conclusively determined.     RECOMMENDATION:   1. CPAP at 15 cm, full face mask of patients  choice, chin strap, and heated humidification, which is essential in conjunction with PAP to prevent airway drying and irritation.   2. Consider dedicated titration sleep study to confirm optimum pressure setting.       EPWORTH SLEEPINESS SCALE 8/13/2019   Sitting and reading 2   Watching TV 2   Sitting, inactive in a public place (e.g. a theatre or a meeting) 2   As a passenger in a car for an hour without a break 2   Lying down to rest in the afternoon when circumstances permit 2   Sitting and talking to someone 0   Sitting quietly after a lunch without alcohol 1   In a car, while stopped for a few minutes in traffic 0   Total score 11         Eboni Nicholas  was seen as a new patient at the request of No ref. provider found for the evaluation of obstructive sleep apnea.      CHIEF COMPLAINT:    Chief Complaint   Patient presents with    Follow-up       05/14/2019 KRISTYN Madrid NP: Initial HISTORY OF PRESENT ILLNESS: Eboni Nicholas is a 74 y.o. female is here for sleep evaluation.      Previously diagnosed with CHIDI via PSG prompted by snoring, witnessed apneas, unrefreshing sleep, daytime sleepiness and fatigue.   Used CPAP x 2 years with resolution of sleep complaints. Mask began to have overt air leaks and air hunger, pt stopped use  Per patient results of 2010 PSG never explained to her. Didn't know maintenance of therapy  Would like to resume PAP treatment   Discussed in detail PSG results. [    Due to break in usage, pt understands requirement to complete requal PSG  Face-to-face visit completed today     Denies symptoms of restless legs or kicking during sleep.    Occupation: retired     [place] Sleepiness Scale score during initial sleep evaluation was 16.    SLEEP ROUTINE:    Bed partner:     Time to bed:  11 pm   Sleep onset latency: quickly         Disruptions or awakenings:  1 - 2, bathroom, not difficult to fall back asleep    Wakeup time:    8:30 am  Perceived sleep quality: poor      "    02/11/2010 Split  lb. The overall AHI was 86.6 with an oxygen ashley of 72%. Effective control of sleep disordered breathing was seen at a pressure of 16 cm of water, including supine REM stage sleep.          PHYSICAL EXAM:  Vitals:    12/06/19 1332   BP: 99/60   Pulse: 66   Weight: 100.3 kg (221 lb 0.2 oz)   Height: 5' 3" (1.6 m)   PainSc: 0-No pain     Body mass index is 39.15 kg/m².   GENERAL: Well groomed; Normally developed;obese      ASSESSMENT:    Obstructive sleep apnea, severe by AHI. Excellent adherence with autopap, benefiting from therapy. AHI<5. Oral drying problematic  She has medical co-morbidities: depression, glaucoma, HLD, HTN, sarcoidosis, and severe obesity      PLAN:  Continue apap 12 to 20 cm , [facility] DME today for alternative style FFM   Discussed etiology of CHIDI and potential ramifications of untreated CHIDI, including heart disease, hypertension, cognitive difficulties, stroke, and diabetes.    rtc 1 yr. See PCP re: HTN mgt/continue meds and encouraged weight loss efforts for potential improvement of CHIDI and overall health benefits          "

## 2019-12-09 RX ORDER — SIMVASTATIN 40 MG/1
TABLET, FILM COATED ORAL
Qty: 90 TABLET | Refills: 1 | Status: SHIPPED | OUTPATIENT
Start: 2019-12-09 | End: 2020-06-16 | Stop reason: SDUPTHER

## 2019-12-11 ENCOUNTER — TELEPHONE (OUTPATIENT)
Dept: INTERNAL MEDICINE | Facility: CLINIC | Age: 74
End: 2019-12-11

## 2019-12-11 DIAGNOSIS — E03.9 ACQUIRED HYPOTHYROIDISM: ICD-10-CM

## 2019-12-11 DIAGNOSIS — I10 HTN (HYPERTENSION), BENIGN: Primary | ICD-10-CM

## 2019-12-11 DIAGNOSIS — E78.5 DYSLIPIDEMIA: ICD-10-CM

## 2019-12-11 NOTE — TELEPHONE ENCOUNTER
----- Message from Nae Wolfe sent at 12/11/2019  2:41 PM CST -----  Contact: self   Doctor appointment and lab have been scheduled.  Please link lab orders to the lab appointment.  Date of doctor appointment:  06/10  Date of lab appointment:  06/3  Physical or EP: Epp  Comments:

## 2019-12-12 ENCOUNTER — PATIENT OUTREACH (OUTPATIENT)
Dept: ADMINISTRATIVE | Facility: OTHER | Age: 74
End: 2019-12-12

## 2019-12-17 ENCOUNTER — OFFICE VISIT (OUTPATIENT)
Dept: OPTOMETRY | Facility: CLINIC | Age: 74
End: 2019-12-17
Payer: MEDICARE

## 2019-12-17 DIAGNOSIS — H40.039 ANATOMICAL NARROW ANGLE: ICD-10-CM

## 2019-12-17 DIAGNOSIS — Z98.890 HISTORY OF LASER IRIDOTOMY: ICD-10-CM

## 2019-12-17 DIAGNOSIS — H40.1121 PRIMARY OPEN ANGLE GLAUCOMA (POAG) OF LEFT EYE, MILD STAGE: ICD-10-CM

## 2019-12-17 DIAGNOSIS — H40.1112 PRIMARY OPEN ANGLE GLAUCOMA (POAG) OF RIGHT EYE, MODERATE STAGE: Primary | ICD-10-CM

## 2019-12-17 PROCEDURE — 99999 PR PBB SHADOW E&M-EST. PATIENT-LVL II: ICD-10-PCS | Mod: PBBFAC,HCNC,, | Performed by: OPTOMETRIST

## 2019-12-17 PROCEDURE — 99999 PR PBB SHADOW E&M-EST. PATIENT-LVL II: CPT | Mod: PBBFAC,HCNC,, | Performed by: OPTOMETRIST

## 2019-12-17 PROCEDURE — 92012 PR EYE EXAM, EST PATIENT,INTERMED: ICD-10-PCS | Mod: HCNC,S$GLB,, | Performed by: OPTOMETRIST

## 2019-12-17 PROCEDURE — 92012 INTRM OPH EXAM EST PATIENT: CPT | Mod: HCNC,S$GLB,, | Performed by: OPTOMETRIST

## 2019-12-17 RX ORDER — LATANOPROST 50 UG/ML
1 SOLUTION/ DROPS OPHTHALMIC DAILY
Qty: 7.5 ML | Refills: 3 | Status: SHIPPED | OUTPATIENT
Start: 2019-12-17 | End: 2020-10-07 | Stop reason: SDUPTHER

## 2019-12-17 NOTE — PROGRESS NOTES
SARAH BETH CHANG 08/2019  Here for IOP check today.  Using  Latanoprost OU Q HS and   Combigan BID OD, last used as directed.  Patient hasn't noticed any   changes since the last visit.     Last edited by Aracelis Cook on 12/17/2019  1:39 PM. (History)            Assessment /Plan     For exam results, see Encounter Report.    Primary open angle glaucoma (POAG) of right eye, moderate stage  -     OCT - Optic Nerve; Future  -     Owusu Visual Field - OU - Intermediate - Both Eyes; Future  -     latanoprost (XALATAN) 0.005 % ophthalmic solution; Place 1 drop into both eyes once daily.  Dispense: 7.5 mL; Refill: 3    Primary open angle glaucoma (POAG) of left eye, mild stage  -     OCT - Optic Nerve; Future  -     Owusu Visual Field - OU - Intermediate - Both Eyes; Future  -     latanoprost (XALATAN) 0.005 % ophthalmic solution; Place 1 drop into both eyes once daily.  Dispense: 7.5 mL; Refill: 3    Anatomical narrow angle  -     latanoprost (XALATAN) 0.005 % ophthalmic solution; Place 1 drop into both eyes once daily.  Dispense: 7.5 mL; Refill: 3    History of laser iridotomy      1,2. IOP stable and fine for nerve, nerve eval stable, fam history of glaucoma but no blindness, pachy normal, cont Combigan bid OD and Latanaprost qhs OU. RTC 4 mos IOP ck, dfe, HVf and OCT.

## 2020-01-10 ENCOUNTER — PATIENT OUTREACH (OUTPATIENT)
Dept: ADMINISTRATIVE | Facility: OTHER | Age: 75
End: 2020-01-10

## 2020-01-15 ENCOUNTER — PES CALL (OUTPATIENT)
Dept: ADMINISTRATIVE | Facility: CLINIC | Age: 75
End: 2020-01-15

## 2020-01-15 RX ORDER — LEVOTHYROXINE SODIUM 100 UG/1
TABLET ORAL
Qty: 90 TABLET | Refills: 1 | Status: SHIPPED | OUTPATIENT
Start: 2020-01-15 | End: 2020-07-28 | Stop reason: SDUPTHER

## 2020-01-20 ENCOUNTER — OFFICE VISIT (OUTPATIENT)
Dept: INTERNAL MEDICINE | Facility: CLINIC | Age: 75
End: 2020-01-20
Payer: MEDICARE

## 2020-01-20 VITALS
SYSTOLIC BLOOD PRESSURE: 105 MMHG | DIASTOLIC BLOOD PRESSURE: 62 MMHG | RESPIRATION RATE: 15 BRPM | HEIGHT: 63 IN | OXYGEN SATURATION: 97 % | WEIGHT: 227.31 LBS | BODY MASS INDEX: 40.28 KG/M2 | HEART RATE: 69 BPM

## 2020-01-20 DIAGNOSIS — F32.5 MAJOR DEPRESSIVE DISORDER WITH SINGLE EPISODE, IN FULL REMISSION: ICD-10-CM

## 2020-01-20 DIAGNOSIS — E66.01 MORBID OBESITY WITH BMI OF 40.0-44.9, ADULT: ICD-10-CM

## 2020-01-20 DIAGNOSIS — E03.9 ACQUIRED HYPOTHYROIDISM: Primary | ICD-10-CM

## 2020-01-20 DIAGNOSIS — H40.10X0 OPEN-ANGLE GLAUCOMA, UNSPECIFIED GLAUCOMA STAGE, UNSPECIFIED LATERALITY, UNSPECIFIED OPEN-ANGLE GLAUCOMA TYPE: ICD-10-CM

## 2020-01-20 DIAGNOSIS — I15.9 SECONDARY HYPERTENSION: ICD-10-CM

## 2020-01-20 DIAGNOSIS — Z00.00 ENCOUNTER FOR PREVENTIVE HEALTH EXAMINATION: ICD-10-CM

## 2020-01-20 DIAGNOSIS — H25.13 NUCLEAR SCLEROSIS OF BOTH EYES: ICD-10-CM

## 2020-01-20 DIAGNOSIS — E78.5 DYSLIPIDEMIA: ICD-10-CM

## 2020-01-20 DIAGNOSIS — G47.33 OSA (OBSTRUCTIVE SLEEP APNEA): ICD-10-CM

## 2020-01-20 DIAGNOSIS — D86.89 SARCOID OF NOSE: ICD-10-CM

## 2020-01-20 DIAGNOSIS — E78.5 HYPERLIPIDEMIA, UNSPECIFIED HYPERLIPIDEMIA TYPE: ICD-10-CM

## 2020-01-20 DIAGNOSIS — I51.89 LEFT VENTRICULAR DIASTOLIC DYSFUNCTION WITH PRESERVED SYSTOLIC FUNCTION: ICD-10-CM

## 2020-01-20 DIAGNOSIS — I10 HTN (HYPERTENSION), BENIGN: ICD-10-CM

## 2020-01-20 PROCEDURE — 3074F SYST BP LT 130 MM HG: CPT | Mod: HCNC,CPTII,S$GLB, | Performed by: NURSE PRACTITIONER

## 2020-01-20 PROCEDURE — 3074F PR MOST RECENT SYSTOLIC BLOOD PRESSURE < 130 MM HG: ICD-10-PCS | Mod: HCNC,CPTII,S$GLB, | Performed by: NURSE PRACTITIONER

## 2020-01-20 PROCEDURE — 99499 UNLISTED E&M SERVICE: CPT | Mod: HCNC,S$GLB,, | Performed by: NURSE PRACTITIONER

## 2020-01-20 PROCEDURE — 99499 RISK ADDL DX/OHS AUDIT: ICD-10-PCS | Mod: HCNC,S$GLB,, | Performed by: NURSE PRACTITIONER

## 2020-01-20 PROCEDURE — 99999 PR PBB SHADOW E&M-EST. PATIENT-LVL V: CPT | Mod: PBBFAC,HCNC,, | Performed by: NURSE PRACTITIONER

## 2020-01-20 PROCEDURE — G0439 PR MEDICARE ANNUAL WELLNESS SUBSEQUENT VISIT: ICD-10-PCS | Mod: HCNC,S$GLB,, | Performed by: NURSE PRACTITIONER

## 2020-01-20 PROCEDURE — 3078F PR MOST RECENT DIASTOLIC BLOOD PRESSURE < 80 MM HG: ICD-10-PCS | Mod: HCNC,CPTII,S$GLB, | Performed by: NURSE PRACTITIONER

## 2020-01-20 PROCEDURE — G0439 PPPS, SUBSEQ VISIT: HCPCS | Mod: HCNC,S$GLB,, | Performed by: NURSE PRACTITIONER

## 2020-01-20 PROCEDURE — 99999 PR PBB SHADOW E&M-EST. PATIENT-LVL V: ICD-10-PCS | Mod: PBBFAC,HCNC,, | Performed by: NURSE PRACTITIONER

## 2020-01-20 PROCEDURE — 3078F DIAST BP <80 MM HG: CPT | Mod: HCNC,CPTII,S$GLB, | Performed by: NURSE PRACTITIONER

## 2020-01-20 NOTE — PATIENT INSTRUCTIONS
Counseling and Referral of Other Preventative  (Italic type indicates deductible and co-insurance are waived)    Patient Name: Eboni Nicholas  Today's Date: 1/20/2020    Health Maintenance       Date Due Completion Date    TETANUS VACCINE In the future for injury   ---    Shingles Vaccine (1 of 2) [facility] handout given   ---    Mammogram 04/12/2020 4/12/2019    DEXA SCAN 06/10/2022   6/10/2019    Colonoscopy 08/27/2023 8/27/2013    Lipid Panel    Colonoscopy    annual eye- current      04/05/2020 8/27/2023 4/5/2019 8/27/2013        No orders of the defined types were placed in this encounter.    The following information is provided to all patients.  This information is to help you find resources for any of the problems found today that may be affecting your health:                Living healthy guide: www.[facility].[place].[facility]      Understanding Diabetes: www.diabetes.org      Eating healthy: www.cdc.gov/healthyweight      [facility] home safety checklist: www.cdc.gov/steadi/patient.html      Agency on Aging: www.goea.[place].[facility]      Alcoholics anonymous (AA): www.aa.org      Physical Activity: www.bao.nih.gov/rb6kqlc      Tobacco use: www.quitwithusla.org     Weight Management: Exercise and Activity    Studies show that people who exercise are the most likely to lose weight and keep it off. Exercise burns calories. It helps build muscle to make your body stronger. Make exercise an important part of your weight-management plan.  Make activity part of your day  You may not think you have the time to exercise. But you can work activity into your daily life--you just need to be committed. Take 10 minutes out of your lunch hour to take a walk. Walk to the [facility] to get your paper instead of having it delivered. Make it a habit to take the stairs instead of the elevator. Park in a far away parking spot instead of the closest. Youll be surprised at how fast these little changes can make a difference.  Some people  really cannot walk very far, and tire out quickly with exercise. Instead of becoming discouraged, resolve to do what you can do, and work to make that a regular frequent habit.   The benefits of exercise  Exercise offers many benefits including:   · Exercise increases your metabolism (the speed at which your body burns calories).  · Regular exercise can increase the amount of muscle in your body. Muscle burns calories faster than fat. The more muscle you have, the more calories you burn.  · Exercise gives you energy and curbs your appetite.  · Exercise decreases stress and helps you sleep better.  Make exercise fun  Exercise can be fun. Choose an activity you enjoy. You may even get a friend to do it with you:  · Take a resistance-training or aerobics class  · Join a team sport  · Take a dance class  · Walk the dog  · Ride a bike  If you have health problems, be sure to ask your healthcare provider before you start an exercise program. Have a  help you develop a plan thats safe for you.   Date Last Reviewed: 2/4/2016 © 2000-2017 [employer]. [address] [place] [address]. All rights reserved. This information is not intended as a substitute for professional medical care. Always follow your healthcare professional's instructions.        Losing Weight for Heart Health  Excess weight is a major risk factor for heart disease. Losing weight has many benefits including lowering your blood pressure, improving your cholesterol level, and decreasing your risk for diseases such as diabetes and heart disease. It may help keep your arteries open so that your heart can get the oxygen-rich blood it needs. All in all, losing weight makes you healthier.     Exercise with a friend. When activity is fun, you're more likely to stick with it.   Calories and weight loss  · Calories are the fuel your body burns for energy. You get the calories you need from the food you eat. For healthy  weight loss, women should eat at least 1,200 calories a day, men at least 1,500.  · When you eat more calories than you need, your body stores the extra calories as fat. One pound of fat equals 3,500 calories.  · To lose weight, try to reduce your total calorie intake by 500 calories. To do this, eat 250 calories less each day. Add activity to burn the other 250 calories. Walking 2.5 miles burns about 250 calories. Other more intense activities can burn more calories in the time you spend doing them, such as swimming and running. It is important to understand that reducing calorie intake is much more effective at weight loss than is exercise.  · Eat a variety of healthy foods to get the nutrients you need.  Tips for losing weight  · Drink 8 to 10 glasses of water a day.  · Dont skip meals. Instead, eat smaller portions.  · Eat your meals earlier in the day.  · Cut out sugary drinks such as soda and fruit juices.  · Make your later meals lighter than your earlier meals.   Brisk activity is best  Brisk activity gets your heart pumping faster and it makes it healthier. Its also a great way to burn calories. In fact, your body may keep burning calories for hours after you stop a brisk activity:  · Begin by walking 10 minutes most days.  · Add more time and speed to your walk. Build up as you feel able.  · Aim for 3 to 4 sessions of aerobic exercise a week. Each session should last about 40 minutes and include moderate to vigorous physical activity.  · The most important part of the activity is that you break a sweat. This indicates your heart is working hard enough to burn fat.  Date Last Reviewed: 6/1/2016  © [address]. [address] [place] [address], [place] [address]. All rights reserved. This information is not intended as a substitute for professional medical care. Always follow your healthcare professional's instructions.

## 2020-01-20 NOTE — PROGRESS NOTES
"Eboni Nicholas presented for a  Medicare AWV and comprehensive Health Risk Assessment today. The following components were reviewed and updated:    · Medical history  · Family History  · Social history  · Allergies and Current Medications  · Health Risk Assessment  · Health Maintenance  · Care Team     ** See Completed Assessments for Annual Wellness Visit within the encounter summary.**       The following assessments were completed:  · Living Situation  · CAGE  · Depression Screening  · Timed Get Up and Go  · Whisper Test  · Cognitive Function Screening  · Nutrition Screening  · ADL Screening  · PAQ Screening    Vitals:    01/20/20 1253   BP: 105/62   BP Location: Left arm   Patient Position: Sitting   BP Method: Large (Manual)   Pulse: 69   Resp: 15   SpO2: 97%   Weight: 103.1 kg (227 lb 4.7 oz)   Height: 5' 3" (1.6 m)     Body mass index is 40.26 kg/m².  Physical Exam   Constitutional: She is oriented to person, place, and time. She appears well-developed and well-nourished.   Younger in appearance than stated age   HENT:   Head: Normocephalic and atraumatic.   Mouth/Throat: Oropharynx is clear and moist. No oropharyngeal exudate.   Eyes: No scleral icterus.   Neck: Normal range of motion. Neck supple.   Cardiovascular: Normal rate and regular rhythm.   Pulmonary/Chest: Effort normal and breath sounds normal. No respiratory distress.   Abdominal: Soft. Bowel sounds are normal. She exhibits no distension.   obese   Musculoskeletal: Normal range of motion.   Neurological: She is alert and oriented to person, place, and time. She has normal reflexes.   Skin: Skin is warm and dry. No rash noted.   Psychiatric: She has a normal mood and affect. Her behavior is normal. Judgment and thought content normal.         Lab Results   Component Value Date    CHOL 201 (H) 04/05/2019    HDL 56 04/05/2019    LDLCALC 116.0 04/05/2019    TRIG 145 04/05/2019    CHOLHDL 27.9 04/05/2019      Results for orders placed in visit on " 06/10/19   DXA Bone Density Spine And Hip    Narrative EXAMINATION:  DEXA BONE DENSITY SPINE HIP    CLINICAL HISTORY:  Asymptomatic menopausal state73 y/o female with no history of fractures.  She had surgical menopause at 58 y/o.  She is taking Calcium and Vit D supplements.  She exercises 3 times a week and does not smoke.    TECHNIQUE:  DXA specification: [employer] [id number].    Bone Mineral Density scanning was performed over the hip and lumbar spine. Review of the images confirms satisfactory positioning and technique.    COMPARISON:  Comparison study done on 01/15/2015. Lumbar spine BMD 0.993 g/cm2 and the T-score -0.5.  The Total Hip BMD 1.124 g/cm2 and the T-score 1.5.    FINDINGS:  Lumbar Spine: Lumbar bone mineral density L1-L4 is 1.005g/cm2, which is a T-score of -0.4. The Z-score is 1.1.    Total Hip: Total hip bone mineral density is 1.103g/cm2.  The T-score is 1.3, and the Z-score is 1.6.    Femoral neck: Bone mineral density is 0.884g/cm2 and the T-score is 0.3 and the Z-score is 1.0 g/cm2.    There is a 2.7% risk of a major osteoporotic fracture and a 0.1% risk of hip fracture in the next 10 years (FRAX).    Compared with previous DXA, BMD at the lumbar spine has remained stable, and the BMD at the total hip has remained stable.      Impression Normal BMD at the total hip and lumbar spine.    RECOMMENDATIONS of [facility]:    1.  Calcium 2877-9005 mg daily and vitamin D 800-1000 units daily, adequate exercise.    2.  Recommended therapy No additional pharmacologic therapy recommended at this time.    3.  No need to repeat BMD in near future unless clinical change.    EXPLANATION OF RESULTS:    The T-score compares these results to the average bone density of a 20 year-old of the same gender. The Z-score compares this result to the average bone density to people of the same age, gender, and race. The amounts indicate the number of standard  deviations above or below the mean.    * Osteoporosis is generally defined as having a T-score between less than or equal to -2.5.    * Osteopenia is generally defined as having a T-score between -1.0 and -2.5.    * The normal range is generally defined as having a t-score greater than or equal to -1.0.    * Calculated FRAX scores for fracture risk prediction may not be accurate in the setting of certain clinical factors such as pharmacologic therapy for osteoporosis, prior fragility fractures, high dose glucocorticoid use etc.      Electronically signed by: Ann Alexandra MD  Date:    06/14/2019  Time:    12:58     Results for orders placed during the hospital encounter of 01/15/15   Mammo Digital Screening Bilat with CAD    Narrative The present examination has been compared to prior imaging studies dated  06/06/2013, 10/17/2011 and 09/08/2010.    BILATERAL MAMMOGRAM FINDINGS:  The breasts are almost entirely fat.    No significant abnormalities are seen.    These images  were processed to produce digital images analyzed for potential  abnormalities.      Impression There is no mammographic evidence of malignancy.    Mammogram in 1 year is recommended.    ACR BI-RADS Category 1: Negative      SURINDER LEVI M.D.  01/15/2015       Diagnoses and health risks identified today and associated recommendations/orders:    1. Acquired hypothyroidism  Stable followed by PCP    2. Major depressive disorder with single episode, in full remission  Stable followed by PCP    3. Sarcoid of nose  Stable followed by PCP, ENT    4. Left ventricular diastolic dysfunction with preserved systolic function  Stable followed by PCP    5. Nuclear sclerosis of both eyes  Stable followed by opth    6. Hyperlipidemia, unspecified hyperlipidemia type  Stable followed by PCP  - Ambulatory Referral to Weight Management Program    7. CHIDI (obstructive sleep apnea)  Stable followed by PCP- with CPAP  - Ambulatory Referral to Weight Management  Program    8. Dyslipidemia  Stable followed by PCP  - Ambulatory Referral to Weight Management Program    9. HTN (hypertension), benign  Stable followed by PCP    10. Secondary hypertension  Stable followed by PCP  - Ambulatory Referral to Weight Management Program    11. Open-angle glaucoma, unspecified glaucoma stage, unspecified laterality, unspecified open-angle glaucoma type  Stable followed by opth    12. Encounter for preventive health examination  Assessments completed. Preventative health recommendations reviewed.       13. Morbid obesity with BMI of 40.0-44.9, adult  Chronic. Followed by PCP.   Centers for Disease Control and Prevention (CDC)  weight recommendations for current BMI & ideal BMI range discussed with patient.  Recommended  Low fat diet, regular exercise every day.   referral to weight loss  program.   - Ambulatory Referral to Weight Management Program      Provided Eboni with a 5-10 year written screening schedule and personal prevention plan. Recommendations were developed using the USPSTF age appropriate recommendations. Education, counseling, and referrals were provided as needed. After Visit Summary printed and given to patient which includes a list of additional screenings\tests needed. Declined audiology. Here w . Fall prevention.    Follow up in about 1 year (around 1/20/2021) for HRA.    Lori Valerio NP I offered to discuss end of life issues, including information on how to make advance directives that the patient could use to name someone who would make medical decisions on their behalf if they became too ill to make themselves.    ___Patient declined  _X_Patient is interested, I provided paper work and offered to discuss.

## 2020-01-23 ENCOUNTER — PATIENT MESSAGE (OUTPATIENT)
Dept: ADMINISTRATIVE | Facility: OTHER | Age: 75
End: 2020-01-23

## 2020-01-23 ENCOUNTER — PATIENT OUTREACH (OUTPATIENT)
Dept: OTHER | Facility: OTHER | Age: 75
End: 2020-01-23

## 2020-01-31 ENCOUNTER — LAB VISIT (OUTPATIENT)
Dept: LAB | Facility: HOSPITAL | Age: 75
End: 2020-01-31
Attending: INTERNAL MEDICINE
Payer: MEDICARE

## 2020-01-31 ENCOUNTER — OFFICE VISIT (OUTPATIENT)
Dept: INTERNAL MEDICINE | Facility: CLINIC | Age: 75
End: 2020-01-31
Payer: MEDICARE

## 2020-01-31 VITALS
DIASTOLIC BLOOD PRESSURE: 60 MMHG | BODY MASS INDEX: 39.88 KG/M2 | TEMPERATURE: 98 F | HEART RATE: 76 BPM | HEIGHT: 63 IN | RESPIRATION RATE: 16 BRPM | WEIGHT: 225.06 LBS | SYSTOLIC BLOOD PRESSURE: 122 MMHG

## 2020-01-31 DIAGNOSIS — R79.89 ELEVATED LFTS: ICD-10-CM

## 2020-01-31 DIAGNOSIS — I10 HTN (HYPERTENSION), BENIGN: ICD-10-CM

## 2020-01-31 DIAGNOSIS — I10 HTN (HYPERTENSION), BENIGN: Primary | ICD-10-CM

## 2020-01-31 DIAGNOSIS — E78.5 DYSLIPIDEMIA: ICD-10-CM

## 2020-01-31 DIAGNOSIS — R21 RASH: ICD-10-CM

## 2020-01-31 LAB
BASOPHILS # BLD AUTO: 0.04 K/UL (ref 0–0.2)
BASOPHILS NFR BLD: 0.6 % (ref 0–1.9)
DIFFERENTIAL METHOD: ABNORMAL
EOSINOPHIL # BLD AUTO: 0.2 K/UL (ref 0–0.5)
EOSINOPHIL NFR BLD: 2.4 % (ref 0–8)
ERYTHROCYTE [DISTWIDTH] IN BLOOD BY AUTOMATED COUNT: 14.5 % (ref 11.5–14.5)
HCT VFR BLD AUTO: 38.2 % (ref 37–48.5)
HGB BLD-MCNC: 11.8 G/DL (ref 12–16)
IMM GRANULOCYTES # BLD AUTO: 0.04 K/UL (ref 0–0.04)
IMM GRANULOCYTES NFR BLD AUTO: 0.6 % (ref 0–0.5)
LYMPHOCYTES # BLD AUTO: 1.7 K/UL (ref 1–4.8)
LYMPHOCYTES NFR BLD: 24 % (ref 18–48)
MCH RBC QN AUTO: 31.8 PG (ref 27–31)
MCHC RBC AUTO-ENTMCNC: 30.9 G/DL (ref 32–36)
MCV RBC AUTO: 103 FL (ref 82–98)
MONOCYTES # BLD AUTO: 0.6 K/UL (ref 0.3–1)
MONOCYTES NFR BLD: 8.8 % (ref 4–15)
NEUTROPHILS # BLD AUTO: 4.5 K/UL (ref 1.8–7.7)
NEUTROPHILS NFR BLD: 63.6 % (ref 38–73)
NRBC BLD-RTO: 0 /100 WBC
PLATELET # BLD AUTO: 206 K/UL (ref 150–350)
PMV BLD AUTO: 11.3 FL (ref 9.2–12.9)
RBC # BLD AUTO: 3.71 M/UL (ref 4–5.4)
WBC # BLD AUTO: 7.01 K/UL (ref 3.9–12.7)

## 2020-01-31 PROCEDURE — 1126F PR PAIN SEVERITY QUANTIFIED, NO PAIN PRESENT: ICD-10-PCS | Mod: HCNC,S$GLB,, | Performed by: INTERNAL MEDICINE

## 2020-01-31 PROCEDURE — 36415 COLL VENOUS BLD VENIPUNCTURE: CPT | Mod: HCNC,PO

## 2020-01-31 PROCEDURE — 1159F PR MEDICATION LIST DOCUMENTED IN MEDICAL RECORD: ICD-10-PCS | Mod: HCNC,S$GLB,, | Performed by: INTERNAL MEDICINE

## 2020-01-31 PROCEDURE — 99999 PR PBB SHADOW E&M-EST. PATIENT-LVL III: ICD-10-PCS | Mod: PBBFAC,HCNC,, | Performed by: INTERNAL MEDICINE

## 2020-01-31 PROCEDURE — 3078F DIAST BP <80 MM HG: CPT | Mod: HCNC,CPTII,S$GLB, | Performed by: INTERNAL MEDICINE

## 2020-01-31 PROCEDURE — 1101F PR PT FALLS ASSESS DOC 0-1 FALLS W/OUT INJ PAST YR: ICD-10-PCS | Mod: HCNC,CPTII,S$GLB, | Performed by: INTERNAL MEDICINE

## 2020-01-31 PROCEDURE — 3074F SYST BP LT 130 MM HG: CPT | Mod: HCNC,CPTII,S$GLB, | Performed by: INTERNAL MEDICINE

## 2020-01-31 PROCEDURE — 3074F PR MOST RECENT SYSTOLIC BLOOD PRESSURE < 130 MM HG: ICD-10-PCS | Mod: HCNC,CPTII,S$GLB, | Performed by: INTERNAL MEDICINE

## 2020-01-31 PROCEDURE — 87340 HEPATITIS B SURFACE AG IA: CPT | Mod: HCNC

## 2020-01-31 PROCEDURE — 1159F MED LIST DOCD IN RCRD: CPT | Mod: HCNC,S$GLB,, | Performed by: INTERNAL MEDICINE

## 2020-01-31 PROCEDURE — 3078F PR MOST RECENT DIASTOLIC BLOOD PRESSURE < 80 MM HG: ICD-10-PCS | Mod: HCNC,CPTII,S$GLB, | Performed by: INTERNAL MEDICINE

## 2020-01-31 PROCEDURE — 99213 OFFICE O/P EST LOW 20 MIN: CPT | Mod: HCNC,S$GLB,, | Performed by: INTERNAL MEDICINE

## 2020-01-31 PROCEDURE — 1101F PT FALLS ASSESS-DOCD LE1/YR: CPT | Mod: HCNC,CPTII,S$GLB, | Performed by: INTERNAL MEDICINE

## 2020-01-31 PROCEDURE — 99213 PR OFFICE/OUTPT VISIT, EST, LEVL III, 20-29 MIN: ICD-10-PCS | Mod: HCNC,S$GLB,, | Performed by: INTERNAL MEDICINE

## 2020-01-31 PROCEDURE — 99999 PR PBB SHADOW E&M-EST. PATIENT-LVL III: CPT | Mod: PBBFAC,HCNC,, | Performed by: INTERNAL MEDICINE

## 2020-01-31 PROCEDURE — 1126F AMNT PAIN NOTED NONE PRSNT: CPT | Mod: HCNC,S$GLB,, | Performed by: INTERNAL MEDICINE

## 2020-01-31 PROCEDURE — 85025 COMPLETE CBC W/AUTO DIFF WBC: CPT | Mod: HCNC

## 2020-01-31 PROCEDURE — 86803 HEPATITIS C AB TEST: CPT | Mod: HCNC

## 2020-01-31 NOTE — PROGRESS NOTES
History of present illness:  [age] year lady with hypertension, dyslipidemia, hypothyroidism obstructive sleep apnea in today because she noticed small red spots when her left forearm for last week or so.  No itching no pain. No changes in medications.  The rash is not present elsewhere on her body.  No fever no chills no general body aches.  No new medications.  No nose bleeds gum bleeding or other.  Otherwise has been doing well with no concerns issues otherwise .    Current medications:  All medications noted reviewed in the electronic medical record medication list.    Review of systems:  Constitutional:  No fever no chills no generalized body aches.  No chills.  HEENT:  No hoarseness no dysphagia no URI symptoms.  Respiratory:  No cough shortness of breath.  Cardiovascular:  No chest pain or palpitations syncope.    Past medical history, past surgical history, family medical history social history is are all noted and reviewed in the electronic medical record history sections.    Physical examination:  General:  Pleasant alert appropriately groomed lady no acute distress.  Vital signs:  All noted reviewed is normal.  Cardiovascular exam:  Regular rate rhythm no significant murmur.  Skin:  Area of concern is the left mid posterolateral forearm there are a few scattered petechial looking lesions. No other skin changes.  Examination of the skin of the rest of the patient's body reveal no similar lesions.    Impression:  Few petechial lesions in this lady on low-dose aspirin therapy not currently felt to be significant.  Hypertension controlled.  Mild ALT elevations suspect fatty liver.    Plan:  Will check a CBC today along with hepatitis B and C serology.  Reassurance advise if any other skin changes developed elsewhere or other evidence of any bleeding occur to let us know otherwise she will follow-up in a few months for her scheduled visit.

## 2020-02-03 LAB
HBV SURFACE AG SERPL QL IA: NEGATIVE
HCV AB SERPL QL IA: NEGATIVE

## 2020-02-27 ENCOUNTER — PATIENT OUTREACH (OUTPATIENT)
Dept: OTHER | Facility: OTHER | Age: 75
End: 2020-02-27

## 2020-02-27 NOTE — PROGRESS NOTES
Digital Medicine: Clinician Follow-Up    Called patient to discuss her at goal readings.    The history is provided by the patient.     Follow Up  Follow-up reason(s): reading review    Patient's blood pressure has remained at goal. She is feeling well and denies hypotension symptoms.      INTERVENTION(S)  reviewed appropriate dose schedule and encouragement/support    PLAN  patient verbalizes understanding and continue monitoring    Patient will maintain her current hypertension medication regimen.     Patient is aware to contact us with any hypotension symptoms prior to our next outreach.       There are no preventive care reminders to display for this patient.    Last 5 Patient Entered Readings                                      Current 30 Day Average: 124/75     Recent Readings 2/24/2020 2/21/2020 2/16/2020 2/9/2020 2/4/2020    SBP (mmHg) 130 129 130 118 117    DBP (mmHg) 73 80 87 69 70    Pulse 71 79 79 75 75             Hypertension Medications             losartan (COZAAR) 50 MG tablet Take one tablet daily, then may repeat the dose if BP > 140/90                 Screenings

## 2020-03-02 ENCOUNTER — OFFICE VISIT (OUTPATIENT)
Dept: INTERNAL MEDICINE | Facility: CLINIC | Age: 75
End: 2020-03-02
Payer: MEDICARE

## 2020-03-02 VITALS
HEIGHT: 63 IN | HEART RATE: 80 BPM | SYSTOLIC BLOOD PRESSURE: 128 MMHG | BODY MASS INDEX: 40.39 KG/M2 | DIASTOLIC BLOOD PRESSURE: 68 MMHG | TEMPERATURE: 99 F | WEIGHT: 227.94 LBS

## 2020-03-02 DIAGNOSIS — M25.512 ACUTE PAIN OF LEFT SHOULDER: ICD-10-CM

## 2020-03-02 DIAGNOSIS — M79.602 LEFT ARM PAIN: Primary | ICD-10-CM

## 2020-03-02 PROCEDURE — 99999 PR PBB SHADOW E&M-EST. PATIENT-LVL III: ICD-10-PCS | Mod: PBBFAC,HCNC,, | Performed by: NURSE PRACTITIONER

## 2020-03-02 PROCEDURE — 93005 ELECTROCARDIOGRAM TRACING: CPT | Mod: HCNC,S$GLB,, | Performed by: NURSE PRACTITIONER

## 2020-03-02 PROCEDURE — 99999 PR PBB SHADOW E&M-EST. PATIENT-LVL III: CPT | Mod: PBBFAC,HCNC,, | Performed by: NURSE PRACTITIONER

## 2020-03-02 PROCEDURE — 99214 PR OFFICE/OUTPT VISIT, EST, LEVL IV, 30-39 MIN: ICD-10-PCS | Mod: HCNC,S$GLB,, | Performed by: NURSE PRACTITIONER

## 2020-03-02 PROCEDURE — 99214 OFFICE O/P EST MOD 30 MIN: CPT | Mod: HCNC,S$GLB,, | Performed by: NURSE PRACTITIONER

## 2020-03-02 PROCEDURE — 93010 ELECTROCARDIOGRAM REPORT: CPT | Mod: HCNC,S$GLB,, | Performed by: INTERNAL MEDICINE

## 2020-03-02 PROCEDURE — 93005 EKG 12-LEAD: ICD-10-PCS | Mod: HCNC,S$GLB,, | Performed by: NURSE PRACTITIONER

## 2020-03-02 PROCEDURE — 93010 EKG 12-LEAD: ICD-10-PCS | Mod: HCNC,S$GLB,, | Performed by: INTERNAL MEDICINE

## 2020-03-02 NOTE — PROGRESS NOTES
[facility]    Chief Complaint      Chief Complaint   Patient presents with    Arm Pain     & shoulder left     History of Present Illness      Eboni Nicholsa is a 74 y.o. female patient of Dr. Varela who is new to me and presents today for complaints of constant  left upper back and left shoulder shoulder pain that radiates down left arm to elbow over the last week.  Pain 5-6/10, has been taking ibuprofen with little relief, hard to lay on shoulder.  Denies shortness of breath fever chills nausea      Problem List Items Addressed This Visit     None      Visit Diagnoses     Left arm pain    -  Primary    Relevant Orders    IN OFFICE EKG 12-LEAD (to Muse) (Completed)    Acute pain of left shoulder        Relevant Orders    IN OFFICE EKG 12-LEAD (to [place]) (Completed)          Health Maintenance   Topic Date Due    TETANUS VACCINE  [birth year]10/1963    Mammogram  04/12/2021    DEXA SCAN  06/10/2022    Colonoscopy  08/27/2023    Lipid Panel  04/05/2024    Hepatitis C Screening  Completed    Pneumococcal Vaccine (65+ Low/Medium Risk)  Completed       Past Medical History:   Diagnosis Date    Anxiety     Benign essential HTN     Cataract     Depression     GERD (gastroesophageal reflux disease)     Glaucoma     Hyperlipidemia     Hypothyroid     CHIDI (obstructive sleep apnea)     Sarcoidosis        Past Surgical History:   Procedure Laterality Date    HYSTERECTOMY      THYROID SURGERY         family history includes Alcohol abuse in her brother; Arthritis in her sister; Cancer in her father; Diabetes in her maternal grandmother and son; Heart disease in her brother and brother; Liver disease in her brother and brother; No Known Problems in her daughter and daughter; Rheum arthritis in her sister; Stroke in her mother; Transient ischemic attack in her mother.    Social History     Tobacco Use    Smoking status: Never Smoker    Smokeless tobacco: Never Used   Substance Use Topics     Alcohol use: Yes     Frequency: Monthly or less     Drinks per session: 1 or 2     Binge frequency: Never     Comment: rarely drink glass of wine with dinner/ not weekly    Drug use: No       Review of Systems   Constitutional: Negative for chills and fever.   Respiratory: Negative for cough and shortness of breath.    Cardiovascular: Positive for chest pain. Negative for palpitations.   Gastrointestinal: Negative for diarrhea, nausea and vomiting.   Musculoskeletal: Positive for back pain and joint pain. Negative for myalgias.   Neurological: Negative for dizziness and weakness.        Outpatient Encounter Medications as of 3/2/2020   Medication Sig Dispense Refill    aspirin (ECOTRIN) 81 MG EC tablet Take 81 mg by mouth once daily.      brimonidine-timolol (COMBIGAN) 0.2-0.5 % Drop Place 1 drop into the right eye 2 (two) times daily. 3 mL 3    calcium-vitamin D 250-100 mg-unit per tablet Take 1 tablet by mouth once daily.       escitalopram oxalate (LEXAPRO) 20 MG tablet TAKE 1 TABLET BY MOUTH EVERY DAY 90 tablet 1    latanoprost (XALATAN) 0.005 % ophthalmic solution Place 1 drop into both eyes once daily. 7.5 mL 3    levothyroxine (SYNTHROID) 100 MCG tablet TAKE 1 TABLET BY MOUTH EVERY DAY 90 tablet 1    losartan (COZAAR) 50 MG tablet Take one tablet daily, then may repeat the dose if BP > 140/90 180 tablet 3    meloxicam (MOBIC) 7.5 MG tablet 1 BY MOUTH EVERY DAY WITH FOOD FOR JOINT PAIN 30 tablet 1    multivitamin (THERAGRAN) per tablet Take 1 tablet by mouth once daily.      simvastatin (ZOCOR) 40 MG tablet TAKE 1 TABLET BY MOUTH EVERY DAY IN THE EVENING 90 tablet 1    traMADol (ULTRAM) 50 mg tablet Take 1 tablet (50 mg total) by mouth every 6 (six) hours as needed for Pain. 30 tablet 0    blood pressure monitor Kit        No facility-administered encounter medications on file as of 3/2/2020.         Review of patient's allergies indicates:   Allergen Reactions    Phenobarbital Rash    Sulfa  "(sulfonamide antibiotics) Rash       Physical Exam      Vital Signs  Temp: 98.6 °F (37 °C)  Temp src: Oral  Pulse: 80  BP: 128/68  BP Location: Right arm  Patient Position: Sitting  Pain Score:   3  Pain Loc: Arm  Height and Weight  Height: 5' 3" (160 cm)  Weight: 103.4 kg (227 lb 15.3 oz)  BSA (Calculated - sq m): 2.14 sq meters  BMI (Calculated): 40.4  Weight in (lb) to have BMI = 25: 140.8]    Physical Exam   Constitutional: She is oriented to person, place, and time. She appears well-developed and well-nourished.   HENT:   Head: Normocephalic and atraumatic.   Right Ear: External ear normal.   Left Ear: External ear normal.   Eyes: Pupils are equal, round, and reactive to light. Conjunctivae and EOM are normal.   Neck: Normal range of motion. Neck supple.   Cardiovascular: Normal rate, regular rhythm and normal heart sounds.   No murmur heard.  Pulmonary/Chest: Effort normal and breath sounds normal. She exhibits no tenderness.   Abdominal: Soft.   Musculoskeletal: Normal range of motion. She exhibits no edema, tenderness or deformity.   Lymphadenopathy:     She has no cervical adenopathy.   Neurological: She is alert and oriented to person, place, and time.   Skin: Skin is warm and dry.   Psychiatric: She has a normal mood and affect. Her behavior is normal. Judgment and thought content normal.   Nursing note and vitals reviewed.       Laboratory:  CBC:  Recent Labs   Lab Result Units 01/31/20  1212   WBC K/uL 7.01   RBC M/uL 3.71*   Hemoglobin g/dL 11.8*   Hematocrit % 38.2   Platelets K/uL 206   Mean Corpuscular Volume fL 103*   Mean Corpuscular Hemoglobin pg 31.8*   Mean Corpuscular Hemoglobin Conc g/dL 30.9*     CMP:  No results for input(s): GLU, CALCIUM, ALBUMIN, PROT, NA, K, CO2, CL, BUN, ALKPHOS, ALT, AST, BILITOT in the last 2160 hours.    Invalid input(s): CREATININ  URINALYSIS:  No results for input(s): COLORU, CLARITYU, SPECGRAV, PHUR, PROTEINUA, GLUCOSEU, BILIRUBINCON, BLOODU, WBCU, RBCU, BACTERIA, " MUCUS, NITRITE, LEUKOCYTESUR, UROBILINOGEN, HYALINECASTS in the last 2160 hours.   LIPIDS:  No results for input(s): TSH, HDL, CHOL, TRIG, LDLCALC, CHOLHDL, NONHDLCHOL, TOTALCHOLEST in the last 2160 hours.  TSH:  No results for input(s): TSH in the last 2160 hours.  A1C:  No results for input(s): HGBA1C in the last 2160 hours.      Assessment/Plan     Eboni Nicholas is a 74 y.o.female with:    1. Left arm pain  - IN OFFICE EKG 12-LEAD (to Muse)    2. Acute pain of left shoulder  - IN OFFICE EKG 12-LEAD (to Muse)      -Continue current medications and maintain follow up with specialists.  Return to clinic as needed for any concerns       Erin Jiminez, NP-C [facility]

## 2020-03-05 ENCOUNTER — PATIENT MESSAGE (OUTPATIENT)
Dept: ADMINISTRATIVE | Facility: OTHER | Age: 75
End: 2020-03-05

## 2020-03-09 RX ORDER — ESCITALOPRAM OXALATE 20 MG/1
TABLET ORAL
Qty: 90 TABLET | Refills: 1 | Status: SHIPPED | OUTPATIENT
Start: 2020-03-09 | End: 2020-06-16 | Stop reason: SDUPTHER

## 2020-04-09 NOTE — PROGRESS NOTES
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  States she has made 45 masks so far.    The history is provided by the patient.     Follow Up  Follow-up reason(s): routine education      Routine Education Topics: eating patterns and physical activity        INTERVENTION(S)  recommended diet modifications, recommend physical activity, encouragement/support and denied questions    PLAN  patient verbalizes understanding and continue monitoring      There are no preventive care reminders to display for this patient.    Last 5 Patient Entered Readings                                      Current 30 Day Average: 122/64     Recent Readings 4/4/2020 3/30/2020 3/26/2020 3/21/2020 3/13/2020    SBP (mmHg) 120 120 122 125 125    DBP (mmHg) 62 64 62 65 65    Pulse 69 65 69 65 70                      Diet Screening       Reviewed <2,000mg per day.  Patient could not remember daily amount, but states her  gets meals that are about 500mg of sodium.    Intervention(s): low sodium diet education and reading food labels    Physical Activity Screening   When asked if exercising, patient responded: yes    Patient participates in the following activities: walking    Reports she and her  walk in the driveway in the evening    Intervention(s): behavior change and exercise ideas       SDOH

## 2020-04-20 PROBLEM — Z00.00 ENCOUNTER FOR PREVENTIVE HEALTH EXAMINATION: Status: RESOLVED | Noted: 2020-01-20 | Resolved: 2020-04-20

## 2020-05-20 ENCOUNTER — HOSPITAL ENCOUNTER (EMERGENCY)
Facility: HOSPITAL | Age: 75
Discharge: HOME | End: 2020-05-20
Attending: EMERGENCY MEDICINE
Payer: MEDICARE

## 2020-05-20 ENCOUNTER — PATIENT MESSAGE (OUTPATIENT)
Dept: INTERNAL MEDICINE | Facility: CLINIC | Age: 75
End: 2020-05-20

## 2020-05-20 VITALS
TEMPERATURE: 99 F | SYSTOLIC BLOOD PRESSURE: 140 MMHG | DIASTOLIC BLOOD PRESSURE: 73 MMHG | WEIGHT: 225 LBS | RESPIRATION RATE: 16 BRPM | HEIGHT: 63 IN | HEART RATE: 71 BPM | BODY MASS INDEX: 39.87 KG/M2 | OXYGEN SATURATION: 95 %

## 2020-05-20 DIAGNOSIS — K62.5 RECTAL BLEEDING: Primary | ICD-10-CM

## 2020-05-20 LAB
ACANTHOCYTES BLD QL SMEAR: ABNORMAL
ALBUMIN SERPL BCP-MCNC: 3.7 G/DL (ref 3.5–5.2)
ALP SERPL-CCNC: 56 U/L (ref 55–135)
ALT SERPL W/O P-5'-P-CCNC: 35 U/L (ref 10–44)
ANION GAP SERPL CALC-SCNC: 10 MMOL/L (ref 8–16)
ANISOCYTOSIS BLD QL SMEAR: ABNORMAL
AST SERPL-CCNC: 43 U/L (ref 10–40)
AUER BODIES BLD QL SMEAR: ABNORMAL
BASO STIPL BLD QL SMEAR: ABNORMAL
BASOPHILS # BLD AUTO: 0.04 K/UL (ref 0–0.2)
BASOPHILS # BLD AUTO: ABNORMAL K/UL (ref 0–0.2)
BASOPHILS NFR BLD: 0.5 % (ref 0–1.9)
BASOPHILS NFR BLD: ABNORMAL % (ref 0–1.9)
BILIRUB SERPL-MCNC: 0.4 MG/DL (ref 0.1–1)
BLASTS NFR BLD MANUAL: ABNORMAL %
BUN SERPL-MCNC: 11 MG/DL (ref 6–30)
BUN SERPL-MCNC: 11 MG/DL (ref 8–23)
BURR CELLS BLD QL SMEAR: ABNORMAL
CABOT RINGS BLD QL SMEAR: ABNORMAL
CALCIUM SERPL-MCNC: 9.6 MG/DL (ref 8.7–10.5)
CHLORIDE SERPL-SCNC: 105 MMOL/L (ref 95–110)
CHLORIDE SERPL-SCNC: 106 MMOL/L (ref 95–110)
CO2 SERPL-SCNC: 22 MMOL/L (ref 23–29)
CREAT SERPL-MCNC: 0.9 MG/DL (ref 0.5–1.4)
CREAT SERPL-MCNC: 0.9 MG/DL (ref 0.5–1.4)
DACRYOCYTES BLD QL SMEAR: ABNORMAL
DIFFERENTIAL METHOD: ABNORMAL
DIFFERENTIAL METHOD: ABNORMAL
DOHLE BOD BLD QL SMEAR: ABNORMAL
EOSINOPHIL # BLD AUTO: 0.2 K/UL (ref 0–0.5)
EOSINOPHIL # BLD AUTO: ABNORMAL K/UL (ref 0–0.5)
EOSINOPHIL NFR BLD: 0 % (ref 0–8)
EOSINOPHIL NFR BLD: 2.4 % (ref 0–8)
ERYTHROCYTE [DISTWIDTH] IN BLOOD BY AUTOMATED COUNT: 14.6 % (ref 11.5–14.5)
ERYTHROCYTE [DISTWIDTH] IN BLOOD BY AUTOMATED COUNT: ABNORMAL % (ref 11.5–14.5)
EST. GFR  (AFRICAN AMERICAN): >60 ML/MIN/1.73 M^2
EST. GFR  (NON AFRICAN AMERICAN): >60 ML/MIN/1.73 M^2
GLUCOSE SERPL-MCNC: 132 MG/DL (ref 70–110)
GLUCOSE SERPL-MCNC: 137 MG/DL (ref 70–110)
HCT VFR BLD AUTO: 39 % (ref 37–48.5)
HCT VFR BLD AUTO: ABNORMAL % (ref 37–48.5)
HCT VFR BLD CALC: 36 %PCV (ref 36–54)
HEINZ BOD BLD QL SMEAR: ABNORMAL
HGB BLD-MCNC: 12 G/DL (ref 12–16)
HGB BLD-MCNC: ABNORMAL G/DL (ref 12–16)
HGB C CRY RBC QL MICRO: ABNORMAL
HOWELL-JOLLY BOD BLD QL SMEAR: ABNORMAL
HYPOCHROMIA BLD QL SMEAR: ABNORMAL
IMM GRANULOCYTES # BLD AUTO: 0.06 K/UL (ref 0–0.04)
IMM GRANULOCYTES # BLD AUTO: ABNORMAL K/UL (ref 0–0.04)
IMM GRANULOCYTES NFR BLD AUTO: 0.8 % (ref 0–0.5)
IMM GRANULOCYTES NFR BLD AUTO: ABNORMAL %
LYMPHOCYTES # BLD AUTO: 1.4 K/UL (ref 1–4.8)
LYMPHOCYTES # BLD AUTO: ABNORMAL K/UL (ref 1–4.8)
LYMPHOCYTES NFR BLD: 18.4 % (ref 18–48)
LYMPHOCYTES NFR BLD: ABNORMAL % (ref 18–48)
MCH RBC QN AUTO: 29.8 PG (ref 27–31)
MCH RBC QN AUTO: ABNORMAL PG (ref 27–31)
MCHC RBC AUTO-ENTMCNC: 30.8 G/DL (ref 32–36)
MCHC RBC AUTO-ENTMCNC: ABNORMAL G/DL (ref 32–36)
MCV RBC AUTO: 97 FL (ref 82–98)
MCV RBC AUTO: ABNORMAL FL (ref 82–98)
MEGAKARYOCYTIC FRAGMENTS: ABNORMAL
METAMYELOCYTES NFR BLD MANUAL: ABNORMAL %
MONOCYTES # BLD AUTO: 0.5 K/UL (ref 0.3–1)
MONOCYTES # BLD AUTO: ABNORMAL K/UL (ref 0.3–1)
MONOCYTES NFR BLD: 0 % (ref 4–15)
MONOCYTES NFR BLD: 6.3 % (ref 4–15)
MYELOCYTES NFR BLD MANUAL: ABNORMAL %
NEUTROPHILS # BLD AUTO: 5.6 K/UL (ref 1.8–7.7)
NEUTROPHILS # BLD AUTO: ABNORMAL K/UL (ref 1.8–7.7)
NEUTROPHILS NFR BLD: 71.6 % (ref 38–73)
NEUTROPHILS NFR BLD: ABNORMAL % (ref 38–73)
NEUTS BAND NFR BLD MANUAL: ABNORMAL %
NRBC BLD-RTO: 0 /100 WBC
NRBC BLD-RTO: ABNORMAL /100 WBC
OVALOCYTES BLD QL SMEAR: ABNORMAL
PAPPENHEIMER BOD BLD QL SMEAR: ABNORMAL
PLASMODIUM BLD QL SMEAR: ABNORMAL
PLATELET # BLD AUTO: 205 K/UL (ref 150–350)
PLATELET # BLD AUTO: ABNORMAL K/UL (ref 150–350)
PLATELET BLD QL SMEAR: ABNORMAL
PMV BLD AUTO: 11.1 FL (ref 9.2–12.9)
PMV BLD AUTO: ABNORMAL FL (ref 9.2–12.9)
POC IONIZED CALCIUM: 1.05 MMOL/L (ref 1.06–1.42)
POC TCO2 (MEASURED): 23 MMOL/L (ref 23–29)
POIKILOCYTOSIS BLD QL SMEAR: ABNORMAL
POLYCHROMASIA BLD QL SMEAR: ABNORMAL
POTASSIUM BLD-SCNC: 3.9 MMOL/L (ref 3.5–5.1)
POTASSIUM SERPL-SCNC: 4.2 MMOL/L (ref 3.5–5.1)
PROMYELOCYTES NFR BLD MANUAL: ABNORMAL %
PROT SERPL-MCNC: 6.9 G/DL (ref 6–8.4)
RBC # BLD AUTO: 4.03 M/UL (ref 4–5.4)
RBC # BLD AUTO: ABNORMAL M/UL (ref 4–5.4)
RBC AGGLUT BLD QL: ABNORMAL
ROULEAUX BLD QL SMEAR: ABNORMAL
SAMPLE: ABNORMAL
SCHISTOCYTES BLD QL SMEAR: ABNORMAL
SICKLE CELLS BLD QL SMEAR: ABNORMAL
SODIUM BLD-SCNC: 138 MMOL/L (ref 136–145)
SODIUM SERPL-SCNC: 138 MMOL/L (ref 136–145)
SPHEROCYTES BLD QL SMEAR: ABNORMAL
STOMATOCYTES BLD QL SMEAR: ABNORMAL
TARGETS BLD QL SMEAR: ABNORMAL
WBC # BLD AUTO: 7.76 K/UL (ref 3.9–12.7)
WBC # BLD AUTO: ABNORMAL K/UL (ref 3.9–12.7)
WBC NRBC COR # BLD: ABNORMAL K/UL (ref 3.9–12.7)
WBC OTHER NFR BLD MANUAL: ABNORMAL %

## 2020-05-20 PROCEDURE — 85025 COMPLETE CBC W/AUTO DIFF WBC: CPT | Mod: HCNC

## 2020-05-20 PROCEDURE — 99284 EMERGENCY DEPT VISIT MOD MDM: CPT | Mod: HCNC,,, | Performed by: PHYSICIAN ASSISTANT

## 2020-05-20 PROCEDURE — 80053 COMPREHEN METABOLIC PANEL: CPT | Mod: HCNC

## 2020-05-20 PROCEDURE — 99284 PR EMERGENCY DEPT VISIT,LEVEL IV: ICD-10-PCS | Mod: HCNC,,, | Performed by: PHYSICIAN ASSISTANT

## 2020-05-20 PROCEDURE — 99283 EMERGENCY DEPT VISIT LOW MDM: CPT | Mod: HCNC

## 2020-05-20 NOTE — DISCHARGE INSTRUCTIONS
Call your primary care doctor tomorrow.  We recommend repeat blood work within 24 hours to check that your blood levels are not dropping.     You may need a colonoscopy to further assess because of your rectal bleeding.    Return to the ER immediately if you have repeated or heavy bleeding, developed abdominal pain, fever greater than 100.4°, weakness, lightheadedness or any other concerning symptoms.

## 2020-05-20 NOTE — ED PROVIDER NOTES
Encounter Date: 5/20/2020       History     Chief Complaint   Patient presents with    Rectal Bleeding     onset this morning     74-year-old female with HTN, GERD, depression, HLD, hypothyroidism, obesity, CHIDI presents to the ED with a chief complaint of rectal bleeding.  Patient reports passing a large amount of bright red blood after a bowel movement around noon today.  She reports only 1 episode.  There was no blood mixed in her stool.  She does have a history of hemorrhoids which she reports are currently out, but she denies any rectal pain.  She denies any history of rectal or GI bleeds.  Patient reports nausea and 1 episode of vomiting prior to her rectal bleeding.  Currently reports that nausea is mild.  Patient also reports feeling weak on her way over to the ED, but this is also improved.  She denies abdominal pain, fever, weakness, lightheadedness.  Per chart, patient had colonoscopy in 2013 that was normal.  Patient takes 81 mg of aspirin daily.              Review of patient's allergies indicates:   Allergen Reactions    Phenobarbital Rash    Sulfa (sulfonamide antibiotics) Rash     Past Medical History:   Diagnosis Date    Anxiety     Benign essential HTN     Cataract     Depression     GERD (gastroesophageal reflux disease)     Glaucoma     Hyperlipidemia     Hypothyroid     CHIDI (obstructive sleep apnea)     Sarcoidosis      Past Surgical History:   Procedure Laterality Date    HYSTERECTOMY      THYROID SURGERY       Family History   Problem Relation Age of Onset    Heart disease Brother     Liver disease Brother     Diabetes Maternal Grandmother     Stroke Mother     Transient ischemic attack Mother     Cancer Father         lung ca    Rheum arthritis Sister     Arthritis Sister     No Known Problems Daughter     Diabetes Son     No Known Problems Daughter     Heart disease Brother     Liver disease Brother     Alcohol abuse Brother      Social History     Tobacco Use     Smoking status: Never Smoker    Smokeless tobacco: Never Used   Substance Use Topics    Alcohol use: Not Currently     Frequency: Monthly or less     Drinks per session: 1 or 2     Binge frequency: Never     Comment: rarely drink glass of wine with dinner/ not weekly    Drug use: No     Review of Systems   Constitutional: Negative for fever.   HENT: Negative for sore throat.    Respiratory: Negative for shortness of breath.    Cardiovascular: Negative for chest pain.   Gastrointestinal: Positive for anal bleeding, nausea and vomiting. Negative for abdominal pain, blood in stool and rectal pain.   Genitourinary: Negative for dysuria.   Musculoskeletal: Negative for back pain.   Skin: Negative for rash.   Neurological: Positive for weakness (resolved). Negative for headaches.   Hematological: Does not bruise/bleed easily.       Physical Exam     Initial Vitals [05/20/20 1323]   BP Pulse Resp Temp SpO2   (!) 163/76 84 15 99 °F (37.2 °C) 95 %      MAP       --         Physical Exam    Nursing note and vitals reviewed.  Constitutional: She appears well-developed and well-nourished. She is not diaphoretic.  Non-toxic appearance. She does not appear ill. No distress.   Morbidly obese   HENT:   Head: Normocephalic and atraumatic.   Neck: Neck supple.   Cardiovascular: Normal rate and regular rhythm. Exam reveals no gallop and no friction rub.    No murmur heard.  Pulmonary/Chest: Effort normal and breath sounds normal. No accessory muscle usage. No tachypnea. No respiratory distress. She has no decreased breath sounds. She has no wheezes. She has no rhonchi. She has no rales.   Abdominal: She exhibits no distension.   Genitourinary:   Genitourinary Comments: Nontender, external non-thrombosed hemorrhoids.  Dry blood noted around the anus and on patient's underwear.  Hemorrhoids palpated inside the rectum.  No melena.  Scant blood mixed with medium brown stool. Guaiac positive.    Neurological: She is alert.   Skin:  No rash noted.   Psychiatric: She has a normal mood and affect. Her behavior is normal.         ED Course   Procedures  Labs Reviewed   CBC W/ AUTO DIFFERENTIAL - Abnormal; Notable for the following components:       Result Value    nRBC SEE COMMENT (*)     Mono% 0.0 (*)     All other components within normal limits    Narrative:     called Brandt Mcneil RN to recollect. by [facility] 05/20/2020 17:20   COMPREHENSIVE METABOLIC PANEL - Abnormal; Notable for the following components:    CO2 22 (*)     Glucose 132 (*)     AST 43 (*)     All other components within normal limits   CBC W/ AUTO DIFFERENTIAL - Abnormal; Notable for the following components:    Mean Corpuscular Hemoglobin Conc 30.8 (*)     RDW 14.6 (*)     Immature Granulocytes 0.8 (*)     Immature Grans (Abs) 0.06 (*)     All other components within normal limits   ISTAT PROCEDURE - Abnormal; Notable for the following components:    POC Glucose 137 (*)     POC Ionized Calcium 1.05 (*)     All other components within normal limits          Imaging Results    None          Medical Decision Making:   History:   Old Medical Records: I decided to obtain old medical records.  Differential Diagnosis:   My differential diagnosis includes but is not limited to:  Hemorrhoidal bleed, diverticular bleed, malignancy, AVM  Clinical Tests:   Lab Tests: Ordered       APC / Resident Notes:   74-year-old female presents with 1 episode of bright red blood per rectum today.  She is mildly hypertensive at 163/76.  Vitals otherwise within normal limits.  Patient is well-appearing and in no acute distress.   exam as above.    CBC with no evidence of anemia.  Hemodynamically stable.  Patient had no recurrent episodes of bleeding in the ED..  She takes a baby aspirin a day, but is on no other anticoagulation.  Feel that she is stable for discharge.  Patient was advised to follow up with her PCP in 24 hrs for repeat blood work.  I will place a referral to Colorectal surgery if she has  continued bleeding.  Patient advised that she will need a colonoscopy to further assess source of bleeding.  She was given strict return precautions and verbalized understanding.  She is comfortable with plan and stable for discharge.    I have reviewed the patient's records and discussed this case with my supervising physician. Please be advised that this text was dictated with [employer] software and may contain dictation errors.                                   Clinical Impression:       ICD-10-CM ICD-9-CM   1. Rectal bleeding K62.5 569.3         Disposition:   Disposition: Discharged  Condition: Stable     ED Disposition Condition    Discharge Stable        ED Prescriptions     None        Follow-up Information     Follow up With Specialties Details Why Contact Info    CAMILO Elizondo MD Internal Medicine Call in 1 day For reevaluation and repeat blood work [address] [facility] [id number]  [phone]                                       Arielle Tijerina PA-C  05/21/20 1145

## 2020-05-20 NOTE — ED TRIAGE NOTES
Pt reports brigth red,  bloody BM x 1, at approx. Noon today. Pt denies clots. Pt not on blood thinners, has Hx of hemorrhoids. Pt denies pain, weakness

## 2020-05-21 NOTE — TELEPHONE ENCOUNTER
I reviewed the ed data. If she has not had any additional significant bleeding there is not need to repeat blood count at this time.  I would rec referral for f/u with CRS if agreeable

## 2020-06-02 ENCOUNTER — TELEPHONE (OUTPATIENT)
Dept: INTERNAL MEDICINE | Facility: CLINIC | Age: 75
End: 2020-06-02

## 2020-06-02 NOTE — TELEPHONE ENCOUNTER
Appears she came in today and saw Dr Leung.   Refill request from pharmacy for:  tiZANidine (ZANAFLEX) 4 MG  Last refill: 08/26/19  sertraline (ZOLOFT) 50 MG   Last refill: 07/15/19    Last office visit: 03/04/20  Upcoming visit: none    Sent to provider for review.

## 2020-06-02 NOTE — TELEPHONE ENCOUNTER
----- Message from Corazon Canas sent at 6/2/2020  1:01 PM CDT -----  Contact: [phone] pt micheal Huerta is requesting to move labs to another location if possible please call to confirm.

## 2020-06-04 ENCOUNTER — LAB VISIT (OUTPATIENT)
Dept: LAB | Facility: HOSPITAL | Age: 75
End: 2020-06-04
Attending: INTERNAL MEDICINE
Payer: MEDICARE

## 2020-06-04 DIAGNOSIS — I10 HTN (HYPERTENSION), BENIGN: ICD-10-CM

## 2020-06-04 DIAGNOSIS — E78.5 DYSLIPIDEMIA: ICD-10-CM

## 2020-06-04 LAB
BACTERIA #/AREA URNS AUTO: NORMAL /HPF
BILIRUB UR QL STRIP: NEGATIVE
CLARITY UR REFRACT.AUTO: ABNORMAL
COLOR UR AUTO: YELLOW
GLUCOSE UR QL STRIP: NEGATIVE
HGB UR QL STRIP: NEGATIVE
HYALINE CASTS UR QL AUTO: 1 /LPF
KETONES UR QL STRIP: NEGATIVE
LEUKOCYTE ESTERASE UR QL STRIP: ABNORMAL
MICROSCOPIC COMMENT: NORMAL
NITRITE UR QL STRIP: NEGATIVE
PH UR STRIP: 5 [PH] (ref 5–8)
PROT UR QL STRIP: NEGATIVE
RBC #/AREA URNS AUTO: 1 /HPF (ref 0–4)
SP GR UR STRIP: 1.02 (ref 1–1.03)
SQUAMOUS #/AREA URNS AUTO: 3 /HPF
URN SPEC COLLECT METH UR: ABNORMAL
WBC #/AREA URNS AUTO: 3 /HPF (ref 0–5)

## 2020-06-04 PROCEDURE — 81001 URINALYSIS AUTO W/SCOPE: CPT | Mod: HCNC

## 2020-06-10 ENCOUNTER — TELEPHONE (OUTPATIENT)
Dept: INTERNAL MEDICINE | Facility: CLINIC | Age: 75
End: 2020-06-10

## 2020-06-10 DIAGNOSIS — I10 HTN (HYPERTENSION), BENIGN: Primary | ICD-10-CM

## 2020-06-10 NOTE — TELEPHONE ENCOUNTER
There are no sooner appts with Dr. Elizondo for a hosp f/u.    She has been scheduled with another MD.

## 2020-06-10 NOTE — TELEPHONE ENCOUNTER
----- Message from Rachael Escobar sent at 6/10/2020  8:28 AM CDT -----  Contact: Pt self Home [phone] or Mobile [phone]  Caller is requesting an earlier appt than we can schedule.  Caller declined first available appointment listed below. Caller will not accept being placed on the wait list and is requesting a message be sent to the provider.  When is the next available appointment:  08/13/2020  Did you offer to schedule the next available appt and put the patient on the wait list?:   Yes  What visit type: HOSFU  Symptoms:  N/A  Patient preference of timeframe to be scheduled: As soon as possible    What is the reason the patient is requesting a sooner appointment? (insurance terminating, changing jobs):  HOSFU, patient discharged from [facility] main [facility] on 05/20/2020  Would the patient rather a call back or a response via [facility]?:  A phone call please.

## 2020-06-10 NOTE — TELEPHONE ENCOUNTER
----- Message from Rachael Escobar sent at 6/10/2020  8:26 AM CDT -----  Contact: Pt self Home [phone] or Mobile [phone]  Doctor appointment and lab have been scheduled.  Please link lab orders to the lab appointment.  Date of doctor appointment:  08/14/2020  Date of lab appointment:  08/07/2020  Physical or EP:   Comments: Annual Physical

## 2020-06-11 ENCOUNTER — PATIENT MESSAGE (OUTPATIENT)
Dept: INTERNAL MEDICINE | Facility: CLINIC | Age: 75
End: 2020-06-11

## 2020-06-11 ENCOUNTER — OFFICE VISIT (OUTPATIENT)
Dept: INTERNAL MEDICINE | Facility: CLINIC | Age: 75
End: 2020-06-11
Payer: MEDICARE

## 2020-06-11 ENCOUNTER — HOSPITAL ENCOUNTER (OUTPATIENT)
Dept: RADIOLOGY | Facility: HOSPITAL | Age: 75
Discharge: HOME | End: 2020-06-11
Attending: HOSPITALIST
Payer: MEDICARE

## 2020-06-11 VITALS
HEIGHT: 63 IN | HEART RATE: 66 BPM | WEIGHT: 226 LBS | TEMPERATURE: 97 F | BODY MASS INDEX: 40.04 KG/M2 | DIASTOLIC BLOOD PRESSURE: 68 MMHG | SYSTOLIC BLOOD PRESSURE: 110 MMHG

## 2020-06-11 DIAGNOSIS — I10 HTN (HYPERTENSION), BENIGN: ICD-10-CM

## 2020-06-11 DIAGNOSIS — M25.512 ACUTE PAIN OF LEFT SHOULDER: Primary | ICD-10-CM

## 2020-06-11 DIAGNOSIS — M25.512 LEFT SHOULDER PAIN, UNSPECIFIED CHRONICITY: ICD-10-CM

## 2020-06-11 DIAGNOSIS — K62.5 BRBPR (BRIGHT RED BLOOD PER RECTUM): Primary | ICD-10-CM

## 2020-06-11 DIAGNOSIS — K64.5 HEMORRHOID THROMBOSIS: ICD-10-CM

## 2020-06-11 PROCEDURE — 3078F DIAST BP <80 MM HG: CPT | Mod: HCNC,CPTII,S$GLB, | Performed by: HOSPITALIST

## 2020-06-11 PROCEDURE — 99499 UNLISTED E&M SERVICE: CPT | Mod: HCNC,S$GLB,, | Performed by: HOSPITALIST

## 2020-06-11 PROCEDURE — 1126F AMNT PAIN NOTED NONE PRSNT: CPT | Mod: HCNC,S$GLB,, | Performed by: HOSPITALIST

## 2020-06-11 PROCEDURE — 1101F PT FALLS ASSESS-DOCD LE1/YR: CPT | Mod: HCNC,CPTII,S$GLB, | Performed by: HOSPITALIST

## 2020-06-11 PROCEDURE — 3074F SYST BP LT 130 MM HG: CPT | Mod: HCNC,CPTII,S$GLB, | Performed by: HOSPITALIST

## 2020-06-11 PROCEDURE — 99213 OFFICE O/P EST LOW 20 MIN: CPT | Mod: HCNC,S$GLB,, | Performed by: HOSPITALIST

## 2020-06-11 PROCEDURE — 1159F PR MEDICATION LIST DOCUMENTED IN MEDICAL RECORD: ICD-10-PCS | Mod: HCNC,S$GLB,, | Performed by: HOSPITALIST

## 2020-06-11 PROCEDURE — 73030 X-RAY EXAM OF SHOULDER: CPT | Mod: TC,HCNC,PO,LT

## 2020-06-11 PROCEDURE — 99999 PR PBB SHADOW E&M-EST. PATIENT-LVL III: CPT | Mod: PBBFAC,HCNC,, | Performed by: HOSPITALIST

## 2020-06-11 PROCEDURE — 3074F PR MOST RECENT SYSTOLIC BLOOD PRESSURE < 130 MM HG: ICD-10-PCS | Mod: HCNC,CPTII,S$GLB, | Performed by: HOSPITALIST

## 2020-06-11 PROCEDURE — 99213 PR OFFICE/OUTPT VISIT, EST, LEVL III, 20-29 MIN: ICD-10-PCS | Mod: HCNC,S$GLB,, | Performed by: HOSPITALIST

## 2020-06-11 PROCEDURE — 73030 X-RAY EXAM OF SHOULDER: CPT | Mod: 26,HCNC,LT, | Performed by: RADIOLOGY

## 2020-06-11 PROCEDURE — 1101F PR PT FALLS ASSESS DOC 0-1 FALLS W/OUT INJ PAST YR: ICD-10-PCS | Mod: HCNC,CPTII,S$GLB, | Performed by: HOSPITALIST

## 2020-06-11 PROCEDURE — 99499 RISK ADDL DX/OHS AUDIT: ICD-10-PCS | Mod: HCNC,S$GLB,, | Performed by: HOSPITALIST

## 2020-06-11 PROCEDURE — 1159F MED LIST DOCD IN RCRD: CPT | Mod: HCNC,S$GLB,, | Performed by: HOSPITALIST

## 2020-06-11 PROCEDURE — 1126F PR PAIN SEVERITY QUANTIFIED, NO PAIN PRESENT: ICD-10-PCS | Mod: HCNC,S$GLB,, | Performed by: HOSPITALIST

## 2020-06-11 PROCEDURE — 3078F PR MOST RECENT DIASTOLIC BLOOD PRESSURE < 80 MM HG: ICD-10-PCS | Mod: HCNC,CPTII,S$GLB, | Performed by: HOSPITALIST

## 2020-06-11 PROCEDURE — 99999 PR PBB SHADOW E&M-EST. PATIENT-LVL III: ICD-10-PCS | Mod: PBBFAC,HCNC,, | Performed by: HOSPITALIST

## 2020-06-11 PROCEDURE — 73030 XR SHOULDER TRAUMA 3 VIEW LEFT: ICD-10-PCS | Mod: 26,HCNC,LT, | Performed by: RADIOLOGY
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
## 2020-06-16 ENCOUNTER — HOSPITAL ENCOUNTER (OUTPATIENT)
Dept: RADIOLOGY | Facility: HOSPITAL | Age: 75
Discharge: HOME | End: 2020-06-16
Attending: HOSPITALIST
Payer: MEDICARE

## 2020-06-16 ENCOUNTER — PATIENT OUTREACH (OUTPATIENT)
Dept: ADMINISTRATIVE | Facility: OTHER | Age: 75
End: 2020-06-16

## 2020-06-16 ENCOUNTER — PATIENT MESSAGE (OUTPATIENT)
Dept: INTERNAL MEDICINE | Facility: CLINIC | Age: 75
End: 2020-06-16

## 2020-06-16 DIAGNOSIS — S46.012A TRAUMATIC INCOMPLETE TEAR OF LEFT ROTATOR CUFF, INITIAL ENCOUNTER: Primary | ICD-10-CM

## 2020-06-16 DIAGNOSIS — M25.512 ACUTE PAIN OF LEFT SHOULDER: ICD-10-CM

## 2020-06-16 PROCEDURE — 73221 MRI JOINT UPR EXTREM W/O DYE: CPT | Mod: 26,HCNC,LT, | Performed by: RADIOLOGY

## 2020-06-16 PROCEDURE — 73221 MRI JOINT UPR EXTREM W/O DYE: CPT | Mod: TC,HCNC,LT

## 2020-06-16 PROCEDURE — 73221 MRI SHOULDER WITHOUT CONTRAST LEFT: ICD-10-PCS | Mod: 26,HCNC,LT, | Performed by: RADIOLOGY

## 2020-06-17 ENCOUNTER — OFFICE VISIT (OUTPATIENT)
Dept: OPTOMETRY | Facility: CLINIC | Age: 75
End: 2020-06-17
Payer: MEDICARE

## 2020-06-17 ENCOUNTER — CLINICAL SUPPORT (OUTPATIENT)
Dept: OPHTHALMOLOGY | Facility: CLINIC | Age: 75
End: 2020-06-17
Payer: MEDICARE

## 2020-06-17 DIAGNOSIS — H40.1121 PRIMARY OPEN ANGLE GLAUCOMA (POAG) OF LEFT EYE, MILD STAGE: ICD-10-CM

## 2020-06-17 DIAGNOSIS — H40.1112 PRIMARY OPEN ANGLE GLAUCOMA (POAG) OF RIGHT EYE, MODERATE STAGE: ICD-10-CM

## 2020-06-17 DIAGNOSIS — H40.039 ANATOMICAL NARROW ANGLE: ICD-10-CM

## 2020-06-17 DIAGNOSIS — H40.1112 PRIMARY OPEN ANGLE GLAUCOMA (POAG) OF RIGHT EYE, MODERATE STAGE: Primary | ICD-10-CM

## 2020-06-17 DIAGNOSIS — H25.13 NUCLEAR SCLEROSIS OF BOTH EYES: ICD-10-CM

## 2020-06-17 DIAGNOSIS — Z98.890 HISTORY OF LASER IRIDOTOMY: ICD-10-CM

## 2020-06-17 PROCEDURE — 92082 HUMPHREY VISUAL FIELD-OU-INTERMEDIATE-BOTH EYES: ICD-10-PCS | Mod: HCNC,S$GLB,, | Performed by: OPTOMETRIST

## 2020-06-17 PROCEDURE — 92020 GONIOSCOPY: CPT | Mod: HCNC,S$GLB,, | Performed by: OPTOMETRIST

## 2020-06-17 PROCEDURE — 92020 PR SPECIAL EYE EVAL,GONIOSCOPY: ICD-10-PCS | Mod: HCNC,S$GLB,, | Performed by: OPTOMETRIST

## 2020-06-17 PROCEDURE — 92012 PR EYE EXAM, EST PATIENT,INTERMED: ICD-10-PCS | Mod: HCNC,S$GLB,, | Performed by: OPTOMETRIST

## 2020-06-17 PROCEDURE — 92133 POSTERIOR SEGMENT OCT OPTIC NERVE(OCULAR COHERENCE TOMOGRAPHY) - OU - BOTH EYES: ICD-10-PCS | Mod: HCNC,S$GLB,, | Performed by: OPTOMETRIST

## 2020-06-17 PROCEDURE — 99999 PR PBB SHADOW E&M-EST. PATIENT-LVL III: CPT | Mod: PBBFAC,HCNC,, | Performed by: OPTOMETRIST

## 2020-06-17 PROCEDURE — 92082 INTERMEDIATE VISUAL FIELD XM: CPT | Mod: HCNC,S$GLB,, | Performed by: OPTOMETRIST

## 2020-06-17 PROCEDURE — 92012 INTRM OPH EXAM EST PATIENT: CPT | Mod: HCNC,S$GLB,, | Performed by: OPTOMETRIST

## 2020-06-17 PROCEDURE — 92133 CPTRZD OPH DX IMG PST SGM ON: CPT | Mod: HCNC,S$GLB,, | Performed by: OPTOMETRIST

## 2020-06-17 PROCEDURE — 99999 PR PBB SHADOW E&M-EST. PATIENT-LVL III: ICD-10-PCS | Mod: PBBFAC,HCNC,, | Performed by: OPTOMETRIST

## 2020-06-17 RX ORDER — ESCITALOPRAM OXALATE 20 MG/1
20 TABLET ORAL DAILY
Qty: 90 TABLET | Refills: 1 | Status: SHIPPED | OUTPATIENT
Start: 2020-06-17 | End: 2020-06-19 | Stop reason: SDUPTHER

## 2020-06-17 RX ORDER — SIMVASTATIN 40 MG/1
40 TABLET, FILM COATED ORAL NIGHTLY
Qty: 90 TABLET | Refills: 1 | Status: SHIPPED | OUTPATIENT
Start: 2020-06-17 | End: 2020-09-21 | Stop reason: SDUPTHER

## 2020-06-17 NOTE — PROGRESS NOTES
SARAH BETH CHANG 12/2017  Here for HVF,OCT,IOP check and DFE today.  Using Xalatan OU   Q HS and Combined BID OU, last used as directed. Patient hasn't noticed   any vision changes since last exam.  Eyes water a lot.  Patient hasn't   tried AT's   Used drops this morning  No eye pain    Last edited by Osman Campbell, OD on 6/17/2020  4:04 PM. (History)            Assessment /Plan     For exam results, see Encounter Report.    Primary open angle glaucoma (POAG) of right eye, moderate stage    Primary open angle glaucoma (POAG) of left eye, mild stage    Anatomical narrow angle    History of laser iridotomy    Nuclear sclerosis of both eyes      1,2. IOP slight up right but fine for nerve, nerve eval stable, OCT stable with rnfl loss od, normal os, hvf with inf defects od and normal os, fam history of glaucoma but no blindness, pachy normal, cont Combigan bid OD and Latanaprost qhs OU. RTC 4 mos IOP ck, dfe   3,4. PI open and iop fine.  5. Educated pt on presence of cataracts and effects on vision. No surgery at this time. Recheck in one year.

## 2020-06-18 ENCOUNTER — OFFICE VISIT (OUTPATIENT)
Dept: SURGERY | Facility: CLINIC | Age: 75
End: 2020-06-18
Payer: MEDICARE

## 2020-06-18 VITALS
DIASTOLIC BLOOD PRESSURE: 84 MMHG | BODY MASS INDEX: 40.16 KG/M2 | WEIGHT: 226.63 LBS | HEIGHT: 63 IN | SYSTOLIC BLOOD PRESSURE: 128 MMHG | HEART RATE: 76 BPM

## 2020-06-18 DIAGNOSIS — K62.5 BRBPR (BRIGHT RED BLOOD PER RECTUM): ICD-10-CM

## 2020-06-18 DIAGNOSIS — K64.5 HEMORRHOID THROMBOSIS: ICD-10-CM

## 2020-06-18 PROCEDURE — 3074F PR MOST RECENT SYSTOLIC BLOOD PRESSURE < 130 MM HG: ICD-10-PCS | Mod: HCNC,CPTII,S$GLB, | Performed by: NURSE PRACTITIONER

## 2020-06-18 PROCEDURE — 3074F SYST BP LT 130 MM HG: CPT | Mod: HCNC,CPTII,S$GLB, | Performed by: NURSE PRACTITIONER

## 2020-06-18 PROCEDURE — 1125F PR PAIN SEVERITY QUANTIFIED, PAIN PRESENT: ICD-10-PCS | Mod: HCNC,S$GLB,, | Performed by: NURSE PRACTITIONER

## 2020-06-18 PROCEDURE — 1125F AMNT PAIN NOTED PAIN PRSNT: CPT | Mod: HCNC,S$GLB,, | Performed by: NURSE PRACTITIONER

## 2020-06-18 PROCEDURE — 99999 PR PBB SHADOW E&M-EST. PATIENT-LVL IV: ICD-10-PCS | Mod: PBBFAC,HCNC,, | Performed by: NURSE PRACTITIONER

## 2020-06-18 PROCEDURE — 1159F MED LIST DOCD IN RCRD: CPT | Mod: HCNC,S$GLB,, | Performed by: NURSE PRACTITIONER

## 2020-06-18 PROCEDURE — 3079F PR MOST RECENT DIASTOLIC BLOOD PRESSURE 80-89 MM HG: ICD-10-PCS | Mod: HCNC,CPTII,S$GLB, | Performed by: NURSE PRACTITIONER

## 2020-06-18 PROCEDURE — 99204 OFFICE O/P NEW MOD 45 MIN: CPT | Mod: HCNC,25,S$GLB, | Performed by: NURSE PRACTITIONER

## 2020-06-18 PROCEDURE — 1159F PR MEDICATION LIST DOCUMENTED IN MEDICAL RECORD: ICD-10-PCS | Mod: HCNC,S$GLB,, | Performed by: NURSE PRACTITIONER

## 2020-06-18 PROCEDURE — 99204 PR OFFICE/OUTPT VISIT, NEW, LEVL IV, 45-59 MIN: ICD-10-PCS | Mod: HCNC,25,S$GLB, | Performed by: NURSE PRACTITIONER

## 2020-06-18 PROCEDURE — 99999 PR PBB SHADOW E&M-EST. PATIENT-LVL IV: CPT | Mod: PBBFAC,HCNC,, | Performed by: NURSE PRACTITIONER

## 2020-06-18 PROCEDURE — 1101F PT FALLS ASSESS-DOCD LE1/YR: CPT | Mod: HCNC,CPTII,S$GLB, | Performed by: NURSE PRACTITIONER

## 2020-06-18 PROCEDURE — 1101F PR PT FALLS ASSESS DOC 0-1 FALLS W/OUT INJ PAST YR: ICD-10-PCS | Mod: HCNC,CPTII,S$GLB, | Performed by: NURSE PRACTITIONER

## 2020-06-18 PROCEDURE — 46600 DIAGNOSTIC ANOSCOPY SPX: CPT | Mod: ,,, | Performed by: NURSE PRACTITIONER

## 2020-06-18 PROCEDURE — 3079F DIAST BP 80-89 MM HG: CPT | Mod: HCNC,CPTII,S$GLB, | Performed by: NURSE PRACTITIONER

## 2020-06-18 PROCEDURE — 46600 PR DIAG2STIC A2SCOPY: ICD-10-PCS | Mod: ,,, | Performed by: NURSE PRACTITIONER

## 2020-06-18 NOTE — LETTER
June 18, 2020      Ayanna Fuentes MD  [address]  [place] [address]           [facility]  [address]  [facility] [address]  Phone: [phone]          Patient: Eboni Nicholas   MR Number: [id number]   YOB: 1945   Date of Visit: 6/18/2020       Dear Dr. Ayanna Fuentes:    Thank you for referring Eboni Nicholas to me for evaluation. Attached you will find relevant portions of my assessment and plan of care.    If you have questions, please do not hesitate to call me. I look forward to following Eboni Nicholas along with you.    Sincerely,    Emerita Zamora, NP    Enclosure  CC:  No Recipients    If you would like to receive this communication electronically, please contact [email or web address] or [phone] to request more information on [employer] Link access.    For providers and/or their staff who would like to refer a patient to [facility], please contact us through our one-stop-shop provider referral line, [facility] , at [phone].    If you feel you have received this communication in error or would no longer like to receive these types of communications, please e-mail [email or web address]

## 2020-06-18 NOTE — PROGRESS NOTES
CRS Office Visit History and Physical    Referring Md:   Ayanna Fuentes Md  [address] [facility]  [place] [address]    SUBJECTIVE:     Chief Complaint: rectal bleeding    History of Present Illness:  The patient is new patient to this practice.   Course is as follows:  Patient is a 74 y.o. female presents with rectal bleeding. She experienced 1 episode on 5/20 of a large volume of blood after a firm  Bowel movement. She states she had been constipated for a while prior to this occurrence. She states she has intermittient hemorrhoidal pressure and itching that is relieved by otc preparation H products. This has been for the past 48 years since the birth of her second child.   She denies blood with/without bowel movements, except for this one occurrence, melena, or tissue prolapse.   denies straining/prolonged time on toilet with bowel movements. She reports a daily formed bowel movement  is not currently taking fiber supplement or stool softener  Blood thinners: No    Last Colonoscopy: Last Colonoscopy completed on 8/27/2013  - The entire examined colon is normal.   - The distal rectum and anal verge are normal on retroflexion view.   - repeat in 10 years  Family history of colorectal cancer or IBD: none.    Review of patient's allergies indicates:   Allergen Reactions    Phenobarbital Rash    Sulfa (sulfonamide antibiotics) Rash       Past Medical History:   Diagnosis Date    Anxiety     Benign essential HTN     Cataract     Depression     GERD (gastroesophageal reflux disease)     Glaucoma     Hyperlipidemia     Hypothyroid     CHIDI (obstructive sleep apnea)     Sarcoidosis      Past Surgical History:   Procedure Laterality Date    HYSTERECTOMY      THYROID SURGERY       Family History   Problem Relation Age of Onset    Heart disease Brother     Liver disease Brother     Diabetes Maternal Grandmother     Stroke Mother     Transient ischemic attack Mother     Cancer Father         lung ca     "Rheum arthritis Sister     Arthritis Sister     No Known Problems Daughter     Diabetes Son     No Known Problems Daughter     Heart disease Brother     Liver disease Brother     Alcohol abuse Brother      Social History     Tobacco Use    Smoking status: Never Smoker    Smokeless tobacco: Never Used   Substance Use Topics    Alcohol use: Not Currently     Frequency: Monthly or less     Drinks per session: 1 or 2     Binge frequency: Never     Comment: rarely drink glass of wine with dinner/ not weekly    Drug use: No        Review of Systems:  Review of Systems   Constitutional: Negative for chills, fever and weight loss.   Respiratory: Negative for cough and shortness of breath.    Cardiovascular: Negative for chest pain and palpitations.   Gastrointestinal: Positive for blood in stool. Negative for abdominal pain, constipation, diarrhea, heartburn, melena, nausea and vomiting.   Genitourinary: Negative for dysuria, frequency and hematuria.   Musculoskeletal: Positive for joint pain.        Left shoulder rotator cuff tear   Skin: Negative for itching and rash.   Neurological: Negative for weakness.   Psychiatric/Behavioral: The patient is not nervous/anxious.    All other systems reviewed and are negative.      OBJECTIVE:     Vital Signs (Most Recent)  Blood Pressure 128/84 (BP Location: Right arm, Patient Position: Sitting, BP Method: Large (Automatic))   Pulse 76   Height 5' 3" (1.6 m)   Weight 102.8 kg (226 lb 10.1 oz)   Body Mass Index 40.15 kg/m²     Physical Exam:  General: Black or  female in no distress   Neuro: Alert and oriented to person, place, and time.  Moves all extremities.     HEENT: No icterus.  Trachea midline  Respiratory: Respirations are even and unlabored, no cough or audible wheezing  Abdomen: soft, round  Skin: Warm dry and intact, No visible rashes, no jaundice    Labs reviewed today:  Lab Results   Component Value Date    WBC 6.83 06/04/2020    HGB 12.3 " 06/04/2020    HCT 40.5 06/04/2020     06/04/2020    CHOL 189 06/04/2020    TRIG 140 06/04/2020    HDL 52 06/04/2020    ALT 37 06/04/2020    AST 43 (H) 06/04/2020     06/04/2020    K 4.7 06/04/2020     06/04/2020    CREATININE 1.0 06/04/2020    BUN 14 06/04/2020    CO2 23 06/04/2020    TSH 1.107 06/04/2020       Imaging reviewed today:  none    Endoscopy reviewed today:  Last Colonoscopy: Last Colonoscopy completed on 8/27/2013  - The entire examined colon is normal.   - The distal rectum and anal verge are normal on retroflexion view.   - repeat in 10 years      Anorectal Exam:    Anal Skin: External hemorrhoids    Digital Rectal Exam:  Resting Tone normal  Squeeze low  Relaxation with bear down present  Mass none  Rectocele  absent  Tenderness  absent    Anoscopy:  Verbal consent was obtained.   Clear plastic anoscope inserted.    Hemorrhoids  present  Stigmata of bleeding  present  Stigmata of prolapsed  present  Distal rectal mucosa normal      ASSESSMENT/PLAN:     Eboni was seen today for hemorrhoid thrombosis.    Diagnoses and all orders for this visit:    Hemorrhoid thrombosis  -     Ambulatory referral/consult to Colorectal Surgery    BRBPR (bright red blood per rectum)  -     Ambulatory referral/consult to Colorectal Surgery        The patient was instructed to:  Increase water intake to at least 8-10 glasses of water per day.  Take a daily fiber supplement (Konsyl, Benefiber, Metamucil) and increase dietary intake to 20-30 grams/day.  Avoid straining or spending >5min/event with bowel movements.  Follow-up in clinic prn. Will order colonoscopy if continue.       KAEL HopperC  Colon and Rectal Surgery

## 2020-06-19 ENCOUNTER — LAB VISIT (OUTPATIENT)
Dept: LAB | Facility: HOSPITAL | Age: 75
End: 2020-06-19
Attending: INTERNAL MEDICINE
Payer: MEDICARE

## 2020-06-19 ENCOUNTER — PATIENT MESSAGE (OUTPATIENT)
Dept: INTERNAL MEDICINE | Facility: CLINIC | Age: 75
End: 2020-06-19

## 2020-06-19 DIAGNOSIS — Z20.822 EXPOSURE TO COVID-19 VIRUS: ICD-10-CM

## 2020-06-19 DIAGNOSIS — Z20.822 EXPOSURE TO COVID-19 VIRUS: Primary | ICD-10-CM

## 2020-06-19 PROCEDURE — 86769 SARS-COV-2 COVID-19 ANTIBODY: CPT | Mod: HCNC

## 2020-06-19 PROCEDURE — 36415 COLL VENOUS BLD VENIPUNCTURE: CPT | Mod: HCNC,PO

## 2020-06-19 RX ORDER — ESCITALOPRAM OXALATE 20 MG/1
20 TABLET ORAL DAILY
Qty: 90 TABLET | Refills: 1 | Status: SHIPPED | OUTPATIENT
Start: 2020-06-19 | End: 2020-10-01 | Stop reason: SDUPTHER

## 2020-06-20 LAB — SARS-COV-2 IGG SERPLBLD QL IA.RAPID: NEGATIVE

## 2020-06-22 ENCOUNTER — PATIENT OUTREACH (OUTPATIENT)
Dept: OTHER | Facility: OTHER | Age: 75
End: 2020-06-22

## 2020-06-22 NOTE — PROGRESS NOTES
Digital Medicine: Health  Follow-Up    Reports she has been staying safe from COVID 19.    The history is provided by the patient.   Follow Up  Follow-up reason(s): reading review      Readings are trending down due to lifestyle change.        INTERVENTION(S)  encouragement/support and denied questions    PLAN  patient verbalizes understanding and continue monitoring      There are no preventive care reminders to display for this patient.    Last 5 Patient Entered Readings                                      Current 30 Day Average: 128/79     Recent Readings 6/18/2020 6/15/2020 6/11/2020 6/7/2020 6/4/2020    SBP (mmHg) 128 130 110 130 130    DBP (mmHg) 84 74 68 81 80    Pulse 74 69 66 67 69                      Physical Activity Screening   When asked if exercising, patient responded: yes    Patient participates in the following activities: body weight exercises    Reports she and her  have been driving around [employer].  States she is doing at home exercises.      SDOH

## 2020-06-25 ENCOUNTER — OFFICE VISIT (OUTPATIENT)
Dept: ORTHOPEDICS | Facility: CLINIC | Age: 75
End: 2020-06-25
Payer: MEDICARE

## 2020-06-25 VITALS
WEIGHT: 224 LBS | HEIGHT: 63 IN | BODY MASS INDEX: 39.69 KG/M2 | SYSTOLIC BLOOD PRESSURE: 124 MMHG | DIASTOLIC BLOOD PRESSURE: 80 MMHG

## 2020-06-25 DIAGNOSIS — M99.07 UPPER EXTREMITY SOMATIC DYSFUNCTION: ICD-10-CM

## 2020-06-25 DIAGNOSIS — M79.10 MYALGIA: ICD-10-CM

## 2020-06-25 DIAGNOSIS — M99.01 CERVICAL (NECK) REGION SOMATIC DYSFUNCTION: ICD-10-CM

## 2020-06-25 DIAGNOSIS — M67.814 BICEPS TENDINOSIS OF LEFT SHOULDER: ICD-10-CM

## 2020-06-25 DIAGNOSIS — M99.08 SOMATIC DYSFUNCTION OF RIB CAGE REGION: ICD-10-CM

## 2020-06-25 DIAGNOSIS — M25.512 ACUTE PAIN OF LEFT SHOULDER: ICD-10-CM

## 2020-06-25 DIAGNOSIS — M99.02 SOMATIC DYSFUNCTION OF THORACIC REGION: ICD-10-CM

## 2020-06-25 DIAGNOSIS — M99.00 SOMATIC DYSFUNCTION OF HEAD REGION: ICD-10-CM

## 2020-06-25 DIAGNOSIS — S46.012A TRAUMATIC INCOMPLETE TEAR OF LEFT ROTATOR CUFF, INITIAL ENCOUNTER: Primary | ICD-10-CM

## 2020-06-25 DIAGNOSIS — M19.012 PRIMARY OSTEOARTHRITIS OF LEFT SHOULDER: ICD-10-CM

## 2020-06-25 PROCEDURE — 3079F PR MOST RECENT DIASTOLIC BLOOD PRESSURE 80-89 MM HG: ICD-10-PCS | Mod: HCNC,CPTII,S$GLB, | Performed by: NEUROMUSCULOSKELETAL MEDICINE & OMM

## 2020-06-25 PROCEDURE — 97110 THERAPEUTIC EXERCISES: CPT | Mod: HCNC,GP,S$GLB, | Performed by: NEUROMUSCULOSKELETAL MEDICINE & OMM

## 2020-06-25 PROCEDURE — 3008F BODY MASS INDEX DOCD: CPT | Mod: HCNC,CPTII,S$GLB, | Performed by: NEUROMUSCULOSKELETAL MEDICINE & OMM

## 2020-06-25 PROCEDURE — 3074F SYST BP LT 130 MM HG: CPT | Mod: HCNC,CPTII,S$GLB, | Performed by: NEUROMUSCULOSKELETAL MEDICINE & OMM

## 2020-06-25 PROCEDURE — 98927 PR OSTEOPATHIC MANIP,5-6 BODY REGN: ICD-10-PCS | Mod: HCNC,S$GLB,, | Performed by: NEUROMUSCULOSKELETAL MEDICINE & OMM

## 2020-06-25 PROCEDURE — 3079F DIAST BP 80-89 MM HG: CPT | Mod: HCNC,CPTII,S$GLB, | Performed by: NEUROMUSCULOSKELETAL MEDICINE & OMM

## 2020-06-25 PROCEDURE — 1159F MED LIST DOCD IN RCRD: CPT | Mod: HCNC,S$GLB,, | Performed by: NEUROMUSCULOSKELETAL MEDICINE & OMM

## 2020-06-25 PROCEDURE — 99204 PR OFFICE/OUTPT VISIT, NEW, LEVL IV, 45-59 MIN: ICD-10-PCS | Mod: 25,HCNC,S$GLB, | Performed by: NEUROMUSCULOSKELETAL MEDICINE & OMM

## 2020-06-25 PROCEDURE — 1101F PR PT FALLS ASSESS DOC 0-1 FALLS W/OUT INJ PAST YR: ICD-10-PCS | Mod: HCNC,CPTII,S$GLB, | Performed by: NEUROMUSCULOSKELETAL MEDICINE & OMM

## 2020-06-25 PROCEDURE — 99204 OFFICE O/P NEW MOD 45 MIN: CPT | Mod: 25,HCNC,S$GLB, | Performed by: NEUROMUSCULOSKELETAL MEDICINE & OMM

## 2020-06-25 PROCEDURE — 3008F PR BODY MASS INDEX (BMI) DOCUMENTED: ICD-10-PCS | Mod: HCNC,CPTII,S$GLB, | Performed by: NEUROMUSCULOSKELETAL MEDICINE & OMM

## 2020-06-25 PROCEDURE — 99999 PR PBB SHADOW E&M-EST. PATIENT-LVL III: ICD-10-PCS | Mod: PBBFAC,HCNC,, | Performed by: NEUROMUSCULOSKELETAL MEDICINE & OMM

## 2020-06-25 PROCEDURE — 97110 PR THERAPEUTIC EXERCISES: ICD-10-PCS | Mod: HCNC,GP,S$GLB, | Performed by: NEUROMUSCULOSKELETAL MEDICINE & OMM

## 2020-06-25 PROCEDURE — 1101F PT FALLS ASSESS-DOCD LE1/YR: CPT | Mod: HCNC,CPTII,S$GLB, | Performed by: NEUROMUSCULOSKELETAL MEDICINE & OMM

## 2020-06-25 PROCEDURE — 1159F PR MEDICATION LIST DOCUMENTED IN MEDICAL RECORD: ICD-10-PCS | Mod: HCNC,S$GLB,, | Performed by: NEUROMUSCULOSKELETAL MEDICINE & OMM

## 2020-06-25 PROCEDURE — 99999 PR PBB SHADOW E&M-EST. PATIENT-LVL III: CPT | Mod: PBBFAC,HCNC,, | Performed by: NEUROMUSCULOSKELETAL MEDICINE & OMM

## 2020-06-25 PROCEDURE — 3074F PR MOST RECENT SYSTOLIC BLOOD PRESSURE < 130 MM HG: ICD-10-PCS | Mod: HCNC,CPTII,S$GLB, | Performed by: NEUROMUSCULOSKELETAL MEDICINE & OMM

## 2020-06-25 PROCEDURE — 98927 OSTEOPATH MANJ 5-6 REGIONS: CPT | Mod: HCNC,S$GLB,, | Performed by: NEUROMUSCULOSKELETAL MEDICINE & OMM

## 2020-06-25 NOTE — PROGRESS NOTES
Subjective:     Eboni Nicholas     Chief Complaint   Patient presents with    Left Shoulder - Pain       HPI    Eboni is a 74 y.o. right hand dominant female coming in today for left shoulder pain that began about 4-6 week(s) ago, referred by Dr. Shetty. Pt. describes the pain as a 2/10 achy pain that does not radiate. There was a fall/injury/ or trauma associated with the onset of symptoms. Pt hit her shoulder on a doorframe, and then had pain in her shoulder several weeks later. The pain has worsened.The pain is better with rest and worse with raising arm, reaching hand behind her back, sleeping on L side. She was taking meloxicam with good relief, but d/c this after a hemorrhoid bleed. She no longer is having any hemorrhoid bleeding and would like to resume meloxicam if this if advised.  Pt. Denies any other musculoskeletal complaints at this time. Hx of CSI in L hip and knee and PT L knee, good results with each.    Joint instability? no  Mechanical locking/clicking? no  Affecting ADL's? yes  Affecting sleep? yes    Occupation: retired    Review of Systems   Constitutional: Negative for chills and fever.   HENT: Negative for hearing loss and tinnitus.    Eyes: Negative for blurred vision and photophobia.   Respiratory: Negative for cough and shortness of breath.    Cardiovascular: Negative for chest pain and leg swelling.   Gastrointestinal: Negative for abdominal pain, heartburn, nausea and vomiting.   Genitourinary: Negative for dysuria and hematuria.   Musculoskeletal: Positive for joint pain. Negative for back pain, falls, myalgias and neck pain.   Skin: Negative for rash.   Neurological: Negative for dizziness, tingling, focal weakness, weakness and headaches.   Endo/Heme/Allergies: Negative for environmental allergies. Does not bruise/bleed easily.   Psychiatric/Behavioral: Negative for depression. The patient is not nervous/anxious.        PAST MEDICAL HISTORY:   Past Medical History:   Diagnosis  Date    Anxiety     Benign essential HTN     Cataract     Depression     GERD (gastroesophageal reflux disease)     Glaucoma     Hyperlipidemia     Hypothyroid     CHIDI (obstructive sleep apnea)     Sarcoidosis      PAST SURGICAL HISTORY:   Past Surgical History:   Procedure Laterality Date    HYSTERECTOMY      THYROID SURGERY       FAMILY HISTORY:   Family History   Problem Relation Age of Onset    Heart disease Brother     Liver disease Brother     Diabetes Maternal Grandmother     Stroke Mother     Transient ischemic attack Mother     Cancer Father         lung ca    Rheum arthritis Sister     Arthritis Sister     No Known Problems Daughter     Diabetes Son     No Known Problems Daughter     Heart disease Brother     Liver disease Brother     Alcohol abuse Brother      SOCIAL HISTORY:   Social History     Socioeconomic History    Marital status:      Spouse name: Not on file    Number of children: Not on file    Years of education: Not on file    Highest education level: Not on file   Occupational History    Occupation: retired   Social Needs    Financial resource strain: Not very hard    Food insecurity     Worry: Never true     Inability: Never true    Transportation needs     Medical: No     Non-medical: No   Tobacco Use    Smoking status: Never Smoker    Smokeless tobacco: Never Used   Substance and Sexual Activity    Alcohol use: Not Currently     Frequency: Monthly or less     Drinks per session: 1 or 2     Binge frequency: Never     Comment: rarely drink glass of wine with dinner/ not weekly    Drug use: No    Sexual activity: Not on file   Lifestyle    Physical activity     Days per week: 3 days     Minutes per session: 20 min    Stress: Not at all   Relationships    Social connections     Talks on phone: Three times a week     Gets together: Once a week     Attends Sikh service: Not on file     Active member of club or organization: Yes     Attends  meetings of clubs or organizations: More than 4 times per year     Relationship status:    Other Topics Concern    Are you pregnant or think you may be? Not Asked    Breast-feeding Not Asked   Social History Narrative    Not on file       MEDICATIONS:   Current Outpatient Medications:     aspirin (ECOTRIN) 81 MG EC tablet, Take 81 mg by mouth once daily., Disp: , Rfl:     blood pressure monitor Kit, , Disp: , Rfl:     brimonidine-timolol (COMBIGAN) 0.2-0.5 % Drop, Place 1 drop into the right eye 2 (two) times daily., Disp: 3 mL, Rfl: 3    calcium-vitamin D 250-100 mg-unit per tablet, Take 1 tablet by mouth once daily. , Disp: , Rfl:     escitalopram oxalate (LEXAPRO) 20 MG tablet, Take 1 tablet (20 mg total) by mouth once daily., Disp: 90 tablet, Rfl: 1    latanoprost (XALATAN) 0.005 % ophthalmic solution, Place 1 drop into both eyes once daily., Disp: 7.5 mL, Rfl: 3    levothyroxine (SYNTHROID) 100 MCG tablet, TAKE 1 TABLET BY MOUTH EVERY DAY, Disp: 90 tablet, Rfl: 1    losartan (COZAAR) 50 MG tablet, Take one tablet daily, then may repeat the dose if BP > 140/90, Disp: 180 tablet, Rfl: 3    meloxicam (MOBIC) 7.5 MG tablet, 1 BY MOUTH EVERY DAY WITH FOOD FOR JOINT PAIN, Disp: 30 tablet, Rfl: 1    multivitamin (THERAGRAN) per tablet, Take 1 tablet by mouth once daily., Disp: , Rfl:     simvastatin (ZOCOR) 40 MG tablet, Take 1 tablet (40 mg total) by mouth every evening., Disp: 90 tablet, Rfl: 1    traMADol (ULTRAM) 50 mg tablet, Take 1 tablet (50 mg total) by mouth every 6 (six) hours as needed for Pain., Disp: 30 tablet, Rfl: 0  ALLERGIES:   Review of patient's allergies indicates:   Allergen Reactions    Phenobarbital Rash    Sulfa (sulfonamide antibiotics) Rash     IMAGIN. X-ray ordered due to left shoulder pain. (AP, scapular Y, and axillary views) taken 2020.   2. X-ray images were reviewed personally by me and then directly with patient.  3. FINDINGS: X-ray images obtained  "demonstrate mild DJD changes of AC and glenohumeral joint  4. IMPRESSION:  See above    Left shoulder MRI from 06/16/2020 images and report personally reviewed by me and then directly room with the patient.   - Findings:    1. Low-grade partial-thickness articular sided tears of the supraspinatus and infraspinatus tendons with background tendinosis   2. Moderate degenerative changes of the glenohumeral joint with full-thickness chondral fissuring and associated subchondral cystic change.   3. Long head biceps tendinosis and tenosynovitis.   4. Moderate acromioclavicular joint arthropathy.      Objective:     VITAL SIGNS: /80   Ht 5' 3" (1.6 m)   Wt 101.6 kg (224 lb)   BMI 39.68 kg/m²    General    Nursing note and vitals reviewed.  Constitutional: She is oriented to person, place, and time. She appears well-developed and well-nourished.   HENT:   Head: Normocephalic and atraumatic.   no nasal discharge, no external ear redness or discharge   Eyes:   EOM is full and smooth  No eye redness or discharge   Neck: Neck supple. No tracheal deviation present.   Cardiovascular: Normal rate.    2+ Radial pulse bilaterally  2+ Dorsalis Pedis pulse bilaterally  No LE edema appreciated   Pulmonary/Chest: Effort normal. No respiratory distress.   Abdominal: She exhibits no distension.   No rigidity   Neurological: She is alert and oriented to person, place, and time. She exhibits normal muscle tone. Coordination normal.   See details below   Psychiatric: She has a normal mood and affect. Her behavior is normal.               MUSCULOSKELETAL EXAM  Shoulder: left SHOULDER  The affected shoulder is compared to the contralateral shoulder.    Observation:      SHOULDER  No ecchymosis, edema, or erythema throughout the shoulder girdle.  No sternal, clavicular, or acromial deformities bilaterally.  No atrophy of the pectorals, deltoids, supraspinatus, infraspinatus, or biceps bilaterally.  No asymmetry of shoulders " bilaterally.    Posture:  Increased thoracic kyphosis and Anterior head carriage    ROM (* = with pain):  CERVICAL SPINE  Full AROM in flexion, extension, sidebending, and rotation.    SHOULDER  Active flexion to 130° on left* (improved to 170° following OMT) and 180° on right.  Active abduction to 130° on left* (improved to 170° following OMT) and 180° on right.  Active internal rotation to L5 on left* and T12 on right.    Active external rotation to T2 on left and T3 on right.    No scapular dyskinesia or winging.    Tenderness:  No tenderness at the SC or AC joint  No tenderness over the clavicle   + tenderness over biceps tendon at the bicipital groove  No tenderness over subacromial space    Strength Testing (* = with pain):  Deltoid - 5/5 on left and 5/5 on right  Biceps - 5/5 on left and 5/5 on right  Triceps - 5/5 on left and 5/5 on right  Wrist extension - 5/5 on left and 5/5 on right  Wrist flexion - 5/5 on left and 5/5 on right   - 5/5 on left and 5/5 on right  Finger extension - 5/5 on left and 5/5 on right  Finger abduction - 5/5 on left and 5/5 on right    Special Tests:  Empty can test - negative for weakness, positive for pain  Full can test - negative for weakness, positive for pain  Bear hug test - negative  Belly press test - negative  Resisted internal rotation - negative  Resisted external rotation - negative    Neer's test - positive  Hawkin's-Vinnie test - positive  Cross-arm test- negative    OAarons test - negative    Sulcus sign - none  AP load and shift laxity - none    Speed's test - negative  Yergason's test - negative    Neurovascular Exam:  Spurlings test - negative bialterally  2+ radial pulses bilaterally  Sensation intact to light touch in the distal median, radial, and ulnar nerve distributions bilaterally.  Negative Tinel's at carpal tunnel  Negative Tinel's at cubital tunnel  2+/4 reflexes at triceps, biceps, and brachioradialis  Capillary refill intact <2 seconds in all  digits bilaterally      TART (Tissue texture abnormality, Asymmetry,  Restriction of motion and/or Tenderness) changes:    Head: Occipitoatlantal (OA) Joint ES-left, R-right     Cervical Spine   C1 TTA RIGHT   C2 FRS LEFT   C3 Neutral   C4 Neutral   C5 Neutral   C6 Neutral   C7 Neutral      Thoracic Spine   T1 Neutral   T2 Neutral   T3 Neutral   T4 Neutral   T5 Neutral   T6 FRS RIGHT   T7 FRS RIGHT   T8 FRS RIGHT   T9 Neutral   T10 Neutral   T11 Neutral   T12 Neutral     Rib cage: R1 inhaled on right, R7 external torsion on right    Upper extremity: Left deltoid Trigger band (TB) fascial distortion and Herniated trigger point (HTP) fascial distortion       Key   F= Flexed   E = Extended   R = Rotated   S = Sidebent   TTA = tissue texture abnormality       Assessment:      Encounter Diagnoses   Name Primary?    Traumatic incomplete tear of left rotator cuff, initial encounter Yes    Biceps tendinosis of left shoulder     Acute pain of left shoulder     Primary osteoarthritis of left shoulder     Myalgia     Somatic dysfunction of head region     Cervical (neck) region somatic dysfunction     Somatic dysfunction of thoracic region     Somatic dysfunction of rib cage region     Upper extremity somatic dysfunction           Plan:   1. Acute left shoulder pain secondary a low-grade, partial tear of the rotator cuff and biceps irritation from recent fall along with underlying shoulder DJD and poor thoracic and scapular biomechanics.  Patient still has significant rotator cuff strength on physical exam today and range of motion improved following OMT today.  Discussed with patient that a left shoulder CSI is not advised as this could potentially weaken the rotator cuff tendons and increase her risk for further tearing.   - OMT performed today to address associated biomechanical restrictions  and HEP started.   - patient cleared to restart Mobic 7.5 mg p.o. q.day with food p.r.n. for pain control now that  hemorrhoid bleeding has resolved. Pt. Advised to avoid all other NSAIDS while on this medication.   - Recommend ice up to 20 minutes at a time prn for pain control  - discuss next steps of formal physical therapy referral if pain does not improve with OMT, NSAIDs and home exercise program along  -  X-ray images of left shoulder taken 6/11/2020 (AP, scapular Y, and axillary views) showed mild DJD changes of AC and glenohumeral joint. Images were personally reviewed with patient.  -  MRI images of left shoudler taken 6/16/2020 showed low-grade partial-thickness articular sided tears of the supraspinatus and infraspinatus tendons with background tendinosis,  moderate degenerative changes of the glenohumeral joint, long head biceps tendinosis and tenosynovitis, and  moderate acromioclavicular joint arthropathy.Images were personally reviewed with patient.    2. OMT 5-6 regions. Oral consent obtained.  Reviewed benefits and potential side effects, including bruising at site of treatment and increased soreness for the next 24-48 hours. Pt. Instructed to increased water intake by 1 L today and tomorrow to help with any residual soreness.   - OMT indicated today due to signs and symptoms as well as local and remote somatic dysfunction findings and their related neurokinetic, lymphatic, fascial and/or arteriovenous body connections.   - OMT techniques used: Myofascial Release, Muscle Energy, Still's Technique and Fascial Distortion Model   - Treatment was tolerated well. Improvement noted in segmental mobility post-treatment in dysfunctional regions. There were no adverse events and no complications immediately following treatment.     3. Pt. Given the following HEP:  A) Wall crawl shoudler ROM exercises in both the forward flexion and abduction planes. Repeat 10-15 times, twice daily. Handout given.  B)  Supine Thoracic extension exercise: 10-15 reps, twice daily. Handout also given.   C) Internal/extnernal rotation  shoulder ROM towel exercises: Hold towel behind you with one shoulder internally rotated and one shoulder externally rotated, increasing motion with flexion of the elbows x 10 reps. Repeat with arms in opposite directions. Do twice daily.   D) Scalene, upper trapezius, and levator scapulae stretches : hold neck sidebending stretch for 30 seconds in each plane, bilaterally, twice daily. Hand out given.     [address] HOME EXERCISE PROGRAM (HEP):  The patient was taught a homegoing physical therapy regimen as described above. The patient demonstrated understanding of the exercises and proper technique of their execution. This interaction took 15 minutes.     4. Follow-up in 3 weeks for reevaluation    5. Patient agreeable to today's plan and all questions were answered    This note is dictated using the M*Modal Fluency Direct word recognition program. There are word recognition mistakes that are occasionally missed on review.

## 2020-06-25 NOTE — LETTER
June 25, 2020      Ayanna Fuentes MD  [address] [facility] [place] [address]           [facility]  [address] [facility]  [place] [address]  Phone: [phone]          Patient: Eboni Nicholas   MR Number: [id number]   YOB: 1945   Date of Visit: 6/25/2020       Dear Dr. Ayanna Fuentes:    Thank you for referring Eboni Nicholas to me for evaluation. Attached you will find relevant portions of my assessment and plan of care.    If you have questions, please do not hesitate to call me. I look forward to following Eboni Nicholas along with you.    Sincerely,    Annika Mendoza,     Enclosure  CC:  No Recipients    If you would like to receive this communication electronically, please contact [email or web address] or [phone] to request more information on [employer] Link access.    For providers and/or their staff who would like to refer a patient to [facility], please contact us through our one-stop-shop provider referral line, [facility] , at [phone].    If you feel you have received this communication in error or would no longer like to receive these types of communications, please e-mail [email or web address]

## 2020-07-14 ENCOUNTER — PATIENT OUTREACH (OUTPATIENT)
Dept: ADMINISTRATIVE | Facility: OTHER | Age: 75
End: 2020-07-14

## 2020-07-15 ENCOUNTER — OFFICE VISIT (OUTPATIENT)
Dept: ORTHOPEDICS | Facility: CLINIC | Age: 75
End: 2020-07-15
Payer: MEDICARE

## 2020-07-15 VITALS
WEIGHT: 224 LBS | DIASTOLIC BLOOD PRESSURE: 82 MMHG | BODY MASS INDEX: 39.69 KG/M2 | SYSTOLIC BLOOD PRESSURE: 124 MMHG | HEIGHT: 63 IN

## 2020-07-15 DIAGNOSIS — M99.07 UPPER EXTREMITY SOMATIC DYSFUNCTION: ICD-10-CM

## 2020-07-15 DIAGNOSIS — M99.00 SOMATIC DYSFUNCTION OF HEAD REGION: ICD-10-CM

## 2020-07-15 DIAGNOSIS — M67.814 BICEPS TENDINOSIS OF LEFT SHOULDER: ICD-10-CM

## 2020-07-15 DIAGNOSIS — M75.42 IMPINGEMENT SYNDROME OF LEFT SHOULDER: ICD-10-CM

## 2020-07-15 DIAGNOSIS — M19.012 PRIMARY OSTEOARTHRITIS OF LEFT SHOULDER: ICD-10-CM

## 2020-07-15 DIAGNOSIS — M99.01 CERVICAL (NECK) REGION SOMATIC DYSFUNCTION: ICD-10-CM

## 2020-07-15 DIAGNOSIS — M99.08 SOMATIC DYSFUNCTION OF RIB CAGE REGION: ICD-10-CM

## 2020-07-15 DIAGNOSIS — M99.02 SOMATIC DYSFUNCTION OF THORACIC REGION: ICD-10-CM

## 2020-07-15 DIAGNOSIS — M79.10 MYALGIA: ICD-10-CM

## 2020-07-15 DIAGNOSIS — M25.512 ACUTE PAIN OF LEFT SHOULDER: ICD-10-CM

## 2020-07-15 DIAGNOSIS — S46.012D TRAUMATIC INCOMPLETE TEAR OF LEFT ROTATOR CUFF, SUBSEQUENT ENCOUNTER: Primary | ICD-10-CM

## 2020-07-15 PROCEDURE — 3079F PR MOST RECENT DIASTOLIC BLOOD PRESSURE 80-89 MM HG: ICD-10-PCS | Mod: HCNC,CPTII,S$GLB, | Performed by: NEUROMUSCULOSKELETAL MEDICINE & OMM

## 2020-07-15 PROCEDURE — 1159F PR MEDICATION LIST DOCUMENTED IN MEDICAL RECORD: ICD-10-PCS | Mod: HCNC,S$GLB,, | Performed by: NEUROMUSCULOSKELETAL MEDICINE & OMM

## 2020-07-15 PROCEDURE — 3074F PR MOST RECENT SYSTOLIC BLOOD PRESSURE < 130 MM HG: ICD-10-PCS | Mod: HCNC,CPTII,S$GLB, | Performed by: NEUROMUSCULOSKELETAL MEDICINE & OMM

## 2020-07-15 PROCEDURE — 1159F MED LIST DOCD IN RCRD: CPT | Mod: HCNC,S$GLB,, | Performed by: NEUROMUSCULOSKELETAL MEDICINE & OMM

## 2020-07-15 PROCEDURE — 3008F PR BODY MASS INDEX (BMI) DOCUMENTED: ICD-10-PCS | Mod: HCNC,CPTII,S$GLB, | Performed by: NEUROMUSCULOSKELETAL MEDICINE & OMM

## 2020-07-15 PROCEDURE — 99999 PR PBB SHADOW E&M-EST. PATIENT-LVL III: ICD-10-PCS | Mod: PBBFAC,HCNC,, | Performed by: NEUROMUSCULOSKELETAL MEDICINE & OMM

## 2020-07-15 PROCEDURE — 99999 PR PBB SHADOW E&M-EST. PATIENT-LVL III: CPT | Mod: PBBFAC,HCNC,, | Performed by: NEUROMUSCULOSKELETAL MEDICINE & OMM

## 2020-07-15 PROCEDURE — 1101F PR PT FALLS ASSESS DOC 0-1 FALLS W/OUT INJ PAST YR: ICD-10-PCS | Mod: HCNC,CPTII,S$GLB, | Performed by: NEUROMUSCULOSKELETAL MEDICINE & OMM

## 2020-07-15 PROCEDURE — 99213 OFFICE O/P EST LOW 20 MIN: CPT | Mod: 25,HCNC,S$GLB, | Performed by: NEUROMUSCULOSKELETAL MEDICINE & OMM

## 2020-07-15 PROCEDURE — 3074F SYST BP LT 130 MM HG: CPT | Mod: HCNC,CPTII,S$GLB, | Performed by: NEUROMUSCULOSKELETAL MEDICINE & OMM

## 2020-07-15 PROCEDURE — 98927 OSTEOPATH MANJ 5-6 REGIONS: CPT | Mod: HCNC,S$GLB,, | Performed by: NEUROMUSCULOSKELETAL MEDICINE & OMM

## 2020-07-15 PROCEDURE — 98927 PR OSTEOPATHIC MANIP,5-6 BODY REGN: ICD-10-PCS | Mod: HCNC,S$GLB,, | Performed by: NEUROMUSCULOSKELETAL MEDICINE & OMM

## 2020-07-15 PROCEDURE — 3008F BODY MASS INDEX DOCD: CPT | Mod: HCNC,CPTII,S$GLB, | Performed by: NEUROMUSCULOSKELETAL MEDICINE & OMM

## 2020-07-15 PROCEDURE — 3079F DIAST BP 80-89 MM HG: CPT | Mod: HCNC,CPTII,S$GLB, | Performed by: NEUROMUSCULOSKELETAL MEDICINE & OMM

## 2020-07-15 PROCEDURE — 1101F PT FALLS ASSESS-DOCD LE1/YR: CPT | Mod: HCNC,CPTII,S$GLB, | Performed by: NEUROMUSCULOSKELETAL MEDICINE & OMM

## 2020-07-15 PROCEDURE — 99213 PR OFFICE/OUTPT VISIT, EST, LEVL III, 20-29 MIN: ICD-10-PCS | Mod: 25,HCNC,S$GLB, | Performed by: NEUROMUSCULOSKELETAL MEDICINE & OMM

## 2020-07-15 NOTE — PROGRESS NOTES
Subjective:     Eboni Nicholas     Chief Complaint   Patient presents with    Follow-up     L shoulder       HPI      Eboni is a 74 y.o. female coming in today for left shoulder pain. Since last visit the pain has Improved. The pain is better with rest, OMT, HEP and worse with raising arm, reaching hand behind her back, sleeping on L side. Pt. describes the pain as a 1/10 achy pain that does not radiate. There has not been any new a fall/injury/ or traumas since last visit.  Pt. denies any new musculoskeletal complaints at this time.     Office note from 6/25/20 reviewed      Joint instability? no  Mechanical locking/clicking? no  Affecting ADL's? yes  Affecting sleep? yes    Occupation: retired    Review of Systems   Constitutional: Negative for chills and fever.   Musculoskeletal: Positive for joint pain. Negative for back pain, falls, myalgias and neck pain.   Neurological: Negative for dizziness, tingling, focal weakness, weakness and headaches.       PAST MEDICAL HISTORY:   Past Medical History:   Diagnosis Date    Anxiety     Benign essential HTN     Cataract     Depression     GERD (gastroesophageal reflux disease)     Glaucoma     Hyperlipidemia     Hypothyroid     CHIDI (obstructive sleep apnea)     Sarcoidosis      PAST SURGICAL HISTORY:   Past Surgical History:   Procedure Laterality Date    HYSTERECTOMY      THYROID SURGERY       FAMILY HISTORY:   Family History   Problem Relation Age of Onset    Heart disease Brother     Liver disease Brother     Diabetes Maternal Grandmother     Stroke Mother     Transient ischemic attack Mother     Cancer Father         lung ca    Rheum arthritis Sister     Arthritis Sister     No Known Problems Daughter     Diabetes Son     No Known Problems Daughter     Heart disease Brother     Liver disease Brother     Alcohol abuse Brother      SOCIAL HISTORY:   Social History     Socioeconomic History    Marital status:      Spouse name: Not  on file    Number of children: Not on file    Years of education: Not on file    Highest education level: Not on file   Occupational History    Occupation: retired   Social Needs    Financial resource strain: Not very hard    Food insecurity     Worry: Never true     Inability: Never true    Transportation needs     Medical: No     Non-medical: No   Tobacco Use    Smoking status: Never Smoker    Smokeless tobacco: Never Used   Substance and Sexual Activity    Alcohol use: Not Currently     Frequency: Monthly or less     Drinks per session: 1 or 2     Binge frequency: Never     Comment: rarely drink glass of wine with dinner/ not weekly    Drug use: No    Sexual activity: Not on file   Lifestyle    Physical activity     Days per week: 3 days     Minutes per session: 20 min    Stress: Not at all   Relationships    Social connections     Talks on phone: Three times a week     Gets together: Once a week     Attends Religion service: Not on file     Active member of club or organization: Yes     Attends meetings of clubs or organizations: More than 4 times per year     Relationship status:    Other Topics Concern    Are you pregnant or think you may be? Not Asked    Breast-feeding Not Asked   Social History Narrative    Not on file       MEDICATIONS:   Current Outpatient Medications:     aspirin (ECOTRIN) 81 MG EC tablet, Take 81 mg by mouth once daily., Disp: , Rfl:     blood pressure monitor Kit, , Disp: , Rfl:     brimonidine-timolol (COMBIGAN) 0.2-0.5 % Drop, Place 1 drop into the right eye 2 (two) times daily., Disp: 3 mL, Rfl: 3    calcium-vitamin D 250-100 mg-unit per tablet, Take 1 tablet by mouth once daily. , Disp: , Rfl:     escitalopram oxalate (LEXAPRO) 20 MG tablet, Take 1 tablet (20 mg total) by mouth once daily., Disp: 90 tablet, Rfl: 1    latanoprost (XALATAN) 0.005 % ophthalmic solution, Place 1 drop into both eyes once daily., Disp: 7.5 mL, Rfl: 3    levothyroxine  "(SYNTHROID) 100 MCG tablet, TAKE 1 TABLET BY MOUTH EVERY DAY, Disp: 90 tablet, Rfl: 1    losartan (COZAAR) 50 MG tablet, Take one tablet daily, then may repeat the dose if BP > 140/90, Disp: 180 tablet, Rfl: 3    meloxicam (MOBIC) 7.5 MG tablet, 1 BY MOUTH EVERY DAY WITH FOOD FOR JOINT PAIN, Disp: 30 tablet, Rfl: 1    multivitamin (THERAGRAN) per tablet, Take 1 tablet by mouth once daily., Disp: , Rfl:     simvastatin (ZOCOR) 40 MG tablet, Take 1 tablet (40 mg total) by mouth every evening., Disp: 90 tablet, Rfl: 1    traMADol (ULTRAM) 50 mg tablet, Take 1 tablet (50 mg total) by mouth every 6 (six) hours as needed for Pain., Disp: 30 tablet, Rfl: 0  ALLERGIES:   Review of patient's allergies indicates:   Allergen Reactions    Phenobarbital Rash    Sulfa (sulfonamide antibiotics) Rash     IMAGIN. X-ray ordered due to left shoulder pain. (AP, scapular Y, and axillary views) taken 2020.   2. X-ray images were reviewed personally   3. FINDINGS: X-ray images obtained demonstrate mild DJD changes of AC and glenohumeral joint  4. IMPRESSION:  See above    Left shoulder MRI from 2020 images and report personally reviewed:  - Findings:    1. Low-grade partial-thickness articular sided tears of the supraspinatus and infraspinatus tendons with background tendinosis   2. Moderate degenerative changes of the glenohumeral joint with full-thickness chondral fissuring and associated subchondral cystic change.   3. Long head biceps tendinosis and tenosynovitis.   4. Moderate acromioclavicular joint arthropathy.      Objective:     VITAL SIGNS: /82   Ht 5' 3" (1.6 m)   Wt 101.6 kg (224 lb)   BMI 39.68 kg/m²    General    Vitals reviewed.  Constitutional: She is oriented to person, place, and time. She appears well-developed and well-nourished.   Neurological: She is alert and oriented to person, place, and time.   Psychiatric: She has a normal mood and affect. Her behavior is normal. "               MUSCULOSKELETAL EXAM  Shoulder: left SHOULDER  The affected shoulder is compared to the contralateral shoulder.    Observation:      SHOULDER  No ecchymosis, edema, or erythema throughout the shoulder girdle.  No sternal, clavicular, or acromial deformities bilaterally.  No atrophy of the pectorals, deltoids, supraspinatus, infraspinatus, or biceps bilaterally.  No asymmetry of shoulders bilaterally.    Posture:  Increased thoracic kyphosis and Anterior head carriage    ROM (* = with pain):  CERVICAL SPINE  Full AROM in flexion, extension, sidebending, and rotation.    SHOULDER  Active flexion to 180° on left and 180° on right.  Active abduction to 180° on left and 180° on right.  Active internal rotation to L5 on left* and T12 on right.    Active external rotation to T3 on left and T3 on right.    No scapular dyskinesia or winging.    Tenderness:  No tenderness at the SC or AC joint  No tenderness over the clavicle   No tenderness over biceps tendon at the bicipital groove  No tenderness over subacromial space    Strength Testing (* = with pain):  Deltoid - 5/5 on left and 5/5 on right  Biceps - 5/5 on left and 5/5 on right  Triceps - 5/5 on left and 5/5 on right  Wrist extension - 5/5 on left and 5/5 on right  Wrist flexion - 5/5 on left and 5/5 on right   - 5/5 on left and 5/5 on right  Finger extension - 5/5 on left and 5/5 on right  Finger abduction - 5/5 on left and 5/5 on right    Special Tests:  Empty can test - negative   Full can test - negative   Bear hug test - negative  Belly press test - negative  Resisted internal rotation - negative  Resisted external rotation - negative    Neer's test - negative  Hawkin's-Vinnie test - positive  Cross-arm test- negative    OAarons test - negative    Sulcus sign - none  AP load and shift laxity - none    Speed's test - negative  Yergason's test - negative    Neurovascular Exam:  Spurlings test - negative bialterally  2+ radial pulses  bilaterally  Sensation intact to light touch in the distal median, radial, and ulnar nerve distributions bilaterally.  Capillary refill intact <2 seconds in all digits bilaterally    TART (Tissue texture abnormality, Asymmetry,  Restriction of motion and/or Tenderness) changes:    Head: Occipitoatlantal (OA) Joint ES-right, R-left     Cervical Spine   C1 TTA LEFT   C2 TTA LEFT   C3 Neutral   C4 Neutral   C5 Neutral   C6 Neutral   C7 Neutral   Left cervical Trigger band (TB) fascial distortion      Thoracic Spine   T1 Neutral   T2 Neutral   T3 Neutral   T4 Neutral   T5 Neutral   T6 Neutral   T7 FRS RIGHT   T8 FRS RIGHT   T9 Neutral   T10 Neutral   T11 Neutral   T12 Neutral     Rib cage: R1 inhaled on left,  R2 external torsion on right    Upper extremity: Left anterior deltoid Trigger band (TB) fascial distortion and Herniated trigger point (HTP) fascial distortion, left anterior shoulder Trigger band (TB) fascial distortion , left thoracic outlet TTA      Key   F= Flexed   E = Extended   R = Rotated   S = Sidebent   TTA = tissue texture abnormality       Assessment:      Encounter Diagnoses   Name Primary?    Traumatic incomplete tear of left rotator cuff, subsequent encounter Yes    Biceps tendinosis of left shoulder     Acute pain of left shoulder     Primary osteoarthritis of left shoulder     Impingement syndrome of left shoulder     Myalgia     Somatic dysfunction of head region     Cervical (neck) region somatic dysfunction     Somatic dysfunction of thoracic region     Somatic dysfunction of rib cage region     Upper extremity somatic dysfunction           Plan:   1. Acute left shoulder pain secondary a low-grade, partial tear of the rotator cuff and biceps irritation from recent fall along with underlying shoulder DJD and poor thoracic and scapular biomechanics contributing to associated shoulder impingment- improved.    - OMT performed again today to address associated biomechanical restrictions   and HEP started.   - Continue Mobic 7.5 mg p.o. q.day with food p.r.n. for pain control. Pt. Advised to avoid all other NSAIDS while on this medication.   - Continue ice up to 20 minutes at a time prn for pain control  -  X-ray images of left shoulder taken 6/11/2020 (AP, scapular Y, and axillary views) showed mild DJD changes of AC and glenohumeral joint.   -  MRI images of left shoudler taken 6/16/2020 showed low-grade partial-thickness articular sided tears of the supraspinatus and infraspinatus tendons with background tendinosis,  moderate degenerative changes of the glenohumeral joint, long head biceps tendinosis and tenosynovitis, and  moderate acromioclavicular joint arthropathy.    2. OMT 5-6 regions. Oral consent obtained.  Reviewed benefits and potential side effects, including bruising at site of treatment and increased soreness for the next 24-48 hours. Pt. Instructed to increased water intake by 1 L today and tomorrow to help with any residual soreness.   - OMT indicated today due to signs and symptoms as well as local and remote somatic dysfunction findings and their related neurokinetic, lymphatic, fascial and/or arteriovenous body connections.   - OMT techniques used: Myofascial Release, Muscle Energy, Still's Technique and Fascial Distortion Model   - Treatment was tolerated well. Improvement noted in segmental mobility post-treatment in dysfunctional regions. There were no adverse events and no complications immediately following treatment.     3.Reviewes with pt. the following HEP:  Continue:  A)  Supine Thoracic extension exercise: 10-15 reps, twice daily. Handout also given.   B) Internal/extnernal rotation shoulder ROM towel exercises: Hold towel behind you with one shoulder internally rotated and one shoulder externally rotated, increasing motion with flexion of the elbows x 10 reps. Repeat with arms in opposite directions. Do twice daily.   C) Scalene, upper trapezius, and levator scapulae  stretches : hold neck sidebending stretch for 30 seconds in each plane, bilaterally, twice daily. Hand out given.   Add:  D) Corner stretch: hold for 30 seconds, repeat 2-3 times, twice daily    Discontinue:  Wall crawl shoudler ROM exercises in both the forward flexion and abduction planes. Repeat 10-15 times, twice daily. Handout given.    The patient was taught a homegoing physical therapy regimen as described above. The patient demonstrated understanding of the exercises and proper technique of their execution.     4. Follow-up as needed if pain deteriorates or new issue arises    5. Patient agreeable to today's plan and all questions were answered    This note is dictated using the M*Modal Fluency Direct word recognition program. There are word recognition mistakes that are occasionally missed on review.

## 2020-07-28 ENCOUNTER — PATIENT MESSAGE (OUTPATIENT)
Dept: INTERNAL MEDICINE | Facility: CLINIC | Age: 75
End: 2020-07-28

## 2020-07-28 RX ORDER — LEVOTHYROXINE SODIUM 100 UG/1
100 TABLET ORAL DAILY
Qty: 90 TABLET | Refills: 1 | Status: SHIPPED | OUTPATIENT
Start: 2020-07-28 | End: 2021-01-28 | Stop reason: SDUPTHER

## 2020-08-04 ENCOUNTER — PATIENT OUTREACH (OUTPATIENT)
Dept: OTHER | Facility: OTHER | Age: 75
End: 2020-08-04

## 2020-08-07 ENCOUNTER — LAB VISIT (OUTPATIENT)
Dept: LAB | Facility: HOSPITAL | Age: 75
End: 2020-08-07
Attending: INTERNAL MEDICINE
Payer: MEDICARE

## 2020-08-07 DIAGNOSIS — I10 HTN (HYPERTENSION), BENIGN: ICD-10-CM

## 2020-08-07 LAB
ALBUMIN SERPL BCP-MCNC: 4.1 G/DL (ref 3.5–5.2)
ALP SERPL-CCNC: 65 U/L (ref 55–135)
ALT SERPL W/O P-5'-P-CCNC: 53 U/L (ref 10–44)
ANION GAP SERPL CALC-SCNC: 9 MMOL/L (ref 8–16)
AST SERPL-CCNC: 53 U/L (ref 10–40)
BASOPHILS # BLD AUTO: 0.04 K/UL (ref 0–0.2)
BASOPHILS NFR BLD: 0.6 % (ref 0–1.9)
BILIRUB SERPL-MCNC: 0.5 MG/DL (ref 0.1–1)
BUN SERPL-MCNC: 17 MG/DL (ref 8–23)
CALCIUM SERPL-MCNC: 9.7 MG/DL (ref 8.7–10.5)
CHLORIDE SERPL-SCNC: 107 MMOL/L (ref 95–110)
CHOLEST SERPL-MCNC: 178 MG/DL (ref 120–199)
CHOLEST/HDLC SERPL: 3.6 {RATIO} (ref 2–5)
CO2 SERPL-SCNC: 25 MMOL/L (ref 23–29)
CREAT SERPL-MCNC: 1 MG/DL (ref 0.5–1.4)
DIFFERENTIAL METHOD: ABNORMAL
EOSINOPHIL # BLD AUTO: 0.3 K/UL (ref 0–0.5)
EOSINOPHIL NFR BLD: 4.2 % (ref 0–8)
ERYTHROCYTE [DISTWIDTH] IN BLOOD BY AUTOMATED COUNT: 15.2 % (ref 11.5–14.5)
EST. GFR  (AFRICAN AMERICAN): >60 ML/MIN/1.73 M^2
EST. GFR  (NON AFRICAN AMERICAN): 56.4 ML/MIN/1.73 M^2
GLUCOSE SERPL-MCNC: 107 MG/DL (ref 70–110)
HCT VFR BLD AUTO: 39.7 % (ref 37–48.5)
HDLC SERPL-MCNC: 49 MG/DL (ref 40–75)
HDLC SERPL: 27.5 % (ref 20–50)
HGB BLD-MCNC: 11.9 G/DL (ref 12–16)
IMM GRANULOCYTES # BLD AUTO: 0.03 K/UL (ref 0–0.04)
IMM GRANULOCYTES NFR BLD AUTO: 0.5 % (ref 0–0.5)
LDLC SERPL CALC-MCNC: 101.4 MG/DL (ref 63–159)
LYMPHOCYTES # BLD AUTO: 1.8 K/UL (ref 1–4.8)
LYMPHOCYTES NFR BLD: 27.1 % (ref 18–48)
MCH RBC QN AUTO: 29.5 PG (ref 27–31)
MCHC RBC AUTO-ENTMCNC: 30 G/DL (ref 32–36)
MCV RBC AUTO: 99 FL (ref 82–98)
MONOCYTES # BLD AUTO: 0.5 K/UL (ref 0.3–1)
MONOCYTES NFR BLD: 7.1 % (ref 4–15)
NEUTROPHILS # BLD AUTO: 3.9 K/UL (ref 1.8–7.7)
NEUTROPHILS NFR BLD: 60.5 % (ref 38–73)
NONHDLC SERPL-MCNC: 129 MG/DL
NRBC BLD-RTO: 0 /100 WBC
PLATELET # BLD AUTO: 213 K/UL (ref 150–350)
PMV BLD AUTO: 11.5 FL (ref 9.2–12.9)
POTASSIUM SERPL-SCNC: 4.6 MMOL/L (ref 3.5–5.1)
PROT SERPL-MCNC: 7.2 G/DL (ref 6–8.4)
RBC # BLD AUTO: 4.03 M/UL (ref 4–5.4)
SODIUM SERPL-SCNC: 141 MMOL/L (ref 136–145)
TRIGL SERPL-MCNC: 138 MG/DL (ref 30–150)
TSH SERPL DL<=0.005 MIU/L-ACNC: 1.59 UIU/ML (ref 0.4–4)
WBC # BLD AUTO: 6.5 K/UL (ref 3.9–12.7)

## 2020-08-07 PROCEDURE — 80053 COMPREHEN METABOLIC PANEL: CPT | Mod: HCNC

## 2020-08-07 PROCEDURE — 80061 LIPID PANEL: CPT | Mod: HCNC

## 2020-08-07 PROCEDURE — 84443 ASSAY THYROID STIM HORMONE: CPT | Mod: HCNC

## 2020-08-07 PROCEDURE — 85025 COMPLETE CBC W/AUTO DIFF WBC: CPT | Mod: HCNC

## 2020-08-07 PROCEDURE — 36415 COLL VENOUS BLD VENIPUNCTURE: CPT | Mod: HCNC,PN

## 2020-08-14 ENCOUNTER — OFFICE VISIT (OUTPATIENT)
Dept: INTERNAL MEDICINE | Facility: CLINIC | Age: 75
End: 2020-08-14
Payer: MEDICARE

## 2020-08-14 VITALS
HEIGHT: 63 IN | SYSTOLIC BLOOD PRESSURE: 110 MMHG | DIASTOLIC BLOOD PRESSURE: 64 MMHG | RESPIRATION RATE: 16 BRPM | HEART RATE: 74 BPM | BODY MASS INDEX: 40.51 KG/M2 | OXYGEN SATURATION: 97 % | TEMPERATURE: 97 F | WEIGHT: 228.63 LBS

## 2020-08-14 DIAGNOSIS — E66.01 MORBID OBESITY WITH BMI OF 40.0-44.9, ADULT: ICD-10-CM

## 2020-08-14 DIAGNOSIS — G47.33 OSA (OBSTRUCTIVE SLEEP APNEA): ICD-10-CM

## 2020-08-14 DIAGNOSIS — Z00.00 ENCOUNTER FOR PREVENTIVE HEALTH EXAMINATION: Primary | ICD-10-CM

## 2020-08-14 DIAGNOSIS — I10 HTN (HYPERTENSION), BENIGN: ICD-10-CM

## 2020-08-14 DIAGNOSIS — E78.5 DYSLIPIDEMIA: ICD-10-CM

## 2020-08-14 DIAGNOSIS — R82.90 ABNORMAL URINE: ICD-10-CM

## 2020-08-14 DIAGNOSIS — E03.9 ACQUIRED HYPOTHYROIDISM: ICD-10-CM

## 2020-08-14 PROCEDURE — 3078F PR MOST RECENT DIASTOLIC BLOOD PRESSURE < 80 MM HG: ICD-10-PCS | Mod: HCNC,CPTII,S$GLB, | Performed by: INTERNAL MEDICINE

## 2020-08-14 PROCEDURE — 3074F PR MOST RECENT SYSTOLIC BLOOD PRESSURE < 130 MM HG: ICD-10-PCS | Mod: HCNC,CPTII,S$GLB, | Performed by: INTERNAL MEDICINE

## 2020-08-14 PROCEDURE — 3078F DIAST BP <80 MM HG: CPT | Mod: HCNC,CPTII,S$GLB, | Performed by: INTERNAL MEDICINE

## 2020-08-14 PROCEDURE — 99999 PR PBB SHADOW E&M-EST. PATIENT-LVL IV: CPT | Mod: PBBFAC,HCNC,, | Performed by: INTERNAL MEDICINE

## 2020-08-14 PROCEDURE — 99397 PER PM REEVAL EST PAT 65+ YR: CPT | Mod: HCNC,S$GLB,, | Performed by: INTERNAL MEDICINE

## 2020-08-14 PROCEDURE — 99999 PR PBB SHADOW E&M-EST. PATIENT-LVL IV: ICD-10-PCS | Mod: PBBFAC,HCNC,, | Performed by: INTERNAL MEDICINE

## 2020-08-14 PROCEDURE — 3074F SYST BP LT 130 MM HG: CPT | Mod: HCNC,CPTII,S$GLB, | Performed by: INTERNAL MEDICINE

## 2020-08-14 PROCEDURE — 99397 PR PREVENTIVE VISIT,EST,65 & OVER: ICD-10-PCS | Mod: HCNC,S$GLB,, | Performed by: INTERNAL MEDICINE

## 2020-08-14 NOTE — PROGRESS NOTES
History of present illness:  [age] year lady in today for health assessment.    Current medications:  All medications are noted reviewed in the electronic medical record med    Review of systems:  General: no fever, chills, generalized body aches. No unexpected weight loss.  Eyes:  No visual disturbances.  HEENT:  No hoarseness, dysphagia, ear pain.  Respiratory:  No cough, no shortness of breath.  Cardiovascular: no chest pain, palpitations, cough, exertional limb pain. No edema.  GI: no nausea, vomiting.  No abdominal pain. No change in bowel habits.  No melena, no hematochezia.  : no dysuria. No change in the color or character of the urine. No urinary frequency.  Musculoskeletal:  Does complain of some pain intermittently with her right 1st carpometacarpal joint.  Neurologic:  No focal neurological complaints.  No headaches.  Skin:  No rashes or other concerns.  Psych:  No emotional issues    Past medical history, past surgical history, family medical history social history are all noted and reviewed in the electronic medical record history sections.    Health screenings:  Colonoscopy 2013.  Mammogram October 2019.  Bone densitometry June 2019 normal.  She has had both pneumococcal vaccines.    Physical examination:  GENERAL:  Alert, appropriately groomed, no acute distress.  VS:  Blood pressure taken manually is 110/64   EYES: sclerae white ,nonicteric. PERRL.  HEENT:  Normocephalic. Ear canals and tympanic membranes normal. Mouth and pharynx normal. No thyromegaly. Trachea midline and freely mobile.  LUNGS:  Clear to ascultation and normal to percussion.  CARDIOVASCULAR:  Normal heart sounds.  No significant murmur. Carotids full bilaterally without bruit.  Pedal pulses intact .  No abdominal bruit.  No peripheral extremity edema.  GI: the abdomen is obese, soft, no distension. No masses , tenderness, organomegaly.    LYMPHATIC:  No axillary, inguinal , cervical adenopathy.  MUSCULOSKELETAL:  Range of  motion, stability and strength of the right and left upper and lower extremities normal. No swollen or tender joints  NEUROLOGIC:  DTR's normal. No gross motor or sensory deficits apparent, gait normal.  SKIN:  No rashes.   MS:  Alert, oriented , affect and mood all appropriate  Diabetic foot exam:    Visual Inspection:  Normal -  Bilateral    Pedal Pulses:   Right: Present  Left: Present    Posterior tibialis:   Right:Present  Left: Present      Data:  Recent health maintenance laboratory data all noted and reviewed.  All reasonable although urine is abnormal.  The patient is asymptomatic.    Impression:  [age] year lady with several chronic medical conditions.  Hypertension is controlled.  Dyslipidemia on statin therapy.  Morbid obesity BMI 40.50.  Hypothyroidism on replacement therapy.  Obstructive sleep apnea.  Glaucoma.    Plan:  Submit urine for urine culture and sensitivity.  Otherwise continue current pharmacologic regimens.  Encouraged increased physical activity and weight loss.  Return to clinic 6 months follow-up.

## 2020-08-21 ENCOUNTER — PATIENT OUTREACH (OUTPATIENT)
Dept: OTHER | Facility: OTHER | Age: 75
End: 2020-08-21

## 2020-08-21 NOTE — PROGRESS NOTES
Digital Medicine: Clinician Follow-Up    Called patient to discuss her at goal readings.    The history is provided by the patient.   Follow-up reason(s): routine follow up.     Hypertension    Patient readings are stable   Patient is not experiencing signs/symptoms of hypotension.  Patient is not experiencing signs/symptoms of hypertension.          Last 5 Patient Entered Readings                                      Current 30 Day Average: 120/72     Recent Readings 8/20/2020 8/14/2020 8/10/2020 8/2/2020 7/28/2020    SBP (mmHg) 112 110 126 124 124    DBP (mmHg) 65 64 76 75 76    Pulse 72 74 70 67 69                           Medication Adherence-Medication Adherence not addressed.          ASSESSMENT(S)  Patients BP average is 120/72 mmHg, which is at goal. Patient's BP goal is less than or equal to 130/80 per 2017 ACC/AHA Hypertension Guidelines.      Hypertension Plan  Continue current therapy.  Provided patient education. Discussed hypotension symptoms and to contact me if these symptoms develop prior to my next outreach.  Patient's end goal is to reduce medications. If her blood pressure continues to trend down, I will reduce losartan. I plan to monitor closely to prevent hypotension symptoms.     Addressed any questions or concerns and patient has my contact information if needed prior to next outreach. Patient verbalizes understanding.            There are no preventive care reminders to display for this patient.      Hypertension Medications             losartan (COZAAR) 50 MG tablet Take one tablet daily, then may repeat the dose if BP > 140/90

## 2020-09-03 DIAGNOSIS — H40.039 ANATOMICAL NARROW ANGLE: ICD-10-CM

## 2020-09-03 DIAGNOSIS — H40.1121 PRIMARY OPEN ANGLE GLAUCOMA (POAG) OF LEFT EYE, MILD STAGE: ICD-10-CM

## 2020-09-03 DIAGNOSIS — H40.1112 PRIMARY OPEN ANGLE GLAUCOMA (POAG) OF RIGHT EYE, MODERATE STAGE: ICD-10-CM

## 2020-09-03 RX ORDER — BRIMONIDINE TARTRATE AND TIMOLOL MALEATE 2; 5 MG/ML; MG/ML
1 SOLUTION OPHTHALMIC 2 TIMES DAILY
Qty: 3 ML | Refills: 3 | Status: SHIPPED | OUTPATIENT
Start: 2020-09-03 | End: 2020-10-08

## 2020-09-08 ENCOUNTER — PATIENT OUTREACH (OUTPATIENT)
Dept: ADMINISTRATIVE | Facility: OTHER | Age: 75
End: 2020-09-08

## 2020-09-09 ENCOUNTER — OFFICE VISIT (OUTPATIENT)
Dept: ORTHOPEDICS | Facility: CLINIC | Age: 75
End: 2020-09-09
Payer: MEDICARE

## 2020-09-09 VITALS
DIASTOLIC BLOOD PRESSURE: 70 MMHG | SYSTOLIC BLOOD PRESSURE: 102 MMHG | WEIGHT: 228 LBS | BODY MASS INDEX: 40.4 KG/M2 | HEIGHT: 63 IN

## 2020-09-09 DIAGNOSIS — S46.012D TRAUMATIC INCOMPLETE TEAR OF LEFT ROTATOR CUFF, SUBSEQUENT ENCOUNTER: ICD-10-CM

## 2020-09-09 DIAGNOSIS — M99.08 SOMATIC DYSFUNCTION OF RIB CAGE REGION: ICD-10-CM

## 2020-09-09 DIAGNOSIS — M99.07 UPPER EXTREMITY SOMATIC DYSFUNCTION: ICD-10-CM

## 2020-09-09 DIAGNOSIS — M99.02 SOMATIC DYSFUNCTION OF THORACIC REGION: ICD-10-CM

## 2020-09-09 DIAGNOSIS — M99.01 CERVICAL (NECK) REGION SOMATIC DYSFUNCTION: ICD-10-CM

## 2020-09-09 DIAGNOSIS — M19.012 PRIMARY OSTEOARTHRITIS OF LEFT SHOULDER: ICD-10-CM

## 2020-09-09 DIAGNOSIS — M79.10 MYALGIA: ICD-10-CM

## 2020-09-09 DIAGNOSIS — M75.42 IMPINGEMENT SYNDROME OF LEFT SHOULDER: Primary | ICD-10-CM

## 2020-09-09 PROCEDURE — 99214 PR OFFICE/OUTPT VISIT, EST, LEVL IV, 30-39 MIN: ICD-10-PCS | Mod: 25,HCNC,S$GLB, | Performed by: NEUROMUSCULOSKELETAL MEDICINE & OMM

## 2020-09-09 PROCEDURE — 3078F PR MOST RECENT DIASTOLIC BLOOD PRESSURE < 80 MM HG: ICD-10-PCS | Mod: HCNC,CPTII,S$GLB, | Performed by: NEUROMUSCULOSKELETAL MEDICINE & OMM

## 2020-09-09 PROCEDURE — 98926 OSTEOPATH MANJ 3-4 REGIONS: CPT | Mod: HCNC,S$GLB,, | Performed by: NEUROMUSCULOSKELETAL MEDICINE & OMM

## 2020-09-09 PROCEDURE — 3008F PR BODY MASS INDEX (BMI) DOCUMENTED: ICD-10-PCS | Mod: HCNC,CPTII,S$GLB, | Performed by: NEUROMUSCULOSKELETAL MEDICINE & OMM

## 2020-09-09 PROCEDURE — 1159F PR MEDICATION LIST DOCUMENTED IN MEDICAL RECORD: ICD-10-PCS | Mod: HCNC,S$GLB,, | Performed by: NEUROMUSCULOSKELETAL MEDICINE & OMM

## 2020-09-09 PROCEDURE — 1101F PT FALLS ASSESS-DOCD LE1/YR: CPT | Mod: HCNC,CPTII,S$GLB, | Performed by: NEUROMUSCULOSKELETAL MEDICINE & OMM

## 2020-09-09 PROCEDURE — 98926 PR OSTEOPATHIC MANIP,3-4 BODY REGN: ICD-10-PCS | Mod: HCNC,S$GLB,, | Performed by: NEUROMUSCULOSKELETAL MEDICINE & OMM

## 2020-09-09 PROCEDURE — 3074F SYST BP LT 130 MM HG: CPT | Mod: HCNC,CPTII,S$GLB, | Performed by: NEUROMUSCULOSKELETAL MEDICINE & OMM

## 2020-09-09 PROCEDURE — 99214 OFFICE O/P EST MOD 30 MIN: CPT | Mod: 25,HCNC,S$GLB, | Performed by: NEUROMUSCULOSKELETAL MEDICINE & OMM

## 2020-09-09 PROCEDURE — 99999 PR PBB SHADOW E&M-EST. PATIENT-LVL III: CPT | Mod: PBBFAC,HCNC,, | Performed by: NEUROMUSCULOSKELETAL MEDICINE & OMM

## 2020-09-09 PROCEDURE — 99999 PR PBB SHADOW E&M-EST. PATIENT-LVL III: ICD-10-PCS | Mod: PBBFAC,HCNC,, | Performed by: NEUROMUSCULOSKELETAL MEDICINE & OMM

## 2020-09-09 PROCEDURE — 1101F PR PT FALLS ASSESS DOC 0-1 FALLS W/OUT INJ PAST YR: ICD-10-PCS | Mod: HCNC,CPTII,S$GLB, | Performed by: NEUROMUSCULOSKELETAL MEDICINE & OMM

## 2020-09-09 PROCEDURE — 3074F PR MOST RECENT SYSTOLIC BLOOD PRESSURE < 130 MM HG: ICD-10-PCS | Mod: HCNC,CPTII,S$GLB, | Performed by: NEUROMUSCULOSKELETAL MEDICINE & OMM

## 2020-09-09 PROCEDURE — 3008F BODY MASS INDEX DOCD: CPT | Mod: HCNC,CPTII,S$GLB, | Performed by: NEUROMUSCULOSKELETAL MEDICINE & OMM

## 2020-09-09 PROCEDURE — 3078F DIAST BP <80 MM HG: CPT | Mod: HCNC,CPTII,S$GLB, | Performed by: NEUROMUSCULOSKELETAL MEDICINE & OMM

## 2020-09-09 PROCEDURE — 1159F MED LIST DOCD IN RCRD: CPT | Mod: HCNC,S$GLB,, | Performed by: NEUROMUSCULOSKELETAL MEDICINE & OMM

## 2020-09-09 NOTE — PROGRESS NOTES
Subjective:     Eboni Nicholas     Chief Complaint   Patient presents with    Follow-up     L shoulder       HPI      Eboni is a 74 y.o. female coming in today for left shoulder pain. Since last visit her left shoulder ROM has improved but the pain has remained unchanged, still noting an achy pain in her left shoulder when sleeping on her left side and with internal rotation. The pain is better with rest, OMT, HEP and worse with raising arm, reaching hand behind her back, sleeping on L side. Pt. describes the pain as a 5/10 achy pain that does not radiate. There has not been any new a fall/injury/ or traumas since last visit.  Pt. denies any new musculoskeletal complaints at this time.       Office note from 7/15/20 reviewed      Joint instability? no  Mechanical locking/clicking? no  Affecting ADL's? yes  Affecting sleep? yes    Occupation: retired    Review of Systems   Constitutional: Negative for chills and fever.   Musculoskeletal: Positive for joint pain. Negative for back pain, falls, myalgias and neck pain.   Neurological: Negative for dizziness, tingling, focal weakness, weakness and headaches.       PAST MEDICAL HISTORY:   Past Medical History:   Diagnosis Date    Anxiety     Benign essential HTN     Cataract     Depression     GERD (gastroesophageal reflux disease)     Glaucoma     Hyperlipidemia     Hypothyroid     CHIDI (obstructive sleep apnea)     Sarcoidosis      PAST SURGICAL HISTORY:   Past Surgical History:   Procedure Laterality Date    HYSTERECTOMY      THYROID SURGERY       FAMILY HISTORY:   Family History   Problem Relation Age of Onset    Heart disease Brother     Liver disease Brother     Diabetes Maternal Grandmother     Stroke Mother     Transient ischemic attack Mother     Cancer Father         lung ca    Rheum arthritis Sister     Arthritis Sister     No Known Problems Daughter     Diabetes Son     No Known Problems Daughter     Heart disease Brother     Liver  disease Brother     Alcohol abuse Brother      SOCIAL HISTORY:   Social History     Socioeconomic History    Marital status:      Spouse name: Not on file    Number of children: Not on file    Years of education: Not on file    Highest education level: Not on file   Occupational History    Occupation: retired   Social Needs    Financial resource strain: Not very hard    Food insecurity     Worry: Never true     Inability: Never true    Transportation needs     Medical: No     Non-medical: No   Tobacco Use    Smoking status: Never Smoker    Smokeless tobacco: Never Used   Substance and Sexual Activity    Alcohol use: Not Currently     Frequency: Monthly or less     Drinks per session: 1 or 2     Binge frequency: Never     Comment: rarely drink glass of wine with dinner/ not weekly    Drug use: No    Sexual activity: Not on file   Lifestyle    Physical activity     Days per week: 3 days     Minutes per session: 20 min    Stress: Not at all   Relationships    Social connections     Talks on phone: Three times a week     Gets together: Once a week     Attends [employer] service: Not on file     Active member of club or organization: Yes     Attends meetings of clubs or organizations: More than 4 times per year     Relationship status:    Other Topics Concern    Are you pregnant or think you may be? Not Asked    Breast-feeding Not Asked   Social History Narrative    Not on file       MEDICATIONS:   Current Outpatient Medications:     aspirin (ECOTRIN) 81 MG EC tablet, Take 81 mg by mouth once daily., Disp: , Rfl:     blood pressure monitor Kit, , Disp: , Rfl:     brimonidine-timoloL (COMBIGAN) 0.2-0.5 % Drop, Place 1 drop into the right eye 2 (two) times daily., Disp: 3 mL, Rfl: 3    calcium-vitamin D 250-100 mg-unit per tablet, Take 1 tablet by mouth once daily. , Disp: , Rfl:     escitalopram oxalate (LEXAPRO) 20 MG tablet, Take 1 tablet (20 mg total) by mouth once daily., Disp: 90  "tablet, Rfl: 1    latanoprost (XALATAN) 0.005 % ophthalmic solution, Place 1 drop into both eyes once daily., Disp: 7.5 mL, Rfl: 3    levothyroxine (SYNTHROID) 100 MCG tablet, Take 1 tablet (100 mcg total) by mouth once daily., Disp: 90 tablet, Rfl: 1    losartan (COZAAR) 50 MG tablet, Take one tablet daily, then may repeat the dose if BP > 140/90, Disp: 180 tablet, Rfl: 3    meloxicam (MOBIC) 7.5 MG tablet, 1 BY MOUTH EVERY DAY WITH FOOD FOR JOINT PAIN, Disp: 30 tablet, Rfl: 1    multivitamin (THERAGRAN) per tablet, Take 1 tablet by mouth once daily., Disp: , Rfl:     simvastatin (ZOCOR) 40 MG tablet, Take 1 tablet (40 mg total) by mouth every evening., Disp: 90 tablet, Rfl: 1    traMADol (ULTRAM) 50 mg tablet, Take 1 tablet (50 mg total) by mouth every 6 (six) hours as needed for Pain., Disp: 30 tablet, Rfl: 0  ALLERGIES:   Review of patient's allergies indicates:   Allergen Reactions    Phenobarbital Rash    Sulfa (sulfonamide antibiotics) Rash     IMAGIN. X-ray ordered due to left shoulder pain. (AP, scapular Y, and axillary views) taken 2020.   2. X-ray images were reviewed personally   3. FINDINGS: X-ray images obtained demonstrate mild DJD changes of AC and glenohumeral joint  4. IMPRESSION:  See above    Left shoulder MRI from 2020 images and report personally reviewed:  - Findings:    1. Low-grade partial-thickness articular sided tears of the supraspinatus and infraspinatus tendons with background tendinosis   2. Moderate degenerative changes of the glenohumeral joint with full-thickness chondral fissuring and associated subchondral cystic change.   3. Long head biceps tendinosis and tenosynovitis.   4. Moderate acromioclavicular joint arthropathy.      Objective:     VITAL SIGNS: /70   Ht 5' 3" (1.6 m)   Wt 103.4 kg (228 lb)   BMI 40.39 kg/m²    General    Vitals reviewed.  Constitutional: She is oriented to person, place, and time. She appears well-developed and " well-nourished.   Neurological: She is alert and oriented to person, place, and time.   Psychiatric: She has a normal mood and affect. Her behavior is normal.               MUSCULOSKELETAL EXAM  Shoulder: left SHOULDER  The affected shoulder is compared to the contralateral shoulder.    Observation:      SHOULDER  No ecchymosis, edema, or erythema throughout the shoulder girdle.  No sternal, clavicular, or acromial deformities bilaterally.  No atrophy of the pectorals, deltoids, supraspinatus, infraspinatus, or biceps bilaterally.  No asymmetry of shoulders bilaterally. Bilateral anterior shoulder girdle    Posture:  Increased thoracic kyphosis and Anterior head carriage    ROM (* = with pain):  CERVICAL SPINE  Full AROM in flexion, extension, sidebending, and rotation.    SHOULDER  Active flexion to 180° on left and 180° on right.  Active abduction to 180° on left and 180° on right.  Active internal rotation to L5 on left* and T12 on right.    Active external rotation to T3 on left and T3 on right.    No scapular dyskinesia or winging.    Tenderness:  No tenderness at the SC or AC joint  No tenderness over the clavicle   No tenderness over biceps tendon at the bicipital groove  No tenderness over subacromial space    Strength Testing (* = with pain):  Deltoid - 5/5 on left and 5/5 on right  Biceps - 5/5 on left and 5/5 on right  Triceps - 5/5 on left and 5/5 on right  Wrist extension - 5/5 on left and 5/5 on right  Wrist flexion - 5/5 on left and 5/5 on right   - 5/5 on left and 5/5 on right  Finger extension - 5/5 on left and 5/5 on right  Finger abduction - 5/5 on left and 5/5 on right    Special Tests:  Empty can test - negative   Full can test - negative   Bear hug test - negative  Belly press test - negative  Resisted internal rotation - negative  Resisted external rotation - negative    Neer's test - negative  Hawkin's-Vinnie test - positive  Cross-arm test- negative    OAarons test -  negative    Sulcus sign - none  AP load and shift laxity - none    Speed's test - negative  Yergason's test - negative    Neurovascular Exam:  Spurlings test - negative bialterally  2+ radial pulses bilaterally  Sensation intact to light touch in the distal median, radial, and ulnar nerve distributions bilaterally.  Capillary refill intact <2 seconds in all digits bilaterally    TART (Tissue texture abnormality, Asymmetry,  Restriction of motion and/or Tenderness) changes:    Head: Occipitoatlantal (OA) Joint Neutral     Cervical Spine   C1 Neutral   C2 Neutral   C3 Neutral   C4 Neutral   C5 Neutral   C6 Neutral   C7 ERS RIGHT      Thoracic Spine   T1 FRS LEFT   T2 Neutral   T3 Neutral   T4 Neutral   T5 Neutral   T6 Neutral   T7 FRS RIGHT   T8 FRS RIGHT   T9 Neutral   T10 Neutral   T11 Neutral   T12 Neutral     Rib cage: R1 inhaled on left, R6 external torsion on right    Upper extremity: Left postierior deltoid Herniated trigger point (HTP) fascial distortion, left triceps Trigger band (TB) fascial distortion , left thoracic outlet TTA, left shoulder folding fascial distortion         Key   F= Flexed   E = Extended   R = Rotated   S = Sidebent   TTA = tissue texture abnormality       Assessment:      Encounter Diagnoses   Name Primary?    Traumatic incomplete tear of left rotator cuff, subsequent encounter     Impingement syndrome of left shoulder Yes    Primary osteoarthritis of left shoulder     Myalgia     Cervical (neck) region somatic dysfunction     Somatic dysfunction of thoracic region     Somatic dysfunction of rib cage region     Upper extremity somatic dysfunction           Plan:   1. Left shoulder pain secondary a low-grade, partial tear of the rotator cuff  from a fall in May 2020 along with underlying shoulder DJD and poor thoracic and scapular biomechanics contributing to associated shoulder impingment- improved ROM, but left shoulder internal rotation still limited and pain with lying on  left side still present.  Left biceps tendon irritation has resolved.   - Referral to outpatient PT ([facility]) for increased shoulder range of motion, rotator cuff strengthening, and scapular stability. Emphasized importance of maintaining HEP after completion of PT.   - OMT performed again today to address associated biomechanical restrictions  and HEP reviewed   - Continue OTC Tylenol prn for pain control, adding in ice up to 20 minutes at a time prn for pain control as well  -  X-ray images of left shoulder taken 6/11/2020 (AP, scapular Y, and axillary views) showed mild DJD changes of AC and glenohumeral joint.   -  MRI images of left shoudler taken 6/16/2020 showed low-grade partial-thickness articular sided tears of the supraspinatus and infraspinatus tendons with background tendinosis,  moderate degenerative changes of the glenohumeral joint, long head biceps tendinosis and tenosynovitis, and  moderate acromioclavicular joint arthropathy.    2. OMT 3-4 regions. Oral consent obtained.  Reviewed benefits and potential side effects, including bruising at site of treatment and increased soreness for the next 24-48 hours. Pt. Instructed to increased water intake by 1 L today and tomorrow to help with any residual soreness.   - OMT indicated today due to signs and symptoms as well as local and remote somatic dysfunction findings and their related neurokinetic, lymphatic, fascial and/or arteriovenous body connections.   - OMT techniques used: Myofascial Release, Muscle Energy, Still's Technique and Fascial Distortion Model   - Treatment was tolerated well. Improvement noted in segmental mobility post-treatment in dysfunctional regions. There were no adverse events and no complications immediately following treatment.     3.Reviewes with pt. the following HEP:  Continue:  A)  Supine Thoracic extension exercise: 10-15 reps, twice daily. Handout also given.   B) Internal/extnernal rotation shoulder ROM towel  exercises: Hold towel behind you with one shoulder internally rotated and one shoulder externally rotated, increasing motion with flexion of the elbows x 10 reps. Repeat with arms in opposite directions. Do twice daily.   C) Scalene, upper trapezius, and levator scapulae stretches : hold neck sidebending stretch for 30 seconds in each plane, bilaterally, twice daily. Hand out given.   D) Corner stretch: hold for 30 seconds, repeat 2-3 times, twice daily    The patient was taught a homegoing physical therapy regimen as described above. The patient demonstrated understanding of the exercises and proper technique of their execution.     4. Follow-up upon completion of PT if pain persist or deteriorates    5. Patient agreeable to today's plan and all questions were answered    This note is dictated using the M*Modal Fluency Direct word recognition program. There are word recognition mistakes that are occasionally missed on review.

## 2020-09-21 ENCOUNTER — PATIENT MESSAGE (OUTPATIENT)
Dept: INTERNAL MEDICINE | Facility: CLINIC | Age: 75
End: 2020-09-21

## 2020-09-21 RX ORDER — SIMVASTATIN 40 MG/1
40 TABLET, FILM COATED ORAL NIGHTLY
Qty: 90 TABLET | Refills: 1 | Status: SHIPPED | OUTPATIENT
Start: 2020-09-21 | End: 2021-03-06 | Stop reason: SDUPTHER

## 2020-09-22 ENCOUNTER — PATIENT OUTREACH (OUTPATIENT)
Dept: OTHER | Facility: OTHER | Age: 75
End: 2020-09-22

## 2020-10-01 ENCOUNTER — PATIENT MESSAGE (OUTPATIENT)
Dept: INTERNAL MEDICINE | Facility: CLINIC | Age: 75
End: 2020-10-01

## 2020-10-01 RX ORDER — ESCITALOPRAM OXALATE 20 MG/1
20 TABLET ORAL DAILY
Qty: 90 TABLET | Refills: 1 | Status: SHIPPED | OUTPATIENT
Start: 2020-10-01 | End: 2021-01-02 | Stop reason: SDUPTHER

## 2020-10-02 ENCOUNTER — CLINICAL SUPPORT (OUTPATIENT)
Dept: REHABILITATION | Facility: HOSPITAL | Age: 75
End: 2020-10-02
Payer: MEDICARE

## 2020-10-02 DIAGNOSIS — M62.81 MUSCLE WEAKNESS: ICD-10-CM

## 2020-10-02 DIAGNOSIS — M19.012 PRIMARY OSTEOARTHRITIS OF LEFT SHOULDER: ICD-10-CM

## 2020-10-02 DIAGNOSIS — M75.42 IMPINGEMENT SYNDROME OF LEFT SHOULDER: ICD-10-CM

## 2020-10-02 DIAGNOSIS — M25.612 DECREASED ROM OF LEFT SHOULDER: Primary | ICD-10-CM

## 2020-10-02 DIAGNOSIS — M25.512 ACUTE PAIN OF LEFT SHOULDER: ICD-10-CM

## 2020-10-02 DIAGNOSIS — Z74.09 IMPAIRED FUNCTIONAL MOBILITY AND ACTIVITY TOLERANCE: ICD-10-CM

## 2020-10-02 DIAGNOSIS — R29.3 POOR POSTURE: ICD-10-CM

## 2020-10-02 DIAGNOSIS — S46.012D TRAUMATIC INCOMPLETE TEAR OF LEFT ROTATOR CUFF, SUBSEQUENT ENCOUNTER: ICD-10-CM

## 2020-10-02 PROCEDURE — 97161 PT EVAL LOW COMPLEX 20 MIN: CPT | Mod: HCNC,PO

## 2020-10-02 PROCEDURE — 97110 THERAPEUTIC EXERCISES: CPT | Mod: HCNC,PO

## 2020-10-02 NOTE — PLAN OF CARE
[facility]  Physical Therapy Initial Evaluation    Date: 10/2/2020   Name: Eboni Nicholas  Clinic Number: [id number]    Therapy Diagnosis:   Encounter Diagnoses   Name Primary?    Traumatic incomplete tear of left rotator cuff, subsequent encounter     Impingement syndrome of left shoulder     Primary osteoarthritis of left shoulder     Acute pain of left shoulder     Muscle weakness     Poor posture     Impaired functional mobility and activity tolerance     Decreased ROM of left shoulder Yes     Physician: Annika Mendoza DO    Physician Orders: PT Eval and Treat- Pain secondary to incomplete left rotator cuff tear.  Increase shoulder internal rotation, scapular stabilization, rotator cuff strengthening.  Home exercise program needed.  Medical Diagnosis from Referral:   Traumatic incomplete tear of left rotator cuff, subsequent encounter   M75.42 (ICD-10-CM) - Impingement syndrome of left shoulder   M19.012 (ICD-10-CM) - Primary osteoarthritis of left shoulder       Evaluation Date: 10/2/2020  Authorization Period Expiration: 10/29/20  Plan of Care Expiration: 12/4/20  Visit # / Visits authorized: 1/ 1    Time In: 2:05 PM  Time Out: 2:45 PM  Total Appointment Time (timed & untimed codes): 40 minutes    Precautions: Standard    Subjective   Date of onset: 6 weeks ago  History of current condition - Eboni reports: She banged her shoulder into the doorway and then 2 weeks after she started having pain. She went to MD who took XR and MRI. Pt found to have a rotator cuff tear in 2 muscles in her L shoulder. Pt states she was given pain medication and exercises. The meds helped a little and that her range improved a little bit, but she is most limited reaching for the small of her back. Pt states sometimes she gets pain down her left upper arm. She is R handed.     Medical History:   Past Medical History:   Diagnosis Date    Anxiety     Benign essential HTN     Cataract      Depression     GERD (gastroesophageal reflux disease)     Glaucoma     Hyperlipidemia     Hypothyroid     CHIDI (obstructive sleep apnea)     Sarcoidosis        Surgical History:   Eboni Nicholas  has a past surgical history that includes Hysterectomy and Thyroid surgery.    Medications:   Eboni has a current medication list which includes the following prescription(s): aspirin, blood pressure monitor, brimonidine-timolol, calcium-vitamin d, escitalopram oxalate, latanoprost, levothyroxine, losartan, meloxicam, multivitamin, simvastatin, and tramadol.    Allergies:   Review of patient's allergies indicates:   Allergen Reactions    Phenobarbital Rash    Sulfa (sulfonamide antibiotics) Rash        Imaging, MRI studies:    1. Low-grade partial-thickness articular sided tears of the supraspinatus and infraspinatus tendons with background tendinosis.  2. Moderate degenerative changes of the glenohumeral joint with full-thickness chondral fissuring and associated subchondral cystic change.  3. Long head biceps tendinosis and tenosynovitis.  4. Moderate acromioclavicular joint arthropathy.    Prior Therapy: yes for her hip  Social History: pt lives with their spouse in a house. Her  has Parkinson's, and so he can't help as much. They do have a .  Occupation: Retired  Prior Level of Function: pt had full ROM in L shoulder and no pain  Current Level of Function: pt has limited range in L shoulder especially for internal rotation- can't scratch her back or don/doff bra with L hand. Reaching to put something up overhead is painful. Pt can't roll over onto her arm.     Pain:  Current 2/10, worst 8/10, best 1/10   Location: left shoulder    Description: Sharp and sore  Aggravating Factors: Lifting and reaching, driving, internal rotation  Easing Factors: exercise    Pts goals: I want to get the range to be able to reach the small of my back and be able to reach overhead.    Objective     Observation:  pt received amb independently    Posture: rounded shoulders, forward head, L shoulder is depressed       Active Range of Motion:   Shoulder Left Right   Flexion 140* 165   Abduction 80* 158   ER  T1 T3   ER at 90 40* 90   IR Outside of L hip* Across low back   IR at 90 Grossly WFL 90   *pain    Upper Extremity Strength   (L) UE (R) UE   Shoulder flexion: >/=3/5 5/5   Shoulder Abduction: >/=3/5 5/5   Shoulder ER 4+/5 5/5   Shoulder IR 4+/5* 5/5   Lower Trap TBA TBA   Middle Trap TBA TBA   Rhomboids TBA TBA   Biceps, triceps, wrist flex/ext are 5/5 B    Joint Mobility: some guarding noted- pt reports that distraction and grade I joint mobs feel good    Palpation: tender at anterior shoulder, biceps, biceps tendon    Sensation: light touch intact      Scapular Control/Dyskinesis:    Normal / Subtle / Obvious  Comments    Left  obvious L shoulder hiking   Right  normal N/A           Limitation/Restriction for FOTO Shoulder Survey    Therapist reviewed FOTO scores for Eboni BROOKS Cristopher on 10/2/2020.   FOTO documents entered into [facility] - see Media section.    Limitation Score: Pending         TREATMENT   Treatment Time In: 2:32 PM  Treatment Time Out: 2:40 PM  Total Treatment time (time-based codes) separate from Evaluation: 8 minutes    Eboni received therapeutic exercises to develop ROM, flexibility and posture for 8 minutes including:  Table slides in scaption x 10 reps  Table slides, ABD x 10 reps  Scapular retractions x 10 reps    Home Exercises and Patient Education Provided    Education provided:   - HEP, POC, scheduling, postural awareness, to try wall crawl exercise (from doctor) with towel or pillow case under her hands    Written Home Exercises Provided: yes.  Exercises were reviewed and Eboni was able to demonstrate them prior to the end of the session.  Eboni demonstrated good  understanding of the education provided.     See EMR under Patient Instructions for exercises provided 10/2/2020.    Assessment    Eboni is a 74 y.o. female referred to outpatient Physical Therapy with a medical diagnosis of   Traumatic incomplete tear of left rotator cuff, subsequent encounter   M75.42 (ICD-10-CM) - Impingement syndrome of left shoulder   M19.012 (ICD-10-CM) - Primary osteoarthritis of left shoulder      Pt presents with signs and symptoms consistent with rotator cuff tear including, L shoulder pain, decreased strength, poor posture, decreased ROM, impaired functional mobility/reach.       Pt prognosis is Good.   Pt will benefit from skilled outpatient Physical Therapy to address the deficits stated above and in the chart below, provide pt/family education, and to maximize pt's level of independence.     Plan of care discussed with patient: Yes  Pt's spiritual, cultural and educational needs considered and patient is agreeable to the plan of care and goals as stated below:     Anticipated Barriers for therapy: advanced age    Medical Necessity is demonstrated by the following  History  Co-morbidities and personal factors that may impact the plan of care Co-morbidities:   advanced age, depression and high BMI    Personal Factors:   no deficits     high   Examination  Body Structures and Functions, activity limitations and participation restrictions that may impact the plan of care Body Regions:   back  upper extremities  trunk    Body Systems:    ROM  strength  transitions  motor control  motor learning  posture    Participation Restrictions:   None anticipated    Activity limitations:   Learning and applying knowledge  no deficits    General Tasks and Commands  no deficits    Communication  no deficits    Mobility  lifting and carrying objects  driving (bike, car, motorcycle)    Self care  washing oneself (bathing, drying, washing hands)  dressing    Domestic Life  shopping  cooking  doing house work (cleaning house, washing dishes, laundry)    Interactions/Relationships  no deficits    Life Areas  no deficits    Community  and Social Life  community life  recreation and leisure         high   Clinical Presentation stable and uncomplicated low   Decision Making/ Complexity Score: low     Goals:  Short Term Goals: 4 weeks   1. Pt will tolerate HEP for improved strength, functional mobility, ROM, posture, and endurance. (progressing, not met)  2. Pt will report reduced L shoulder pain to </= 5/10 at worst for improved functional mobility and ability to participate in functional activities/ADL's. (progressing, not met)  3. Pt will increase L shoulder flexion AROM to >/= 155 degrees for improved functional mobility and reach. (progressing, not met)  4. Pt will increase L shoulder ABD AROM to >/= 120 degrees for improved functional mobility and reach (progressing, not met)  5. Pt will increase L shoulder IR AROM to reach across low back for improved functional mobility and reach. (progressing, not met)  6. Pt will demo >/= 4/5 strength in LUE for improved functional mobility, endurance, and posture. (progressing, not met)  7. Pt will report improved ability to drive without increase in pain/symptoms for improved functional mobility (progressing, not met)  8. pt will report improved ability to reach back to don/doff bra without increase in pain/symptoms for improved functional mobility (progressing, not met)    Long Term Goals: 8 weeks   1. Pt will be I with updated HEP for improved functional mobility, posture, strength, and endurance. (progressing, not met)  2. Pt will report reduced L shoulder pain to </= 3/10 at worst for improved functional mobility and ability to participate in functional activities/ADL's. (progressing, not met)  3. Pt will increase L shoulder flexion AROM to >/= 160 degrees for improved functional mobility and reach. (progressing, not met)  4. Pt will increase L shoulder ABD AROM to >/= 150 degrees for improved functional mobility and reach. (progressing, not met)    5. Pt will demo 5/5 strength in BUE's for improved  functional mobility, endurance, and posture. (progressing, not met)  6. Pt will demo/report increased ability to reach items overhead without pain/symptoms for improved functional mobility (progressing, not met)    Plan   Plan of care Certification: 10/2/2020 to 12/4/20.    Outpatient Physical Therapy 2 times weekly for 8 weeks to include the following interventions: Manual Therapy, Moist Heat/ Ice, Neuromuscular Re-ed, Patient Education, Self Care, Therapeutic Activites, Therapeutic Exercise and modalities as appropriate.     Afia Perla, PT

## 2020-10-04 PROBLEM — M25.512 ACUTE PAIN OF LEFT SHOULDER: Status: ACTIVE | Noted: 2020-10-04

## 2020-10-04 PROBLEM — Z74.09 IMPAIRED FUNCTIONAL MOBILITY AND ACTIVITY TOLERANCE: Status: ACTIVE | Noted: 2020-10-04

## 2020-10-04 PROBLEM — M25.612 DECREASED ROM OF LEFT SHOULDER: Status: ACTIVE | Noted: 2020-10-04

## 2020-10-04 PROBLEM — R29.3 POOR POSTURE: Status: ACTIVE | Noted: 2020-10-04

## 2020-10-04 PROBLEM — M62.81 MUSCLE WEAKNESS: Status: ACTIVE | Noted: 2020-10-04

## 2020-10-07 DIAGNOSIS — H40.1112 PRIMARY OPEN ANGLE GLAUCOMA (POAG) OF RIGHT EYE, MODERATE STAGE: ICD-10-CM

## 2020-10-07 DIAGNOSIS — H40.1121 PRIMARY OPEN ANGLE GLAUCOMA (POAG) OF LEFT EYE, MILD STAGE: ICD-10-CM

## 2020-10-07 DIAGNOSIS — H40.039 ANATOMICAL NARROW ANGLE: ICD-10-CM

## 2020-10-07 RX ORDER — LATANOPROST 50 UG/ML
1 SOLUTION/ DROPS OPHTHALMIC DAILY
Qty: 7.5 ML | Refills: 3 | Status: SHIPPED | OUTPATIENT
Start: 2020-10-07 | End: 2021-08-09 | Stop reason: SDUPTHER

## 2020-10-07 NOTE — TELEPHONE ENCOUNTER
Called pt to inform her that Dr Campbell sent in her refill for Latanoprost to [facility]. Pt states Dr Campbell was suppose to replace her combigan with 2 other drops. Told her I would touch base with him and let her know when he does that.

## 2020-10-08 RX ORDER — TIMOLOL MALEATE 5 MG/ML
1 SOLUTION/ DROPS OPHTHALMIC 2 TIMES DAILY
Qty: 15 ML | Refills: 3 | Status: SHIPPED | OUTPATIENT
Start: 2020-10-08 | End: 2020-11-30 | Stop reason: SDUPTHER

## 2020-10-08 RX ORDER — BRIMONIDINE TARTRATE 2 MG/ML
1 SOLUTION/ DROPS OPHTHALMIC 2 TIMES DAILY
Qty: 15 ML | Refills: 3 | Status: SHIPPED | OUTPATIENT
Start: 2020-10-08 | End: 2021-04-21

## 2020-10-08 NOTE — PROGRESS NOTES
Split combigan eyedrop rx into brimonidine and timolol due to cost for pt.         Assessment /Plan     For exam results, see Encounter Report.    There are no diagnoses linked to this encounter.

## 2020-10-13 NOTE — TELEPHONE ENCOUNTER
The patient was called and a message left to call the office regarding test results.   Labs/Imaging Studies Imaging Studies/Labs/Medications

## 2020-10-15 ENCOUNTER — PATIENT OUTREACH (OUTPATIENT)
Dept: ADMINISTRATIVE | Facility: OTHER | Age: 75
End: 2020-10-15

## 2020-10-19 ENCOUNTER — OFFICE VISIT (OUTPATIENT)
Dept: OPTOMETRY | Facility: CLINIC | Age: 75
End: 2020-10-19
Payer: MEDICARE

## 2020-10-19 DIAGNOSIS — Z98.890 HISTORY OF LASER IRIDOTOMY: ICD-10-CM

## 2020-10-19 DIAGNOSIS — H40.039 ANATOMICAL NARROW ANGLE: ICD-10-CM

## 2020-10-19 DIAGNOSIS — H40.1121 PRIMARY OPEN ANGLE GLAUCOMA (POAG) OF LEFT EYE, MILD STAGE: ICD-10-CM

## 2020-10-19 DIAGNOSIS — H40.1112 PRIMARY OPEN ANGLE GLAUCOMA (POAG) OF RIGHT EYE, MODERATE STAGE: Primary | ICD-10-CM

## 2020-10-19 DIAGNOSIS — H25.13 NUCLEAR SCLEROSIS OF BOTH EYES: ICD-10-CM

## 2020-10-19 PROCEDURE — 99999 PR PBB SHADOW E&M-EST. PATIENT-LVL III: ICD-10-PCS | Mod: PBBFAC,HCNC,, | Performed by: OPTOMETRIST

## 2020-10-19 PROCEDURE — 92014 PR EYE EXAM, EST PATIENT,COMPREHESV: ICD-10-PCS | Mod: HCNC,S$GLB,, | Performed by: OPTOMETRIST

## 2020-10-19 PROCEDURE — 92014 COMPRE OPH EXAM EST PT 1/>: CPT | Mod: HCNC,S$GLB,, | Performed by: OPTOMETRIST

## 2020-10-19 PROCEDURE — 99999 PR PBB SHADOW E&M-EST. PATIENT-LVL III: CPT | Mod: PBBFAC,HCNC,, | Performed by: OPTOMETRIST

## 2020-10-20 NOTE — PROGRESS NOTES
SARAH BETH CHANG 06/20  Here for 4 month IOP check with [facility] today.  Using Combigan BID   OU and Latanoprost OU Q HS, last used as directed.  Patient is interested   in discussing another drops than Combigan due to the cost.  Patient hasn't   noticed any vision changes since last exam.  Still has some combigan left    Last edited by Osman Campbell, OD on 10/20/2020 11:18 AM. (History)              Assessment /Plan     For exam results, see Encounter Report.    Primary open angle glaucoma (POAG) of right eye, moderate stage    Primary open angle glaucoma (POAG) of left eye, mild stage    History of laser iridotomy    Nuclear sclerosis of both eyes    Anatomical narrow angle      1,2. Combigan too expensive, Ok for Brimonidine bid ou and latanaprost qhs ou, rtc 6 weeks recheck iop, target 12-14 od. nerve eval stable, prev OCT stable with rnfl loss od, normal os, prev hvf with inf defects od and normal os, fam history of glaucoma but no blindness, pachy normal. RTC 6 weeks iop ck, may add timoptic if not at target iop.   3,5. Patent.   4. Educated pt on presence of cataracts and effects on vision. No surgery at this time. Recheck in one year.

## 2020-10-21 ENCOUNTER — CLINICAL SUPPORT (OUTPATIENT)
Dept: URGENT CARE | Facility: CLINIC | Age: 75
End: 2020-10-21
Payer: MEDICARE

## 2020-10-21 VITALS — TEMPERATURE: 97 F | HEART RATE: 83 BPM | OXYGEN SATURATION: 100 %

## 2020-10-21 DIAGNOSIS — Z23 FLU VACCINE NEED: Primary | ICD-10-CM

## 2020-10-21 PROCEDURE — 90694 FLU VACCINE - QUADRIVALENT - ADJUVANTED: ICD-10-PCS | Mod: S$GLB,,, | Performed by: NURSE PRACTITIONER

## 2020-10-21 PROCEDURE — G0008 FLU VACCINE - QUADRIVALENT - ADJUVANTED: ICD-10-PCS | Mod: S$GLB,,, | Performed by: NURSE PRACTITIONER

## 2020-10-21 PROCEDURE — 90694 VACC AIIV4 NO PRSRV 0.5ML IM: CPT | Mod: S$GLB,,, | Performed by: NURSE PRACTITIONER

## 2020-10-21 PROCEDURE — G0008 ADMIN INFLUENZA VIRUS VAC: HCPCS | Mod: S$GLB,,, | Performed by: NURSE PRACTITIONER

## 2020-10-23 ENCOUNTER — CLINICAL SUPPORT (OUTPATIENT)
Dept: REHABILITATION | Facility: HOSPITAL | Age: 75
End: 2020-10-23
Payer: MEDICARE

## 2020-10-23 DIAGNOSIS — R29.3 POOR POSTURE: ICD-10-CM

## 2020-10-23 DIAGNOSIS — Z74.09 IMPAIRED FUNCTIONAL MOBILITY AND ACTIVITY TOLERANCE: ICD-10-CM

## 2020-10-23 DIAGNOSIS — M25.612 DECREASED ROM OF LEFT SHOULDER: ICD-10-CM

## 2020-10-23 DIAGNOSIS — M25.512 ACUTE PAIN OF LEFT SHOULDER: ICD-10-CM

## 2020-10-23 DIAGNOSIS — M62.81 MUSCLE WEAKNESS: ICD-10-CM

## 2020-10-23 PROCEDURE — 97140 MANUAL THERAPY 1/> REGIONS: CPT | Mod: HCNC,PO,CQ

## 2020-10-23 PROCEDURE — 97110 THERAPEUTIC EXERCISES: CPT | Mod: HCNC,PO,CQ

## 2020-10-23 NOTE — PROGRESS NOTES
Physical Therapy Daily Treatment Note     Name: Eboni Nicholas  Clinic Number: [id number]    Therapy Diagnosis:   Encounter Diagnoses   Name Primary?    Acute pain of left shoulder     Muscle weakness     Poor posture     Impaired functional mobility and activity tolerance     Decreased ROM of left shoulder      Physician: Annika Mendoza DO    Visit Date: 10/23/2020       Physician Orders: PT Eval and Treat- Pain secondary to incomplete left rotator cuff tear.  Increase shoulder internal rotation, scapular stabilization, rotator cuff strengthening.  Home exercise program needed.  Medical Diagnosis from Referral:   Traumatic incomplete tear of left rotator cuff, subsequent encounter   M75.42 (ICD-10-CM) - Impingement syndrome of left shoulder   M19.012 (ICD-10-CM) - Primary osteoarthritis of left shoulder         Evaluation Date: 10/2/2020  Authorization Period Expiration: 10/29/20  Plan of Care Expiration: 12/4/20  Visit # / Visits authorized: 1/ 1      Time In: 2:00  Time Out: 2:45  Total Billable Time: 45 minutes    Precautions: Standard    Subjective     Pt reports: her shoulder is alright today, hardest thing is getting her hand behind her back.   She was compliant with home exercise program.  Response to previous treatment: no issues   Functional change: unable to get her hand behind her back     Pain: 5/10  Location: left shoulder      Objective     Eboni received therapeutic exercises to develop strength, endurance, ROM and posture for 40 minutes including:    Pulleys flexion/scaption 3 min each   Scapular retractions 2 x 10 reps  Dowel flexion 2 x 10   Dowel ER x 10   Side lying: flexion/abduction   Breuggers 2 x 10   Row OTB 2 x 10   Extension YTB 2 x 10     Eboni received the following manual therapy techniques: Joint mobilizations and Soft tissue Mobilization were applied to the: left shoulder for 5 minutes, including:    Grade I shoulder mobs  Distraction       Eboni received cold pack for  8 minutes to left shoulder.      Home Exercises Provided and Patient Education Provided     Education provided:   - HEP, POC, scheduling, postural awareness, to try wall crawl exercise (from doctor) with towel or pillow case under her hands    Written Home Exercises Provided: Patient instructed to cont prior HEP.  Exercises were reviewed and Eboni was able to demonstrate them prior to the end of the session.  Eboni demonstrated good  understanding of the education provided.     See EMR under Patient Instructions for exercises provided prior visit.    Assessment     Patient tolerated her first follow up session well. Minor discomfort noted during active range of motion, educated to perform in pain free range. Cueing needed for decreased upper trap compensation during strengthening exercises. Continue to progress with scapular strengthening.   Eboni is progressing well towards her goals.   Pt prognosis is Good.     Pt will continue to benefit from skilled outpatient physical therapy to address the deficits listed in the problem list box on initial evaluation, provide pt/family education and to maximize pt's level of independence in the home and community environment.     Pt's spiritual, cultural and educational needs considered and pt agreeable to plan of care and goals.     Anticipated barriers to physical therapy: advanced age    Goals:   Short Term Goals: 4 weeks   1. Pt will tolerate HEP for improved strength, functional mobility, ROM, posture, and endurance. (progressing, not met)  2. Pt will report reduced L shoulder pain to </= 5/10 at worst for improved functional mobility and ability to participate in functional activities/ADL's. (progressing, not met)  3. Pt will increase L shoulder flexion AROM to >/= 155 degrees for improved functional mobility and reach. (progressing, not met)  4. Pt will increase L shoulder ABD AROM to >/= 120 degrees for improved functional mobility and reach (progressing, not  met)  5. Pt will increase L shoulder IR AROM to reach across low back for improved functional mobility and reach. (progressing, not met)  6. Pt will demo >/= 4/5 strength in LUE for improved functional mobility, endurance, and posture. (progressing, not met)  7. Pt will report improved ability to drive without increase in pain/symptoms for improved functional mobility (progressing, not met)  8. pt will report improved ability to reach back to don/doff bra without increase in pain/symptoms for improved functional mobility (progressing, not met)     Long Term Goals: 8 weeks   1. Pt will be I with updated HEP for improved functional mobility, posture, strength, and endurance. (progressing, not met)  2. Pt will report reduced L shoulder pain to </= 3/10 at worst for improved functional mobility and ability to participate in functional activities/ADL's. (progressing, not met)  3. Pt will increase L shoulder flexion AROM to >/= 160 degrees for improved functional mobility and reach. (progressing, not met)  4. Pt will increase L shoulder ABD AROM to >/= 150 degrees for improved functional mobility and reach. (progressing, not met)     5. Pt will demo 5/5 strength in BUE's for improved functional mobility, endurance, and posture. (progressing, not met)  6. Pt will demo/report increased ability to reach items overhead without pain/symptoms for improved functional mobility (progressing, not met)    Plan     Continue per PT plan of care.     Micaela Raines, PTA

## 2020-10-28 ENCOUNTER — CLINICAL SUPPORT (OUTPATIENT)
Dept: REHABILITATION | Facility: HOSPITAL | Age: 75
End: 2020-10-28
Payer: MEDICARE

## 2020-10-28 DIAGNOSIS — Z74.09 IMPAIRED FUNCTIONAL MOBILITY AND ACTIVITY TOLERANCE: ICD-10-CM

## 2020-10-28 DIAGNOSIS — M25.512 ACUTE PAIN OF LEFT SHOULDER: ICD-10-CM

## 2020-10-28 DIAGNOSIS — R29.3 POOR POSTURE: ICD-10-CM

## 2020-10-28 DIAGNOSIS — M62.81 MUSCLE WEAKNESS: ICD-10-CM

## 2020-10-28 DIAGNOSIS — M25.612 DECREASED ROM OF LEFT SHOULDER: ICD-10-CM

## 2020-10-28 PROCEDURE — 97110 THERAPEUTIC EXERCISES: CPT | Mod: HCNC,PO

## 2020-10-28 PROCEDURE — 97140 MANUAL THERAPY 1/> REGIONS: CPT | Mod: HCNC,PO

## 2020-10-28 NOTE — PROGRESS NOTES
Physical Therapy Daily Treatment Note     Name: Eboni Nicholas  Clinic Number: [id number]    Therapy Diagnosis:   Encounter Diagnoses   Name Primary?    Acute pain of left shoulder     Muscle weakness     Poor posture     Impaired functional mobility and activity tolerance     Decreased ROM of left shoulder      Physician: Annika Mendoza DO    Visit Date: 10/28/2020       Physician Orders: PT Eval and Treat- Pain secondary to incomplete left rotator cuff tear.  Increase shoulder internal rotation, scapular stabilization, rotator cuff strengthening.  Home exercise program needed.  Medical Diagnosis from Referral:   Traumatic incomplete tear of left rotator cuff, subsequent encounter   M75.42 (ICD-10-CM) - Impingement syndrome of left shoulder   M19.012 (ICD-10-CM) - Primary osteoarthritis of left shoulder         Evaluation Date: 10/2/2020  Authorization Period Expiration: 10/29/20  Plan of Care Expiration: 12/4/20  Visit # / Visits authorized: 3/1      Time In: 915 am  Time Out: 1000 am  Total Billable Time: 45 minutes    Precautions: Standard    Subjective     Pt reports: she feels improvement with reaching behind back  She was compliant with home exercise program.  Response to previous treatment: less pain   Functional change: slight improvement with reaching behind back    Pain: 2-3/10  Location: left shoulder      Objective     Eboni received therapeutic exercises to develop strength, endurance, ROM and posture for 30 minutes including:    Pulleys flexion/scaption 3 min each   Scapular retractions 2 x 10 reps  Dowel flexion 2 x 10, HOB elevated   Dowel ER 2x10, towel underneath arm  Side lying: flexion/abduction with manual assistance with PT, x20 each   Breuggers 2x10, HOB elevated, orange theraband  Row OTB 2x10   Extension YTB 2x10, standing     Eboni received the following manual therapy techniques: Joint mobilizations and Soft tissue Mobilization were applied to the: left shoulder for 15  minutes, including:    Grade I/II GH mobs  GH Inferior distraction   Myofascial manipulation to left UT      Eboni received cold pack for 0 minutes to left shoulder.      Home Exercises Provided and Patient Education Provided     Education provided:   - to continue HEP    Written Home Exercises Provided: Patient instructed to cont prior HEP.  Exercises were reviewed and Eboni was able to demonstrate them prior to the end of the session.  Eboni demonstrated good  understanding of the education provided.     See EMR under Patient Instructions for exercises provided 10/23/2020.    Assessment     Educated patient in performing HEP in pain free ROM. Less guarding during interventions on this date with improved middle trap recruitment.     Eboni is progressing well towards her goals.   Pt prognosis is Good.     Pt will continue to benefit from skilled outpatient physical therapy to address the deficits listed in the problem list box on initial evaluation, provide pt/family education and to maximize pt's level of independence in the home and community environment.     Pt's spiritual, cultural and educational needs considered and pt agreeable to plan of care and goals.     Anticipated barriers to physical therapy: advanced age    Goals:   Short Term Goals: 4 weeks   1. Pt will tolerate HEP for improved strength, functional mobility, ROM, posture, and endurance. (progressing, not met)  2. Pt will report reduced L shoulder pain to </= 5/10 at worst for improved functional mobility and ability to participate in functional activities/ADL's. (progressing, not met)  3. Pt will increase L shoulder flexion AROM to >/= 155 degrees for improved functional mobility and reach. (progressing, not met)  4. Pt will increase L shoulder ABD AROM to >/= 120 degrees for improved functional mobility and reach (progressing, not met)  5. Pt will increase L shoulder IR AROM to reach across low back for improved functional mobility and reach.  (progressing, not met)  6. Pt will demo >/= 4/5 strength in LUE for improved functional mobility, endurance, and posture. (progressing, not met)  7. Pt will report improved ability to drive without increase in pain/symptoms for improved functional mobility (progressing, not met)  8. pt will report improved ability to reach back to don/doff bra without increase in pain/symptoms for improved functional mobility (progressing, not met)     Long Term Goals: 8 weeks   1. Pt will be I with updated HEP for improved functional mobility, posture, strength, and endurance. (progressing, not met)  2. Pt will report reduced L shoulder pain to </= 3/10 at worst for improved functional mobility and ability to participate in functional activities/ADL's. (progressing, not met)  3. Pt will increase L shoulder flexion AROM to >/= 160 degrees for improved functional mobility and reach. (progressing, not met)  4. Pt will increase L shoulder ABD AROM to >/= 150 degrees for improved functional mobility and reach. (progressing, not met)     5. Pt will demo 5/5 strength in BUE's for improved functional mobility, endurance, and posture. (progressing, not met)  6. Pt will demo/report increased ability to reach items overhead without pain/symptoms for improved functional mobility (progressing, not met)    Plan     Continue per PT plan of care.     Bel Yepez, PT
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
## 2020-11-04 ENCOUNTER — CLINICAL SUPPORT (OUTPATIENT)
Dept: REHABILITATION | Facility: HOSPITAL | Age: 75
End: 2020-11-04
Payer: MEDICARE

## 2020-11-04 DIAGNOSIS — R29.3 POOR POSTURE: ICD-10-CM

## 2020-11-04 DIAGNOSIS — Z74.09 IMPAIRED FUNCTIONAL MOBILITY AND ACTIVITY TOLERANCE: ICD-10-CM

## 2020-11-04 DIAGNOSIS — M25.512 ACUTE PAIN OF LEFT SHOULDER: ICD-10-CM

## 2020-11-04 DIAGNOSIS — M62.81 MUSCLE WEAKNESS: ICD-10-CM

## 2020-11-04 DIAGNOSIS — M25.612 DECREASED ROM OF LEFT SHOULDER: ICD-10-CM

## 2020-11-04 PROCEDURE — 97140 MANUAL THERAPY 1/> REGIONS: CPT | Mod: PO

## 2020-11-04 PROCEDURE — 97110 THERAPEUTIC EXERCISES: CPT | Mod: PO

## 2020-11-04 NOTE — PROGRESS NOTES
Physical Therapy Daily Treatment Note     Name: Eboni Nicholas  Clinic Number: [id number]    Therapy Diagnosis:   Encounter Diagnoses   Name Primary?    Acute pain of left shoulder     Muscle weakness     Poor posture     Impaired functional mobility and activity tolerance     Decreased ROM of left shoulder      Physician: Annika Mendoza DO    Visit Date: 11/4/2020       Physician Orders: PT Eval and Treat- Pain secondary to incomplete left rotator cuff tear.  Increase shoulder internal rotation, scapular stabilization, rotator cuff strengthening.  Home exercise program needed.  Medical Diagnosis from Referral:   Traumatic incomplete tear of left rotator cuff, subsequent encounter   M75.42 (ICD-10-CM) - Impingement syndrome of left shoulder   M19.012 (ICD-10-CM) - Primary osteoarthritis of left shoulder         Evaluation Date: 10/2/2020  Authorization Period Expiration: 10/29/20  Plan of Care Expiration: 12/4/20  Visit # / Visits authorized: 4/20      Time In: 11:45 am  Time Out: 12:32am  Total Billable Time: 42 minutes    Precautions: Standard    Subjective     Pt reports: She had so much soreness last night that she could barely sleep, but she feels better now  She was compliant with home exercise program.  Response to previous treatment: felt good  Functional change: slight improvement with reaching behind back    Pain: 3/10  Location: left shoulder      Objective     Eboni received therapeutic exercises to develop strength, endurance, ROM and posture for 32 minutes including:    Pulleys flexion/scaption 2.5 min each   L shoulder PROM/stretch by PT, 5-7 reps of following: ER at 90, Flexion, ABD  L posterior shoulder stretch, 5 x 10s  Dowel flexion 2 x 10  Dowel ER 2x10, towel underneath arm  Side lying: flexion/abduction with manual assistance with PT, x20 each   Row green band, 2x10   Extension orange band, 2x10  ER and IR walk outs, LUE, 10 reps each, single orange band    Breuggers 2x10, HOB  elevated, orange theraband -not performed today      Eboni received the following manual therapy techniques: Joint mobilizations and Soft tissue Mobilization were applied to the: left shoulder for 10 minutes, including:    Grade I/II GH mobs  GH Inferior distraction   Myofascial manipulation to left UT      Eboni received cold pack for 0 minutes to left shoulder.      Home Exercises Provided and Patient Education Provided     Education provided:   - to continue HEP, sleeping with pillow support under left arm for improved comfort    Written Home Exercises Provided: Patient instructed to cont prior HEP.  Exercises were reviewed and Eboni was able to demonstrate them prior to the end of the session.  Eboni demonstrated good  understanding of the education provided.     See EMR under Patient Instructions for exercises provided 10/23/2020.    Assessment     Pt tolerated session well. She responds well to manual techniques and requires less assist for overhead exercises. She was able to progress resistance for strengthening exercises.  Less guarding during interventions on this date with improved middle trap recruitment.  Will progress as tolerated    Eboni is progressing well towards her goals.   Pt prognosis is Good.     Pt will continue to benefit from skilled outpatient physical therapy to address the deficits listed in the problem list box on initial evaluation, provide pt/family education and to maximize pt's level of independence in the home and community environment.     Pt's spiritual, cultural and educational needs considered and pt agreeable to plan of care and goals.     Anticipated barriers to physical therapy: advanced age    Goals:   Short Term Goals: 4 weeks   1. Pt will tolerate HEP for improved strength, functional mobility, ROM, posture, and endurance. (progressing, not met)  2. Pt will report reduced L shoulder pain to </= 5/10 at worst for improved functional mobility and ability to participate  in functional activities/ADL's. (progressing, not met)  3. Pt will increase L shoulder flexion AROM to >/= 155 degrees for improved functional mobility and reach. (progressing, not met)  4. Pt will increase L shoulder ABD AROM to >/= 120 degrees for improved functional mobility and reach (progressing, not met)  5. Pt will increase L shoulder IR AROM to reach across low back for improved functional mobility and reach. (progressing, not met)  6. Pt will demo >/= 4/5 strength in LUE for improved functional mobility, endurance, and posture. (progressing, not met)  7. Pt will report improved ability to drive without increase in pain/symptoms for improved functional mobility (progressing, not met)  8. pt will report improved ability to reach back to don/doff bra without increase in pain/symptoms for improved functional mobility (progressing, not met)     Long Term Goals: 8 weeks   1. Pt will be I with updated HEP for improved functional mobility, posture, strength, and endurance. (progressing, not met)  2. Pt will report reduced L shoulder pain to </= 3/10 at worst for improved functional mobility and ability to participate in functional activities/ADL's. (progressing, not met)  3. Pt will increase L shoulder flexion AROM to >/= 160 degrees for improved functional mobility and reach. (progressing, not met)  4. Pt will increase L shoulder ABD AROM to >/= 150 degrees for improved functional mobility and reach. (progressing, not met)     5. Pt will demo 5/5 strength in BUE's for improved functional mobility, endurance, and posture. (progressing, not met)  6. Pt will demo/report increased ability to reach items overhead without pain/symptoms for improved functional mobility (progressing, not met)    Plan     Continue per PT plan of care.     Afia Perla, PT

## 2020-11-05 NOTE — PROGRESS NOTES
Physical Therapy Daily Treatment Note     Name: Eboni Nicholas  Clinic Number: [id number]    Therapy Diagnosis:   Encounter Diagnoses   Name Primary?    Acute pain of left shoulder     Muscle weakness     Poor posture     Impaired functional mobility and activity tolerance     Decreased ROM of left shoulder      Physician: Annika Mendoza DO    Visit Date: 11/6/2020       Physician Orders: PT Eval and Treat- Pain secondary to incomplete left rotator cuff tear.  Increase shoulder internal rotation, scapular stabilization, rotator cuff strengthening.  Home exercise program needed.  Medical Diagnosis from Referral:   Traumatic incomplete tear of left rotator cuff, subsequent encounter   M75.42 (ICD-10-CM) - Impingement syndrome of left shoulder   M19.012 (ICD-10-CM) - Primary osteoarthritis of left shoulder         Evaluation Date: 10/2/2020  Authorization Period Expiration: 10/29/20  Plan of Care Expiration: 12/4/20  Visit # / Visits authorized: 5/20    Time In: 9:45 am  Time Out: 1030am  Total Billable Time: 45 minutes    Precautions: Standard    Subjective     Pt reports: Patient reports that she continues to have limitations with reaching behind her back because of pain.  She was compliant with home exercise program.  Response to previous treatment: felt good  Functional change: sleeping better    Pain: 0/10  Location: left shoulder      Objective      Eboni received therapeutic exercises to develop strength, endurance, ROM and posture for 35 minutes including:    Pulleys flexion/scaption 2.5 min each   L shoulder PROM/stretch by PT, 5-7 reps of following: ER at 90, Flexion, ABD  Side lying: flexion/abduction with manual assistance with PT, x20 each   Row green band, 3x10   Extension green band, 2x10  ER and IR walk outs, LUE, 2x10 reps each, single green band  1x15 supine D2 YTB      Eboni received the following manual therapy techniques: Joint mobilizations and Soft tissue Mobilization were applied to  the: left shoulder for 10 minutes, including:  Posterior capsule stretch  Grade I/II GH mobs  GH Inferior distraction   Myofascial manipulation to left UT      Eboni received cold pack for 0 minutes to left shoulder.    Home Exercises Provided and Patient Education Provided     Education provided:   - to continue HEP    Written Home Exercises Provided: Patient instructed to cont prior HEP.  Exercises were reviewed and Eboni was able to demonstrate them prior to the end of the session.  Eboni demonstrated good  understanding of the education provided.     See EMR under Patient Instructions for exercises provided 10/23/2020.    Assessment   Pt performed all interventions well without shoulder pain. Patient is progressing well towards goals.     Eboni is progressing well towards her goals.   Pt prognosis is Good.     Pt will continue to benefit from skilled outpatient physical therapy to address the deficits listed in the problem list box on initial evaluation, provide pt/family education and to maximize pt's level of independence in the home and community environment.     Pt's spiritual, cultural and educational needs considered and pt agreeable to plan of care and goals.     Anticipated barriers to physical therapy: advanced age    Goals:   Short Term Goals: 4 weeks   1. Pt will tolerate HEP for improved strength, functional mobility, ROM, posture, and endurance. (progressing, not met)  2. Pt will report reduced L shoulder pain to </= 5/10 at worst for improved functional mobility and ability to participate in functional activities/ADL's. (progressing, not met)  3. Pt will increase L shoulder flexion AROM to >/= 155 degrees for improved functional mobility and reach. (progressing, not met)  4. Pt will increase L shoulder ABD AROM to >/= 120 degrees for improved functional mobility and reach (progressing, not met)  5. Pt will increase L shoulder IR AROM to reach across low back for improved functional mobility and  reach. (progressing, not met)  6. Pt will demo >/= 4/5 strength in LUE for improved functional mobility, endurance, and posture. (progressing, not met)  7. Pt will report improved ability to drive without increase in pain/symptoms for improved functional mobility (progressing, not met)  8. pt will report improved ability to reach back to don/doff bra without increase in pain/symptoms for improved functional mobility (progressing, not met)     Long Term Goals: 8 weeks   1. Pt will be I with updated HEP for improved functional mobility, posture, strength, and endurance. (progressing, not met)  2. Pt will report reduced L shoulder pain to </= 3/10 at worst for improved functional mobility and ability to participate in functional activities/ADL's. (progressing, not met)  3. Pt will increase L shoulder flexion AROM to >/= 160 degrees for improved functional mobility and reach. (progressing, not met)  4. Pt will increase L shoulder ABD AROM to >/= 150 degrees for improved functional mobility and reach. (progressing, not met)  5. Pt will demo 5/5 strength in BUE's for improved functional mobility, endurance, and posture. (progressing, not met)  6. Pt will demo/report increased ability to reach items overhead without pain/symptoms for improved functional mobility (progressing, not met)    Plan   Progress as tolerated        Bel Yepez, PT

## 2020-11-06 ENCOUNTER — CLINICAL SUPPORT (OUTPATIENT)
Dept: REHABILITATION | Facility: HOSPITAL | Age: 75
End: 2020-11-06
Payer: MEDICARE

## 2020-11-06 DIAGNOSIS — R29.3 POOR POSTURE: ICD-10-CM

## 2020-11-06 DIAGNOSIS — M25.612 DECREASED ROM OF LEFT SHOULDER: ICD-10-CM

## 2020-11-06 DIAGNOSIS — M62.81 MUSCLE WEAKNESS: ICD-10-CM

## 2020-11-06 DIAGNOSIS — Z74.09 IMPAIRED FUNCTIONAL MOBILITY AND ACTIVITY TOLERANCE: ICD-10-CM

## 2020-11-06 DIAGNOSIS — M25.512 ACUTE PAIN OF LEFT SHOULDER: ICD-10-CM

## 2020-11-06 PROCEDURE — 97110 THERAPEUTIC EXERCISES: CPT | Mod: PO

## 2020-11-06 PROCEDURE — 97140 MANUAL THERAPY 1/> REGIONS: CPT | Mod: PO

## 2020-11-11 ENCOUNTER — CLINICAL SUPPORT (OUTPATIENT)
Dept: REHABILITATION | Facility: HOSPITAL | Age: 75
End: 2020-11-11
Attending: NEUROMUSCULOSKELETAL MEDICINE & OMM
Payer: MEDICARE

## 2020-11-11 DIAGNOSIS — R29.3 POOR POSTURE: ICD-10-CM

## 2020-11-11 DIAGNOSIS — M62.81 MUSCLE WEAKNESS: ICD-10-CM

## 2020-11-11 DIAGNOSIS — M25.512 ACUTE PAIN OF LEFT SHOULDER: ICD-10-CM

## 2020-11-11 DIAGNOSIS — Z74.09 IMPAIRED FUNCTIONAL MOBILITY AND ACTIVITY TOLERANCE: ICD-10-CM

## 2020-11-11 DIAGNOSIS — M25.612 DECREASED ROM OF LEFT SHOULDER: ICD-10-CM

## 2020-11-11 PROCEDURE — 97110 THERAPEUTIC EXERCISES: CPT | Mod: PO

## 2020-11-11 NOTE — PROGRESS NOTES
Physical Therapy Daily Treatment Note     Name: Eboni Nicholas  Clinic Number: [id number]    Therapy Diagnosis:   Encounter Diagnoses   Name Primary?    Acute pain of left shoulder     Muscle weakness     Poor posture     Impaired functional mobility and activity tolerance     Decreased ROM of left shoulder      Physician: Annika Mendoza DO    Visit Date: 11/11/2020       Physician Orders: PT Eval and Treat- Pain secondary to incomplete left rotator cuff tear.  Increase shoulder internal rotation, scapular stabilization, rotator cuff strengthening.  Home exercise program needed.  Medical Diagnosis from Referral:   Traumatic incomplete tear of left rotator cuff, subsequent encounter   M75.42 (ICD-10-CM) - Impingement syndrome of left shoulder   M19.012 (ICD-10-CM) - Primary osteoarthritis of left shoulder         Evaluation Date: 10/2/2020  Authorization Period Expiration: 10/29/20  Plan of Care Expiration: 12/4/20  Visit # / Visits authorized: 6/20    Time In: 9:30 am  Time Out: 10:15am  Total Billable Time: 40 minutes    Precautions: Standard    Subjective     Pt reports: Patient reports that she continues to have limitations with reaching behind her back because of pain, but she can reach her socks and overhead better. Pain level at worst recently has been 5/10.  She was compliant with home exercise program.  Response to previous treatment: felt good  Functional change: can reach her socks    Pain: 2/10  Location: left shoulder      Objective      Eboni received therapeutic exercises to develop strength, endurance, ROM and posture for 35 minutes including:    Active Range of Motion:   Shoulder Left Right   Flexion 150 165   Abduction 145 158   ER  T2 T2   ER at 90 45* 90   IR Outside of L hip Across low back   IR at 90 80 90   *pain     Upper Extremity Strength    (L) UE (R) UE   Shoulder flexion: 5/5 5/5   Shoulder Abduction: 4/5 5/5   Shoulder ER 5/5 5/5   Shoulder IR 5/5 5/5   Lower Trap TBA TBA    Middle Trap TBA TBA   Rhomboids TBA TBA   Biceps, triceps, wrist flex/ext are 5/5 B      UBE backwards, 3 min, level 1  Pulleys flexion/scaption 2 min each   L shoulder PROM/stretch by PT, 7 reps of following: ER at 90, Flexion  L posterior shoulder stretch, 10 reps  2 x 10 supine D2 with manual assist-> resistance  Side lying L ABD with 1#, 2 x 10  Side-lying, L shoulder ER with 1#, 2 x 10 reps  Row green band, 3x10   L shoulder IR stretch c/ towel 4 x 10s holds          Eboni received the following manual therapy techniques: Joint mobilizations and Soft tissue Mobilization were applied to the: left shoulder for 5 minutes, including:    Grade I/II GH mobs  GH Inferior distraction   Myofascial manipulation to left UT -not performed today    Not performed today:  Extension green band, 2x10  ER and IR walk outs, LUE, 2x10 reps each, single green band    Home Exercises Provided and Patient Education Provided     Education provided:   - to continue HEP    Written Home Exercises Provided: Patient instructed to cont prior HEP.  Exercises were reviewed and Eboni was able to demonstrate them prior to the end of the session.  Eboni demonstrated good  understanding of the education provided.     See EMR under Patient Instructions for exercises provided 10/23/2020.    Assessment   Pt performed all interventions well without shoulder pain. Patient is progressing well towards goals. She demo's improved strength and ROM, and she endorses less pain and improved function. She continues to report difficulty/restriction with reaching for small of back to dress more easily, and she does have limitations in L shoulder ROM and strength. She has met goals as noted below and will benefit from continued therapy to address remaining goals.    Eboni is progressing well towards her goals.   Pt prognosis is Good.     Pt will continue to benefit from skilled outpatient physical therapy to address the deficits listed in the problem list  box on initial evaluation, provide pt/family education and to maximize pt's level of independence in the home and community environment.     Pt's spiritual, cultural and educational needs considered and pt agreeable to plan of care and goals.     Anticipated barriers to physical therapy: advanced age    Goals:   Short Term Goals: 4 weeks   1. Pt will tolerate HEP for improved strength, functional mobility, ROM, posture, and endurance. Met, 11/11)  2. Pt will report reduced L shoulder pain to </= 5/10 at worst for improved functional mobility and ability to participate in functional activities/ADL's. (Met, 11/11)  3. Pt will increase L shoulder flexion AROM to >/= 155 degrees for improved functional mobility and reach. (progressing, not met)  4. Pt will increase L shoulder ABD AROM to >/= 120 degrees for improved functional mobility and reach (Exceeded, 11/11)  5. Pt will increase L shoulder IR AROM to reach across low back for improved functional mobility and reach. (progressing, not met)  6. Pt will demo >/= 4/5 strength in LUE for improved functional mobility, endurance, and posture. (Met, 11/11)  7. Pt will report improved ability to drive without increase in pain/symptoms for improved functional mobility (Met, 11/11)  8. pt will report improved ability to reach back to don/doff bra without increase in pain/symptoms for improved functional mobility (progressing, not met)     Long Term Goals: 8 weeks   1. Pt will be I with updated HEP for improved functional mobility, posture, strength, and endurance. (progressing, not met)  2. Pt will report reduced L shoulder pain to </= 3/10 at worst for improved functional mobility and ability to participate in functional activities/ADL's. (progressing, not met)  3. Pt will increase L shoulder flexion AROM to >/= 160 degrees for improved functional mobility and reach. (progressing, not met)  4. Pt will increase L shoulder ABD AROM to >/= 150 degrees for improved functional  mobility and reach. (progressing, not met)  5. Pt will demo 5/5 strength in BUE's for improved functional mobility, endurance, and posture. (progressing, not met)  6. Pt will demo/report increased ability to reach items overhead without pain/symptoms for improved functional mobility (Met, 11/11)    Plan   Progress as tolerated        Afia Perla, PT

## 2020-11-13 ENCOUNTER — CLINICAL SUPPORT (OUTPATIENT)
Dept: REHABILITATION | Facility: HOSPITAL | Age: 75
End: 2020-11-13
Payer: MEDICARE

## 2020-11-13 DIAGNOSIS — M25.512 ACUTE PAIN OF LEFT SHOULDER: ICD-10-CM

## 2020-11-13 DIAGNOSIS — Z74.09 IMPAIRED FUNCTIONAL MOBILITY AND ACTIVITY TOLERANCE: ICD-10-CM

## 2020-11-13 DIAGNOSIS — M62.81 MUSCLE WEAKNESS: ICD-10-CM

## 2020-11-13 DIAGNOSIS — R29.3 POOR POSTURE: ICD-10-CM

## 2020-11-13 DIAGNOSIS — M25.612 DECREASED ROM OF LEFT SHOULDER: ICD-10-CM

## 2020-11-13 PROCEDURE — 97110 THERAPEUTIC EXERCISES: CPT | Mod: PO

## 2020-11-13 NOTE — PROGRESS NOTES
Physical Therapy Daily Treatment Note     Name: Eboni Nicholas  Clinic Number: [id number]    Therapy Diagnosis:   Encounter Diagnoses   Name Primary?    Acute pain of left shoulder     Muscle weakness     Poor posture     Impaired functional mobility and activity tolerance     Decreased ROM of left shoulder      Physician: Annika Mendoza DO    Visit Date: 11/13/2020       Physician Orders: PT Eval and Treat- Pain secondary to incomplete left rotator cuff tear.  Increase shoulder internal rotation, scapular stabilization, rotator cuff strengthening.  Home exercise program needed.  Medical Diagnosis from Referral:   Traumatic incomplete tear of left rotator cuff, subsequent encounter   M75.42 (ICD-10-CM) - Impingement syndrome of left shoulder   M19.012 (ICD-10-CM) - Primary osteoarthritis of left shoulder         Evaluation Date: 10/2/2020  Authorization Period Expiration: 10/29/20  Plan of Care Expiration: 12/4/20  Visit # / Visits authorized: 7/20    Time In: 10:10 am  Time Out: 11:05 am  Total Billable Time: 41 minutes    Precautions: Standard    Subjective     Pt reports: No new complaints. Was sore yesterday, but feels better now.  She was compliant with home exercise program.  Response to previous treatment: felt good  Functional change: can reach her socks    Pain: 0/10  Location: left shoulder      Objective      Eboni received therapeutic exercises to develop strength, endurance, ROM and posture for 35 minutes including:    UBE backwards, 4 min, level 1  Wall slides flexion, 2 x 10 reps  Wall slides ABD, 2 x 10, LUE only  LUE ER stretch at wall, 10 x 5s  L shoulder IR stretch c/ towel 10 x 5s holds  Scapular clocks at wall, yellow band x ~8 rounds  L shoulder ER with dowel, 2 x 10 reps  Supine alphabet with LUE, 1# weight, 2 rounds  L shoulder flexion, 1 #, 2 x 10 (chest press to shoulder flexion)  L shoulder PROM/stretch by PT, 7 reps of following: ER at 90, ABD  Side lying L ABD with 1#, 2 x  10  L posterior shoulder stretch, 10 reps                Eboni received the following manual therapy techniques: Joint mobilizations and Soft tissue Mobilization were applied to the: left shoulder for 6 minutes, including:    Grade I/II GH mobs  GH Inferior distraction   Myofascial manipulation to left UT     Ice x 8min to L shoulder at end of session    Not performed today:  Extension green band, 2x10  ER and IR walk outs, LUE, 2x10 reps each, single green band  2 x 10 supine D2 with manual assist-> resistance  Side-lying, L shoulder ER with 1#, 2 x 10 reps  Row green band, 3x10     Home Exercises Provided and Patient Education Provided     Education provided:   - to continue HEP    Written Home Exercises Provided: Patient instructed to cont prior HEP.  Exercises were reviewed and Eboni was able to demonstrate them prior to the end of the session.  Eboni demonstrated good  understanding of the education provided.     See EMR under Patient Instructions for exercises provided 10/23/2020.    Assessment   Pt performed all interventions well without shoulder pain. Patient is progressing well towards goals. She was able to progress multiple exercises and denies pain today. She will benefit from continued therapy to address remaining goals. Progress as tolerated.    Eboni is progressing well towards her goals.   Pt prognosis is Good.     Pt will continue to benefit from skilled outpatient physical therapy to address the deficits listed in the problem list box on initial evaluation, provide pt/family education and to maximize pt's level of independence in the home and community environment.     Pt's spiritual, cultural and educational needs considered and pt agreeable to plan of care and goals.     Anticipated barriers to physical therapy: advanced age    Goals:   Short Term Goals: 4 weeks   1. Pt will tolerate HEP for improved strength, functional mobility, ROM, posture, and endurance. Met, 11/11)  2. Pt will report  reduced L shoulder pain to </= 5/10 at worst for improved functional mobility and ability to participate in functional activities/ADL's. (Met, 11/11)  3. Pt will increase L shoulder flexion AROM to >/= 155 degrees for improved functional mobility and reach. (progressing, not met)  4. Pt will increase L shoulder ABD AROM to >/= 120 degrees for improved functional mobility and reach (Exceeded, 11/11)  5. Pt will increase L shoulder IR AROM to reach across low back for improved functional mobility and reach. (progressing, not met)  6. Pt will demo >/= 4/5 strength in LUE for improved functional mobility, endurance, and posture. (Met, 11/11)  7. Pt will report improved ability to drive without increase in pain/symptoms for improved functional mobility (Met, 11/11)  8. pt will report improved ability to reach back to don/doff bra without increase in pain/symptoms for improved functional mobility (progressing, not met)     Long Term Goals: 8 weeks   1. Pt will be I with updated HEP for improved functional mobility, posture, strength, and endurance. (progressing, not met)  2. Pt will report reduced L shoulder pain to </= 3/10 at worst for improved functional mobility and ability to participate in functional activities/ADL's. (progressing, not met)  3. Pt will increase L shoulder flexion AROM to >/= 160 degrees for improved functional mobility and reach. (progressing, not met)  4. Pt will increase L shoulder ABD AROM to >/= 150 degrees for improved functional mobility and reach. (progressing, not met)  5. Pt will demo 5/5 strength in BUE's for improved functional mobility, endurance, and posture. (progressing, not met)  6. Pt will demo/report increased ability to reach items overhead without pain/symptoms for improved functional mobility (Met, 11/11)    Plan   Progress as tolerated        Afia Perla, PT

## 2020-11-18 ENCOUNTER — CLINICAL SUPPORT (OUTPATIENT)
Dept: REHABILITATION | Facility: HOSPITAL | Age: 75
End: 2020-11-18
Payer: MEDICARE

## 2020-11-18 DIAGNOSIS — Z74.09 IMPAIRED FUNCTIONAL MOBILITY AND ACTIVITY TOLERANCE: ICD-10-CM

## 2020-11-18 DIAGNOSIS — R29.3 POOR POSTURE: ICD-10-CM

## 2020-11-18 DIAGNOSIS — M25.612 DECREASED ROM OF LEFT SHOULDER: ICD-10-CM

## 2020-11-18 DIAGNOSIS — M62.81 MUSCLE WEAKNESS: ICD-10-CM

## 2020-11-18 DIAGNOSIS — M25.512 ACUTE PAIN OF LEFT SHOULDER: ICD-10-CM

## 2020-11-18 PROCEDURE — 97110 THERAPEUTIC EXERCISES: CPT | Mod: PO | Performed by: PHYSICAL THERAPIST

## 2020-11-18 PROCEDURE — 97140 MANUAL THERAPY 1/> REGIONS: CPT | Mod: PO | Performed by: PHYSICAL THERAPIST

## 2020-11-18 PROCEDURE — 97162 PT EVAL MOD COMPLEX 30 MIN: CPT | Mod: PO | Performed by: PHYSICAL THERAPIST

## 2020-11-18 NOTE — PROGRESS NOTES
Physical Therapy Daily Treatment Note     Name: Eboni Nicholas  Clinic Number: [id number]    Therapy Diagnosis:   Encounter Diagnoses   Name Primary?    Acute pain of left shoulder     Muscle weakness     Poor posture     Impaired functional mobility and activity tolerance     Decreased ROM of left shoulder      Physician: Annika Mendoza DO    Visit Date: 11/18/2020       Physician Orders: PT Eval and Treat- Pain secondary to incomplete left rotator cuff tear.  Increase shoulder internal rotation, scapular stabilization, rotator cuff strengthening.  Home exercise program needed.  Medical Diagnosis from Referral:   Traumatic incomplete tear of left rotator cuff, subsequent encounter   M75.42 (ICD-10-CM) - Impingement syndrome of left shoulder   M19.012 (ICD-10-CM) - Primary osteoarthritis of left shoulder         Evaluation Date: 10/2/2020  Authorization Period Expiration: 10/29/20  Plan of Care Expiration: 12/4/20  Visit # / Visits authorized: 8 /20    Time In: 1415 am  Time Out: 1505  am  Total Billable Time: 50 minutes    Precautions: Standard    Subjective     Pt reports: No new complaints. Was sore yesterday, but feels better now.  She was compliant with home exercise program.  Response to previous treatment: felt good  Functional change: can reach her socks    Pain: 0/10  Location: left shoulder      Objective      Eboni received therapeutic exercises to develop strength, endurance, ROM and posture for 40 minutes including:    UBE backwards, 4 min, level 1  Wall slides flexion, 2 x 10 reps  Wall slides ABD, 2 x 10, LUE only  LUE ER stretch at wall, 10 x 5s  L shoulder IR stretch c/ towel 10 x 5s holds  Scapular clocks at wall, yellow band x ~8 rounds  L shoulder ER with dowel, 2 x 10 reps  Supine alphabet with LUE, 1# weight, 2 rounds  L shoulder flexion, 1 #, 2 x 10 (chest press to shoulder flexion)  L shoulder PROM/stretch by PT, 7 reps of following: ER at 90, ABD  Side lying L ABD with 1#, 2 x  10  L posterior shoulder stretch, 10 reps      Eboni received the following manual therapy techniques: Joint mobilizations and Soft tissue Mobilization were applied to the: left shoulder for 10 minutes, including:  PNF CR / Hold Relax with passive mobility and stretching   Grade I/II GH mobs  GH Inferior distraction   Myofascial manipulation to left UT     Ice x **min   L shoulder at end of session  DEFERRED    Not performed today:  Extension green band, 2x10  ER and IR walk outs, LUE, 2x10 reps each, single green band  2 x 10 supine D2 with manual assist-> resistance  Side-lying, L shoulder ER with 1#, 2 x 10 reps  Row green band, 3x10     Home Exercises Provided and Patient Education Provided     Education provided:   - to continue HEP    Written Home Exercises Provided: Patient instructed to cont prior HEP.  Exercises were reviewed and Eboni was able to demonstrate them prior to the end of the session.  Eboni demonstrated good  understanding of the education provided.     See EMR under Patient Instructions for exercises provided 10/23/2020.    Assessment     The patient responded favorably to manual techniques and passive stretching. She does encounter pain with ER at 90 position especially at end range of passive stretching. She is progressing with flexion and abduction that she demonstrated against the wall with good mobility.  Did not report pain at the end of the session.    Eboni is progressing well towards her goals.   Pt prognosis is Good.     Pt will continue to benefit from skilled outpatient physical therapy to address the deficits listed in the problem list box on initial evaluation, provide pt/family education and to maximize pt's level of independence in the home and community environment.     Pt's spiritual, cultural and educational needs considered and pt agreeable to plan of care and goals.     Anticipated barriers to physical therapy: advanced age    Goals:   Short Term Goals: 4 weeks   1. Pt  will tolerate HEP for improved strength, functional mobility, ROM, posture, and endurance. Met, 11/11)  2. Pt will report reduced L shoulder pain to </= 5/10 at worst for improved functional mobility and ability to participate in functional activities/ADL's. (Met, 11/11)  3. Pt will increase L shoulder flexion AROM to >/= 155 degrees for improved functional mobility and reach. (progressing, not met)  4. Pt will increase L shoulder ABD AROM to >/= 120 degrees for improved functional mobility and reach (Exceeded, 11/11)  5. Pt will increase L shoulder IR AROM to reach across low back for improved functional mobility and reach. (progressing, not met)  6. Pt will demo >/= 4/5 strength in LUE for improved functional mobility, endurance, and posture. (Met, 11/11)  7. Pt will report improved ability to drive without increase in pain/symptoms for improved functional mobility (Met, 11/11)  8. pt will report improved ability to reach back to don/doff bra without increase in pain/symptoms for improved functional mobility (progressing, not met)     Long Term Goals: 8 weeks   1. Pt will be I with updated HEP for improved functional mobility, posture, strength, and endurance. (progressing, not met)  2. Pt will report reduced L shoulder pain to </= 3/10 at worst for improved functional mobility and ability to participate in functional activities/ADL's. (progressing, not met)  3. Pt will increase L shoulder flexion AROM to >/= 160 degrees for improved functional mobility and reach. (progressing, not met)  4. Pt will increase L shoulder ABD AROM to >/= 150 degrees for improved functional mobility and reach. (progressing, not met)  5. Pt will demo 5/5 strength in BUE's for improved functional mobility, endurance, and posture. (progressing, not met)  6. Pt will demo/report increased ability to reach items overhead without pain/symptoms for improved functional mobility (Met, 11/11)    Plan   Progress as tolerated        Mitchell Claros  PT

## 2020-11-24 ENCOUNTER — CLINICAL SUPPORT (OUTPATIENT)
Dept: REHABILITATION | Facility: HOSPITAL | Age: 75
End: 2020-11-24
Payer: MEDICARE

## 2020-11-24 DIAGNOSIS — M62.81 MUSCLE WEAKNESS: ICD-10-CM

## 2020-11-24 DIAGNOSIS — M25.512 ACUTE PAIN OF LEFT SHOULDER: ICD-10-CM

## 2020-11-24 DIAGNOSIS — R29.3 POOR POSTURE: ICD-10-CM

## 2020-11-24 DIAGNOSIS — Z74.09 IMPAIRED FUNCTIONAL MOBILITY AND ACTIVITY TOLERANCE: ICD-10-CM

## 2020-11-24 DIAGNOSIS — M25.612 DECREASED ROM OF LEFT SHOULDER: ICD-10-CM

## 2020-11-24 PROCEDURE — 97110 THERAPEUTIC EXERCISES: CPT | Mod: PO

## 2020-11-24 PROCEDURE — 97140 MANUAL THERAPY 1/> REGIONS: CPT | Mod: PO

## 2020-11-24 NOTE — PROGRESS NOTES
Physical Therapy Daily Treatment Note     Name: Eboni Nicholas  Clinic Number: [id number]    Therapy Diagnosis:   Encounter Diagnoses   Name Primary?    Acute pain of left shoulder     Muscle weakness     Poor posture     Impaired functional mobility and activity tolerance     Decreased ROM of left shoulder      Physician: Annika Mendoza DO    Visit Date: 11/24/2020       Physician Orders: PT Eval and Treat- Pain secondary to incomplete left rotator cuff tear.  Increase shoulder internal rotation, scapular stabilization, rotator cuff strengthening.  Home exercise program needed.  Medical Diagnosis from Referral:   Traumatic incomplete tear of left rotator cuff, subsequent encounter   M75.42 (ICD-10-CM) - Impingement syndrome of left shoulder   M19.012 (ICD-10-CM) - Primary osteoarthritis of left shoulder         Evaluation Date: 10/2/2020  Authorization Period Expiration: 10/29/20  Plan of Care Expiration: 12/4/20  Visit # / Visits authorized: 9 /20    Time In: 1345 am  Time Out: 1430am  Total Billable Time: 45minutes    Precautions: Standard    Subjective     Pt reports: No new complaints. Reports that her shoulder is stiff and aches. Denies sharp pain. Improvments include putting on her socks and lifting her arm for longer periods of time. Remains limited with reaching behind her back.  She was compliant with home exercise program.  Response to previous treatment: felt good  Functional change: can reach her socks    Pain: 0/10  Location: left shoulder      Objective      Eboni received therapeutic exercises to develop strength, endurance, ROM and posture for 35 minutes including:    UBE backwards, 4 min, level 1  Wall slides flexion, 2 x 10 reps  Wall slides ABD, 2 x 10, LUE only  LUE ER stretch at wall, 10 x 5s  L shoulder IR stretch c/ towel 10 x 5s holds  Scapular clocks at wall, yellow band x ~8 rounds  L shoulder ER with dowel, 2 x 10 reps  Supine alphabet with LUE, 1# weight, 2 rounds  L shoulder  flexion, 1 #, 2 x 10 (chest press to shoulder flexion)  L shoulder PROM/stretch by PT, 7 reps of following: ER at 90, ABD  Side lying L ABD with 1#, 2 x 10  L posterior shoulder stretch, 10 reps (side lying stabilizing the scapula)      Eboni received the following manual therapy techniques: Joint mobilizations and Soft tissue Mobilization were applied to the: left shoulder for 10 minutes, including:  PNF CR / Hold Relax with passive mobility and stretching   Grade I/II GH mobs  GH Inferior distraction   Myofascial manipulation to left UT     Ice x **min   L shoulder at end of session  DEFERRED    Not performed today:  Extension green band, 2x10  ER and IR walk outs, LUE, 2x10 reps each, single green band  2 x 10 supine D2 with manual assist-> resistance  Side-lying, L shoulder ER with 1#, 2 x 10 reps  Row green band, 3x10     Home Exercises Provided and Patient Education Provided     Education provided:   - to continue HEP    Written Home Exercises Provided: Patient instructed to cont prior HEP.  Exercises were reviewed and Eboni was able to demonstrate them prior to the end of the session.  Eboni demonstrated good  understanding of the education provided.     See EMR under Patient Instructions for exercises provided 10/23/2020.    Assessment     The pt tolerated the tx session well noting no pain post activity. Limited with IR behind the back but gross ROM appears to be improving. Plan to continue to progress as tolerated.    Eboni is progressing well towards her goals.   Pt prognosis is Good.     Pt will continue to benefit from skilled outpatient physical therapy to address the deficits listed in the problem list box on initial evaluation, provide pt/family education and to maximize pt's level of independence in the home and community environment.     Pt's spiritual, cultural and educational needs considered and pt agreeable to plan of care and goals.     Anticipated barriers to physical therapy: advanced  age    Goals:   Short Term Goals: 4 weeks   1. Pt will tolerate HEP for improved strength, functional mobility, ROM, posture, and endurance. Met, 11/11)  2. Pt will report reduced L shoulder pain to </= 5/10 at worst for improved functional mobility and ability to participate in functional activities/ADL's. (Met, 11/11)  3. Pt will increase L shoulder flexion AROM to >/= 155 degrees for improved functional mobility and reach. (progressing, not met)  4. Pt will increase L shoulder ABD AROM to >/= 120 degrees for improved functional mobility and reach (Exceeded, 11/11)  5. Pt will increase L shoulder IR AROM to reach across low back for improved functional mobility and reach. (progressing, not met)  6. Pt will demo >/= 4/5 strength in LUE for improved functional mobility, endurance, and posture. (Met, 11/11)  7. Pt will report improved ability to drive without increase in pain/symptoms for improved functional mobility (Met, 11/11)  8. pt will report improved ability to reach back to don/doff bra without increase in pain/symptoms for improved functional mobility (progressing, not met)     Long Term Goals: 8 weeks   1. Pt will be I with updated HEP for improved functional mobility, posture, strength, and endurance. (progressing, not met)  2. Pt will report reduced L shoulder pain to </= 3/10 at worst for improved functional mobility and ability to participate in functional activities/ADL's. (progressing, not met)  3. Pt will increase L shoulder flexion AROM to >/= 160 degrees for improved functional mobility and reach. (progressing, not met)  4. Pt will increase L shoulder ABD AROM to >/= 150 degrees for improved functional mobility and reach. (progressing, not met)  5. Pt will demo 5/5 strength in BUE's for improved functional mobility, endurance, and posture. (progressing, not met)  6. Pt will demo/report increased ability to reach items overhead without pain/symptoms for improved functional mobility (Met,  11/11)    Plan   Progress as tolerated        Ernie Person, PT

## 2020-11-29 ENCOUNTER — PATIENT OUTREACH (OUTPATIENT)
Dept: ADMINISTRATIVE | Facility: OTHER | Age: 75
End: 2020-11-29

## 2020-11-30 ENCOUNTER — OFFICE VISIT (OUTPATIENT)
Dept: OPTOMETRY | Facility: CLINIC | Age: 75
End: 2020-11-30
Payer: MEDICARE

## 2020-11-30 DIAGNOSIS — H40.1121 PRIMARY OPEN ANGLE GLAUCOMA (POAG) OF LEFT EYE, MILD STAGE: ICD-10-CM

## 2020-11-30 DIAGNOSIS — H25.13 NUCLEAR SCLEROSIS OF BOTH EYES: ICD-10-CM

## 2020-11-30 DIAGNOSIS — Z98.890 HISTORY OF LASER IRIDOTOMY: ICD-10-CM

## 2020-11-30 DIAGNOSIS — H40.1112 PRIMARY OPEN ANGLE GLAUCOMA (POAG) OF RIGHT EYE, MODERATE STAGE: Primary | ICD-10-CM

## 2020-11-30 PROCEDURE — 99999 PR PBB SHADOW E&M-EST. PATIENT-LVL II: CPT | Mod: PBBFAC,,, | Performed by: OPTOMETRIST

## 2020-11-30 PROCEDURE — 92012 INTRM OPH EXAM EST PATIENT: CPT | Mod: S$GLB,,, | Performed by: OPTOMETRIST

## 2020-11-30 PROCEDURE — 92012 PR EYE EXAM, EST PATIENT,INTERMED: ICD-10-PCS | Mod: S$GLB,,, | Performed by: OPTOMETRIST

## 2020-11-30 PROCEDURE — 99999 PR PBB SHADOW E&M-EST. PATIENT-LVL II: ICD-10-PCS | Mod: PBBFAC,,, | Performed by: OPTOMETRIST

## 2020-11-30 RX ORDER — TIMOLOL MALEATE 5 MG/ML
1 SOLUTION/ DROPS OPHTHALMIC 2 TIMES DAILY
Qty: 15 ML | Refills: 3 | Status: SHIPPED | OUTPATIENT
Start: 2020-11-30 | End: 2022-05-02 | Stop reason: SDUPTHER

## 2020-11-30 NOTE — PROGRESS NOTES
HPI     DLS 10/19/2020  Patient here for IOP CK.  Patient using Brimonidine  BID OU  Latanoprost QHS OU    Last edited by Osman Campbell, OD on 11/30/2020  3:26 PM. (History)              Assessment /Plan     For exam results, see Encounter Report.    Primary open angle glaucoma (POAG) of right eye, moderate stage    Primary open angle glaucoma (POAG) of left eye, mild stage    History of laser iridotomy    Nuclear sclerosis of both eyes    Other orders  -     timolol maleate 0.5% (TIMOPTIC) 0.5 % Drop; Place 1 drop into both eyes 2 (two) times daily.  Dispense: 15 mL; Refill: 3      1,2,3. IOP not low enough, will add timolol, just split Combigan into 2 separate drops. Combigan was too expensive, target 12-14 od. nerve eval stable, prev OCT stable with rnfl loss od, normal os, prev hvf with inf defects od and normal os, fam history of glaucoma but no blindness, pachy normal. RTC 4 mos iop ck  4. Educated pt on presence of cataracts and effects on vision. No surgery at this time. Recheck in one year.

## 2020-12-02 ENCOUNTER — CLINICAL SUPPORT (OUTPATIENT)
Dept: REHABILITATION | Facility: HOSPITAL | Age: 75
End: 2020-12-02
Payer: MEDICARE

## 2020-12-02 DIAGNOSIS — M25.612 DECREASED ROM OF LEFT SHOULDER: ICD-10-CM

## 2020-12-02 DIAGNOSIS — M62.81 MUSCLE WEAKNESS: ICD-10-CM

## 2020-12-02 DIAGNOSIS — M25.512 ACUTE PAIN OF LEFT SHOULDER: ICD-10-CM

## 2020-12-02 DIAGNOSIS — R29.3 POOR POSTURE: ICD-10-CM

## 2020-12-02 DIAGNOSIS — Z74.09 IMPAIRED FUNCTIONAL MOBILITY AND ACTIVITY TOLERANCE: ICD-10-CM

## 2020-12-02 PROCEDURE — 97110 THERAPEUTIC EXERCISES: CPT | Mod: PO,CQ

## 2020-12-02 NOTE — PROGRESS NOTES
Physical Therapy Daily Treatment Note     Name: Eboni Nicholas  Clinic Number: [id number]    Therapy Diagnosis:   Encounter Diagnoses   Name Primary?    Acute pain of left shoulder     Muscle weakness     Poor posture     Impaired functional mobility and activity tolerance     Decreased ROM of left shoulder      Physician: Annika Mendoza DO    Visit Date: 12/2/2020       Physician Orders: PT Eval and Treat- Pain secondary to incomplete left rotator cuff tear.  Increase shoulder internal rotation, scapular stabilization, rotator cuff strengthening.  Home exercise program needed.  Medical Diagnosis from Referral:   Traumatic incomplete tear of left rotator cuff, subsequent encounter   M75.42 (ICD-10-CM) - Impingement syndrome of left shoulder   M19.012 (ICD-10-CM) - Primary osteoarthritis of left shoulder         Evaluation Date: 10/2/2020  Authorization Period Expiration: 10/29/20  Plan of Care Expiration: 12/4/20  Visit # / Visits authorized: 10 /20    Time In: 1405 pm  Time Out: 1450 pm  Total Billable Time: 45minutes    Precautions: Standard    Subjective     Pt reports: that her ROM has improved since start of care.  She was compliant with home exercise program.  Response to previous treatment: felt good  Functional change: can reach her socks and turn wheel to R when driving with no pain    Pain: 0/10  Location: left shoulder      Objective      Eboni received therapeutic exercises to develop strength, endurance, ROM and posture for 45 minutes including:    UBE backwards, 4 min, level 1    Wall slides flexion, 2 x 10 reps  Wall slides ABD, 2 x 10, LUE only  LUE ER stretch at wall, 10 x 5s  L shoulder IR stretch c/ towel 10 x 5s holds  Scapular clocks at wall, yellow band x ~8 rounds  L shoulder ER with dowel, 2 x 10 reps  Supine alphabet with LUE, 1# weight, 1 rounds  Supine shoulder stabilization with multi-directional perturbations with yellow TB, 1x1min  L shoulder flexion, 1 #, 2 x 10 (chest  press to shoulder flexion)  L shoulder PROM/stretch by PT, 7 reps of following: ER at 90, ABD  Side lying L ABD with 1#, 2 x 10  L posterior shoulder stretch, 10 reps (side lying stabilizing the scapula)      Eboni received the following manual therapy techniques: Joint mobilizations and Soft tissue Mobilization were applied to the: left shoulder for 5 minutes, including:  PNF CR / Hold Relax with passive mobility and stretching np  Grade I/II GH mobs  GH Inferior distraction   Myofascial manipulation to left UT np    Ice x 0 min L shoulder at end of session  DEFERRED    Not performed today:  Extension green band, 2x10  ER and IR walk outs, LUE, 2x10 reps each, single green band  2 x 10 supine D2 with manual assist-> resistance  Side-lying, L shoulder ER with 1#, 2 x 10 reps  Row green band, 3x10     Home Exercises Provided and Patient Education Provided     Education provided:   - to continue HEP    Written Home Exercises Provided: Patient instructed to cont prior HEP.  Exercises were reviewed and Eboni was able to demonstrate them prior to the end of the session.  Eboni demonstrated good  understanding of the education provided.     See EMR under Patient Instructions for exercises provided 10/23/2020.    Assessment   Pt tolerated tx well and endorses improved ROM since start of care. However, pt continues to demonstrate L shoulder ROM limitations in external and internal rotation. Plan to continue to progress as tolerated.    Eboni is progressing well towards her goals.   Pt prognosis is Good.     Pt will continue to benefit from skilled outpatient physical therapy to address the deficits listed in the problem list box on initial evaluation, provide pt/family education and to maximize pt's level of independence in the home and community environment.     Pt's spiritual, cultural and educational needs considered and pt agreeable to plan of care and goals.     Anticipated barriers to physical therapy: advanced  age    Goals:   Short Term Goals: 4 weeks   1. Pt will tolerate HEP for improved strength, functional mobility, ROM, posture, and endurance. Met, 11/11)  2. Pt will report reduced L shoulder pain to </= 5/10 at worst for improved functional mobility and ability to participate in functional activities/ADL's. (Met, 11/11)  3. Pt will increase L shoulder flexion AROM to >/= 155 degrees for improved functional mobility and reach. (progressing, not met)  4. Pt will increase L shoulder ABD AROM to >/= 120 degrees for improved functional mobility and reach (Exceeded, 11/11)  5. Pt will increase L shoulder IR AROM to reach across low back for improved functional mobility and reach. (progressing, not met)  6. Pt will demo >/= 4/5 strength in LUE for improved functional mobility, endurance, and posture. (Met, 11/11)  7. Pt will report improved ability to drive without increase in pain/symptoms for improved functional mobility (Met, 11/11)  8. pt will report improved ability to reach back to don/doff bra without increase in pain/symptoms for improved functional mobility (progressing, not met)     Long Term Goals: 8 weeks   1. Pt will be I with updated HEP for improved functional mobility, posture, strength, and endurance. (progressing, not met)  2. Pt will report reduced L shoulder pain to </= 3/10 at worst for improved functional mobility and ability to participate in functional activities/ADL's. (progressing, not met)  3. Pt will increase L shoulder flexion AROM to >/= 160 degrees for improved functional mobility and reach. (progressing, not met)  4. Pt will increase L shoulder ABD AROM to >/= 150 degrees for improved functional mobility and reach. (progressing, not met)  5. Pt will demo 5/5 strength in BUE's for improved functional mobility, endurance, and posture. (progressing, not met)  6. Pt will demo/report increased ability to reach items overhead without pain/symptoms for improved functional mobility (Met,  11/11)    Plan   Cont POC established by PT, including: L shoulder ROM, L shoulder/RC/periscapular strengthening.        Brandon Blas, PTA

## 2020-12-04 ENCOUNTER — CLINICAL SUPPORT (OUTPATIENT)
Dept: REHABILITATION | Facility: HOSPITAL | Age: 75
End: 2020-12-04
Payer: MEDICARE

## 2020-12-04 DIAGNOSIS — M25.612 DECREASED ROM OF LEFT SHOULDER: ICD-10-CM

## 2020-12-04 DIAGNOSIS — Z74.09 IMPAIRED FUNCTIONAL MOBILITY AND ACTIVITY TOLERANCE: ICD-10-CM

## 2020-12-04 DIAGNOSIS — M25.512 ACUTE PAIN OF LEFT SHOULDER: ICD-10-CM

## 2020-12-04 DIAGNOSIS — M62.81 MUSCLE WEAKNESS: ICD-10-CM

## 2020-12-04 DIAGNOSIS — R29.3 POOR POSTURE: ICD-10-CM

## 2020-12-04 PROCEDURE — 97110 THERAPEUTIC EXERCISES: CPT | Mod: PO

## 2020-12-04 PROCEDURE — 97140 MANUAL THERAPY 1/> REGIONS: CPT | Mod: PO

## 2020-12-04 NOTE — PROGRESS NOTES
Physical Therapy Daily Treatment Note     Name: Eboni Nicholas  Clinic Number: [id number]    Therapy Diagnosis:   Encounter Diagnoses   Name Primary?    Acute pain of left shoulder     Muscle weakness     Poor posture     Impaired functional mobility and activity tolerance     Decreased ROM of left shoulder      Physician: Annika Mendoza DO    Visit Date: 12/4/2020       Physician Orders: PT Eval and Treat- Pain secondary to incomplete left rotator cuff tear.  Increase shoulder internal rotation, scapular stabilization, rotator cuff strengthening.  Home exercise program needed.  Medical Diagnosis from Referral:   Traumatic incomplete tear of left rotator cuff, subsequent encounter   M75.42 (ICD-10-CM) - Impingement syndrome of left shoulder   M19.012 (ICD-10-CM) - Primary osteoarthritis of left shoulder         Evaluation Date: 10/2/2020  Authorization Period Expiration: 10/29/20  Updated Plan of Care Expiration: 1/8/21  Visit # / Visits authorized: 11 /20    Time In: 12:30 pm  Time Out: 1:17 pm  Total Billable Time: 40 minutes    Precautions: Standard    Subjective     Pt reports: feeling pretty good. Still can't get her bra on and off normally. Max pain is 3-4/10.  She was compliant with home exercise program.  Response to previous treatment: felt good  Functional change: easier to put her socks on and dry her back better    Pain: 0/10  Location: left shoulder      Objective      Eboni received therapeutic exercises to develop strength, endurance, ROM and posture for 32 minutes including:    Please refer to Plan of Care objective section for updated objective measurements     UBE 3 min fwd and 3min back  L posterior shoulder stretch, 10 reps   L shoulder flexion, 1 #, 2 x 10 (chest press to shoulder flexion)  Supine alphabet with LUE, 1# weight, 1 rounds  B shoulder ER at 90 degrees of ABD (with dowel), 2 x 10 reps  L UE D2 flexion/extension with PT resistance, 2 x 10 reps   Side-lying LUE ABD, 2 x 10  reps, 1 #  L shoulder IR stretch c/ towel 10 x 5s holds  Row green band, 3 x10         Eboni received the following manual therapy techniques: Joint mobilizations and Soft tissue Mobilization were applied to the: left shoulder for 8 minutes, including:    Grade I/II GH mobs, A-P  GH Inferior distraction   STM to L anterior shoulder and biceps        Not performed today:  Extension green band, 2x10  ER and IR walk outs, LUE, 2x10 reps each, single green band  2 x 10 supine D2 with manual assist-> resistance  Side-lying, L shoulder ER with 1#, 2 x 10 reps  Wall slides flexion, 2 x 10 reps  Wall slides ABD, 2 x 10, LUE only  LUE ER stretch at wall, 10 x 5s  Scapular clocks at wall, yellow band x ~8 rounds  L shoulder ER with dowel, 2 x 10 reps  Supine shoulder stabilization with multi-directional perturbations with yellow TB, 1x1min  L shoulder PROM/stretch by PT, 7 reps of following: ER at 90, ABD    Home Exercises Provided and Patient Education Provided     Education provided:   - progress to date, scheduling, POC    Written Home Exercises Provided: Patient instructed to cont prior HEP.  Exercises were reviewed and Eboni was able to demonstrate them prior to the end of the session.  Eboni demonstrated good  understanding of the education provided.     See EMR under Patient Instructions for exercises provided 10/23/2020.    Assessment   Pt tolerated tx well and demo's improved L shoulder AROM and strength. She still has limited L shoulder IR and ER at 90 degrees. She endorses difficulty donning/doffing bra. She has met goals as noted below. She will benefit from continued therapy to work towards remaining goals. Extending POC to 1/8/21    Eboni is progressing well towards her goals.   Pt prognosis is Good.     Pt will continue to benefit from skilled outpatient physical therapy to address the deficits listed in the problem list box on initial evaluation, provide pt/family education and to maximize pt's level of  independence in the home and community environment.     Pt's spiritual, cultural and educational needs considered and pt agreeable to plan of care and goals.     Anticipated barriers to physical therapy: advanced age    Goals:   Short Term Goals: 4 weeks   1. Pt will tolerate HEP for improved strength, functional mobility, ROM, posture, and endurance. Met, 11/11)  2. Pt will report reduced L shoulder pain to </= 5/10 at worst for improved functional mobility and ability to participate in functional activities/ADL's. (Met, 11/11)  3. Pt will increase L shoulder flexion AROM to >/= 155 degrees for improved functional mobility and reach. (Met, 12/4)  4. Pt will increase L shoulder ABD AROM to >/= 120 degrees for improved functional mobility and reach (Exceeded, 11/11)  5. Pt will increase L shoulder IR AROM to reach across low back for improved functional mobility and reach. (progressing, not met)  6. Pt will demo >/= 4/5 strength in LUE for improved functional mobility, endurance, and posture. (Met, 11/11)  7. Pt will report improved ability to drive without increase in pain/symptoms for improved functional mobility (Met, 11/11)  8. pt will report improved ability to reach back to don/doff bra without increase in pain/symptoms for improved functional mobility (progressing, not met)     Long Term Goals: 8 weeks   1. Pt will be I with updated HEP for improved functional mobility, posture, strength, and endurance. (progressing, not met)  2. Pt will report reduced L shoulder pain to </= 3/10 at worst for improved functional mobility and ability to participate in functional activities/ADL's. (progressing, not met)  3. Pt will increase L shoulder flexion AROM to >/= 160 degrees for improved functional mobility and reach. (progressing, not met)  4. Pt will increase L shoulder ABD AROM to >/= 150 degrees for improved functional mobility and reach. (Exceeded, 12/4)  5. Pt will demo 5/5 strength in BUE's for improved functional  mobility, endurance, and posture. (Met, 12/4)  6. Pt will demo/report increased ability to reach items overhead without pain/symptoms for improved functional mobility (Met, 11/11)    Plan   Cont POC established by PT, including: L shoulder ROM, L shoulder/RC/periscapular strengthening.        Afia Perla, PT

## 2020-12-04 NOTE — PLAN OF CARE
Outpatient Therapy Updated Plan of Care     Visit Date: 12/4/2020  Name: Eboni Nicholas  Clinic Number: [id number]    Therapy Diagnosis:   Encounter Diagnoses   Name Primary?    Acute pain of left shoulder     Muscle weakness     Poor posture     Impaired functional mobility and activity tolerance     Decreased ROM of left shoulder      Physician: Annika Mendoza DO    Physician Orders: PT Eval and Treat- Pain secondary to incomplete left rotator cuff tear.  Increase shoulder internal rotation, scapular stabilization, rotator cuff strengthening.  Home exercise program needed.  Medical Diagnosis from Referral:   Traumatic incomplete tear of left rotator cuff, subsequent encounter   M75.42 (ICD-10-CM) - Impingement syndrome of left shoulder   M19.012 (ICD-10-CM) - Primary osteoarthritis of left shoulder      Evaluation Date: 10/2/2020      Total Visits Received: 11  Cancelled Visits: 1  No Show Visits: 0    Current Certification Period:  10/2/20 to 12/4/20  Precautions:  Standard  Visits from Evaluation Date:  10      Subjective     Update: Pt reports: feeling pretty good. Still can't get her bra on and off normally. Max pain is 3-4/10.  She was compliant with home exercise program.  Response to previous treatment: felt good  Functional change: easier to put her socks on and dry her back better     Pain: 0/10  Location: left shoulder      Objective     Update: Active Range of Motion:   Shoulder Left Right   Flexion 155 165   Abduction 165 158   ER  T2 T2   ER at 90 50* 90   IR  L buttock* Across low back   IR at 90 80 90   *pain     Upper Extremity Strength    (L) UE (R) UE   Shoulder flexion: 5/5 5/5   Shoulder Abduction: 5/5 5/5   Shoulder ER 5/5 5/5   Shoulder IR 5/5 5/5   Lower Trap TBA TBA   Middle Trap TBA TBA   Rhomboids TBA TBA   Biceps, triceps, wrist flex/ext are 5/5 B      Assessment     Update: Pt tolerated tx well and demo's improved L shoulder AROM and strength. She still has limited L shoulder  IR and ER at 90 degrees. She endorses difficulty donning/doffing bra. She has met goals as noted below. She will benefit from continued therapy to work towards remaining goals. Extending POC to 1/8/21    Previous Short Term Goals Status:   Met partially  New Short Term Goals Status:  N/A  Long Term Goal Status:   Met partially/continue per initial plan of care.  Reasons for Recertification of Therapy:   Pt making steady gains in strength, function, AROM, but she continues to have some pain and ROM/functional deficits.    Plan     Updated Certification Period: 12/4/2020 to 1/8/21  Recommended Treatment Plan: 1 times per week for 5 weeks: Manual Therapy, Moist Heat/ Ice, Neuromuscular Re-ed, Orthotic Management and Training, Patient Education, Self Care, Therapeutic Activites, Therapeutic Exercise and modalities as appropriate  Other Recommendations: none    Afia Perla, PT  12/4/2020      I CERTIFY THE NEED FOR THESE SERVICES FURNISHED UNDER THIS PLAN OF TREATMENT AND WHILE UNDER MY CARE    Physician's comments:        Physician's Signature: ___________________________________________________

## 2020-12-08 RX ORDER — LOSARTAN POTASSIUM 50 MG/1
TABLET ORAL
Qty: 180 TABLET | Refills: 3 | Status: SHIPPED | OUTPATIENT
Start: 2020-12-08 | End: 2021-12-29

## 2020-12-11 ENCOUNTER — CLINICAL SUPPORT (OUTPATIENT)
Dept: REHABILITATION | Facility: HOSPITAL | Age: 75
End: 2020-12-11
Payer: MEDICARE

## 2020-12-11 DIAGNOSIS — M62.81 MUSCLE WEAKNESS: ICD-10-CM

## 2020-12-11 DIAGNOSIS — R29.3 POOR POSTURE: ICD-10-CM

## 2020-12-11 DIAGNOSIS — Z74.09 IMPAIRED FUNCTIONAL MOBILITY AND ACTIVITY TOLERANCE: ICD-10-CM

## 2020-12-11 DIAGNOSIS — M25.612 DECREASED ROM OF LEFT SHOULDER: ICD-10-CM

## 2020-12-11 DIAGNOSIS — M25.512 ACUTE PAIN OF LEFT SHOULDER: ICD-10-CM

## 2020-12-11 PROCEDURE — 97110 THERAPEUTIC EXERCISES: CPT | Mod: HCNC,PO

## 2020-12-11 NOTE — PROGRESS NOTES
Physical Therapy Daily Treatment Note     Name: Eboni Nicholas  Clinic Number: [id number]    Therapy Diagnosis:   Encounter Diagnoses   Name Primary?    Acute pain of left shoulder     Muscle weakness     Poor posture     Impaired functional mobility and activity tolerance     Decreased ROM of left shoulder      Physician: Annika Mendoza DO    Visit Date: 12/11/2020       Physician Orders: PT Eval and Treat- Pain secondary to incomplete left rotator cuff tear.  Increase shoulder internal rotation, scapular stabilization, rotator cuff strengthening.  Home exercise program needed.  Medical Diagnosis from Referral:   Traumatic incomplete tear of left rotator cuff, subsequent encounter   M75.42 (ICD-10-CM) - Impingement syndrome of left shoulder   M19.012 (ICD-10-CM) - Primary osteoarthritis of left shoulder         Evaluation Date: 10/2/2020  Authorization Period Expiration: 10/29/20  Updated Plan of Care Expiration: 1/8/21  Visit # / Visits authorized: 12 /20    Time In: 3:30 pm  Time Out: 4:17 pm  Total Billable Time: 40 minutes    Precautions: Standard    Subjective     Pt reports: She's been feeling good.  She was compliant with home exercise program.  Response to previous treatment: felt good  Functional change: easier to put her socks on and dry her back better    Pain: 0/10  Location: left shoulder      Objective      Eboni received therapeutic exercises to develop strength, endurance, ROM and posture for 35 minutes including:      UBE 3 min fwd and 3min back, Level 1.5  Row blue band, 3 x10  B shoulder ext, green band, 2 x 10 reps  L shoulder ADD with orange band, 2 x 10 reps  L shoulder IR, single orange band, 2 x 10 reps  L shoulder ER, single orange band, 2 x 10 reps  L shoulder IR stretch c/ strap 10 x 5s holds  B shoulder ER at 90 degrees of ABD (with dowel), 2 x 10 reps  L posterior shoulder stretch by PT, 10 reps                Eboni received the following manual therapy techniques: Joint  mobilizations and Soft tissue Mobilization were applied to the: left shoulder for 5 minutes, including:    Grade I/II GH mobs, A-P  GH Inferior distraction   STM to L anterior shoulder and biceps        Not performed today:    L shoulder flexion, 1 #, 2 x 10 (chest press to shoulder flexion)  Supine alphabet with LUE, 1# weight, 1 rounds  L UE D2 flexion/extension with PT resistance, 2 x 10 reps   Side-lying LUE ABD, 2 x 10 reps, 1 #  2 x 10 supine D2 with manual assist-> resistance  Side-lying, L shoulder ER with 1#, 2 x 10 reps  Wall slides flexion, 2 x 10 reps  Wall slides ABD, 2 x 10, LUE only  LUE ER stretch at wall, 10 x 5s  Scapular clocks at wall, yellow band x ~8 rounds  L shoulder ER with dowel, 2 x 10 reps  Supine shoulder stabilization with multi-directional perturbations with yellow TB, 1x1min  L shoulder PROM/stretch by PT, 7 reps of following: ER at 90, ABD    Home Exercises Provided and Patient Education Provided     Education provided:   - exercise technique    Written Home Exercises Provided: Patient instructed to cont prior HEP.  Exercises were reviewed and Eboni was able to demonstrate them prior to the end of the session.  Eboni demonstrated good  understanding of the education provided.     See EMR under Patient Instructions for exercises provided 10/23/2020.    Assessment   Pt tolerated tx well and demo's improved ability to complete L shoulder IR stretch. She progressed resistance with multiple exercises.  She will benefit from continued therapy to work towards remaining goals. Progress as tolerated.    Eboni is progressing well towards her goals.   Pt prognosis is Good.     Pt will continue to benefit from skilled outpatient physical therapy to address the deficits listed in the problem list box on initial evaluation, provide pt/family education and to maximize pt's level of independence in the home and community environment.     Pt's spiritual, cultural and educational needs considered  and pt agreeable to plan of care and goals.     Anticipated barriers to physical therapy: advanced age    Goals:   Short Term Goals: 4 weeks   1. Pt will tolerate HEP for improved strength, functional mobility, ROM, posture, and endurance. Met, 11/11)  2. Pt will report reduced L shoulder pain to </= 5/10 at worst for improved functional mobility and ability to participate in functional activities/ADL's. (Met, 11/11)  3. Pt will increase L shoulder flexion AROM to >/= 155 degrees for improved functional mobility and reach. (Met, 12/4)  4. Pt will increase L shoulder ABD AROM to >/= 120 degrees for improved functional mobility and reach (Exceeded, 11/11)  5. Pt will increase L shoulder IR AROM to reach across low back for improved functional mobility and reach. (progressing, not met)  6. Pt will demo >/= 4/5 strength in LUE for improved functional mobility, endurance, and posture. (Met, 11/11)  7. Pt will report improved ability to drive without increase in pain/symptoms for improved functional mobility (Met, 11/11)  8. pt will report improved ability to reach back to don/doff bra without increase in pain/symptoms for improved functional mobility (progressing, not met)     Long Term Goals: 8 weeks   1. Pt will be I with updated HEP for improved functional mobility, posture, strength, and endurance. (progressing, not met)  2. Pt will report reduced L shoulder pain to </= 3/10 at worst for improved functional mobility and ability to participate in functional activities/ADL's. (progressing, not met)  3. Pt will increase L shoulder flexion AROM to >/= 160 degrees for improved functional mobility and reach. (progressing, not met)  4. Pt will increase L shoulder ABD AROM to >/= 150 degrees for improved functional mobility and reach. (Exceeded, 12/4)  5. Pt will demo 5/5 strength in BUE's for improved functional mobility, endurance, and posture. (Met, 12/4)  6. Pt will demo/report increased ability to reach items overhead  without pain/symptoms for improved functional mobility (Met, 11/11)    Plan   Cont POC established by PT, including: L shoulder ROM, L shoulder/RC/periscapular strengthening.        Afia Perla, PT

## 2020-12-18 ENCOUNTER — CLINICAL SUPPORT (OUTPATIENT)
Dept: REHABILITATION | Facility: HOSPITAL | Age: 75
End: 2020-12-18
Payer: MEDICARE

## 2020-12-18 DIAGNOSIS — M25.612 DECREASED ROM OF LEFT SHOULDER: ICD-10-CM

## 2020-12-18 DIAGNOSIS — M62.81 MUSCLE WEAKNESS: ICD-10-CM

## 2020-12-18 DIAGNOSIS — Z74.09 IMPAIRED FUNCTIONAL MOBILITY AND ACTIVITY TOLERANCE: ICD-10-CM

## 2020-12-18 DIAGNOSIS — R29.3 POOR POSTURE: ICD-10-CM

## 2020-12-18 DIAGNOSIS — M25.512 ACUTE PAIN OF LEFT SHOULDER: ICD-10-CM

## 2020-12-18 PROCEDURE — 97110 THERAPEUTIC EXERCISES: CPT | Mod: HCNC,PO

## 2020-12-18 PROCEDURE — 97140 MANUAL THERAPY 1/> REGIONS: CPT | Mod: HCNC,PO

## 2020-12-18 NOTE — PROGRESS NOTES
Physical Therapy Daily Treatment Note     Name: Eboni Nicholas  Clinic Number: [id number]    Therapy Diagnosis:   Encounter Diagnoses   Name Primary?    Acute pain of left shoulder     Muscle weakness     Poor posture     Impaired functional mobility and activity tolerance     Decreased ROM of left shoulder      Physician: Annika Mendoza DO    Visit Date: 12/18/2020       Physician Orders: PT Eval and Treat- Pain secondary to incomplete left rotator cuff tear.  Increase shoulder internal rotation, scapular stabilization, rotator cuff strengthening.  Home exercise program needed.  Medical Diagnosis from Referral:   Traumatic incomplete tear of left rotator cuff, subsequent encounter   M75.42 (ICD-10-CM) - Impingement syndrome of left shoulder   M19.012 (ICD-10-CM) - Primary osteoarthritis of left shoulder         Evaluation Date: 10/2/2020  Authorization Period Expiration: 10/29/20  Updated Plan of Care Expiration: 1/8/21  Visit # / Visits authorized: 13/20    Time In: 12:30 pm  Time Out: 1:18 pm  Total Billable Time: 42 minutes    Precautions: Standard    Subjective     Pt reports: She's been feeling good. No new complaints  She was compliant with home exercise program.  Response to previous treatment: felt good  Functional change: easier to put her socks on and dry her back better    Pain: 0/10  Location: left shoulder      Objective      Eboni received therapeutic exercises to develop strength, endurance, ROM and posture for 32 minutes including:      UBE 3 min fwd and 3min back, Level 1.0  Row blue band, 3 x10  B shoulder ext, green band, 2 x 12 reps  L shoulder IR, double orange band, 2 x 10 reps  L shoulder ER, single orange band, 2 x 10 reps  Scapular clocks, yellow band, 2 x 5 rounds  L scapular stabilizing with clockwise and counterclockwise circles with green weighted ball at shoulder height, 2 x 10reps/direction  B shoulder ER with back to wall, yellow band, 2 x 10 reps  L posterior shoulder  stretch by PT, 10 reps   L shoulder ADD with orange band, 2 x 10 reps    L shoulder IR stretch c/ strap 6 x 10s holds  L posterior shoulder stretch by PT, 10 reps            Eboni received the following manual therapy techniques: Joint mobilizations and Soft tissue Mobilization were applied to the: left shoulder for 10 minutes, including:      GH Inferior distraction   STM to L anterior shoulder and biceps and upper trap        Not performed today:    L shoulder flexion, 1 #, 2 x 10 (chest press to shoulder flexion)  Supine alphabet with LUE, 1# weight, 1 rounds  L UE D2 flexion/extension with PT resistance, 2 x 10 reps   Side-lying LUE ABD, 2 x 10 reps, 1 #  2 x 10 supine D2 with manual assist-> resistance  Side-lying, L shoulder ER with 1#, 2 x 10 reps  Wall slides flexion, 2 x 10 reps  Wall slides ABD, 2 x 10, LUE only  LUE ER stretch at wall, 10 x 5s  Scapular clocks at wall, yellow band x ~8 rounds  L shoulder ER with dowel, 2 x 10 reps  Supine shoulder stabilization with multi-directional perturbations with yellow TB, 1x1min  L shoulder PROM/stretch by PT, 7 reps of following: ER at 90, ABD    Home Exercises Provided and Patient Education Provided     Education provided:   - exercise technique    Written Home Exercises Provided: Patient instructed to cont prior HEP.  Exercises were reviewed and Eboni was able to demonstrate them prior to the end of the session.  Eboni demonstrated good  understanding of the education provided.     See EMR under Patient Instructions for exercises provided 10/23/2020.    Assessment   Pt tolerated tx well and was able to complete expansion of exercises to include scapular stabilization. She progressed resistance with multiple exercises.  She will benefit from continued therapy to work towards remaining goals. Progress as tolerated.    Eboni is progressing well towards her goals.   Pt prognosis is Good.     Pt will continue to benefit from skilled outpatient physical therapy  to address the deficits listed in the problem list box on initial evaluation, provide pt/family education and to maximize pt's level of independence in the home and community environment.     Pt's spiritual, cultural and educational needs considered and pt agreeable to plan of care and goals.     Anticipated barriers to physical therapy: advanced age    Goals:   Short Term Goals: 4 weeks   1. Pt will tolerate HEP for improved strength, functional mobility, ROM, posture, and endurance. Met, 11/11)  2. Pt will report reduced L shoulder pain to </= 5/10 at worst for improved functional mobility and ability to participate in functional activities/ADL's. (Met, 11/11)  3. Pt will increase L shoulder flexion AROM to >/= 155 degrees for improved functional mobility and reach. (Met, 12/4)  4. Pt will increase L shoulder ABD AROM to >/= 120 degrees for improved functional mobility and reach (Exceeded, 11/11)  5. Pt will increase L shoulder IR AROM to reach across low back for improved functional mobility and reach. (progressing, not met)  6. Pt will demo >/= 4/5 strength in LUE for improved functional mobility, endurance, and posture. (Met, 11/11)  7. Pt will report improved ability to drive without increase in pain/symptoms for improved functional mobility (Met, 11/11)  8. pt will report improved ability to reach back to don/doff bra without increase in pain/symptoms for improved functional mobility (progressing, not met)     Long Term Goals: 8 weeks   1. Pt will be I with updated HEP for improved functional mobility, posture, strength, and endurance. (progressing, not met)  2. Pt will report reduced L shoulder pain to </= 3/10 at worst for improved functional mobility and ability to participate in functional activities/ADL's. (progressing, not met)  3. Pt will increase L shoulder flexion AROM to >/= 160 degrees for improved functional mobility and reach. (progressing, not met)  4. Pt will increase L shoulder ABD AROM to >/=  150 degrees for improved functional mobility and reach. (Exceeded, 12/4)  5. Pt will demo 5/5 strength in BUE's for improved functional mobility, endurance, and posture. (Met, 12/4)  6. Pt will demo/report increased ability to reach items overhead without pain/symptoms for improved functional mobility (Met, 11/11)    Plan   Cont POC established by PT, including: L shoulder ROM, L shoulder/RC/periscapular strengthening.        Afia Perla, PT

## 2020-12-30 ENCOUNTER — PATIENT OUTREACH (OUTPATIENT)
Dept: OTHER | Facility: OTHER | Age: 75
End: 2020-12-30

## 2021-01-04 ENCOUNTER — PES CALL (OUTPATIENT)
Dept: ADMINISTRATIVE | Facility: CLINIC | Age: 76
End: 2021-01-04

## 2021-01-04 RX ORDER — ESCITALOPRAM OXALATE 20 MG/1
20 TABLET ORAL DAILY
Qty: 90 TABLET | Refills: 1 | Status: SHIPPED | OUTPATIENT
Start: 2021-01-04 | End: 2021-07-05 | Stop reason: SDUPTHER

## 2021-01-05 ENCOUNTER — CLINICAL SUPPORT (OUTPATIENT)
Dept: REHABILITATION | Facility: HOSPITAL | Age: 76
End: 2021-01-05
Payer: MEDICARE

## 2021-01-05 DIAGNOSIS — M62.81 MUSCLE WEAKNESS: ICD-10-CM

## 2021-01-05 DIAGNOSIS — Z74.09 IMPAIRED FUNCTIONAL MOBILITY AND ACTIVITY TOLERANCE: ICD-10-CM

## 2021-01-05 DIAGNOSIS — M25.612 DECREASED ROM OF LEFT SHOULDER: ICD-10-CM

## 2021-01-05 DIAGNOSIS — R29.3 POOR POSTURE: ICD-10-CM

## 2021-01-05 DIAGNOSIS — M25.512 ACUTE PAIN OF LEFT SHOULDER: ICD-10-CM

## 2021-01-05 PROCEDURE — 97140 MANUAL THERAPY 1/> REGIONS: CPT | Mod: HCNC,PO,CQ

## 2021-01-05 PROCEDURE — 97110 THERAPEUTIC EXERCISES: CPT | Mod: HCNC,PO,CQ

## 2021-01-07 ENCOUNTER — OFFICE VISIT (OUTPATIENT)
Dept: INTERNAL MEDICINE | Facility: CLINIC | Age: 76
End: 2021-01-07
Payer: MEDICARE

## 2021-01-07 VITALS
WEIGHT: 222.69 LBS | RESPIRATION RATE: 15 BRPM | SYSTOLIC BLOOD PRESSURE: 100 MMHG | BODY MASS INDEX: 39.46 KG/M2 | HEIGHT: 63 IN | DIASTOLIC BLOOD PRESSURE: 60 MMHG | HEART RATE: 67 BPM

## 2021-01-07 DIAGNOSIS — I15.9 SECONDARY HYPERTENSION: ICD-10-CM

## 2021-01-07 DIAGNOSIS — H25.13 NUCLEAR SCLEROSIS OF BOTH EYES: ICD-10-CM

## 2021-01-07 DIAGNOSIS — E78.5 HYPERLIPIDEMIA, UNSPECIFIED HYPERLIPIDEMIA TYPE: ICD-10-CM

## 2021-01-07 DIAGNOSIS — G89.29 CHRONIC LEFT SHOULDER PAIN: ICD-10-CM

## 2021-01-07 DIAGNOSIS — M62.81 MUSCLE WEAKNESS: ICD-10-CM

## 2021-01-07 DIAGNOSIS — D86.89 SARCOID OF NOSE: ICD-10-CM

## 2021-01-07 DIAGNOSIS — E78.5 DYSLIPIDEMIA: ICD-10-CM

## 2021-01-07 DIAGNOSIS — H40.10X0 OPEN-ANGLE GLAUCOMA OF RIGHT EYE, UNSPECIFIED GLAUCOMA STAGE, UNSPECIFIED OPEN-ANGLE GLAUCOMA TYPE: ICD-10-CM

## 2021-01-07 DIAGNOSIS — Z12.11 SCREENING FOR COLON CANCER: ICD-10-CM

## 2021-01-07 DIAGNOSIS — F32.5 MAJOR DEPRESSIVE DISORDER WITH SINGLE EPISODE, IN FULL REMISSION: ICD-10-CM

## 2021-01-07 DIAGNOSIS — Z74.09 IMPAIRED FUNCTIONAL MOBILITY AND ACTIVITY TOLERANCE: ICD-10-CM

## 2021-01-07 DIAGNOSIS — E66.01 CLASS 2 SEVERE OBESITY DUE TO EXCESS CALORIES WITH SERIOUS COMORBIDITY AND BODY MASS INDEX (BMI) OF 39.0 TO 39.9 IN ADULT: ICD-10-CM

## 2021-01-07 DIAGNOSIS — Z00.00 ENCOUNTER FOR PREVENTIVE HEALTH EXAMINATION: Primary | ICD-10-CM

## 2021-01-07 DIAGNOSIS — G47.33 OSA (OBSTRUCTIVE SLEEP APNEA): ICD-10-CM

## 2021-01-07 DIAGNOSIS — R29.3 POOR POSTURE: ICD-10-CM

## 2021-01-07 DIAGNOSIS — I51.89 LEFT VENTRICULAR DIASTOLIC DYSFUNCTION WITH PRESERVED SYSTOLIC FUNCTION: ICD-10-CM

## 2021-01-07 DIAGNOSIS — M25.512 CHRONIC LEFT SHOULDER PAIN: ICD-10-CM

## 2021-01-07 DIAGNOSIS — M25.612 DECREASED ROM OF LEFT SHOULDER: ICD-10-CM

## 2021-01-07 DIAGNOSIS — E03.9 ACQUIRED HYPOTHYROIDISM: ICD-10-CM

## 2021-01-07 DIAGNOSIS — I10 HTN (HYPERTENSION), BENIGN: ICD-10-CM

## 2021-01-07 PROBLEM — E66.812 CLASS 2 SEVERE OBESITY WITH SERIOUS COMORBIDITY IN ADULT: Status: ACTIVE | Noted: 2020-01-20

## 2021-01-07 PROCEDURE — 3288F FALL RISK ASSESSMENT DOCD: CPT | Mod: HCNC,CPTII,S$GLB, | Performed by: NURSE PRACTITIONER

## 2021-01-07 PROCEDURE — 3074F SYST BP LT 130 MM HG: CPT | Mod: HCNC,CPTII,S$GLB, | Performed by: NURSE PRACTITIONER

## 2021-01-07 PROCEDURE — 99999 PR PBB SHADOW E&M-EST. PATIENT-LVL V: CPT | Mod: PBBFAC,HCNC,, | Performed by: NURSE PRACTITIONER

## 2021-01-07 PROCEDURE — 3008F BODY MASS INDEX DOCD: CPT | Mod: HCNC,CPTII,S$GLB, | Performed by: NURSE PRACTITIONER

## 2021-01-07 PROCEDURE — 3078F PR MOST RECENT DIASTOLIC BLOOD PRESSURE < 80 MM HG: ICD-10-PCS | Mod: HCNC,CPTII,S$GLB, | Performed by: NURSE PRACTITIONER

## 2021-01-07 PROCEDURE — G0439 PPPS, SUBSEQ VISIT: HCPCS | Mod: HCNC,S$GLB,, | Performed by: NURSE PRACTITIONER

## 2021-01-07 PROCEDURE — 1100F PR PT FALLS ASSESS DOC 2+ FALLS/FALL W/INJURY/YR: ICD-10-PCS | Mod: HCNC,CPTII,S$GLB, | Performed by: NURSE PRACTITIONER

## 2021-01-07 PROCEDURE — G0439 PR MEDICARE ANNUAL WELLNESS SUBSEQUENT VISIT: ICD-10-PCS | Mod: HCNC,S$GLB,, | Performed by: NURSE PRACTITIONER

## 2021-01-07 PROCEDURE — 3078F DIAST BP <80 MM HG: CPT | Mod: HCNC,CPTII,S$GLB, | Performed by: NURSE PRACTITIONER

## 2021-01-07 PROCEDURE — 99499 UNLISTED E&M SERVICE: CPT | Mod: S$GLB,,, | Performed by: NURSE PRACTITIONER

## 2021-01-07 PROCEDURE — 3008F PR BODY MASS INDEX (BMI) DOCUMENTED: ICD-10-PCS | Mod: HCNC,CPTII,S$GLB, | Performed by: NURSE PRACTITIONER

## 2021-01-07 PROCEDURE — 1126F AMNT PAIN NOTED NONE PRSNT: CPT | Mod: HCNC,S$GLB,, | Performed by: NURSE PRACTITIONER

## 2021-01-07 PROCEDURE — 1158F ADVNC CARE PLAN TLK DOCD: CPT | Mod: HCNC,S$GLB,, | Performed by: NURSE PRACTITIONER

## 2021-01-07 PROCEDURE — 99999 PR PBB SHADOW E&M-EST. PATIENT-LVL V: ICD-10-PCS | Mod: PBBFAC,HCNC,, | Performed by: NURSE PRACTITIONER

## 2021-01-07 PROCEDURE — 3288F PR FALLS RISK ASSESSMENT DOCUMENTED: ICD-10-PCS | Mod: HCNC,CPTII,S$GLB, | Performed by: NURSE PRACTITIONER

## 2021-01-07 PROCEDURE — 1126F PR PAIN SEVERITY QUANTIFIED, NO PAIN PRESENT: ICD-10-PCS | Mod: HCNC,S$GLB,, | Performed by: NURSE PRACTITIONER

## 2021-01-07 PROCEDURE — 1100F PTFALLS ASSESS-DOCD GE2>/YR: CPT | Mod: HCNC,CPTII,S$GLB, | Performed by: NURSE PRACTITIONER

## 2021-01-07 PROCEDURE — 3074F PR MOST RECENT SYSTOLIC BLOOD PRESSURE < 130 MM HG: ICD-10-PCS | Mod: HCNC,CPTII,S$GLB, | Performed by: NURSE PRACTITIONER

## 2021-01-07 PROCEDURE — 1158F PR ADVANCE CARE PLANNING DISCUSS DOCUMENTED IN MEDICAL RECORD: ICD-10-PCS | Mod: HCNC,S$GLB,, | Performed by: NURSE PRACTITIONER

## 2021-01-07 PROCEDURE — 99499 RISK ADDL DX/OHS AUDIT: ICD-10-PCS | Mod: S$GLB,,, | Performed by: NURSE PRACTITIONER

## 2021-01-12 ENCOUNTER — CLINICAL SUPPORT (OUTPATIENT)
Dept: REHABILITATION | Facility: HOSPITAL | Age: 76
End: 2021-01-12
Payer: MEDICARE

## 2021-01-12 DIAGNOSIS — G89.29 CHRONIC LEFT SHOULDER PAIN: ICD-10-CM

## 2021-01-12 DIAGNOSIS — M25.512 CHRONIC LEFT SHOULDER PAIN: ICD-10-CM

## 2021-01-12 DIAGNOSIS — M62.81 MUSCLE WEAKNESS: ICD-10-CM

## 2021-01-12 DIAGNOSIS — Z74.09 IMPAIRED FUNCTIONAL MOBILITY AND ACTIVITY TOLERANCE: ICD-10-CM

## 2021-01-12 DIAGNOSIS — R29.3 POOR POSTURE: ICD-10-CM

## 2021-01-12 DIAGNOSIS — M25.612 DECREASED ROM OF LEFT SHOULDER: ICD-10-CM

## 2021-01-12 PROCEDURE — 97110 THERAPEUTIC EXERCISES: CPT | Mod: HCNC,PO

## 2021-01-13 ENCOUNTER — LAB VISIT (OUTPATIENT)
Dept: LAB | Facility: HOSPITAL | Age: 76
End: 2021-01-13
Attending: NURSE PRACTITIONER
Payer: MEDICARE

## 2021-01-13 DIAGNOSIS — Z12.11 SCREENING FOR COLON CANCER: ICD-10-CM

## 2021-01-13 PROCEDURE — 82274 ASSAY TEST FOR BLOOD FECAL: CPT | Mod: HCNC

## 2021-01-14 LAB — HEMOCCULT STL QL IA: NEGATIVE

## 2021-01-16 ENCOUNTER — IMMUNIZATION (OUTPATIENT)
Dept: INTERNAL MEDICINE | Facility: CLINIC | Age: 76
End: 2021-01-16
Payer: MEDICARE

## 2021-01-16 DIAGNOSIS — Z23 NEED FOR VACCINATION: Primary | ICD-10-CM

## 2021-01-16 PROCEDURE — 91300 COVID-19, MRNA, LNP-S, PF, 30 MCG/0.3 ML DOSE VACCINE: CPT | Mod: HCNC,,, | Performed by: INTERNAL MEDICINE

## 2021-01-16 PROCEDURE — 0001A COVID-19, MRNA, LNP-S, PF, 30 MCG/0.3 ML DOSE VACCINE: CPT | Mod: HCNC,CV19,, | Performed by: INTERNAL MEDICINE

## 2021-01-16 PROCEDURE — 91300 COVID-19, MRNA, LNP-S, PF, 30 MCG/0.3 ML DOSE VACCINE: ICD-10-PCS | Mod: HCNC,,, | Performed by: INTERNAL MEDICINE

## 2021-01-16 PROCEDURE — 0001A COVID-19, MRNA, LNP-S, PF, 30 MCG/0.3 ML DOSE VACCINE: ICD-10-PCS | Mod: HCNC,CV19,, | Performed by: INTERNAL MEDICINE

## 2021-01-21 ENCOUNTER — CLINICAL SUPPORT (OUTPATIENT)
Dept: REHABILITATION | Facility: HOSPITAL | Age: 76
End: 2021-01-21
Payer: MEDICARE

## 2021-01-21 DIAGNOSIS — Z74.09 IMPAIRED FUNCTIONAL MOBILITY AND ACTIVITY TOLERANCE: ICD-10-CM

## 2021-01-21 DIAGNOSIS — M62.81 MUSCLE WEAKNESS: ICD-10-CM

## 2021-01-21 DIAGNOSIS — M25.612 DECREASED ROM OF LEFT SHOULDER: ICD-10-CM

## 2021-01-21 DIAGNOSIS — G89.29 CHRONIC LEFT SHOULDER PAIN: ICD-10-CM

## 2021-01-21 DIAGNOSIS — M25.512 CHRONIC LEFT SHOULDER PAIN: ICD-10-CM

## 2021-01-21 DIAGNOSIS — R29.3 POOR POSTURE: ICD-10-CM

## 2021-01-21 PROCEDURE — 97110 THERAPEUTIC EXERCISES: CPT | Mod: HCNC,PO

## 2021-01-21 PROCEDURE — 97140 MANUAL THERAPY 1/> REGIONS: CPT | Mod: HCNC,PO

## 2021-01-28 ENCOUNTER — CLINICAL SUPPORT (OUTPATIENT)
Dept: REHABILITATION | Facility: HOSPITAL | Age: 76
End: 2021-01-28
Payer: MEDICARE

## 2021-01-28 DIAGNOSIS — G89.29 CHRONIC LEFT SHOULDER PAIN: ICD-10-CM

## 2021-01-28 DIAGNOSIS — R29.3 POOR POSTURE: ICD-10-CM

## 2021-01-28 DIAGNOSIS — Z74.09 IMPAIRED FUNCTIONAL MOBILITY AND ACTIVITY TOLERANCE: ICD-10-CM

## 2021-01-28 DIAGNOSIS — M62.81 MUSCLE WEAKNESS: ICD-10-CM

## 2021-01-28 DIAGNOSIS — M25.612 DECREASED ROM OF LEFT SHOULDER: ICD-10-CM

## 2021-01-28 DIAGNOSIS — M25.512 CHRONIC LEFT SHOULDER PAIN: ICD-10-CM

## 2021-01-28 PROCEDURE — 97110 THERAPEUTIC EXERCISES: CPT | Mod: HCNC,PO,CQ

## 2021-01-28 PROCEDURE — 97140 MANUAL THERAPY 1/> REGIONS: CPT | Mod: HCNC,PO,CQ

## 2021-01-28 RX ORDER — LEVOTHYROXINE SODIUM 100 UG/1
100 TABLET ORAL DAILY
Qty: 90 TABLET | Refills: 1 | Status: SHIPPED | OUTPATIENT
Start: 2021-01-28 | End: 2021-01-31 | Stop reason: SDUPTHER

## 2021-01-31 ENCOUNTER — PATIENT MESSAGE (OUTPATIENT)
Dept: INTERNAL MEDICINE | Facility: CLINIC | Age: 76
End: 2021-01-31

## 2021-02-01 RX ORDER — LEVOTHYROXINE SODIUM 100 UG/1
100 TABLET ORAL DAILY
Qty: 90 TABLET | Refills: 1 | Status: SHIPPED | OUTPATIENT
Start: 2021-02-01 | End: 2021-05-04 | Stop reason: SDUPTHER

## 2021-02-04 ENCOUNTER — CLINICAL SUPPORT (OUTPATIENT)
Dept: REHABILITATION | Facility: HOSPITAL | Age: 76
End: 2021-02-04
Payer: MEDICARE

## 2021-02-04 DIAGNOSIS — Z74.09 IMPAIRED FUNCTIONAL MOBILITY AND ACTIVITY TOLERANCE: ICD-10-CM

## 2021-02-04 DIAGNOSIS — G89.29 CHRONIC LEFT SHOULDER PAIN: ICD-10-CM

## 2021-02-04 DIAGNOSIS — M25.612 DECREASED ROM OF LEFT SHOULDER: ICD-10-CM

## 2021-02-04 DIAGNOSIS — R29.3 POOR POSTURE: ICD-10-CM

## 2021-02-04 DIAGNOSIS — M62.81 MUSCLE WEAKNESS: ICD-10-CM

## 2021-02-04 DIAGNOSIS — M25.512 CHRONIC LEFT SHOULDER PAIN: ICD-10-CM

## 2021-02-06 ENCOUNTER — IMMUNIZATION (OUTPATIENT)
Dept: INTERNAL MEDICINE | Facility: CLINIC | Age: 76
End: 2021-02-06
Payer: MEDICARE

## 2021-02-06 DIAGNOSIS — Z23 NEED FOR VACCINATION: Primary | ICD-10-CM

## 2021-02-06 PROCEDURE — 0002A COVID-19, MRNA, LNP-S, PF, 30 MCG/0.3 ML DOSE VACCINE: CPT | Mod: HCNC,CV19,, | Performed by: INTERNAL MEDICINE

## 2021-02-06 PROCEDURE — 91300 COVID-19, MRNA, LNP-S, PF, 30 MCG/0.3 ML DOSE VACCINE: CPT | Mod: HCNC,,, | Performed by: INTERNAL MEDICINE

## 2021-02-06 PROCEDURE — 0002A COVID-19, MRNA, LNP-S, PF, 30 MCG/0.3 ML DOSE VACCINE: ICD-10-PCS | Mod: HCNC,CV19,, | Performed by: INTERNAL MEDICINE

## 2021-02-06 PROCEDURE — 91300 COVID-19, MRNA, LNP-S, PF, 30 MCG/0.3 ML DOSE VACCINE: ICD-10-PCS | Mod: HCNC,,, | Performed by: INTERNAL MEDICINE

## 2021-02-10 ENCOUNTER — PATIENT OUTREACH (OUTPATIENT)
Dept: ADMINISTRATIVE | Facility: OTHER | Age: 76
End: 2021-02-10

## 2021-02-10 DIAGNOSIS — Z12.31 ENCOUNTER FOR SCREENING MAMMOGRAM FOR MALIGNANT NEOPLASM OF BREAST: Primary | ICD-10-CM

## 2021-02-12 ENCOUNTER — OFFICE VISIT (OUTPATIENT)
Dept: SLEEP MEDICINE | Facility: CLINIC | Age: 76
End: 2021-02-12
Payer: MEDICARE

## 2021-02-12 VITALS
BODY MASS INDEX: 39.87 KG/M2 | HEART RATE: 58 BPM | SYSTOLIC BLOOD PRESSURE: 112 MMHG | WEIGHT: 225 LBS | DIASTOLIC BLOOD PRESSURE: 75 MMHG | HEIGHT: 63 IN

## 2021-02-12 DIAGNOSIS — H40.10X0 OPEN-ANGLE GLAUCOMA OF RIGHT EYE, UNSPECIFIED GLAUCOMA STAGE, UNSPECIFIED OPEN-ANGLE GLAUCOMA TYPE: ICD-10-CM

## 2021-02-12 DIAGNOSIS — I10 HTN (HYPERTENSION), BENIGN: Primary | ICD-10-CM

## 2021-02-12 DIAGNOSIS — G47.33 OSA (OBSTRUCTIVE SLEEP APNEA): ICD-10-CM

## 2021-02-12 PROCEDURE — 1159F PR MEDICATION LIST DOCUMENTED IN MEDICAL RECORD: ICD-10-PCS | Mod: HCNC,S$GLB,, | Performed by: NURSE PRACTITIONER

## 2021-02-12 PROCEDURE — 3008F BODY MASS INDEX DOCD: CPT | Mod: HCNC,CPTII,S$GLB, | Performed by: NURSE PRACTITIONER

## 2021-02-12 PROCEDURE — 3078F PR MOST RECENT DIASTOLIC BLOOD PRESSURE < 80 MM HG: ICD-10-PCS | Mod: HCNC,CPTII,S$GLB, | Performed by: NURSE PRACTITIONER

## 2021-02-12 PROCEDURE — 1159F MED LIST DOCD IN RCRD: CPT | Mod: HCNC,S$GLB,, | Performed by: NURSE PRACTITIONER

## 2021-02-12 PROCEDURE — 3074F PR MOST RECENT SYSTOLIC BLOOD PRESSURE < 130 MM HG: ICD-10-PCS | Mod: HCNC,CPTII,S$GLB, | Performed by: NURSE PRACTITIONER

## 2021-02-12 PROCEDURE — 3288F PR FALLS RISK ASSESSMENT DOCUMENTED: ICD-10-PCS | Mod: HCNC,CPTII,S$GLB, | Performed by: NURSE PRACTITIONER

## 2021-02-12 PROCEDURE — 3078F DIAST BP <80 MM HG: CPT | Mod: HCNC,CPTII,S$GLB, | Performed by: NURSE PRACTITIONER

## 2021-02-12 PROCEDURE — 99999 PR PBB SHADOW E&M-EST. PATIENT-LVL III: ICD-10-PCS | Mod: PBBFAC,HCNC,, | Performed by: NURSE PRACTITIONER

## 2021-02-12 PROCEDURE — 99999 PR PBB SHADOW E&M-EST. PATIENT-LVL III: CPT | Mod: PBBFAC,HCNC,, | Performed by: NURSE PRACTITIONER

## 2021-02-12 PROCEDURE — 3074F SYST BP LT 130 MM HG: CPT | Mod: HCNC,CPTII,S$GLB, | Performed by: NURSE PRACTITIONER

## 2021-02-12 PROCEDURE — 3008F PR BODY MASS INDEX (BMI) DOCUMENTED: ICD-10-PCS | Mod: HCNC,CPTII,S$GLB, | Performed by: NURSE PRACTITIONER

## 2021-02-12 PROCEDURE — 3288F FALL RISK ASSESSMENT DOCD: CPT | Mod: HCNC,CPTII,S$GLB, | Performed by: NURSE PRACTITIONER

## 2021-02-12 PROCEDURE — 1126F PR PAIN SEVERITY QUANTIFIED, NO PAIN PRESENT: ICD-10-PCS | Mod: HCNC,S$GLB,, | Performed by: NURSE PRACTITIONER

## 2021-02-12 PROCEDURE — 1126F AMNT PAIN NOTED NONE PRSNT: CPT | Mod: HCNC,S$GLB,, | Performed by: NURSE PRACTITIONER

## 2021-02-12 PROCEDURE — 99214 OFFICE O/P EST MOD 30 MIN: CPT | Mod: HCNC,S$GLB,, | Performed by: NURSE PRACTITIONER

## 2021-02-12 PROCEDURE — 1101F PR PT FALLS ASSESS DOC 0-1 FALLS W/OUT INJ PAST YR: ICD-10-PCS | Mod: HCNC,CPTII,S$GLB, | Performed by: NURSE PRACTITIONER

## 2021-02-12 PROCEDURE — 1101F PT FALLS ASSESS-DOCD LE1/YR: CPT | Mod: HCNC,CPTII,S$GLB, | Performed by: NURSE PRACTITIONER

## 2021-02-12 PROCEDURE — 99214 PR OFFICE/OUTPT VISIT, EST, LEVL IV, 30-39 MIN: ICD-10-PCS | Mod: HCNC,S$GLB,, | Performed by: NURSE PRACTITIONER

## 2021-03-18 ENCOUNTER — PATIENT MESSAGE (OUTPATIENT)
Dept: RESEARCH | Facility: HOSPITAL | Age: 76
End: 2021-03-18

## 2021-03-26 ENCOUNTER — PATIENT MESSAGE (OUTPATIENT)
Dept: RESEARCH | Facility: HOSPITAL | Age: 76
End: 2021-03-26

## 2021-03-29 ENCOUNTER — PATIENT OUTREACH (OUTPATIENT)
Dept: ADMINISTRATIVE | Facility: OTHER | Age: 76
End: 2021-03-29

## 2021-03-30 ENCOUNTER — OFFICE VISIT (OUTPATIENT)
Dept: OPTOMETRY | Facility: CLINIC | Age: 76
End: 2021-03-30
Payer: MEDICARE

## 2021-03-30 DIAGNOSIS — H40.1112 PRIMARY OPEN ANGLE GLAUCOMA (POAG) OF RIGHT EYE, MODERATE STAGE: Primary | ICD-10-CM

## 2021-03-30 DIAGNOSIS — H25.13 NUCLEAR SCLEROSIS OF BOTH EYES: ICD-10-CM

## 2021-03-30 DIAGNOSIS — H40.1121 PRIMARY OPEN ANGLE GLAUCOMA (POAG) OF LEFT EYE, MILD STAGE: ICD-10-CM

## 2021-03-30 DIAGNOSIS — Z98.890 HISTORY OF LASER IRIDOTOMY: ICD-10-CM

## 2021-03-30 PROCEDURE — 99999 PR PBB SHADOW E&M-EST. PATIENT-LVL II: CPT | Mod: PBBFAC,,, | Performed by: OPTOMETRIST

## 2021-03-30 PROCEDURE — 92012 PR EYE EXAM, EST PATIENT,INTERMED: ICD-10-PCS | Mod: S$GLB,,, | Performed by: OPTOMETRIST

## 2021-03-30 PROCEDURE — 92012 INTRM OPH EXAM EST PATIENT: CPT | Mod: S$GLB,,, | Performed by: OPTOMETRIST

## 2021-03-30 PROCEDURE — 99999 PR PBB SHADOW E&M-EST. PATIENT-LVL II: ICD-10-PCS | Mod: PBBFAC,,, | Performed by: OPTOMETRIST

## 2021-04-16 ENCOUNTER — PATIENT MESSAGE (OUTPATIENT)
Dept: INTERNAL MEDICINE | Facility: CLINIC | Age: 76
End: 2021-04-16

## 2021-04-16 RX ORDER — MECLIZINE HCL 12.5 MG 12.5 MG/1
12.5 TABLET ORAL 3 TIMES DAILY PRN
Qty: 30 TABLET | Refills: 1 | Status: SHIPPED | OUTPATIENT
Start: 2021-04-16 | End: 2021-05-04 | Stop reason: SDUPTHER

## 2021-04-20 ENCOUNTER — PATIENT MESSAGE (OUTPATIENT)
Dept: OPTOMETRY | Facility: CLINIC | Age: 76
End: 2021-04-20

## 2021-04-21 ENCOUNTER — PATIENT MESSAGE (OUTPATIENT)
Dept: INTERNAL MEDICINE | Facility: CLINIC | Age: 76
End: 2021-04-21

## 2021-04-21 RX ORDER — DORZOLAMIDE HCL 20 MG/ML
1 SOLUTION/ DROPS OPHTHALMIC 2 TIMES DAILY
Qty: 10 ML | Refills: 6 | Status: SHIPPED | OUTPATIENT
Start: 2021-04-21 | End: 2021-08-01 | Stop reason: SDUPTHER

## 2021-05-04 ENCOUNTER — PATIENT MESSAGE (OUTPATIENT)
Dept: INTERNAL MEDICINE | Facility: CLINIC | Age: 76
End: 2021-05-04

## 2021-05-04 RX ORDER — LEVOTHYROXINE SODIUM 100 UG/1
100 TABLET ORAL DAILY
Qty: 90 TABLET | Refills: 1 | Status: SHIPPED | OUTPATIENT
Start: 2021-05-04 | End: 2022-01-06 | Stop reason: SDUPTHER

## 2021-05-04 RX ORDER — MECLIZINE HCL 12.5 MG 12.5 MG/1
12.5 TABLET ORAL 3 TIMES DAILY PRN
Qty: 30 TABLET | Refills: 1 | Status: SHIPPED | OUTPATIENT
Start: 2021-05-04 | End: 2023-02-14

## 2021-05-06 ENCOUNTER — TELEPHONE (OUTPATIENT)
Dept: OTOLARYNGOLOGY | Facility: CLINIC | Age: 76
End: 2021-05-06

## 2021-05-10 ENCOUNTER — OFFICE VISIT (OUTPATIENT)
Dept: OTOLARYNGOLOGY | Facility: CLINIC | Age: 76
End: 2021-05-10
Payer: MEDICARE

## 2021-05-10 ENCOUNTER — CLINICAL SUPPORT (OUTPATIENT)
Dept: AUDIOLOGY | Facility: CLINIC | Age: 76
End: 2021-05-10
Payer: MEDICARE

## 2021-05-10 DIAGNOSIS — H69.92 DYSFUNCTION OF LEFT EUSTACHIAN TUBE: Primary | ICD-10-CM

## 2021-05-10 DIAGNOSIS — H90.A21 SENSORINEURAL HEARING LOSS (SNHL) OF RIGHT EAR WITH RESTRICTED HEARING OF LEFT EAR: ICD-10-CM

## 2021-05-10 DIAGNOSIS — H90.A32 MIXED CONDUCTIVE AND SENSORINEURAL HEARING LOSS OF LEFT EAR WITH RESTRICTED HEARING OF RIGHT EAR: ICD-10-CM

## 2021-05-10 DIAGNOSIS — R42 DIZZINESS: ICD-10-CM

## 2021-05-10 DIAGNOSIS — H90.3 BILATERAL SENSORINEURAL HEARING LOSS: Primary | ICD-10-CM

## 2021-05-10 PROCEDURE — 99999 PR PBB SHADOW E&M-EST. PATIENT-LVL III: CPT | Mod: PBBFAC,,, | Performed by: NURSE PRACTITIONER

## 2021-05-10 PROCEDURE — 1101F PR PT FALLS ASSESS DOC 0-1 FALLS W/OUT INJ PAST YR: ICD-10-PCS | Mod: CPTII,S$GLB,, | Performed by: NURSE PRACTITIONER

## 2021-05-10 PROCEDURE — 3288F FALL RISK ASSESSMENT DOCD: CPT | Mod: CPTII,S$GLB,, | Performed by: NURSE PRACTITIONER

## 2021-05-10 PROCEDURE — 1126F AMNT PAIN NOTED NONE PRSNT: CPT | Mod: S$GLB,,, | Performed by: NURSE PRACTITIONER

## 2021-05-10 PROCEDURE — 92567 PR TYMPA2METRY: ICD-10-PCS | Mod: S$GLB,,, | Performed by: AUDIOLOGIST

## 2021-05-10 PROCEDURE — 1159F PR MEDICATION LIST DOCUMENTED IN MEDICAL RECORD: ICD-10-PCS | Mod: S$GLB,,, | Performed by: NURSE PRACTITIONER

## 2021-05-10 PROCEDURE — 99203 PR OFFICE/OUTPT VISIT, NEW, LEVL III, 30-44 MIN: ICD-10-PCS | Mod: S$GLB,,, | Performed by: NURSE PRACTITIONER

## 2021-05-10 PROCEDURE — 92557 PR COMPREHENSIVE HEARING TEST: ICD-10-PCS | Mod: S$GLB,,, | Performed by: AUDIOLOGIST

## 2021-05-10 PROCEDURE — 99203 OFFICE O/P NEW LOW 30 MIN: CPT | Mod: S$GLB,,, | Performed by: NURSE PRACTITIONER

## 2021-05-10 PROCEDURE — 1126F PR PAIN SEVERITY QUANTIFIED, NO PAIN PRESENT: ICD-10-PCS | Mod: S$GLB,,, | Performed by: NURSE PRACTITIONER

## 2021-05-10 PROCEDURE — 1101F PT FALLS ASSESS-DOCD LE1/YR: CPT | Mod: CPTII,S$GLB,, | Performed by: NURSE PRACTITIONER

## 2021-05-10 PROCEDURE — 92557 COMPREHENSIVE HEARING TEST: CPT | Mod: S$GLB,,, | Performed by: AUDIOLOGIST

## 2021-05-10 PROCEDURE — 99999 PR PBB SHADOW E&M-EST. PATIENT-LVL III: ICD-10-PCS | Mod: PBBFAC,,, | Performed by: NURSE PRACTITIONER

## 2021-05-10 PROCEDURE — 92567 TYMPANOMETRY: CPT | Mod: S$GLB,,, | Performed by: AUDIOLOGIST

## 2021-05-10 PROCEDURE — 1159F MED LIST DOCD IN RCRD: CPT | Mod: S$GLB,,, | Performed by: NURSE PRACTITIONER

## 2021-05-10 PROCEDURE — 3288F PR FALLS RISK ASSESSMENT DOCUMENTED: ICD-10-PCS | Mod: CPTII,S$GLB,, | Performed by: NURSE PRACTITIONER

## 2021-05-10 RX ORDER — LEVOCETIRIZINE DIHYDROCHLORIDE 5 MG/1
5 TABLET, FILM COATED ORAL NIGHTLY
Qty: 30 TABLET | Refills: 2 | Status: SHIPPED | OUTPATIENT
Start: 2021-05-10 | End: 2021-08-02

## 2021-06-10 ENCOUNTER — PATIENT OUTREACH (OUTPATIENT)
Dept: ADMINISTRATIVE | Facility: HOSPITAL | Age: 76
End: 2021-06-10

## 2021-06-10 ENCOUNTER — PATIENT MESSAGE (OUTPATIENT)
Dept: ADMINISTRATIVE | Facility: HOSPITAL | Age: 76
End: 2021-06-10

## 2021-07-06 RX ORDER — ESCITALOPRAM OXALATE 20 MG/1
20 TABLET ORAL DAILY
Qty: 90 TABLET | Refills: 1 | Status: SHIPPED | OUTPATIENT
Start: 2021-07-06 | End: 2022-03-28

## 2021-07-13 ENCOUNTER — PATIENT MESSAGE (OUTPATIENT)
Dept: SLEEP MEDICINE | Facility: CLINIC | Age: 76
End: 2021-07-13

## 2021-07-27 ENCOUNTER — PATIENT MESSAGE (OUTPATIENT)
Dept: SPORTS MEDICINE | Facility: CLINIC | Age: 76
End: 2021-07-27

## 2021-08-02 RX ORDER — DORZOLAMIDE HCL 20 MG/ML
1 SOLUTION/ DROPS OPHTHALMIC 2 TIMES DAILY
Qty: 10 ML | Refills: 6 | Status: SHIPPED | OUTPATIENT
Start: 2021-08-02 | End: 2021-08-14 | Stop reason: SDUPTHER

## 2021-08-09 DIAGNOSIS — H40.1121 PRIMARY OPEN ANGLE GLAUCOMA (POAG) OF LEFT EYE, MILD STAGE: ICD-10-CM

## 2021-08-09 DIAGNOSIS — H40.1112 PRIMARY OPEN ANGLE GLAUCOMA (POAG) OF RIGHT EYE, MODERATE STAGE: ICD-10-CM

## 2021-08-09 DIAGNOSIS — H40.039 ANATOMICAL NARROW ANGLE: ICD-10-CM

## 2021-08-09 RX ORDER — LATANOPROST 50 UG/ML
1 SOLUTION/ DROPS OPHTHALMIC DAILY
Qty: 7.5 ML | Refills: 3 | Status: SHIPPED | OUTPATIENT
Start: 2021-08-09 | End: 2022-06-14

## 2021-08-12 ENCOUNTER — PATIENT MESSAGE (OUTPATIENT)
Dept: OPTOMETRY | Facility: CLINIC | Age: 76
End: 2021-08-12

## 2021-08-14 RX ORDER — DORZOLAMIDE HCL 20 MG/ML
1 SOLUTION/ DROPS OPHTHALMIC 2 TIMES DAILY
Qty: 10 ML | Refills: 6 | Status: SHIPPED | OUTPATIENT
Start: 2021-08-14 | End: 2021-12-13 | Stop reason: SDUPTHER

## 2021-08-24 ENCOUNTER — PATIENT MESSAGE (OUTPATIENT)
Dept: SPORTS MEDICINE | Facility: CLINIC | Age: 76
End: 2021-08-24

## 2021-08-24 DIAGNOSIS — M25.551 RIGHT HIP PAIN: ICD-10-CM

## 2021-08-24 DIAGNOSIS — M25.561 RIGHT KNEE PAIN: Primary | ICD-10-CM

## 2021-08-25 ENCOUNTER — HOSPITAL ENCOUNTER (OUTPATIENT)
Dept: RADIOLOGY | Facility: HOSPITAL | Age: 76
Discharge: HOME | End: 2021-08-25
Attending: NEUROMUSCULOSKELETAL MEDICINE & OMM
Payer: MEDICARE

## 2021-08-25 ENCOUNTER — OFFICE VISIT (OUTPATIENT)
Dept: SPORTS MEDICINE | Facility: CLINIC | Age: 76
End: 2021-08-25
Payer: MEDICARE

## 2021-08-25 VITALS
DIASTOLIC BLOOD PRESSURE: 80 MMHG | BODY MASS INDEX: 39.34 KG/M2 | WEIGHT: 222 LBS | SYSTOLIC BLOOD PRESSURE: 132 MMHG | HEIGHT: 63 IN

## 2021-08-25 DIAGNOSIS — M79.10 MYALGIA: ICD-10-CM

## 2021-08-25 DIAGNOSIS — M54.59 MECHANICAL LOW BACK PAIN: ICD-10-CM

## 2021-08-25 DIAGNOSIS — M25.551 RIGHT HIP PAIN: Primary | ICD-10-CM

## 2021-08-25 DIAGNOSIS — M22.2X1 PATELLOFEMORAL PAIN SYNDROME OF RIGHT KNEE: ICD-10-CM

## 2021-08-25 DIAGNOSIS — M25.551 RIGHT HIP PAIN: ICD-10-CM

## 2021-08-25 DIAGNOSIS — M16.0 BILATERAL PRIMARY OSTEOARTHRITIS OF HIP: ICD-10-CM

## 2021-08-25 DIAGNOSIS — M99.02 SOMATIC DYSFUNCTION OF THORACIC REGION: ICD-10-CM

## 2021-08-25 DIAGNOSIS — M25.561 ACUTE PAIN OF RIGHT KNEE: ICD-10-CM

## 2021-08-25 DIAGNOSIS — M99.04 SACRAL REGION SOMATIC DYSFUNCTION: ICD-10-CM

## 2021-08-25 DIAGNOSIS — M25.561 RIGHT KNEE PAIN: ICD-10-CM

## 2021-08-25 DIAGNOSIS — M99.05 SOMATIC DYSFUNCTION OF PELVIC REGION: ICD-10-CM

## 2021-08-25 DIAGNOSIS — M99.03 SOMATIC DYSFUNCTION OF LUMBAR REGION: ICD-10-CM

## 2021-08-25 PROCEDURE — 99999 PR PBB SHADOW E&M-EST. PATIENT-LVL III: ICD-10-PCS | Mod: PBBFAC,,, | Performed by: NEUROMUSCULOSKELETAL MEDICINE & OMM

## 2021-08-25 PROCEDURE — 73564 X-RAY EXAM KNEE 4 OR MORE: CPT | Mod: 26,RT,, | Performed by: RADIOLOGY

## 2021-08-25 PROCEDURE — 97110 THERAPEUTIC EXERCISES: CPT | Mod: GP,S$GLB,ICN, | Performed by: NEUROMUSCULOSKELETAL MEDICINE & OMM

## 2021-08-25 PROCEDURE — 99999 PR PBB SHADOW E&M-EST. PATIENT-LVL III: CPT | Mod: PBBFAC,,, | Performed by: NEUROMUSCULOSKELETAL MEDICINE & OMM

## 2021-08-25 PROCEDURE — 3079F DIAST BP 80-89 MM HG: CPT | Mod: CPTII,S$GLB,ICN, | Performed by: NEUROMUSCULOSKELETAL MEDICINE & OMM

## 2021-08-25 PROCEDURE — 99215 PR OFFICE/OUTPT VISIT, EST, LEVL V, 40-54 MIN: ICD-10-PCS | Mod: 25,S$GLB,ICN, | Performed by: NEUROMUSCULOSKELETAL MEDICINE & OMM

## 2021-08-25 PROCEDURE — 73502 X-RAY EXAM HIP UNI 2-3 VIEWS: CPT | Mod: 26,RT,, | Performed by: RADIOLOGY

## 2021-08-25 PROCEDURE — 1159F MED LIST DOCD IN RCRD: CPT | Mod: CPTII,S$GLB,ICN, | Performed by: NEUROMUSCULOSKELETAL MEDICINE & OMM

## 2021-08-25 PROCEDURE — 3008F PR BODY MASS INDEX (BMI) DOCUMENTED: ICD-10-PCS | Mod: CPTII,S$GLB,ICN, | Performed by: NEUROMUSCULOSKELETAL MEDICINE & OMM

## 2021-08-25 PROCEDURE — 97110 PR THERAPEUTIC EXERCISES: ICD-10-PCS | Mod: GP,S$GLB,ICN, | Performed by: NEUROMUSCULOSKELETAL MEDICINE & OMM

## 2021-08-25 PROCEDURE — 73562 XR KNEE ORTHO RIGHT WITH FLEXION: ICD-10-PCS | Mod: 26,LT,, | Performed by: RADIOLOGY

## 2021-08-25 PROCEDURE — 98926 OSTEOPATH MANJ 3-4 REGIONS: CPT | Mod: S$GLB,ICN,, | Performed by: NEUROMUSCULOSKELETAL MEDICINE & OMM

## 2021-08-25 PROCEDURE — 3075F PR MOST RECENT SYSTOLIC BLOOD PRESS GE 130-139MM HG: ICD-10-PCS | Mod: CPTII,S$GLB,ICN, | Performed by: NEUROMUSCULOSKELETAL MEDICINE & OMM

## 2021-08-25 PROCEDURE — 73564 X-RAY EXAM KNEE 4 OR MORE: CPT | Mod: TC,PO,RT

## 2021-08-25 PROCEDURE — 73502 XR HIP WITH PELVIS WHEN PERFORMED, 2 OR 3  VIEWS RIGHT: ICD-10-PCS | Mod: 26,RT,, | Performed by: RADIOLOGY

## 2021-08-25 PROCEDURE — 3075F SYST BP GE 130 - 139MM HG: CPT | Mod: CPTII,S$GLB,ICN, | Performed by: NEUROMUSCULOSKELETAL MEDICINE & OMM

## 2021-08-25 PROCEDURE — 73502 X-RAY EXAM HIP UNI 2-3 VIEWS: CPT | Mod: TC,PO,RT

## 2021-08-25 PROCEDURE — 3288F PR FALLS RISK ASSESSMENT DOCUMENTED: ICD-10-PCS | Mod: CPTII,S$GLB,ICN, | Performed by: NEUROMUSCULOSKELETAL MEDICINE & OMM

## 2021-08-25 PROCEDURE — 1159F PR MEDICATION LIST DOCUMENTED IN MEDICAL RECORD: ICD-10-PCS | Mod: CPTII,S$GLB,ICN, | Performed by: NEUROMUSCULOSKELETAL MEDICINE & OMM

## 2021-08-25 PROCEDURE — 1101F PR PT FALLS ASSESS DOC 0-1 FALLS W/OUT INJ PAST YR: ICD-10-PCS | Mod: CPTII,S$GLB,ICN, | Performed by: NEUROMUSCULOSKELETAL MEDICINE & OMM

## 2021-08-25 PROCEDURE — 1160F RVW MEDS BY RX/DR IN RCRD: CPT | Mod: CPTII,S$GLB,ICN, | Performed by: NEUROMUSCULOSKELETAL MEDICINE & OMM

## 2021-08-25 PROCEDURE — 1160F PR REVIEW ALL MEDS BY PRESCRIBER/CLIN PHARMACIST DOCUMENTED: ICD-10-PCS | Mod: CPTII,S$GLB,ICN, | Performed by: NEUROMUSCULOSKELETAL MEDICINE & OMM

## 2021-08-25 PROCEDURE — 73562 X-RAY EXAM OF KNEE 3: CPT | Mod: 26,LT,, | Performed by: RADIOLOGY

## 2021-08-25 PROCEDURE — 99215 OFFICE O/P EST HI 40 MIN: CPT | Mod: 25,S$GLB,ICN, | Performed by: NEUROMUSCULOSKELETAL MEDICINE & OMM

## 2021-08-25 PROCEDURE — 1101F PT FALLS ASSESS-DOCD LE1/YR: CPT | Mod: CPTII,S$GLB,ICN, | Performed by: NEUROMUSCULOSKELETAL MEDICINE & OMM

## 2021-08-25 PROCEDURE — 3008F BODY MASS INDEX DOCD: CPT | Mod: CPTII,S$GLB,ICN, | Performed by: NEUROMUSCULOSKELETAL MEDICINE & OMM

## 2021-08-25 PROCEDURE — 98926 PR OSTEOPATHIC MANIP,3-4 BODY REGN: ICD-10-PCS | Mod: S$GLB,ICN,, | Performed by: NEUROMUSCULOSKELETAL MEDICINE & OMM

## 2021-08-25 PROCEDURE — 3079F PR MOST RECENT DIASTOLIC BLOOD PRESSURE 80-89 MM HG: ICD-10-PCS | Mod: CPTII,S$GLB,ICN, | Performed by: NEUROMUSCULOSKELETAL MEDICINE & OMM

## 2021-08-25 PROCEDURE — 73564 XR KNEE ORTHO RIGHT WITH FLEXION: ICD-10-PCS | Mod: 26,RT,, | Performed by: RADIOLOGY

## 2021-08-25 PROCEDURE — 3288F FALL RISK ASSESSMENT DOCD: CPT | Mod: CPTII,S$GLB,ICN, | Performed by: NEUROMUSCULOSKELETAL MEDICINE & OMM

## 2021-09-29 ENCOUNTER — OFFICE VISIT (OUTPATIENT)
Dept: SPORTS MEDICINE | Facility: CLINIC | Age: 76
End: 2021-09-29
Payer: MEDICARE

## 2021-09-29 VITALS
SYSTOLIC BLOOD PRESSURE: 120 MMHG | HEIGHT: 63 IN | BODY MASS INDEX: 39.34 KG/M2 | DIASTOLIC BLOOD PRESSURE: 74 MMHG | WEIGHT: 222 LBS

## 2021-09-29 DIAGNOSIS — M25.552 LATERAL PAIN OF LEFT HIP: ICD-10-CM

## 2021-09-29 DIAGNOSIS — M22.2X1 PATELLOFEMORAL PAIN SYNDROME OF RIGHT KNEE: ICD-10-CM

## 2021-09-29 DIAGNOSIS — M25.551 LATERAL PAIN OF RIGHT HIP: ICD-10-CM

## 2021-09-29 DIAGNOSIS — M79.10 MYALGIA: ICD-10-CM

## 2021-09-29 DIAGNOSIS — M99.08 SOMATIC DYSFUNCTION OF RIB CAGE REGION: ICD-10-CM

## 2021-09-29 DIAGNOSIS — M99.02 SOMATIC DYSFUNCTION OF THORACIC REGION: ICD-10-CM

## 2021-09-29 DIAGNOSIS — M99.04 SACRAL REGION SOMATIC DYSFUNCTION: ICD-10-CM

## 2021-09-29 DIAGNOSIS — M99.05 SOMATIC DYSFUNCTION OF PELVIC REGION: ICD-10-CM

## 2021-09-29 DIAGNOSIS — M99.03 SOMATIC DYSFUNCTION OF LUMBAR REGION: ICD-10-CM

## 2021-09-29 DIAGNOSIS — M54.59 MECHANICAL LOW BACK PAIN: Primary | ICD-10-CM

## 2021-09-29 PROCEDURE — 3008F PR BODY MASS INDEX (BMI) DOCUMENTED: ICD-10-PCS | Mod: HCNC,CPTII,S$GLB,ICN | Performed by: NEUROMUSCULOSKELETAL MEDICINE & OMM

## 2021-09-29 PROCEDURE — 98927 OSTEOPATH MANJ 5-6 REGIONS: CPT | Mod: HCNC,S$GLB,ICN, | Performed by: NEUROMUSCULOSKELETAL MEDICINE & OMM

## 2021-09-29 PROCEDURE — 98927 PR OSTEOPATHIC MANIP,5-6 BODY REGN: ICD-10-PCS | Mod: HCNC,S$GLB,ICN, | Performed by: NEUROMUSCULOSKELETAL MEDICINE & OMM

## 2021-09-29 PROCEDURE — 97110 THERAPEUTIC EXERCISES: CPT | Mod: HCNC,GP,S$GLB,ICN | Performed by: NEUROMUSCULOSKELETAL MEDICINE & OMM

## 2021-09-29 PROCEDURE — 3288F FALL RISK ASSESSMENT DOCD: CPT | Mod: HCNC,CPTII,S$GLB,ICN | Performed by: NEUROMUSCULOSKELETAL MEDICINE & OMM

## 2021-09-29 PROCEDURE — 3074F SYST BP LT 130 MM HG: CPT | Mod: HCNC,CPTII,S$GLB,ICN | Performed by: NEUROMUSCULOSKELETAL MEDICINE & OMM

## 2021-09-29 PROCEDURE — 1159F MED LIST DOCD IN RCRD: CPT | Mod: HCNC,CPTII,S$GLB,ICN | Performed by: NEUROMUSCULOSKELETAL MEDICINE & OMM

## 2021-09-29 PROCEDURE — 1101F PT FALLS ASSESS-DOCD LE1/YR: CPT | Mod: HCNC,CPTII,S$GLB,ICN | Performed by: NEUROMUSCULOSKELETAL MEDICINE & OMM

## 2021-09-29 PROCEDURE — 99214 OFFICE O/P EST MOD 30 MIN: CPT | Mod: 25,HCNC,S$GLB,ICN | Performed by: NEUROMUSCULOSKELETAL MEDICINE & OMM

## 2021-09-29 PROCEDURE — 3288F PR FALLS RISK ASSESSMENT DOCUMENTED: ICD-10-PCS | Mod: HCNC,CPTII,S$GLB,ICN | Performed by: NEUROMUSCULOSKELETAL MEDICINE & OMM

## 2021-09-29 PROCEDURE — 3008F BODY MASS INDEX DOCD: CPT | Mod: HCNC,CPTII,S$GLB,ICN | Performed by: NEUROMUSCULOSKELETAL MEDICINE & OMM

## 2021-09-29 PROCEDURE — 1160F RVW MEDS BY RX/DR IN RCRD: CPT | Mod: HCNC,CPTII,S$GLB,ICN | Performed by: NEUROMUSCULOSKELETAL MEDICINE & OMM

## 2021-09-29 PROCEDURE — 3078F PR MOST RECENT DIASTOLIC BLOOD PRESSURE < 80 MM HG: ICD-10-PCS | Mod: HCNC,CPTII,S$GLB,ICN | Performed by: NEUROMUSCULOSKELETAL MEDICINE & OMM

## 2021-09-29 PROCEDURE — 1101F PR PT FALLS ASSESS DOC 0-1 FALLS W/OUT INJ PAST YR: ICD-10-PCS | Mod: HCNC,CPTII,S$GLB,ICN | Performed by: NEUROMUSCULOSKELETAL MEDICINE & OMM

## 2021-09-29 PROCEDURE — 3078F DIAST BP <80 MM HG: CPT | Mod: HCNC,CPTII,S$GLB,ICN | Performed by: NEUROMUSCULOSKELETAL MEDICINE & OMM

## 2021-09-29 PROCEDURE — 99214 PR OFFICE/OUTPT VISIT, EST, LEVL IV, 30-39 MIN: ICD-10-PCS | Mod: 25,HCNC,S$GLB,ICN | Performed by: NEUROMUSCULOSKELETAL MEDICINE & OMM

## 2021-09-29 PROCEDURE — 3074F PR MOST RECENT SYSTOLIC BLOOD PRESSURE < 130 MM HG: ICD-10-PCS | Mod: HCNC,CPTII,S$GLB,ICN | Performed by: NEUROMUSCULOSKELETAL MEDICINE & OMM

## 2021-09-29 PROCEDURE — 99999 PR PBB SHADOW E&M-EST. PATIENT-LVL III: ICD-10-PCS | Mod: PBBFAC,HCNC,, | Performed by: NEUROMUSCULOSKELETAL MEDICINE & OMM

## 2021-09-29 PROCEDURE — 1159F PR MEDICATION LIST DOCUMENTED IN MEDICAL RECORD: ICD-10-PCS | Mod: HCNC,CPTII,S$GLB,ICN | Performed by: NEUROMUSCULOSKELETAL MEDICINE & OMM

## 2021-09-29 PROCEDURE — 4010F PR ACE/ARB THEARPY RXD/TAKEN: ICD-10-PCS | Mod: HCNC,CPTII,S$GLB,ICN | Performed by: NEUROMUSCULOSKELETAL MEDICINE & OMM

## 2021-09-29 PROCEDURE — 4010F ACE/ARB THERAPY RXD/TAKEN: CPT | Mod: HCNC,CPTII,S$GLB,ICN | Performed by: NEUROMUSCULOSKELETAL MEDICINE & OMM

## 2021-09-29 PROCEDURE — 1160F PR REVIEW ALL MEDS BY PRESCRIBER/CLIN PHARMACIST DOCUMENTED: ICD-10-PCS | Mod: HCNC,CPTII,S$GLB,ICN | Performed by: NEUROMUSCULOSKELETAL MEDICINE & OMM

## 2021-09-29 PROCEDURE — 99999 PR PBB SHADOW E&M-EST. PATIENT-LVL III: CPT | Mod: PBBFAC,HCNC,, | Performed by: NEUROMUSCULOSKELETAL MEDICINE & OMM

## 2021-09-29 PROCEDURE — 97110 PR THERAPEUTIC EXERCISES: ICD-10-PCS | Mod: HCNC,GP,S$GLB,ICN | Performed by: NEUROMUSCULOSKELETAL MEDICINE & OMM

## 2021-10-04 ENCOUNTER — IMMUNIZATION (OUTPATIENT)
Dept: INTERNAL MEDICINE | Facility: CLINIC | Age: 76
End: 2021-10-04
Payer: MEDICARE

## 2021-10-04 DIAGNOSIS — Z23 NEED FOR VACCINATION: Primary | ICD-10-CM

## 2021-10-04 PROCEDURE — 91300 COVID-19, MRNA, LNP-S, PF, 30 MCG/0.3 ML DOSE VACCINE: CPT | Mod: HCNC,PBBFAC | Performed by: INTERNAL MEDICINE

## 2021-10-04 PROCEDURE — 0003A COVID-19, MRNA, LNP-S, PF, 30 MCG/0.3 ML DOSE VACCINE: CPT | Mod: HCNC,CV19,PBBFAC | Performed by: INTERNAL MEDICINE

## 2021-10-05 ENCOUNTER — PATIENT MESSAGE (OUTPATIENT)
Dept: INTERNAL MEDICINE | Facility: CLINIC | Age: 76
End: 2021-10-05

## 2021-11-03 ENCOUNTER — PATIENT MESSAGE (OUTPATIENT)
Dept: INTERNAL MEDICINE | Facility: CLINIC | Age: 76
End: 2021-11-03
Payer: MEDICARE

## 2021-12-11 ENCOUNTER — PATIENT MESSAGE (OUTPATIENT)
Dept: OPTOMETRY | Facility: CLINIC | Age: 76
End: 2021-12-11
Payer: MEDICARE

## 2021-12-11 ENCOUNTER — PATIENT MESSAGE (OUTPATIENT)
Dept: INTERNAL MEDICINE | Facility: CLINIC | Age: 76
End: 2021-12-11
Payer: MEDICARE

## 2021-12-13 ENCOUNTER — OFFICE VISIT (OUTPATIENT)
Dept: OTOLARYNGOLOGY | Facility: CLINIC | Age: 76
End: 2021-12-13
Payer: MEDICARE

## 2021-12-13 DIAGNOSIS — H81.91 PERIPHERAL VESTIBULOPATHY OF RIGHT EAR: ICD-10-CM

## 2021-12-13 DIAGNOSIS — R42 DIZZINESS: Primary | ICD-10-CM

## 2021-12-13 PROCEDURE — 99999 PR PBB SHADOW E&M-EST. PATIENT-LVL III: ICD-10-PCS | Mod: PBBFAC,HCNC,, | Performed by: NURSE PRACTITIONER

## 2021-12-13 PROCEDURE — 4010F PR ACE/ARB THEARPY RXD/TAKEN: ICD-10-PCS | Mod: HCNC,CPTII,S$GLB, | Performed by: NURSE PRACTITIONER

## 2021-12-13 PROCEDURE — 99212 OFFICE O/P EST SF 10 MIN: CPT | Mod: HCNC,S$GLB,, | Performed by: NURSE PRACTITIONER

## 2021-12-13 PROCEDURE — 99999 PR PBB SHADOW E&M-EST. PATIENT-LVL III: CPT | Mod: PBBFAC,HCNC,, | Performed by: NURSE PRACTITIONER

## 2021-12-13 PROCEDURE — 4010F ACE/ARB THERAPY RXD/TAKEN: CPT | Mod: HCNC,CPTII,S$GLB, | Performed by: NURSE PRACTITIONER

## 2021-12-13 PROCEDURE — 99212 PR OFFICE/OUTPT VISIT, EST, LEVL II, 10-19 MIN: ICD-10-PCS | Mod: HCNC,S$GLB,, | Performed by: NURSE PRACTITIONER

## 2021-12-13 RX ORDER — DORZOLAMIDE HCL 20 MG/ML
1 SOLUTION/ DROPS OPHTHALMIC 2 TIMES DAILY
Qty: 10 ML | Refills: 6 | Status: SHIPPED | OUTPATIENT
Start: 2021-12-13 | End: 2022-06-22

## 2021-12-13 RX ORDER — SIMVASTATIN 40 MG/1
40 TABLET, FILM COATED ORAL NIGHTLY
Qty: 90 TABLET | Refills: 1 | Status: SHIPPED | OUTPATIENT
Start: 2021-12-13 | End: 2022-09-05 | Stop reason: SDUPTHER

## 2021-12-16 ENCOUNTER — CLINICAL SUPPORT (OUTPATIENT)
Dept: AUDIOLOGY | Facility: CLINIC | Age: 76
End: 2021-12-16
Payer: MEDICARE

## 2021-12-16 DIAGNOSIS — H81.8X1 RIGHT-SIDED VESTIBULAR WEAKNESS: Primary | ICD-10-CM

## 2021-12-16 PROCEDURE — 92537 PR CALORIC VSTBLR TEST W/REC BITHERMAL: ICD-10-PCS | Mod: HCNC,S$GLB,, | Performed by: AUDIOLOGIST

## 2021-12-16 PROCEDURE — 92540 PR VESTIBULAR EVAL NYSTAG FOVL&PERPH STIM OSCIL TRACKING: ICD-10-PCS | Mod: HCNC,S$GLB,, | Performed by: AUDIOLOGIST

## 2021-12-16 PROCEDURE — 92537 CALORIC VSTBLR TEST W/REC: CPT | Mod: HCNC,S$GLB,, | Performed by: AUDIOLOGIST

## 2021-12-16 PROCEDURE — 92540 BASIC VESTIBULAR EVALUATION: CPT | Mod: HCNC,S$GLB,, | Performed by: AUDIOLOGIST

## 2021-12-21 ENCOUNTER — OFFICE VISIT (OUTPATIENT)
Dept: URGENT CARE | Facility: CLINIC | Age: 76
End: 2021-12-21
Payer: MEDICARE

## 2021-12-21 VITALS
DIASTOLIC BLOOD PRESSURE: 73 MMHG | SYSTOLIC BLOOD PRESSURE: 130 MMHG | RESPIRATION RATE: 18 BRPM | HEART RATE: 70 BPM | TEMPERATURE: 98 F | BODY MASS INDEX: 39.34 KG/M2 | WEIGHT: 222 LBS | HEIGHT: 63 IN | OXYGEN SATURATION: 98 %

## 2021-12-21 DIAGNOSIS — L02.412 ABSCESS OF LEFT AXILLA: Primary | ICD-10-CM

## 2021-12-21 PROCEDURE — 4010F ACE/ARB THERAPY RXD/TAKEN: CPT | Mod: CPTII,S$GLB,,

## 2021-12-21 PROCEDURE — 99203 OFFICE O/P NEW LOW 30 MIN: CPT | Mod: S$GLB,,,

## 2021-12-21 PROCEDURE — 4010F PR ACE/ARB THEARPY RXD/TAKEN: ICD-10-PCS | Mod: CPTII,S$GLB,,

## 2021-12-21 PROCEDURE — 99203 PR OFFICE/OUTPT VISIT, NEW, LEVL III, 30-44 MIN: ICD-10-PCS | Mod: S$GLB,,,

## 2021-12-21 RX ORDER — DOXYCYCLINE 100 MG/1
100 CAPSULE ORAL EVERY 12 HOURS
Qty: 14 CAPSULE | Refills: 0 | Status: SHIPPED | OUTPATIENT
Start: 2021-12-21 | End: 2021-12-28

## 2022-01-04 ENCOUNTER — CLINICAL SUPPORT (OUTPATIENT)
Dept: REHABILITATION | Facility: HOSPITAL | Age: 77
End: 2022-01-04
Attending: NURSE PRACTITIONER
Payer: MEDICARE

## 2022-01-04 DIAGNOSIS — R26.89 BALANCE PROBLEM: ICD-10-CM

## 2022-01-04 DIAGNOSIS — H81.91 PERIPHERAL VESTIBULOPATHY OF RIGHT EAR: ICD-10-CM

## 2022-01-04 DIAGNOSIS — H81.11 BPPV (BENIGN PAROXYSMAL POSITIONAL VERTIGO), RIGHT: ICD-10-CM

## 2022-01-04 PROCEDURE — 95992 CANALITH REPOSITIONING PROC: CPT | Mod: HCNC,PO

## 2022-01-04 PROCEDURE — 97161 PT EVAL LOW COMPLEX 20 MIN: CPT | Mod: HCNC,PO

## 2022-01-04 NOTE — PROGRESS NOTES
"See POC for initial eval.                                                                                                                                        Outpatient Neurological Vestibular Rehabilitation Physical Therapy Evaluation      Name: Eboni Nicholas  Clinic Number: [id number]    Diagnosis:   Encounter Diagnoses   Name Primary?    Peripheral vestibulopathy of right ear     BPPV (benign paroxysmal positional vertigo), right     Balance problem      Physician: Marino Wheeler D*  Physician Orders: PT Eval and Treat - vestibular therapy  Medical Diagnosis from Referral: H81.91 (ICD-10-CM) - Peripheral vestibulopathy of right ear  Evaluation Date: 1/4/2022  Authorization Period Expiration: 12/16/22  Plan of Care Expiration: 3/1/22  Plan of Care Certification Period: 1/4/22 - 3/1/22  Visit # / Visits authorized: 1/1    Time In:  1100  Time Out: 1145  Total Appointment Time: 45 minutes    Precautions: Fall    Subjective     PT Diagnosis: R posterior BPPV and balance dysfunction    Date of onset: unknown    History of current condition: Eboni reports: " I'm not getting as dizzy when I get up in the morning as I used to since I started doing the exercises the doctor gave to me. Before the exercises, I had a feeling of falling, not spinning, but the sensation that my body was moving when I wasn't after I sat up from laying down in bed. Sometimes I experience a fullness right over my L ear. Sometimes I feel off balance when I wake up and start walking. "     Medical History:   Past Medical History:   Diagnosis Date    Anxiety     Benign essential HTN     Cataract     Depression     GERD (gastroesophageal reflux disease)     Glaucoma     Hyperlipidemia     Hypothyroid     CHIDI (obstructive sleep apnea)     Sarcoidosis        Surgical History:   Eboni Nicholas  has a past surgical history that includes Hysterectomy and Thyroid surgery.    Medications:   Eboni has a current medication list which " includes the following prescription(s): aspirin, blood pressure monitor, calcium-vitamin d, dorzolamide, escitalopram oxalate, latanoprost, levocetirizine, levothyroxine, losartan, meclizine, multivitamin, simvastatin, and timolol maleate 0.5%.    Allergies:   Review of patient's allergies indicates:   Allergen Reactions    Brimonidine Itching     Itchy red eyes with brimonidine    Phenobarbital Rash    Sulfa (sulfonamide antibiotics) Rash          History of Current Symptoms     Positional changes: sitting up, lying down, leaning head back   Environmental changes: n/a   Alleviating Factors: time   Vision: no new changes   Hearing: L ear fullness occasionally     Description of symptoms: imbalanced   Onset: immediate   Frequency: daily, morning   Duration: a few seconds to a few minutes   Limitations due to symptoms: n/a    History of migraines: no    Prior Therapy: orthopedic PT for shoulder  Social History:  lives with their spouse  Prior Level of Function: independent  Current Level of Function: independent  DME owned:  [employer].  Work/Job description includes:  n/a      Subjective     Pt stated goals: get rid of feeling of imbalance    Pain: n/a    Objective     Follows commands: 100% of time   Speech: no deficits    Mental status: alert, oriented to person, place, and time  Appearance: Casually dressed  Behavior:  calm, cooperative and adequate rapport can be established  Attention Span and Concentration:  Normal    Posture Alignment in sitting: rounded shoulders, forward head    Posture Alignment in standing: rounded shoulders, forward head, increased hip extension    Sensation: Light Touch: Intact    CERVICAL SPINE ROM  Grossly WFL      Modified VAS in sitting (rotation, then extension):  R: negative  L: negative         Oculomotor:  Spontaneous nystagmus WNL   Gaze-holding nystagmus End range L and R rotary nystagmus   Ocular ROM WNL   Horizontal smooth pursuit WNL   Vertical smooth pursuit  WNL   Diagonal smooth pursuit WNL   Horizontal saccades undershooting   Vertical saccades undershooting   Diagonal saccades undershooting   Slow VOR WNL   Head thrust Mild positive B, R>L   Dynamic Visual Acuity (DVA) NT at this date   Convergence WNL       MCTSIB: feet together and arms crossed unless otherwise noted   Evaluation (01/04/2022)   EO on firm surface 30 sec   EC on firm surface 25 sec, min sway   EO on compliant surface 30 sec, min sway   EC on compliant surface 5 sec, LOB to R       Static standing balance: arms crossed unless otherwise noted   Evaluation (01/04/2022)   SLS R LE 3 sec  (<10 sec = HIGH FALL RISK)   SLS L LE 4 sec  (<10 sec = HIGH FALL RISK)   Tandem R LE front 19 sec   Tandem L LE front 27 sec     Fukuda step test: NT    Functional Gait Assessment:    1. Gait on level surface (6m) =  (2) Mild impairment: 7-5.6 sec, uses A.D., mild gait deviations, or deviates 6-10 in  2. Change in Gait Speed =  (3) Normal: smooth change w/o loss of balance or gait deviation, deviates < 6 in, significant difference between speeds  3. Gait with horizontal head turns  = (2) Mild impairment: slight change in speed, deviates 6-10 in, OR uses A.D.  4. Gait with vertical head turns = (2) Mild impairment: slight change in speed, deviates 6-10 in OR uses A.D.  5. Gait with pivot turns = (3) Normal: performs safely in 3 sec, no LOB  6. Step over obstacle = (2) Mild impairment: able to step over 1 box w/o change in speed or LOB  7. Gait with Narrow YANDY = (0) Severe impairment: < 4 steps or cannot perform w/o assist  8. Gait with eyes closed = (2) Mild impairment: 7.1-9 sec, mild gait deviations, deviates 6-10 in  9. Ambulating Backwards = (2) Mild impairment: uses A.D., slower speed, mild gait deviations, deviates 6-10 in  10. Steps = (1) Moderate impairment: step-to, uses rail    Score 19/30     Score:   <22/30 fall risk   <20/30 fall risk in older adults       Gait Assessment:   - AD used: SBQC in RUE  -  Assistance: SPV  - Distance: nt    GAIT DEVIATIONS:  Eboni displays the following deviations with ambulation: wide YANDY, impaired weight shifting ability    POSITIONAL CANAL TESTING  Looking for nystagmus (slow drift to affected side with quick correction away)     Armando-Hallpike Test:  Right: positive, rotary upbeating nystagmus with immediate onset and lasting less than 30 seconds   Left: negative    Roll Test:   Right: NT   Left: NT    CRT Performed: R Epley maneuver      Education provided re: role of PT, goals for PT, scheduling - pt verbalized understanding. Discussed insurance limitations with pt.     Eboni verbalized good understanding of education provided.   Pt has no cultural, educational or language barriers to learning provided.      TREATMENT: None provided today. All time spent on evaluation.    Assessment   This is a 76 y.o. female referred to outpatient physical therapy and presents with a medical diagnosis of peripheral vestibulopathy of the R ear.  Patient presents as a falls risk according to her FGA score (19/30), SLS balance, mCTSIB score (30/25/30/5), and her symptoms are consistent with R peripheral vestibulopathy from her positive R head thrust and positive R Cambridge-Hallpike testing. Pt exhibited R rotary upbeating nystagmus in R Cambridge-Hallpike position with an immediate onset and fatiguing within ~20 seconds, indicating R posterior canalithiasis. PT administered 1 Epley maneuver for this, after which pt tested negative in R Armando-Hallpike position (no nystagmus), indicating BPPV clearance. PT will continue to test as indicated to ensure full clearance over the next couple of follow-up appointments. PT is warranted to address and ensure R BPPV clearance as well as address the pt's static and dynamic balance deficits in order to improve her quality of life and decrease her risk of falls.     Pt rehab potential is Good. Pt will benefit from continuing skilled outpatient physical therapy to address the  deficits listed below in the problem list, provide pt/family education and to maximize pt's level of independence in the home and community environment.     Anticipated Barriers for therapy: none perceived.    Medical Necessity is demonstrated by the following:    History  Co-morbidities and personal factors that may impact the plan of care Co-morbidities:   major depressive disorder, nuclear sclerosis of B eyes, L glaucoma, HTN, L ventricular diastolic dysfunction, severe obesity    Personal Factors:   no deficits     high   Examination  Body Structures and Functions, activity limitations and participation restrictions that may impact the plan of care Body Regions:   head, vestibular system    Body Systems:    balance  gait   Vestibular system    Participation Restrictions:   none    Activity limitations:   Learning and applying knowledge  no deficits    General Tasks and Commands  no deficits    Communication  no deficits    Mobility  walking    Self care  no deficits    Domestic Life  doing house work (cleaning house, washing dishes, laundry)    Interactions/Relationships  no deficits    Life Areas  no deficits    Community and Social Life  community life  recreation and leisure         moderate   Clinical Presentation stable and uncomplicated low   Decision Making/ Complexity Score: low       Pt's spiritual, cultural and educational needs considered and pt agreeable to plan of care and goals as stated below:       GOALS:   Short Term Goals: 4 weeks   1. Pt will be introduced to HEP and report >50% compliance.  2. Pt will improve mCTSIB score to 30/30/30/10 to demonstrate improve integration of balance systems.  3. Pt will improve FGA score to 22/30 to show improved balance with gait.  4. Pt will perform B SLS balance for 6 sec each to improve lower body dressing and decrease risk of falls.  5. Pt will be able to perform seated horizontal and vertical VORx1 at >120 bpm for >30 sec to improve gaze stabilization  needed for safe ambulation.    Long Term Goals: 8 weeks  1. Pt will report >75% compliance with progressed HEP.  2. Pt will improve mCTSIB score to 30/30/30/15 to demonstrate improve integration of balance systems.  3. Pt will improve FGA score to 25/30 to show improved balance with gait.  4. Pt will perform B SLS balance for 10 sec each to improve lower body dressing and decrease risk of falls.  5. Pt will be able to perform seated horizontal and vertical VORx1 at >140 bpm for >45 sec to improve gaze stabilization needed for safe ambulation.  6. Pt will test negative for R BPPV in R Tracy-Hallpike position for 2 consecutive visits to demonstrate clearance of R BPPV.      Plan   Outpatient physical therapy 2 times weekly to include: Pt Education, HEP, therapeutic exercises, neuromuscular re-education, therapeutic activities, manual therapy, joint mobilizations, aquatic therapy and modalities PRN to achieve established goals. Pt may be seen by PTA as part of the rehabilitation team.     Cont PT for 8 weeks.     Salvatore Estrada, PT  01/04/2022

## 2022-01-04 NOTE — PLAN OF CARE
"  Outpatient [facility]rological [facility]estibular Rehabilitation Physical Therapy Evaluation      Name: Eboni Nicholas  Clinic Number: [id number]    Diagnosis:   Encounter Diagnoses   Name Primary?    Peripheral vestibulopathy of right ear     BPPV (benign paroxysmal positional vertigo), right     Balance problem      Physician: Marino Wheeler D*  Physician Orders: PT Eval and Treat - vestibular therapy  Medical Diagnosis from Referral: H81.91 (ICD-10-CM) - Peripheral vestibulopathy of right ear  Evaluation Date: 1/4/2022  Authorization Period Expiration: 12/16/22  Plan of Care Expiration: 3/1/22  Plan of Care Certification Period: 1/4/22 - 3/1/22  Visit # / Visits authorized: 1/1    Time In:  1100  Time Out: 1145  Total Appointment Time: 45 minutes    Precautions: Fall    Subjective     PT Diagnosis: R posterior BPPV and balance dysfunction    Date of onset: unknown    History of current condition: Eboni reports: " I'm not getting as dizzy when I get up in the morning as I used to since I started doing the exercises the doctor gave to me. Before the exercises, I had a feeling of falling, not spinning, but the sensation that my body was moving when I wasn't after I sat up from laying down in bed. Sometimes I experience a fullness right over my L ear. Sometimes I feel off balance when I wake up and start walking. "     Medical History:   Past Medical History:   Diagnosis Date    Anxiety     Benign essential HTN     Cataract     Depression     GERD (gastroesophageal reflux disease)     Glaucoma     Hyperlipidemia     Hypothyroid     CHIDI (obstructive sleep apnea)     Sarcoidosis        Surgical History:   Eboni Nicholas  has a past surgical history that includes Hysterectomy and Thyroid surgery.    Medications:   Eboni has a current medication list which includes the following prescription(s): aspirin, blood pressure monitor, calcium-vitamin d, dorzolamide, escitalopram oxalate, latanoprost, levocetirizine, " levothyroxine, losartan, meclizine, multivitamin, simvastatin, and timolol maleate 0.5%.    Allergies:   Review of patient's allergies indicates:   Allergen Reactions    Brimonidine Itching     Itchy red eyes with brimonidine    Phenobarbital Rash    Sulfa (sulfonamide antibiotics) Rash          History of Current Symptoms     Positional changes: sitting up, lying down, leaning head back   Environmental changes: n/a   Alleviating Factors: time   Vision: no new changes   Hearing: L ear fullness occasionally     Description of symptoms: imbalanced   Onset: immediate   Frequency: daily, morning   Duration: a few seconds to a few minutes   Limitations due to symptoms: n/a    History of migraines: no    Prior Therapy: orthopedic PT for shoulder  Social History:  lives with their spouse  Prior Level of Function: independent  Current Level of Function: independent  DME owned:  [employer]  Work/Job description includes:  n/a      Subjective     Pt stated goals: get rid of feeling of imbalance    Pain: n/a    Objective     Follows commands: 100% of time   Speech: no deficits    Mental status: alert, oriented to person, place, and time  Appearance: Casually dressed  Behavior:  calm, cooperative and adequate rapport can be established  Attention Span and Concentration:  Normal    Posture Alignment in sitting: rounded shoulders, forward head    Posture Alignment in standing: rounded shoulders, forward head, increased hip extension    Sensation: Light Touch: Intact    CERVICAL SPINE ROM  Grossly WFL      Modified VAS in sitting (rotation, then extension):  R: negative  L: negative         Oculomotor:  Spontaneous nystagmus WNL   Gaze-holding nystagmus End range L and R rotary nystagmus   Ocular ROM WNL   Horizontal smooth pursuit WNL   Vertical smooth pursuit WNL   Diagonal smooth pursuit WNL   Horizontal saccades undershooting   Vertical saccades undershooting   Diagonal saccades undershooting   Slow VOR WNL    Head thrust Mild positive B, R>L   Dynamic Visual Acuity (DVA) NT at this date   Convergence WNL       MCTSIB: feet together and arms crossed unless otherwise noted   Evaluation (01/04/2022)   EO on firm surface 30 sec   EC on firm surface 25 sec, min sway   EO on compliant surface 30 sec, min sway   EC on compliant surface 5 sec, LOB to R       Static standing balance: arms crossed unless otherwise noted   Evaluation (01/04/2022)   SLS R LE 3 sec  (<10 sec = HIGH FALL RISK)   SLS L LE 4 sec  (<10 sec = HIGH FALL RISK)   Tandem R LE front 19 sec   Tandem L LE front 27 sec     Fukuda step test: NT    Functional Gait Assessment:    1. Gait on level surface (6m) =  (2) Mild impairment: 7-5.6 sec, uses A.D., mild gait deviations, or deviates 6-10 in  2. Change in Gait Speed =  (3) Normal: smooth change w/o loss of balance or gait deviation, deviates < 6 in, significant difference between speeds  3. Gait with horizontal head turns  = (2) Mild impairment: slight change in speed, deviates 6-10 in, OR uses A.D.  4. Gait with vertical head turns = (2) Mild impairment: slight change in speed, deviates 6-10 in OR uses A.D.  5. Gait with pivot turns = (3) Normal: performs safely in 3 sec, no LOB  6. Step over obstacle = (2) Mild impairment: able to step over 1 box w/o change in speed or LOB  7. Gait with Narrow YANDY = (0) Severe impairment: < 4 steps or cannot perform w/o assist  8. Gait with eyes closed = (2) Mild impairment: 7.1-9 sec, mild gait deviations, deviates 6-10 in  9. Ambulating Backwards = (2) Mild impairment: uses A.D., slower speed, mild gait deviations, deviates 6-10 in  10. Steps = (1) Moderate impairment: step-to, uses rail    Score 19/30     Score:   <22/30 fall risk   <20/30 fall risk in older adults       Gait Assessment:   - AD used: SBQC in RUE  - Assistance: SPV  - Distance: nt    GAIT DEVIATIONS:  Eboni displays the following deviations with ambulation: wide YANDY, impaired weight shifting  ability    POSITIONAL CANAL TESTING  Looking for nystagmus (slow drift to affected side with quick correction away)     Armando-Hallpike Test:  Right: positive, rotary upbeating nystagmus with immediate onset and lasting less than 30 seconds   Left: negative    Roll Test:   Right: NT   Left: NT    CRT Performed: R Epley maneuver      Education provided re: role of PT, goals for PT, scheduling - pt verbalized understanding. Discussed insurance limitations with pt.     Eboni verbalized good understanding of education provided.   Pt has no cultural, educational or language barriers to learning provided.      TREATMENT: None provided today. All time spent on evaluation.    Assessment   This is a 76 y.o. female referred to outpatient physical therapy and presents with a medical diagnosis of peripheral vestibulopathy of the R ear.  Patient presents as a falls risk according to her FGA score (19/30), SLS balance, mCTSIB score (30/25/30/5), and her symptoms are consistent with R peripheral vestibulopathy from her positive R head thrust and positive R Armando-Hallpike testing. Pt exhibited R rotary upbeating nystagmus in R Dumont-Hallpike position with an immediate onset and fatiguing within ~20 seconds, indicating R posterior canalithiasis. PT administered 1 Epley maneuver for this, after which pt tested negative in R Dumont-Hallpike position (no nystagmus), indicating BPPV clearance. PT will continue to test as indicated to ensure full clearance over the next couple of follow-up appointments. PT is warranted to address and ensure R BPPV clearance as well as address the pt's static and dynamic balance deficits in order to improve her quality of life and decrease her risk of falls.     Pt rehab potential is Good. Pt will benefit from continuing skilled outpatient physical therapy to address the deficits listed below in the problem list, provide pt/family education and to maximize pt's level of independence in the home and community  environment.     Anticipated Barriers for therapy: none perceived.    Medical Necessity is demonstrated by the following:    History  Co-morbidities and personal factors that may impact the plan of care Co-morbidities:   major depressive disorder, nuclear sclerosis of B eyes, L glaucoma, HTN, L ventricular diastolic dysfunction, severe obesity    Personal Factors:   no deficits     high   Examination  Body Structures and Functions, activity limitations and participation restrictions that may impact the plan of care Body Regions:   head, vestibular system    Body Systems:    balance  gait   Vestibular system    Participation Restrictions:   none    Activity limitations:   Learning and applying knowledge  no deficits    General Tasks and Commands  no deficits    Communication  no deficits    Mobility  walking    Self care  no deficits    Domestic Life  doing house work (cleaning house, washing dishes, laundry)    Interactions/Relationships  no deficits    Life Areas  no deficits    Community and Social Life  community life  recreation and leisure         moderate   Clinical Presentation stable and uncomplicated low   Decision Making/ Complexity Score: low       Pt's spiritual, cultural and educational needs considered and pt agreeable to plan of care and goals as stated below:       GOALS:   Short Term Goals: 4 weeks   1. Pt will be introduced to HEP and report >50% compliance.  2. Pt will improve mCTSIB score to 30/30/30/10 to demonstrate improve integration of balance systems.  3. Pt will improve FGA score to 22/30 to show improved balance with gait.  4. Pt will perform B SLS balance for 6 sec each to improve lower body dressing and decrease risk of falls.  5. Pt will be able to perform seated horizontal and vertical VORx1 at >120 bpm for >30 sec to improve gaze stabilization needed for safe ambulation.    Long Term Goals: 8 weeks  1. Pt will report >75% compliance with progressed HEP.  2. Pt will improve mCTSIB  score to 30/30/30/15 to demonstrate improve integration of balance systems.  3. Pt will improve FGA score to 25/30 to show improved balance with gait.  4. Pt will perform B SLS balance for 10 sec each to improve lower body dressing and decrease risk of falls.  5. Pt will be able to perform seated horizontal and vertical VORx1 at >140 bpm for >45 sec to improve gaze stabilization needed for safe ambulation.  6. Pt will test negative for R BPPV in R Wells-Hallpike position for 2 consecutive visits to demonstrate clearance of R BPPV.      Plan   Outpatient physical therapy 2 times weekly to include: Pt Education, HEP, therapeutic exercises, neuromuscular re-education, therapeutic activities, manual therapy, joint mobilizations, aquatic therapy and modalities PRN to achieve established goals. Pt may be seen by PTA as part of the rehabilitation team.     Cont PT for 8 weeks.     Salvatore Estrada, PT  01/04/2022

## 2022-01-06 ENCOUNTER — CLINICAL SUPPORT (OUTPATIENT)
Dept: REHABILITATION | Facility: HOSPITAL | Age: 77
End: 2022-01-06
Attending: NURSE PRACTITIONER
Payer: MEDICARE

## 2022-01-06 DIAGNOSIS — H81.11 BPPV (BENIGN PAROXYSMAL POSITIONAL VERTIGO), RIGHT: ICD-10-CM

## 2022-01-06 DIAGNOSIS — R26.89 BALANCE PROBLEM: ICD-10-CM

## 2022-01-06 PROCEDURE — 97112 NEUROMUSCULAR REEDUCATION: CPT | Mod: HCNC,PO

## 2022-01-06 RX ORDER — LEVOTHYROXINE SODIUM 100 UG/1
100 TABLET ORAL DAILY
Qty: 90 TABLET | Refills: 1 | Status: SHIPPED | OUTPATIENT
Start: 2022-01-06 | End: 2022-07-15

## 2022-01-06 NOTE — PROGRESS NOTES
"    Physical Therapy Daily Treatment Note     Name: Eboni Nicholas  Clinic Number: [id number]    Therapy Diagnosis:   Encounter Diagnoses   Name Primary?    BPPV (benign paroxysmal positional vertigo), right     Balance problem      Physician: Marino Wheeler D*    Visit Date: 1/6/2022    Physician Orders: PT Eval and Treat - vestibular therapy  Medical Diagnosis from Referral: H81.91 (ICD-10-CM) - Peripheral vestibulopathy of right ear  Evaluation Date: 1/4/2022  Authorization Period Expiration: 12/16/22  Plan of Care Expiration: 3/1/22  Plan of Care Certification Period: 1/4/22 - 3/1/22  Visit #/Visits authorized: 2/20    Time In: 1302  Time Out: 1347  Total Billable Time: 45 minutes    Precautions: Standard and Fall    Subjective     Pt reports: doing well today.  She was not provided with home exercise program at initial eval.  Response to previous treatment: no adverse effects  Functional change: ongoing    Pain: 0/10  Location: n/a    Objective     Eboni received therapeutic exercises to develop strength, endurance, ROM, flexibility, posture and core stabilization for 0 minutes including:    Eboni participated in dynamic functional therapeutic activities to improve functional performance for 0  minutes, including:    Eboni participated in neuromuscular re-education activities to improve: Balance, Coordination, Kinesthetic, Sense, Proprioception and Posture for 40 minutes. The following activities were included:    45 sec seated horizontal VORx1 at 100 bpm  60 sec seated horizontal VORx1 at 140 bpm    60 sec seated vertical VORx1 at 140 bpm    30 sec modified R SLS with LLE resting on 2" step  30 sec modified L SLS with RLE resting on 2" step    30 sec Romberg with EC and arms down   30 sec hip width YANDY on blue foam EC and arms down  30 sec narrow YANDY on blue foam EC and arms down  7 sec, 13 sec, 15 sec, 25 sec near Romberg on blue foam EC and arms down    30 sec Romberg on firm surface arms down EC " head turns   30 sec hip width YANDY on blue foam arms down EC head turns, min sway  30 sec narrow YANDY on blue foam arms down EC head turns, min sway  12 sec, 20 sec near YANDY on blue foam arms down EC head turns, mod sway    1 x 10 non-alternating step up/step back with black foam fitter, CGA with occ Bj  2 x 10 non-alternating step up with opposite leg drive on black foam fitter, CGA with occ Bj    2 x 10 each direction lateral weight shifting for lateral limits of stability with opposite LE tap to blue foam disc between feet    Eboni participated in gait training to improve functional mobility and safety for 5  minutes, includin laps at ballet bar tandem walking with intermittent 1UE touchdown support, CGA with occ Bj  2 laps at ballet bar EC walking, CGA  2 laps at ballet bar retrostepping, CGA - VC for larger steps      Home Exercises Provided and Patient Education Provided     Education provided:   - 22: initial HEP provided with seated hor/vert VORx1 and UE supported SLS    Written Home Exercises Provided: yes.  Exercises were reviewed and Eboni was able to demonstrate them prior to the end of the session.  Eboni demonstrated good  understanding of the education provided.     See EMR under Patient Instructions for exercises provided 2022.    Assessment     Eboni tolerated first follow up session well. We established an initial HEP (see above) focusing on gaze stabilization and SLS balance. Pt favors LLE during vision-eliminated balance requiring frequent VC to adjust. She performed well with alternating lateral limits of stability and weight shifting after verbal explanation and visual demonstration. Retest pt for BPPV next visit. Cont with POC.      Eboni Is progressing well towards her goals.   Pt prognosis is Good.     Pt will continue to benefit from skilled outpatient physical therapy to address the deficits listed in the problem list box on initial evaluation, provide pt/family  education and to maximize pt's level of independence in the home and community environment.     Pt's spiritual, cultural and educational needs considered and pt agreeable to plan of care and goals.     Anticipated barriers to physical therapy: none      GOALS:   Short Term Goals: 4 weeks - progressing  1. Pt will be introduced to HEP and report >50% compliance.  2. Pt will improve mCTSIB score to 30/30/30/10 to demonstrate improve integration of balance systems.  3. Pt will improve FGA score to 22/30 to show improved balance with gait.  4. Pt will perform B SLS balance for 6 sec each to improve lower body dressing and decrease risk of falls.  5. Pt will be able to perform seated horizontal and vertical VORx1 at >120 bpm for >30 sec to improve gaze stabilization needed for safe ambulation.    Long Term Goals: 8 weeks  1. Pt will report >75% compliance with progressed HEP.  2. Pt will improve mCTSIB score to 30/30/30/15 to demonstrate improve integration of balance systems.  3. Pt will improve FGA score to 25/30 to show improved balance with gait.  4. Pt will perform B SLS balance for 10 sec each to improve lower body dressing and decrease risk of falls.  5. Pt will be able to perform seated horizontal and vertical VORx1 at >140 bpm for >45 sec to improve gaze stabilization needed for safe ambulation.  6. Pt will test negative for R BPPV in R Armando-Hallpike position for 2 consecutive visits to demonstrate clearance of R BPPV.    Plan     Continue with POC. Progress VORx1 positions, targets, and speed as tolerated.     Salvatore Estrada, PT   01/06/2022

## 2022-01-07 ENCOUNTER — DOCUMENTATION ONLY (OUTPATIENT)
Dept: REHABILITATION | Facility: HOSPITAL | Age: 77
End: 2022-01-07
Payer: MEDICARE

## 2022-01-07 NOTE — PROGRESS NOTES
PT/PTA met face to face to discuss pt's treatment plan and progress towards established goals.  Continue with current PT POC with focus on static and dynamic balance, VOR, and habituation.  Patient will be seen by physical therapist at least every sixth treatment or 30 days, whichever occurs first.    Laura Adame, PTA  01/07/2022      Face to face performed on 1/6/22

## 2022-01-10 ENCOUNTER — CLINICAL SUPPORT (OUTPATIENT)
Dept: REHABILITATION | Facility: HOSPITAL | Age: 77
End: 2022-01-10
Attending: NURSE PRACTITIONER
Payer: MEDICARE

## 2022-01-10 DIAGNOSIS — R26.89 BALANCE PROBLEM: ICD-10-CM

## 2022-01-10 DIAGNOSIS — H81.11 BPPV (BENIGN PAROXYSMAL POSITIONAL VERTIGO), RIGHT: ICD-10-CM

## 2022-01-10 PROCEDURE — 97116 GAIT TRAINING THERAPY: CPT | Mod: HCNC,PO

## 2022-01-10 PROCEDURE — 97112 NEUROMUSCULAR REEDUCATION: CPT | Mod: HCNC,PO

## 2022-01-10 NOTE — PROGRESS NOTES
Physical Therapy Daily Treatment Note     Name: Eboni Nicholas  Clinic Number: [id number]    Therapy Diagnosis:   Encounter Diagnoses   Name Primary?    BPPV (benign paroxysmal positional vertigo), right     Balance problem      Physician: Marino Wheeler D*    Visit Date: 1/10/2022    Physician Orders: PT Eval and Treat - vestibular therapy  Medical Diagnosis from Referral: H81.91 (ICD-10-CM) - Peripheral vestibulopathy of right ear  Evaluation Date: 1/4/2022  Authorization Period Expiration: 12/16/22  Plan of Care Expiration: 3/1/22  Plan of Care Certification Period: 1/4/22 - 3/1/22  Visit #/Visits authorized: 2/20 (+ initial eval)    Time In: 1400  Time Out: 1445  Total Billable Time: 45 minutes    Precautions: Standard and Fall    Subjective     Pt reports: doing well today.  She was not provided with home exercise program at initial eval.  Response to previous treatment: no adverse effects  Functional change: ongoing    Pain: 0/10  Location: n/a    Objective     Eboni received therapeutic exercises to develop strength, endurance, ROM, flexibility, posture and core stabilization for 0 minutes including:    Eboni participated in dynamic functional therapeutic activities to improve functional performance for 0  minutes, including:    Eboni participated in neuromuscular re-education activities to improve: Balance, Coordination, Kinesthetic, Sense, Proprioception and Posture for 35 minutes. The following activities were included:    90 sec standing hip width YANDY horizontal VORx1 at 140 bpm  60 sec standing narrow YANDY horizontal VORx1 at 140 bpm  35 sec standing Romberg horizontal VORx1 at 140 bpm, min sway, near LOB with loss of fixation    60 sec standing Romberg vertical VORx1 at 140 bpm, min sway    30 sec near Romberg on blue foam EC and arms down  17 sec, 15 sec, 15 sec Romberg on blue foam EC and arms down    30 sec hip width YANDY on blue foam EC with arms crossed  30 sec narrow YANDY on blue foam  "EC with arms crossed    30 sec modified R SLS with LLE resting on 2" step  30 sec modified L SLS with RLE resting on 2" step    30 sec hip width YANDY on blue foam arms crossed EC head turns, min sway  18 sec narrow YANDY on blue foam arms crossed EC head turns, min sway    1 x 10 alternating step up/step back with black foam fitter, CGA   2 x 10 alternating step up with opposite leg drive on black foam fitter, Bj at first, progressed to CGA    2 x 10 non-alternating toe taps to orange cone with CGA-Bj    Eboni participated in gait training to improve functional mobility and safety for 10 minutes, includin laps at ballet bar tandem walking with intermittent 1UE touchdown support, CGA with occ Bj  2 laps at ballet bar retrostepping, CGA - VC for larger steps  3 x 100 walking with head turns every 3 steps  1 x 100 walking with head pitches every 3 steps        Home Exercises Provided and Patient Education Provided     Education provided:   - 22: initial HEP provided with seated hor/vert VORx1 and UE supported SLS    Written Home Exercises Provided: yes.  Exercises were reviewed and Eboni was able to demonstrate them prior to the end of the session.  Eboni demonstrated good  understanding of the education provided.     See EMR under Patient Instructions for exercises provided 2022.    Assessment     Eboni tolerated today's session well. Pt reported she no longer has symptoms of positional vertigo so Armando-Hallpike testing deferred until further notice. Will like retest when nearing reassessment. Pt demonstrated improvements with vision-eliminated static balance and alternating weight shifts. Pt significantly progressed VORx1 to standing with Romberg position. Cont with POC.      Eboni Is progressing well towards her goals.   Pt prognosis is Good.     Pt will continue to benefit from skilled outpatient physical therapy to address the deficits listed in the problem list box on initial evaluation, " provide pt/family education and to maximize pt's level of independence in the home and community environment.     Pt's spiritual, cultural and educational needs considered and pt agreeable to plan of care and goals.     Anticipated barriers to physical therapy: none      GOALS:   Short Term Goals: 4 weeks - progressing  1. Pt will be introduced to HEP and report >50% compliance.  2. Pt will improve mCTSIB score to 30/30/30/10 to demonstrate improve integration of balance systems.  3. Pt will improve FGA score to 22/30 to show improved balance with gait.  4. Pt will perform B SLS balance for 6 sec each to improve lower body dressing and decrease risk of falls.  5. Pt will be able to perform seated horizontal and vertical VORx1 at >120 bpm for >30 sec to improve gaze stabilization needed for safe ambulation.    Long Term Goals: 8 weeks  1. Pt will report >75% compliance with progressed HEP.  2. Pt will improve mCTSIB score to 30/30/30/15 to demonstrate improve integration of balance systems.  3. Pt will improve FGA score to 25/30 to show improved balance with gait.  4. Pt will perform B SLS balance for 10 sec each to improve lower body dressing and decrease risk of falls.  5. Pt will be able to perform seated horizontal and vertical VORx1 at >140 bpm for >45 sec to improve gaze stabilization needed for safe ambulation.  6. Pt will test negative for R BPPV in R Armando-Hallpike position for 2 consecutive visits to demonstrate clearance of R BPPV.    Plan     Continue with POC. Progress VORx1 positions, targets, and speed as tolerated.     Salvatore Estrada, PT   01/10/2022

## 2022-01-13 ENCOUNTER — CLINICAL SUPPORT (OUTPATIENT)
Dept: REHABILITATION | Facility: HOSPITAL | Age: 77
End: 2022-01-13
Attending: NURSE PRACTITIONER
Payer: MEDICARE

## 2022-01-13 DIAGNOSIS — H81.11 BPPV (BENIGN PAROXYSMAL POSITIONAL VERTIGO), RIGHT: ICD-10-CM

## 2022-01-13 DIAGNOSIS — R26.89 BALANCE PROBLEM: ICD-10-CM

## 2022-01-13 PROCEDURE — 97112 NEUROMUSCULAR REEDUCATION: CPT | Mod: HCNC,PO

## 2022-01-13 NOTE — PROGRESS NOTES
"    Physical Therapy Daily Treatment Note     Name: Eboni Nicholas  Clinic Number: [id number]    Therapy Diagnosis:   Encounter Diagnoses   Name Primary?    BPPV (benign paroxysmal positional vertigo), right     Balance problem      Physician: Marino Wheeler D*    Visit Date: 1/13/2022    Physician Orders: PT Eval and Treat - vestibular therapy  Medical Diagnosis from Referral: H81.91 (ICD-10-CM) - Peripheral vestibulopathy of right ear  Evaluation Date: 1/4/2022  Authorization Period Expiration: 12/16/22  Plan of Care Expiration: 3/1/22  Plan of Care Certification Period: 1/4/22 - 3/1/22  Visit #/Visits authorized: 3/20 (+ initial eval)    Time In: 1303  Time Out: 1345  Total Billable Time: 42 minutes    Precautions: Standard and Fall    Subjective     Pt reports: hips were hurting yesterday but feeling better today  She was compliant with home exercise program.  Response to previous treatment: no adverse effects  Functional change: ongoing    Pain: 0/10  Location: n/a    Objective     Eboni received therapeutic exercises to develop strength, endurance, ROM, flexibility, posture and core stabilization for 0 minutes including:    Eboni participated in dynamic functional therapeutic activities to improve functional performance for 0  minutes, including:    Eboni participated in neuromuscular re-education activities to improve: Balance, Coordination, Kinesthetic, Sense, Proprioception and Posture for 42 minutes. The following activities were included:    60 sec standing Romberg horizontal VORx1 at 140 bpm, no sway, no loss of fixation  60 sec standing Romberg horizontal VORx1 at 150 bpm, no sway, no loss of fixation    60 sec standing Romberg vertical VORx1 at 150 bpm, no sway, no loss of fixation    30 sec modified R SLS with LLE resting on 4" step  30 sec modified L SLS with RLE resting on 4" step    30 sec near Romberg on blue foam EC and arms down  30 sec Romberg on blue foam EC and arms down, near LOB " but able to independently recover without opening eyes    30 sec narrow YANDY on blue foam EC with arms crossed  30 sec near Romberg on blue foam EC with arms crossed  30 sec Romberg on blue foam EC with arms crossed    30 sec narrow YANDY on blue foam arms crossed EC head turns, min sway  30 sec near Romberg on blue foam arms crossed EC head turns, mod sway  10 sec Romberg on blue foam arms crossed EC head turns, mod sway, posterior LOB    2 x 10 alternating step up with opposite leg drive on black foam fitter, CGA - occ misstep, pt able to independently recover    1 x 10 non-alternating toe taps to two orange cones with Bj  1 x 10 alternating toe taps to orange cone with CGA    4 laps at ballet bar tandem walking with intermittent 1UE touchdown support, 2 full laps with no UE support  2 laps at ballet bar retrostepping, CGA - VC for larger steps    4 x 100 walking with head turns every 3 steps    Eboni participated in gait training to improve functional mobility and safety for 10 minutes, including:        Home Exercises Provided and Patient Education Provided     Education provided:   - 1/6/22: initial HEP provided with seated hor/vert VORx1 and UE supported SLS    Written Home Exercises Provided: yes.  Exercises were reviewed and Eboni was able to demonstrate them prior to the end of the session.  Eboni demonstrated good  understanding of the education provided.     See EMR under Patient Instructions for exercises provided 1/6/2022.    Assessment     Eboni tolerated today's session very well. She demonstrates excellent carryover with balance interventions, specifically weight shifting, progressing well with her functional SLS. Pt would likely benefit from an updated HEP next visit with standing VORx1, more static balance interventions, and potentially walking with head turns. Pt is still appropriate for PT 2x per week to address her symptoms and her balance, both static and dynamic. Cont with POC.      Eboni  Is progressing well towards her goals.   Pt prognosis is Good.     Pt will continue to benefit from skilled outpatient physical therapy to address the deficits listed in the problem list box on initial evaluation, provide pt/family education and to maximize pt's level of independence in the home and community environment.     Pt's spiritual, cultural and educational needs considered and pt agreeable to plan of care and goals.     Anticipated barriers to physical therapy: none      GOALS:   Short Term Goals: 4 weeks - progressing  1. Pt will be introduced to HEP and report >50% compliance.  2. Pt will improve mCTSIB score to 30/30/30/10 to demonstrate improve integration of balance systems.  3. Pt will improve FGA score to 22/30 to show improved balance with gait.  4. Pt will perform B SLS balance for 6 sec each to improve lower body dressing and decrease risk of falls.  5. Pt will be able to perform seated horizontal and vertical VORx1 at >120 bpm for >30 sec to improve gaze stabilization needed for safe ambulation.    Long Term Goals: 8 weeks  1. Pt will report >75% compliance with progressed HEP.  2. Pt will improve mCTSIB score to 30/30/30/15 to demonstrate improve integration of balance systems.  3. Pt will improve FGA score to 25/30 to show improved balance with gait.  4. Pt will perform B SLS balance for 10 sec each to improve lower body dressing and decrease risk of falls.  5. Pt will be able to perform seated horizontal and vertical VORx1 at >140 bpm for >45 sec to improve gaze stabilization needed for safe ambulation.  6. Pt will test negative for R BPPV in R [place]-Hallpike position for 2 consecutive visits to demonstrate clearance of R BPPV.    Plan     Continue with POC. Progress VORx1 positions, targets, and speed as tolerated.     Salvatore Estrada, PT   01/13/2022

## 2022-01-18 ENCOUNTER — PATIENT MESSAGE (OUTPATIENT)
Dept: ADMINISTRATIVE | Facility: HOSPITAL | Age: 77
End: 2022-01-18
Payer: MEDICARE

## 2022-01-19 ENCOUNTER — CLINICAL SUPPORT (OUTPATIENT)
Dept: REHABILITATION | Facility: HOSPITAL | Age: 77
End: 2022-01-19
Attending: NURSE PRACTITIONER
Payer: MEDICARE

## 2022-01-19 DIAGNOSIS — R26.89 BALANCE PROBLEM: ICD-10-CM

## 2022-01-19 DIAGNOSIS — H81.11 BPPV (BENIGN PAROXYSMAL POSITIONAL VERTIGO), RIGHT: ICD-10-CM

## 2022-01-19 PROCEDURE — 97112 NEUROMUSCULAR REEDUCATION: CPT | Mod: HCNC,PO,CQ

## 2022-01-19 NOTE — PROGRESS NOTES
"    Physical Therapy Daily Treatment Note     Name: Eboni Nicholas  Clinic Number: [id number]    Therapy Diagnosis:   Encounter Diagnoses   Name Primary?    BPPV (benign paroxysmal positional vertigo), right     Balance problem      Physician: Marino Wheeler D*    Visit Date: 1/19/2022    Physician Orders: PT Eval and Treat - vestibular therapy  Medical Diagnosis from Referral: H81.91 (ICD-10-CM) - Peripheral vestibulopathy of right ear  Evaluation Date: 1/4/2022  Authorization Period Expiration: 12/16/22  Plan of Care Expiration: 3/1/22  Plan of Care Certification Period: 1/4/22 - 3/1/22  Visit #/Visits authorized: 4/20 (+ initial eval)    Time In: 1020  Time Out: 1100  Total Billable Time: 40 minutes    Precautions: Standard and Fall    Subjective     Pt reports: "no dizziness today."  She was compliant with home exercise program.  Response to previous treatment: no adverse effects  Functional change: ongoing    Pain: 0/10  Location: n/a    Objective     Eboni received therapeutic exercises to develop strength, endurance, ROM, flexibility, posture and core stabilization for 0 minutes including:    Eboni participated in dynamic functional therapeutic activities to improve functional performance for 0  minutes, including:    Eboni participated in neuromuscular re-education activities to improve: Balance, Coordination, Kinesthetic, Sense, Proprioception and Posture for 40 minutes. The following activities were included:    60 sec standing Romberg horizontal VORx1 at 140 bpm, no sway, no loss of fixation  60 sec standing Romberg horizontal VORx1 at 150 bpm, no sway, no loss of fixation    60 sec standing Romberg vertical VORx1 at 150 bpm, no sway, no loss of fixation    Habituation:  2 x 100 walking with head turns right to left, SBA  2 x 100 ft of forward ambulation with head nod up/down, SBA  2 x 100 ft of forward ambulation with diagonal head turns in each direction, with SBA    // bars   2 balance beams " In front of each other: CGA  2 x 30 sec each of tandem stance, occ 1 finger touchdown  X 4 laps of forward tandem ambulation, occ 1 finger touchdown  X 4 laps of side stepping with occ 1 finger touchdown  X 4 laps of forward marching with 3 sec hold, occ 1 finger touchdown  2 x 30 sec of B LE single leg stance, occ 1 finger support      Eboni participated in gait training to improve functional mobility and safety for 0 minutes, including:        Home Exercises Provided and Patient Education Provided     Education provided:   - 1/6/22: initial HEP provided with seated hor/vert VORx1 and UE supported SLS    Written Home Exercises Provided: yes.  Exercises were reviewed and Eboni was able to demonstrate them prior to the end of the session.  Eboni demonstrated good  understanding of the education provided.     See EMR under Patient Instructions for exercises provided 1/6/2022.    Assessment     Eboni tolerated her tx session well and did not have any problems noted.  Eboni focused on VOR with tandem stance, habituation and balance in the // bars.  Eboni required occasional 1 UE support for safety during her dynamic balance in the // bars.   Pt is still appropriate for PT 2x per week to address her symptoms and her balance, both static and dynamic. Cont with POC.      Eboni Is progressing well towards her goals.   Pt prognosis is Good.     Pt will continue to benefit from skilled outpatient physical therapy to address the deficits listed in the problem list box on initial evaluation, provide pt/family education and to maximize pt's level of independence in the home and community environment.     Pt's spiritual, cultural and educational needs considered and pt agreeable to plan of care and goals.     Anticipated barriers to physical therapy: none      GOALS:   Short Term Goals: 4 weeks - progressing  1. Pt will be introduced to HEP and report >50% compliance.  2. Pt will improve mCTSIB score to 30/30/30/10 to  demonstrate improve integration of balance systems.  3. Pt will improve FGA score to 22/30 to show improved balance with gait.  4. Pt will perform B SLS balance for 6 sec each to improve lower body dressing and decrease risk of falls.  5. Pt will be able to perform seated horizontal and vertical VORx1 at >120 bpm for >30 sec to improve gaze stabilization needed for safe ambulation.    Long Term Goals: 8 weeks  1. Pt will report >75% compliance with progressed HEP.  2. Pt will improve mCTSIB score to 30/30/30/15 to demonstrate improve integration of balance systems.  3. Pt will improve FGA score to 25/30 to show improved balance with gait.  4. Pt will perform B SLS balance for 10 sec each to improve lower body dressing and decrease risk of falls.  5. Pt will be able to perform seated horizontal and vertical VORx1 at >140 bpm for >45 sec to improve gaze stabilization needed for safe ambulation.  6. Pt will test negative for R BPPV in R [place]-Hallpike position for 2 consecutive visits to demonstrate clearance of R BPPV.    Plan     Continue with POC. Progress VORx1 positions, targets, and speed as tolerated.     Laura Adame, PTA   01/19/2022

## 2022-01-21 ENCOUNTER — CLINICAL SUPPORT (OUTPATIENT)
Dept: REHABILITATION | Facility: HOSPITAL | Age: 77
End: 2022-01-21
Attending: NURSE PRACTITIONER
Payer: MEDICARE

## 2022-01-21 DIAGNOSIS — H81.11 BPPV (BENIGN PAROXYSMAL POSITIONAL VERTIGO), RIGHT: ICD-10-CM

## 2022-01-21 DIAGNOSIS — R26.89 BALANCE PROBLEM: ICD-10-CM

## 2022-01-21 PROCEDURE — 97112 NEUROMUSCULAR REEDUCATION: CPT | Mod: HCNC,PO

## 2022-01-21 NOTE — PROGRESS NOTES
"    Physical Therapy Daily Treatment Note     Name: Eboni Nicholas  Clinic Number: [id number]    Therapy Diagnosis:   Encounter Diagnoses   Name Primary?    BPPV (benign paroxysmal positional vertigo), right     Balance problem      Physician: Marino Wheeler D*    Visit Date: 1/21/2022    Physician Orders: PT Eval and Treat - vestibular therapy  Medical Diagnosis from Referral: H81.91 (ICD-10-CM) - Peripheral vestibulopathy of right ear  Evaluation Date: 1/4/2022  Authorization Period Expiration: 12/16/22  Plan of Care Expiration: 3/1/22  Plan of Care Certification Period: 1/4/22 - 3/1/22  Visit #/Visits authorized: 5/20 (+ initial eval)    Time In: 1345  Time Out: 1428  Total Billable Time: 43 minutes    Precautions: Standard and Fall    Subjective     Pt reports: "I have been feeling so much better"  She was compliant with home exercise program.  Response to previous treatment: no adverse effects  Functional change: ongoing    Pain: 0/10  Location: n/a    Objective     Eboni received therapeutic exercises to develop strength, endurance, ROM, flexibility, posture and core stabilization for 0 minutes including:    Eboni participated in dynamic functional therapeutic activities to improve functional performance for 0  minutes, including:    Eboni participated in neuromuscular re-education activities to improve: Balance, Coordination, Kinesthetic, Sense, Proprioception and Posture for 43 minutes. The following activities were included:    60 sec standing hip width YANDY on blue foam horizontal VORx1 at 160 bpm, min sway    60 sec standing hip width YANDY on blue foam vertical VORx1 at 160 bpm, min sway    30 sec narrow YANDY on blue foam arms crossed EC head turns, min sway  3 x 30 sec near Romberg on blue foam arms crossed EC head turns, mod sway, near LOB    2 x 10 non-alternating step up to black foam fitter with opposite LE tap to orange cone, 1 UE touchdown support    Habituation:  2 x 100 feet walking with " head turns every 3 steps, CGA with min veering, VC for avoiding looking straight down the hallway  2 x 100 feet walking with head pitches every 3 steps, SBA, min veering  2 x 100 feet each direction walking with diagonal head turns, SBA, min-mod veering    // bars:  - 4 laps of forward tandem ambulation, occ 1 finger touchdown  - 4 laps each way side stepping over 5 hurdles with 1 UE support, progressed to no UE support with CGA-SBA  - a few trials of B SLS, 1 finger support and opposite LE abducted       Eboni participated in gait training to improve functional mobility and safety for 0 minutes, including:        Home Exercises Provided and Patient Education Provided     Education provided:   - 1/6/22: initial HEP provided with seated hor/vert VORx1 and UE supported SLS    Written Home Exercises Provided: yes.  Exercises were reviewed and Eboni was able to demonstrate them prior to the end of the session.  Eboni demonstrated good  understanding of the education provided.     See EMR under Patient Instructions for exercises provided 1/6/2022.    Assessment     Eboni tolerated today's session very well. She is progressing very well with therapy so far with her balance (static and dynamic) and is quickly progressing with VORx1. Discussed probability of DC before end of POC due to pt's progress but depending on her reassessment. Pt agreeable to this. Pt demonstrated the most difficulty today with B SLS balance as she has difficulty with adequate lateral weight shifting for increased amounts of time. Pt likely has B hip abd weakness; assess this at first reassessment. Pt is still appropriate for PT 2x per week to address her symptoms and her balance, both static and dynamic. Cont with POC.      Eboni Is progressing well towards her goals.   Pt prognosis is Good.     Pt will continue to benefit from skilled outpatient physical therapy to address the deficits listed in the problem list box on initial evaluation,  provide pt/family education and to maximize pt's level of independence in the home and community environment.     Pt's spiritual, cultural and educational needs considered and pt agreeable to plan of care and goals.     Anticipated barriers to physical therapy: none      GOALS:   Short Term Goals: 4 weeks - progressing  1. Pt will be introduced to HEP and report >50% compliance.  2. Pt will improve mCTSIB score to 30/30/30/10 to demonstrate improve integration of balance systems.  3. Pt will improve FGA score to 22/30 to show improved balance with gait.  4. Pt will perform B SLS balance for 6 sec each to improve lower body dressing and decrease risk of falls.  5. Pt will be able to perform seated horizontal and vertical VORx1 at >120 bpm for >30 sec to improve gaze stabilization needed for safe ambulation.    Long Term Goals: 8 weeks  1. Pt will report >75% compliance with progressed HEP.  2. Pt will improve mCTSIB score to 30/30/30/15 to demonstrate improve integration of balance systems.  3. Pt will improve FGA score to 25/30 to show improved balance with gait.  4. Pt will perform B SLS balance for 10 sec each to improve lower body dressing and decrease risk of falls.  5. Pt will be able to perform seated horizontal and vertical VORx1 at >140 bpm for >45 sec to improve gaze stabilization needed for safe ambulation.  6. Pt will test negative for R BPPV in R [place]-Hallpike position for 2 consecutive visits to demonstrate clearance of R BPPV.    Plan     Continue with POC. Progress VORx1 positions, targets, and speed as tolerated.     Salvatore Estrada, PT   01/21/2022

## 2022-01-24 ENCOUNTER — CLINICAL SUPPORT (OUTPATIENT)
Dept: REHABILITATION | Facility: HOSPITAL | Age: 77
End: 2022-01-24
Attending: NURSE PRACTITIONER
Payer: MEDICARE

## 2022-01-24 DIAGNOSIS — H81.11 BPPV (BENIGN PAROXYSMAL POSITIONAL VERTIGO), RIGHT: ICD-10-CM

## 2022-01-24 DIAGNOSIS — R26.89 BALANCE PROBLEM: ICD-10-CM

## 2022-01-24 PROCEDURE — 97112 NEUROMUSCULAR REEDUCATION: CPT | Mod: HCNC,PO

## 2022-01-24 NOTE — PROGRESS NOTES
"    Physical Therapy Daily Treatment Note     Name: Eboni Nicholas  Clinic Number: [id number]    Therapy Diagnosis:   Encounter Diagnoses   Name Primary?    BPPV (benign paroxysmal positional vertigo), right     Balance problem      Physician: Marino Wheeler D*    Visit Date: 1/24/2022    Physician Orders: PT Eval and Treat - vestibular therapy  Medical Diagnosis from Referral: H81.91 (ICD-10-CM) - Peripheral vestibulopathy of right ear  Evaluation Date: 1/4/2022  Authorization Period Expiration: 12/16/22  Plan of Care Expiration: 3/1/22  Plan of Care Certification Period: 1/4/22 - 3/1/22  Visit #/Visits authorized: 6/20 (+ initial eval)    Time In: 1403  Time Out: 1445  Total Billable Time: 42 minutes    Precautions: Standard and Fall    Subjective     Pt reports: "you always make me work hard!"  She was compliant with home exercise program.  Response to previous treatment: no adverse effects  Functional change: ongoing    Pain: 0/10  Location: n/a    Objective     Eboni received therapeutic exercises to develop strength, endurance, ROM, flexibility, posture and core stabilization for 0 minutes including:    Eboni participated in dynamic functional therapeutic activities to improve functional performance for 0  minutes, including:    Eboni participated in neuromuscular re-education activities to improve: Balance, Coordination, Kinesthetic, Sense, Proprioception and Posture for 42 minutes. The following activities were included:    2 min standing hip width YANDY on blue foam horizontal VORx1 at 170 bpm, min-mod sway  1 min standing NBOS on blue foam horizontal VORx1 at 160 bpm, min sway    2 min standing hip width YANDY on blue foam vertical VORx1 at 170 bpm, min-mod sway  1 min standing NBOS on blue foam vertical VORx1 at 160 bpm, min sway    1 x 45 sec near Romberg on blue foam arms crossed EC head turns, mod sway, near LOB    2 x 10 non-alternating step up to black foam fitter with opposite LE tap to orange " cone  - 1 UE touchdown support  - a few reps completed with CGA and no UE support    Habituation:  3 x 100 feet walking with head turns every 3 steps, SBA with min veering, wide YANDY  1 x 100 feet walking with head pitches every 3 steps, SBA, min veering  2 x 100 feet each direction walking with diagonal head turns, SBA, min-mod veering    // bars:  - 4 laps of forward tandem ambulation, occ 1 finger touchdown  - a few trials of B SLS, 1 finger support and opposite LE abducted       Eboni participated in gait training to improve functional mobility and safety for 0 minutes, including:        Home Exercises Provided and Patient Education Provided     Education provided:   - 1/6/22: initial HEP provided with seated hor/vert VORx1 and UE supported SLS    Written Home Exercises Provided: yes.  Exercises were reviewed and Eboni was able to demonstrate them prior to the end of the session.  Eboni demonstrated good  understanding of the education provided.     See EMR under Patient Instructions for exercises provided 1/6/2022.    Assessment     Eboni tolerated today's session very well. Pt demonstrated excellent improvement with step up to black foam fitter with orange cone tap, performing overall with only occ UE touchdown support, demonstrating improvements in functional SLS. Pt to be reassessed within the next couple of visits. Pt is still appropriate for PT 2x per week to address her symptoms and her balance, both static and dynamic. Cont with POC.      Eboni Is progressing well towards her goals.   Pt prognosis is Good.     Pt will continue to benefit from skilled outpatient physical therapy to address the deficits listed in the problem list box on initial evaluation, provide pt/family education and to maximize pt's level of independence in the home and community environment.     Pt's spiritual, cultural and educational needs considered and pt agreeable to plan of care and goals.     Anticipated barriers to  physical therapy: none      GOALS:   Short Term Goals: 4 weeks - progressing  1. Pt will be introduced to HEP and report >50% compliance.  2. Pt will improve mCTSIB score to 30/30/30/10 to demonstrate improve integration of balance systems.  3. Pt will improve FGA score to 22/30 to show improved balance with gait.  4. Pt will perform B SLS balance for 6 sec each to improve lower body dressing and decrease risk of falls.  5. Pt will be able to perform seated horizontal and vertical VORx1 at >120 bpm for >30 sec to improve gaze stabilization needed for safe ambulation.    Long Term Goals: 8 weeks  1. Pt will report >75% compliance with progressed HEP.  2. Pt will improve mCTSIB score to 30/30/30/15 to demonstrate improve integration of balance systems.  3. Pt will improve FGA score to 25/30 to show improved balance with gait.  4. Pt will perform B SLS balance for 10 sec each to improve lower body dressing and decrease risk of falls.  5. Pt will be able to perform seated horizontal and vertical VORx1 at >140 bpm for >45 sec to improve gaze stabilization needed for safe ambulation.  6. Pt will test negative for R BPPV in R [place]-Hallpike position for 2 consecutive visits to demonstrate clearance of R BPPV.    Plan     Continue with POC. Progress VORx1 positions, targets, and speed as tolerated.     Salvatore Estrada, PT   01/24/2022

## 2022-01-31 ENCOUNTER — CLINICAL SUPPORT (OUTPATIENT)
Dept: REHABILITATION | Facility: HOSPITAL | Age: 77
End: 2022-01-31
Attending: NURSE PRACTITIONER
Payer: MEDICARE

## 2022-01-31 DIAGNOSIS — H81.11 BPPV (BENIGN PAROXYSMAL POSITIONAL VERTIGO), RIGHT: ICD-10-CM

## 2022-01-31 DIAGNOSIS — R26.89 BALANCE PROBLEM: ICD-10-CM

## 2022-01-31 PROCEDURE — 97112 NEUROMUSCULAR REEDUCATION: CPT | Mod: HCNC,PO

## 2022-01-31 NOTE — PROGRESS NOTES
"      Physical Therapy Daily Treatment Note     Name: Eboni Nicholas  Clinic Number: [id number]    Therapy Diagnosis:   Encounter Diagnoses   Name Primary?    BPPV (benign paroxysmal positional vertigo), right     Balance problem      Physician: Marino Wheeler D*    Visit Date: 1/31/2022    Physician Orders: PT Eval and Treat - vestibular therapy  Medical Diagnosis from Referral: H81.91 (ICD-10-CM) - Peripheral vestibulopathy of right ear  Evaluation Date: 1/4/2022  Authorization Period Expiration: 12/16/22  Plan of Care Expiration: 3/1/22  Plan of Care Certification Period: 1/4/22 - 3/1/22  Last reassessment: 1/31/22  Visit #/Visits authorized: 7/20 (+ initial eval)    Time In: 1312  Time Out: 1400  Total Billable Time: 48 minutes    Precautions: Standard and Fall    Subjective     Pt reports: feeling off today. Last visit she canceled because she didn't feel safe driving. Doesn't feel "dizzy" but feels a "fullness" in her head.  She was compliant with home exercise program.  Response to previous treatment: no adverse effects  Functional change: ongoing    Pain: 0/10  Location: n/a    Objective     Eboni received therapeutic exercises to develop strength, endurance, ROM, flexibility, posture and core stabilization for 0 minutes including:    Eboni participated in dynamic functional therapeutic activities to improve functional performance for 0  minutes, including:    Eboni participated in neuromuscular re-education activities to improve: Balance, Coordination, Kinesthetic, Sense, Proprioception and Posture for 48 minutes. The following activities were included:    L Armando-Hallpike: negative  R [place]-Hallpike: positive; upbeating rotary nystagmus with immediate onset and fatiguing within 30 seconds. Pt endorsed vertigo.  R Epley maneuver administered  R [place]-Hallpike: negative    MCTSIB: feet together and arms crossed   Evaluation (01/04/2022) 1/26/22   EO on firm surface 30 sec 30 sec   EC on firm surface 25 " sec, min sway 30 sec, no sway   EO on compliant surface 30 sec, min sway 30 sec, min sway   EC on compliant surface 5 sec, LOB to R 17 sec       Static standing balance: arms down   Evaluation (01/04/2022) 1/26/22   SLS R LE 3 sec  (<10 sec = HIGH FALL RISK) 2 sec   SLS L LE 4 sec  (<10 sec = HIGH FALL RISK) 2 sec   Tandem R LE front 19 sec 30 sec   Tandem L LE front 27 sec 30 sec       Functional Gait Assessment EVAL:  Score 19/30     Functional Gait Assessment 1/26/22:    1. Gait on level surface (6m) =  (2) Mild impairment: 7-5.6 sec, uses A.D., mild gait deviations, or deviates 6-10 in  2. Change in Gait Speed =  (3) Normal: smooth change w/o loss of balance or gait deviation, deviates < 6 in, significant difference between speeds  3. Gait with horizontal head turns  = (2) Mild impairment: slight change in speed, deviates 6-10 in, OR uses A.D.  4. Gait with vertical head turns = (3) Normal: no change in gait, deviates <6 in  5. Gait with pivot turns = (3) Normal: performs safely in 3 sec, no LOB  6. Step over obstacle = (2) Mild impairment: able to step over 1 box w/o change in speed or LOB  7. Gait with Narrow YANDY = (2) Mild impairment: 7-9 steps  8. Gait with eyes closed = (2) Mild impairment: 7.1-9 sec, mild gait deviations, deviates 6-10 in  9. Ambulating Backwards = (3) Normal: no A.D., no LOB, normal gait pattern, deviates <6in  10. Steps = (1) Moderate impairment: step-to, uses rail    Score 23/30     1 min standing Romberg horizontal VORx1 at 160 bpm, min sway  1 min standing Romberg vertical VORx1 at 160 bpm, min sway    Eboni participated in gait training to improve functional mobility and safety for 0 minutes, including:        Home Exercises Provided and Patient Education Provided     Education provided:   - 1/6/22: initial HEP provided with seated hor/vert VORx1 and UE supported SLS    Written Home Exercises Provided: yes.  Exercises were reviewed and Eboni was able to demonstrate them prior to  "the end of the session.  Eboni demonstrated good  understanding of the education provided.     See EMR under Patient Instructions for exercises provided 1/6/2022.    Assessment     Eboni tolerated today's reassessment well. Pt came into the clinic with c/o a fullness in her head and feeling "off". Pt tested positive in R Armando Hallpike testing for R posterior canal BPPV, treated with 1 Epley maneuver, and retested negative in R DH. She improved significantly on the FGA, meeting her STG. She also improved on the MCTSIB and met her STG and LTG for this test. She continues to demonstrate significant difficulty with B SLS, only maintaining her balance for 2 sec on each LE today. Pt is still appropriate for PT 2x per week to address her symptoms and her balance, both static and dynamic - we will potentially drop down to 1x per week depending on the pt's request with her schedule and her progress toward her goals between now and the end of her POC. Cont with POC.      Eboni Is progressing well towards her goals.   Pt prognosis is Good.     Pt will continue to benefit from skilled outpatient physical therapy to address the deficits listed in the problem list box on initial evaluation, provide pt/family education and to maximize pt's level of independence in the home and community environment.     Pt's spiritual, cultural and educational needs considered and pt agreeable to plan of care and goals.     Anticipated barriers to physical therapy: none      GOALS:   Short Term Goals: 4 weeks - progressing  1. Pt will be introduced to HEP and report >50% compliance. MET 1/31/22  2. Pt will improve mCTSIB score to 30/30/30/10 to demonstrate improve integration of balance systems. MET 1/31/22  3. Pt will improve FGA score to 22/30 to show improved balance with gait. MET 1/31/22  4. Pt will perform B SLS balance for 6 sec each to improve lower body dressing and decrease risk of falls. progressing  5. Pt will be able to perform " seated horizontal and vertical VORx1 at >120 bpm for >30 sec to improve gaze stabilization needed for safe ambulation. MET 1/31/22    Long Term Goals: 8 weeks  1. Pt will report >75% compliance with progressed HEP. progressing  2. Pt will improve mCTSIB score to 30/30/30/15 to demonstrate improve integration of balance systems. MET 1/31/22  3. Pt will improve FGA score to 25/30 to show improved balance with gait. progressing  4. Pt will perform B SLS balance for 10 sec each to improve lower body dressing and decrease risk of falls. progressing  5. Pt will be able to perform seated horizontal and vertical VORx1 at >140 bpm for >45 sec to improve gaze stabilization needed for safe ambulation. MET 1/31/22  6. Pt will test negative for R BPPV in R Armando-Hallpike position for 2 consecutive visits to demonstrate clearance of R BPPV. progressing    Plan     Continue with POC. Retest R DH next visit to ensure R posterior BPPV clearance. Focus on B SLS balance (static and functional), walking with head turns and diagonal head turns, obstacle clearance, tandem ambulation, and stair navigation.     Salvatore Estrada, PT   01/31/2022

## 2022-02-01 NOTE — PROGRESS NOTES
"      Physical Therapy Daily Treatment Note     Name: Eboni Nicholas  Clinic Number: [id number]    Therapy Diagnosis:   Encounter Diagnoses   Name Primary?    BPPV (benign paroxysmal positional vertigo), right     Balance problem      Physician: Marino Wheeler D*    Visit Date: 2/2/2022    Physician Orders: PT Eval and Treat - vestibular therapy  Medical Diagnosis from Referral: H81.91 (ICD-10-CM) - Peripheral vestibulopathy of right ear  Evaluation Date: 1/4/2022  Authorization Period Expiration: 12/16/22  Plan of Care Expiration: 3/1/22  Plan of Care Certification Period: 1/4/22 - 3/1/22  Last reassessment: 1/31/22  Visit #/Visits authorized: 8/20 (+ initial eval)    Time In: 1445  Time Out: 1525  Total Billable Time: 40 minutes    Precautions: Standard and Fall    Subjective     Pt reports: "fullness" in head and over left ear that increased this morning. No dizziness reported. At end of session patient reports no increase in dizziness but continues to feel fullness in L ear.     She was compliant with home exercise program.  Response to previous treatment: no adverse effects  Functional change: ongoing    Pain: 0/10  Location: n/a    Objective     Eboni participated in neuromuscular re-education activities to improve: Balance, Coordination, Kinesthetic, Sense, Proprioception and Posture for 40 minutes. The following activities were included:    POSITIONAL CANAL TESTING  Looking for nystagmus (slow phase followed by quick phase to the affected side for BPPV)     Armando Hallpike (posterior / CL anterior)              Right : (-) vertigo, (-) nystagmus, (-) dizziness              Left:(-) vertigo, (-) nystagmus, (+) dizziness; dizziness when sitting up from test position                    Horizontal Canals- side lying              Right: (-) nystagmus, (-) vertigo, (+) slight brief dizziness with sitting up               Left: (-) nystagmus, (-) vertigo, (-) dizziness  Treatment Performed: not indicated  "     Ambulation at ballet bar   6 laps horizontal head turns, CGA, min dizziness  6 laps vertical head turns, CGA, min to mod dizziness     Foam pad:   3 x 30 s eyes closed narrow base of support, CGA  2 x 30 horizontal head turns, no dizziness, CGA   2 x 30 vertical head turns, no dizziness, CGA     2 x 30 s tandem eyes closed, CGA, occ touchdown support      4 laps tandem ambulation in parallel bars   4 laps marching, 2 second holds    2 x 10 non-alternating step up to black foam fitter with opposite LE tap to orange cone  - 1 UE touchdown support    Home Exercises Provided and Patient Education Provided     Education provided:   - 1/6/22: initial HEP provided with seated hor/vert VORx1 and UE supported SLS    Written Home Exercises Provided: yes.  Exercises were reviewed and Eboni was able to demonstrate them prior to the end of the session.  Eboni demonstrated good  understanding of the education provided.     See EMR under Patient Instructions for exercises provided 1/6/2022.    Assessment     Eboni tolerated today's session well. CRT's performed with (-) nystagmus and vertigo when testing horizontal and posterior canals. The patient did report dizziness during and with sitting up from left side Armando Hallpike test. The patient performed ambulation at the ballet bar with horizontal and vertical head turns and reported min to moderate dizziness throughout. The patient endorses continuous fullness of the left ear. PT recommended that patient reach out to ENT regarding increased sensation of left ear fullness. The patient will benefit from continued PT to improve remaining vestibular deficits.       Eboni Is progressing well towards her goals.   Pt prognosis is Good.     Pt will continue to benefit from skilled outpatient physical therapy to address the deficits listed in the problem list box on initial evaluation, provide pt/family education and to maximize pt's level of independence in the home and community  environment.     Pt's spiritual, cultural and educational needs considered and pt agreeable to plan of care and goals.     Anticipated barriers to physical therapy: none      GOALS:   Short Term Goals: 4 weeks - progressing  1. Pt will be introduced to HEP and report >50% compliance. MET 1/31/22  2. Pt will improve mCTSIB score to 30/30/30/10 to demonstrate improve integration of balance systems. MET 1/31/22  3. Pt will improve FGA score to 22/30 to show improved balance with gait. MET 1/31/22  4. Pt will perform B SLS balance for 6 sec each to improve lower body dressing and decrease risk of falls. progressing  5. Pt will be able to perform seated horizontal and vertical VORx1 at >120 bpm for >30 sec to improve gaze stabilization needed for safe ambulation. MET 1/31/22    Long Term Goals: 8 weeks  1. Pt will report >75% compliance with progressed HEP. progressing  2. Pt will improve mCTSIB score to 30/30/30/15 to demonstrate improve integration of balance systems. MET 1/31/22  3. Pt will improve FGA score to 25/30 to show improved balance with gait. progressing  4. Pt will perform B SLS balance for 10 sec each to improve lower body dressing and decrease risk of falls. progressing  5. Pt will be able to perform seated horizontal and vertical VORx1 at >140 bpm for >45 sec to improve gaze stabilization needed for safe ambulation. MET 1/31/22  6. Pt will test negative for R BPPV in R Armando-Hallpike position for 2 consecutive visits to demonstrate clearance of R BPPV. progressing    Plan     Continue with POC. Focus on B SLS balance (static and functional), walking with head turns and diagonal head turns, obstacle clearance, tandem ambulation, and stair navigation.     Donna Deleon, SPT     I certify that I was present in the room directing the student in service delivery and guiding them using my skilled judgment. As the co-signing therapist I have reviewed the students documentation and am responsible for the  treatment, assessment, and plan.     Nubia Birmingham, PT   02/02/2022

## 2022-02-02 ENCOUNTER — CLINICAL SUPPORT (OUTPATIENT)
Dept: REHABILITATION | Facility: HOSPITAL | Age: 77
End: 2022-02-02
Attending: NURSE PRACTITIONER
Payer: MEDICARE

## 2022-02-02 DIAGNOSIS — H81.11 BPPV (BENIGN PAROXYSMAL POSITIONAL VERTIGO), RIGHT: ICD-10-CM

## 2022-02-02 DIAGNOSIS — R26.89 BALANCE PROBLEM: ICD-10-CM

## 2022-02-02 PROCEDURE — 97112 NEUROMUSCULAR REEDUCATION: CPT | Mod: HCNC,PO

## 2022-02-07 ENCOUNTER — CLINICAL SUPPORT (OUTPATIENT)
Dept: REHABILITATION | Facility: HOSPITAL | Age: 77
End: 2022-02-07
Attending: NURSE PRACTITIONER
Payer: MEDICARE

## 2022-02-07 DIAGNOSIS — H81.11 BPPV (BENIGN PAROXYSMAL POSITIONAL VERTIGO), RIGHT: ICD-10-CM

## 2022-02-07 DIAGNOSIS — R26.89 BALANCE PROBLEM: ICD-10-CM

## 2022-02-07 PROCEDURE — 97112 NEUROMUSCULAR REEDUCATION: CPT | Mod: HCNC,PO,CQ

## 2022-02-07 NOTE — PROGRESS NOTES
"      Physical Therapy Daily Treatment Note     Name: Eboni Nicholas  Clinic Number: [id number]    Therapy Diagnosis:   Encounter Diagnoses   Name Primary?    BPPV (benign paroxysmal positional vertigo), right     Balance problem      Physician: Marino Wheeler D*    Visit Date: 2/7/2022    Physician Orders: PT Eval and Treat - vestibular therapy  Medical Diagnosis from Referral: H81.91 (ICD-10-CM) - Peripheral vestibulopathy of right ear  Evaluation Date: 1/4/2022  Authorization Period Expiration: 12/16/22  Plan of Care Expiration: 3/1/22  Plan of Care Certification Period: 1/4/22 - 3/1/22  Last reassessment: 1/31/22  Visit #/Visits authorized: 9/20 (+ initial eval)    Time In: 1315  Time Out: 1400  Total Billable Time: 40 minutes    Precautions: Standard and Fall    Subjective     Pt reports:  I don't have that fullness" in head  That I previously had.  Still present but not nearly as bad. She estimates 85%     She was compliant with home exercise program.  Response to previous treatment: no adverse effects  Functional change: ongoing    Pain: 0/10  Location: n/a    Objective     Eboni participated in neuromuscular re-education activities to improve: Balance, Coordination, Kinesthetic, Sense, Proprioception and Posture for 45 minutes. The following activities were included:    Ambulation at ballet bar   6 laps horizontal head turns, CGA, min dizziness  6 laps vertical head turns, CGA, min to mod dizziness     Foam pad:   3 x 30 s eyes closed narrow base of support, CGA  2 x 30 horizontal head turns, no dizziness, CGA   2 x 30 vertical head turns, no dizziness, CGA     2 x 30 s tandem eyes closed, CGA, occ touchdown support      4 laps tandem ambulation in parallel bars    4 laps marching, 2-3 second holds     2 x 10 non-alternating step up to black foam fitter with opposite LE tap to orange cone  - 1 UE touchdown support    Home Exercises Provided and Patient Education Provided     Education provided:   - " 1/6/22: initial HEP provided with seated hor/vert VORx1 and UE supported SLS    Written Home Exercises Provided: yes.  Exercises were reviewed and Eboni was able to demonstrate them prior to the end of the session.  Eboni demonstrated good  understanding of the education provided.     See EMR under Patient Instructions for exercises provided 1/6/2022.    Assessment     Eboni tolerated today's session very well. She prrsents with significantly diminished complaint of ear fullness and dizziness. Improved single leg balance. The patient performed ambulation at the ballet bar with horizontal and vertical head turns and reported NO dizziness with any activity or exercise. The patient will benefit from continued PT to improve remaining vestibular deficits.       Eboni Is progressing well towards her goals.   Pt prognosis is Good.     Pt will continue to benefit from skilled outpatient physical therapy to address the deficits listed in the problem list box on initial evaluation, provide pt/family education and to maximize pt's level of independence in the home and community environment.     Pt's spiritual, cultural and educational needs considered and pt agreeable to plan of care and goals.     Anticipated barriers to physical therapy: none      GOALS:   Short Term Goals: 4 weeks - progressing  1. Pt will be introduced to HEP and report >50% compliance. MET 1/31/22  2. Pt will improve mCTSIB score to 30/30/30/10 to demonstrate improve integration of balance systems. MET 1/31/22  3. Pt will improve FGA score to 22/30 to show improved balance with gait. MET 1/31/22  4. Pt will perform B SLS balance for 6 sec each to improve lower body dressing and decrease risk of falls. progressing  5. Pt will be able to perform seated horizontal and vertical VORx1 at >120 bpm for >30 sec to improve gaze stabilization needed for safe ambulation. MET 1/31/22    Long Term Goals: 8 weeks  1. Pt will report >75% compliance with progressed  HEP. progressing  2. Pt will improve mCTSIB score to 30/30/30/15 to demonstrate improve integration of balance systems. MET 1/31/22  3. Pt will improve FGA score to 25/30 to show improved balance with gait. progressing  4. Pt will perform B SLS balance for 10 sec each to improve lower body dressing and decrease risk of falls. progressing  5. Pt will be able to perform seated horizontal and vertical VORx1 at >140 bpm for >45 sec to improve gaze stabilization needed for safe ambulation. MET 1/31/22  6. Pt will test negative for R BPPV in R [place]-Hallpike position for 2 consecutive visits to demonstrate clearance of R BPPV. progressing    Plan     Continue with POC. Focus on B SLS balance (static and functional), walking with head turns and diagonal head turns, obstacle clearance, tandem ambulation, and stair navigation.     Dilip Moraes, PTA   02/07/2022

## 2022-02-09 ENCOUNTER — CLINICAL SUPPORT (OUTPATIENT)
Dept: REHABILITATION | Facility: HOSPITAL | Age: 77
End: 2022-02-09
Attending: NURSE PRACTITIONER
Payer: MEDICARE

## 2022-02-09 DIAGNOSIS — H81.11 BPPV (BENIGN PAROXYSMAL POSITIONAL VERTIGO), RIGHT: ICD-10-CM

## 2022-02-09 DIAGNOSIS — R26.89 BALANCE PROBLEM: ICD-10-CM

## 2022-02-09 PROCEDURE — 97112 NEUROMUSCULAR REEDUCATION: CPT | Mod: HCNC,PO

## 2022-02-09 PROCEDURE — 95992 CANALITH REPOSITIONING PROC: CPT | Mod: HCNC,PO,59

## 2022-02-09 NOTE — PROGRESS NOTES
"      Physical Therapy Daily Treatment Note     Name: Eboni Nicholas  Clinic Number: [id number]    Therapy Diagnosis:   Encounter Diagnoses   Name Primary?    BPPV (benign paroxysmal positional vertigo), right     Balance problem      Physician: Marino Wheeler D*    Visit Date: 2/9/2022    Physician Orders: PT Eval and Treat - vestibular therapy  Medical Diagnosis from Referral: H81.91 (ICD-10-CM) - Peripheral vestibulopathy of right ear  Evaluation Date: 1/4/2022  Authorization Period Expiration: 12/16/22  Plan of Care Expiration: 3/1/22  Plan of Care Certification Period: 1/4/22 - 3/1/22  Last reassessment: 1/31/22  Visit #/Visits authorized: 10/20 (+ initial eval)    Time In: 1317  Time Out: 1400  Total Billable Time: 43 minutes    Precautions: Standard and Fall    Subjective     Pt reports: "I'm doing well how are you!"  She was compliant with home exercise program.  Response to previous treatment: no adverse effects  Functional change: ongoing    Pain: 0/10  Location: n/a    Objective     Eboni participated in neuromuscular re-education activities to improve: Balance, Coordination, Kinesthetic, Sense, Proprioception and Posture for 35 minutes. The following activities were included:    1 x 20 each LE lateral shift with toe tap to green cone between LEs  - SBA, occ Bj for improved weight shift    30 sec modified L SLS with RLE resting in the middle of hard side large bosu  30 sec modified R SLS with LLE resting in the middle of hard side large bosu    17 sec, 8 sec, 8 sec balancing WBOS on hard side large bosu, mod sway    2 laps of forward tandem ambulation, occ 1 finger touchdown on ballet bar    30 sec each side 3/4 tandem EO with head turns    Modified SLS with opposite LE resting on 3 stacked foam discs, 2 x 10 lifting opposite  4 laps marching, 2-3 second holds     2 x 10 non-alternating step up to black foam fitter with opposite LE tap to orange cone  - 1 UE touchdown support, CGA-Bj    Eboni " participated in canalith reposition procedures activities to improve: Balance, Coordination, Kinesthetic, Sense, Proprioception and Posture for 8 minutes. The following activities were included:    R [place]-Hallpike: positive; rotary upbeating nystagmus, immediate onset, fatigued within 15 sec. Dizziness reported.  1 R Epley maneuver administered and tolerated well.   R [place]-Hallpike: negative. No symptoms.    Home Exercises Provided and Patient Education Provided     Education provided:   - 1/6/22: initial HEP provided with seated hor/vert VORx1 and UE supported SLS    Written Home Exercises Provided: yes.  Exercises were reviewed and Eboni was able to demonstrate them prior to the end of the session.  Eboni demonstrated good  understanding of the education provided.     See EMR under Patient Instructions for exercises provided 1/6/2022.    Assessment     Eboni tolerated today's session fairly well. She demonstrated more difficulty with functional SLS, specifically step up to foam fitter with cone tapping, requiring CGA-Bj. This is possibly due to pt's positive R DixHallpike testing, demonstrating reoccurrence of R posterior BPPV. Pt was treated with 1 R Epley CRT and subsequently tested negative in second R [place]-Hallpike indicating BPPV clearance. Cont retesting for BPPV at future follow ups to ensure clearance.  The patient will benefit from continued PT to improve remaining vestibular deficits.       Eboni Is progressing well towards her goals.   Pt prognosis is Good.     Pt will continue to benefit from skilled outpatient physical therapy to address the deficits listed in the problem list box on initial evaluation, provide pt/family education and to maximize pt's level of independence in the home and community environment.     Pt's spiritual, cultural and educational needs considered and pt agreeable to plan of care and goals.     Anticipated barriers to physical therapy: none      GOALS:   Short Term Goals: 4  weeks - progressing  1. Pt will be introduced to HEP and report >50% compliance. MET 1/31/22  2. Pt will improve mCTSIB score to 30/30/30/10 to demonstrate improve integration of balance systems. MET 1/31/22  3. Pt will improve FGA score to 22/30 to show improved balance with gait. MET 1/31/22  4. Pt will perform B SLS balance for 6 sec each to improve lower body dressing and decrease risk of falls. progressing  5. Pt will be able to perform seated horizontal and vertical VORx1 at >120 bpm for >30 sec to improve gaze stabilization needed for safe ambulation. MET 1/31/22    Long Term Goals: 8 weeks  1. Pt will report >75% compliance with progressed HEP. progressing  2. Pt will improve mCTSIB score to 30/30/30/15 to demonstrate improve integration of balance systems. MET 1/31/22  3. Pt will improve FGA score to 25/30 to show improved balance with gait. progressing  4. Pt will perform B SLS balance for 10 sec each to improve lower body dressing and decrease risk of falls. progressing  5. Pt will be able to perform seated horizontal and vertical VORx1 at >140 bpm for >45 sec to improve gaze stabilization needed for safe ambulation. MET 1/31/22  6. Pt will test negative for R BPPV in R [place]-Hallpike position for 2 consecutive visits to demonstrate clearance of R BPPV. progressing    Plan     Continue with POC. Focus on B SLS balance (static and functional), walking with head turns and diagonal head turns, obstacle clearance, tandem ambulation, and stair navigation.     Salvatore Estrada, PT   02/09/2022

## 2022-02-14 ENCOUNTER — CLINICAL SUPPORT (OUTPATIENT)
Dept: REHABILITATION | Facility: HOSPITAL | Age: 77
End: 2022-02-14
Attending: NURSE PRACTITIONER
Payer: MEDICARE

## 2022-02-14 DIAGNOSIS — R26.89 BALANCE PROBLEM: ICD-10-CM

## 2022-02-14 DIAGNOSIS — H81.11 BPPV (BENIGN PAROXYSMAL POSITIONAL VERTIGO), RIGHT: ICD-10-CM

## 2022-02-14 PROCEDURE — 97112 NEUROMUSCULAR REEDUCATION: CPT | Mod: HCNC,PO,CQ

## 2022-02-14 NOTE — PROGRESS NOTES
"      Physical Therapy Daily Treatment Note     Name: Eboni Nicholas  Clinic Number: [id number]    Therapy Diagnosis:   Encounter Diagnoses   Name Primary?    BPPV (benign paroxysmal positional vertigo), right     Balance problem      Physician: Marino Wheeler D*    Visit Date: 2/14/2022    Physician Orders: PT Eval and Treat - vestibular therapy  Medical Diagnosis from Referral: H81.91 (ICD-10-CM) - Peripheral vestibulopathy of right ear  Evaluation Date: 1/4/2022  Authorization Period Expiration: 12/16/22  Plan of Care Expiration: 3/1/22  Plan of Care Certification Period: 1/4/22 - 3/1/22  Last reassessment: 1/31/22  Visit #/Visits authorized: 11/20 (+ initial eval)    Time In: 1300  Time Out: 1345  Total Billable Time: 45 minutes    Precautions: Standard and Fall    Subjective     Pt reports: "I'm doing pretty good, I just feel a little fullness in my head on the left side."   She was compliant with home exercise program.  Response to previous treatment: no adverse effects  Functional change: ongoing    Pain: 0/10  Location: n/a    Objective     Eboni participated in neuromuscular re-education activities to improve: Balance, Coordination, Kinesthetic, Sense, Proprioception and Posture for 35 minutes. The following activities were included:    Habituation:   2 x 100 feet of forward ambulation with head turns right to left  2 x 100 feet of forward ambulation with head nods up/down  2 x 100 feet each of forward ambulation with diagonal head turns in each direction.    // bars:  X 6 laps of forward ambulation stepping over 5 large orange hurdles  X 6 laps of side stepping over 5 large orange hurdles.     Airex foam pad: CGA  X 30 sec of static standing with NBOS  2 x 30 sec of static standing with NBOS and eyes closed  2 x 30 sec of B LE single leg stance, 1 UE support    Flat ground:  2 x 30 sec each of tandem stance with 1 UE support    Eboni participated in canalith reposition procedures activities to " improve: Balance, Coordination, Kinesthetic, Sense, Proprioception and Posture for 0 minutes. The following activities were included:      Home Exercises Provided and Patient Education Provided     Education provided:   - 1/6/22: initial HEP provided with seated hor/vert VORx1 and UE supported SLS    Written Home Exercises Provided: yes.  Exercises were reviewed and Eboni was able to demonstrate them prior to the end of the session.  Eboni demonstrated good  understanding of the education provided.     See EMR under Patient Instructions for exercises provided 1/6/2022.    Assessment     Eboni tolerated her tx session well and did not have any problems noted.  Eboni focused on habituation, dynamic and single leg balance activities.  Maritza required 1 sitting rest break.   The patient will benefit from continued PT to improve remaining vestibular deficits.       Eboni Is progressing well towards her goals.   Pt prognosis is Good.     Pt will continue to benefit from skilled outpatient physical therapy to address the deficits listed in the problem list box on initial evaluation, provide pt/family education and to maximize pt's level of independence in the home and community environment.     Pt's spiritual, cultural and educational needs considered and pt agreeable to plan of care and goals.     Anticipated barriers to physical therapy: none      GOALS:   Short Term Goals: 4 weeks - progressing  1. Pt will be introduced to HEP and report >50% compliance. MET 1/31/22  2. Pt will improve mCTSIB score to 30/30/30/10 to demonstrate improve integration of balance systems. MET 1/31/22  3. Pt will improve FGA score to 22/30 to show improved balance with gait. MET 1/31/22  4. Pt will perform B SLS balance for 6 sec each to improve lower body dressing and decrease risk of falls. progressing  5. Pt will be able to perform seated horizontal and vertical VORx1 at >120 bpm for >30 sec to improve gaze stabilization needed for  safe ambulation. MET 1/31/22    Long Term Goals: 8 weeks  1. Pt will report >75% compliance with progressed HEP. progressing  2. Pt will improve mCTSIB score to 30/30/30/15 to demonstrate improve integration of balance systems. MET 1/31/22  3. Pt will improve FGA score to 25/30 to show improved balance with gait. progressing  4. Pt will perform B SLS balance for 10 sec each to improve lower body dressing and decrease risk of falls. progressing  5. Pt will be able to perform seated horizontal and vertical VORx1 at >140 bpm for >45 sec to improve gaze stabilization needed for safe ambulation. MET 1/31/22  6. Pt will test negative for R BPPV in R Armando-Hallpike position for 2 consecutive visits to demonstrate clearance of R BPPV. progressing    Plan     Continue with POC. Focus on B SLS balance (static and functional), walking with head turns and diagonal head turns, obstacle clearance, tandem ambulation, and stair navigation.     Laura Adame, PTA   02/14/2022

## 2022-02-16 ENCOUNTER — CLINICAL SUPPORT (OUTPATIENT)
Dept: REHABILITATION | Facility: HOSPITAL | Age: 77
End: 2022-02-16
Attending: NURSE PRACTITIONER
Payer: MEDICARE

## 2022-02-16 DIAGNOSIS — R26.89 BALANCE PROBLEM: ICD-10-CM

## 2022-02-16 DIAGNOSIS — H81.11 BPPV (BENIGN PAROXYSMAL POSITIONAL VERTIGO), RIGHT: ICD-10-CM

## 2022-02-16 PROCEDURE — 95992 CANALITH REPOSITIONING PROC: CPT | Mod: HCNC,PO

## 2022-02-16 PROCEDURE — 97112 NEUROMUSCULAR REEDUCATION: CPT | Mod: HCNC,PO,59

## 2022-02-16 NOTE — PROGRESS NOTES
"      Physical Therapy Daily Treatment Note     Name: Eboni Nicholas  Clinic Number: [id number]    Therapy Diagnosis:   Encounter Diagnoses   Name Primary?    BPPV (benign paroxysmal positional vertigo), right     Balance problem      Physician: Marino Wheeler D*    Visit Date: 2/16/2022    Physician Orders: PT Eval and Treat - vestibular therapy  Medical Diagnosis from Referral: H81.91 (ICD-10-CM) - Peripheral vestibulopathy of right ear  Evaluation Date: 1/4/2022  Authorization Period Expiration: 12/16/22  Plan of Care Expiration: 3/1/22  Plan of Care Certification Period: 1/4/22 - 3/1/22  Last reassessment: 1/31/22  Visit #/Visits authorized: 12/20 (+ initial eval)    Time In: 1312  Time Out: 1405  Total Billable Time: 53 minutes    Precautions: Standard and Fall    Subjective     Pt reports: "I feel like I have been doing really well"  She was compliant with home exercise program.  Response to previous treatment: no adverse effects  Functional change: ongoing    Pain: 0/10  Location: n/a    Objective     Eboni participated in neuromuscular re-education activities to improve: Balance, Coordination, Kinesthetic, Sense, Proprioception and Posture for 45 minutes. The following activities were included:    1 x 20 each LE lateral shift with toe tap to 2 stacked discs between LEs  - CGA    Modified L SLS with RLE resting on soft side large bosu with 2 x 10 LLE lifts  Modified L SLS with LLE resting on soft side large bosu with 2 x 10 RLE lifts    1 x 60 sec mediolateral swaying WBOS on hard side small bosu, mod sway, CGA-Bj with occ 1 UE touchdown support    12 sec, 26 sec, 30 sec balancing WBOS on hard side small bosu, min sway, CGA    4 laps of forward tandem ambulation, occ 1 finger touchdown on ballet bar    12 sec, 30 sec LLE fwd tandem firm surface  30 sec RLE fwd tandem firm surface    30 sec RLE fwd full tandem split with lateral split on foam fitter  30 sec LLE fwd full tandem split with lateral " split on foam fitter    30 sec RLE fwd full tandem on foam fitter  30 sec LLE fwd full tandem on foam fitter    30 sec RLE fwd full tandem on blue foam  21 sec, 30 sec LLE fwd full tandem on blue foam     30 sec each side 3/4 tandem EO with head turns    30 sec standing NBOS horizontal VORx1 at 120 bpm    60 sec standing NBOS horizontal VORx1 at 150 bpm, min sway    Modified SLS with opposite LE resting on 3 stacked foam discs, 2 x 10 lifting opposite  4 laps marching, 2-3 second holds     2 x 10 non-alternating step up to black foam fitter with opposite LE tap to orange cone  - 1 UE touchdown support, CGA-Bj      Eboni participated in canalith reposition procedures activities to improve: Balance, Coordination, Kinesthetic, Sense, Proprioception and Posture for 8 minutes. The following activities were included:    R Armando-Hallpike: positive; rotary upbeating nystagmus, immediate onset, fatigued within 20 sec. Dizziness reported.  1 R Epley maneuver administered and tolerated well.   R Armando-Hallpike: negative. No symptoms.      Home Exercises Provided and Patient Education Provided     Education provided:   - 1/6/22: initial HEP provided with seated hor/vert VORx1 and UE supported SLS    Written Home Exercises Provided: yes.  Exercises were reviewed and Eboni was able to demonstrate them prior to the end of the session.  Eboni demonstrated good  understanding of the education provided.     See EMR under Patient Instructions for exercises provided 1/6/2022.    Assessment     Eboni tolerated today's session well. She tested positive in R Armando-Hallpike testing today with rotary upbeating nystagmus, tolerated 1 R Epley maneuver well and retested negative with no dizziness or nystagmus. Will monitor over last two visits. The remainder of our session we heavily focused on tandem and modified SLS balance. Pt would benefit from continued PT to improve remaining vestibular deficits.     Eboni Is progressing well towards  her goals.   Pt prognosis is Good.     Pt will continue to benefit from skilled outpatient physical therapy to address the deficits listed in the problem list box on initial evaluation, provide pt/family education and to maximize pt's level of independence in the home and community environment.     Pt's spiritual, cultural and educational needs considered and pt agreeable to plan of care and goals.     Anticipated barriers to physical therapy: none      GOALS:   Short Term Goals: 4 weeks - progressing  1. Pt will be introduced to HEP and report >50% compliance. MET 1/31/22  2. Pt will improve mCTSIB score to 30/30/30/10 to demonstrate improve integration of balance systems. MET 1/31/22  3. Pt will improve FGA score to 22/30 to show improved balance with gait. MET 1/31/22  4. Pt will perform B SLS balance for 6 sec each to improve lower body dressing and decrease risk of falls. progressing  5. Pt will be able to perform seated horizontal and vertical VORx1 at >120 bpm for >30 sec to improve gaze stabilization needed for safe ambulation. MET 1/31/22    Long Term Goals: 8 weeks  1. Pt will report >75% compliance with progressed HEP. MET 2/16/2022  2. Pt will improve mCTSIB score to 30/30/30/15 to demonstrate improve integration of balance systems. MET 1/31/22  3. Pt will improve FGA score to 25/30 to show improved balance with gait. progressing  4. Pt will perform B SLS balance for 10 sec each to improve lower body dressing and decrease risk of falls. progressing  5. Pt will be able to perform seated horizontal and vertical VORx1 at >140 bpm for >45 sec to improve gaze stabilization needed for safe ambulation. MET 1/31/22  6. Pt will test negative for R BPPV in R Warren-Hallpike position for 2 consecutive visits to demonstrate clearance of R BPPV. progressing    Plan     Cont to check BPPV. Focus on B SLS balance (static and functional), walking with head turns and diagonal head turns, obstacle clearance, tandem  ambulation, and stair navigation.     Salvatore Estrada, PT   02/16/2022

## 2022-02-21 ENCOUNTER — CLINICAL SUPPORT (OUTPATIENT)
Dept: REHABILITATION | Facility: HOSPITAL | Age: 77
End: 2022-02-21
Attending: NURSE PRACTITIONER
Payer: MEDICARE

## 2022-02-21 DIAGNOSIS — R26.89 BALANCE PROBLEM: ICD-10-CM

## 2022-02-21 DIAGNOSIS — H81.11 BPPV (BENIGN PAROXYSMAL POSITIONAL VERTIGO), RIGHT: Primary | ICD-10-CM

## 2022-02-21 PROCEDURE — 95992 CANALITH REPOSITIONING PROC: CPT | Mod: HCNC,PO,59

## 2022-02-21 PROCEDURE — 97112 NEUROMUSCULAR REEDUCATION: CPT | Mod: HCNC,PO,CQ

## 2022-02-21 NOTE — PROGRESS NOTES
"      Physical Therapy Daily Treatment Note     Name: Eboni Nicholas  Clinic Number: [id number]    Therapy Diagnosis:   Encounter Diagnoses   Name Primary?    BPPV (benign paroxysmal positional vertigo), right Yes    Balance problem      Physician: Marino Wheeler D*    Visit Date: 2/21/2022    Physician Orders: PT Eval and Treat - vestibular therapy  Medical Diagnosis from Referral: H81.91 (ICD-10-CM) - Peripheral vestibulopathy of right ear  Evaluation Date: 1/4/2022  Authorization Period Expiration: 12/16/22  Plan of Care Expiration: 3/1/22  Plan of Care Certification Period: 1/4/22 - 3/1/22  Last reassessment: 1/31/22  Visit #/Visits authorized: 13/20 (+ initial eval)    Time In: 1315  Time Out: 1345  Total Billable Time:  minutes    Precautions: Standard and Fall    Subjective     Pt reports: "I feel ok."    She was compliant with home exercise program.  Response to previous treatment: no adverse effects  Functional change: ongoing    Pain: 0/10  Location: n/a    Objective   Patient received from Salvatore Estrada PT.  See note for first unit."   Eboni participated in neuromuscular re-education activities to improve: Balance, Coordination, Kinesthetic, Sense, Proprioception and Posture for 30 minutes. The following activities were included:    // bars:  CGA-SBA  2 x 60 sec each of tandem stance, occ 1 finger support  4 laps of forward tandem ambulation, occ 1 finger touchdown on ballet bar      4 point foam fitter board: CGA   2 x 30 sec B LE single leg stance with unilateral support, occ 1 UE support   3 x 30 sec of B LE single leg stance, 1 UE support    2 x 30 sec of B LE single leg stance hip flexion tapping the top of an orange cone    2 x 60 sec each of B LE tandem stance, 1 UE support   2 x 60 sec each of medial/lateral weight shifting and anterior/posterior weight shifting, occ 1 finger support   X 60 sec of static standing with head turns right to left, occ 1 finger support   X 60 sec of static " standing with head nods up/down, occ 1 finger support         Eboni participated in canalith reposition procedures activities to improve: Balance, Coordination, Kinesthetic, Sense, Proprioception and Posture for 0 minutes. The following activities were included:      Home Exercises Provided and Patient Education Provided     Education provided:   - 1/6/22: initial HEP provided with seated hor/vert VORx1 and UE supported SLS    Written Home Exercises Provided: yes.  Exercises were reviewed and Eboni was able to demonstrate them prior to the end of the session.  Eboni demonstrated good  understanding of the education provided.     See EMR under Patient Instructions for exercises provided 1/6/2022.    Assessment     Eboni tolerated today's session well and was seen by both the PT and PTA.  The PT saw Mrs. Lyn for the first 15 mins to check her ear crystals as well as adjust her and then Eboni focused on tandem and single leg balance the remainder of the tx session.  Pt would benefit from continued PT to improve remaining vestibular deficits.     Eboni Is progressing well towards her goals.   Pt prognosis is Good.     Pt will continue to benefit from skilled outpatient physical therapy to address the deficits listed in the problem list box on initial evaluation, provide pt/family education and to maximize pt's level of independence in the home and community environment.     Pt's spiritual, cultural and educational needs considered and pt agreeable to plan of care and goals.     Anticipated barriers to physical therapy: none      GOALS:   Short Term Goals: 4 weeks - progressing  1. Pt will be introduced to HEP and report >50% compliance. MET 1/31/22  2. Pt will improve mCTSIB score to 30/30/30/10 to demonstrate improve integration of balance systems. MET 1/31/22  3. Pt will improve FGA score to 22/30 to show improved balance with gait. MET 1/31/22  4. Pt will perform B SLS balance for 6 sec each to improve lower  body dressing and decrease risk of falls. progressing  5. Pt will be able to perform seated horizontal and vertical VORx1 at >120 bpm for >30 sec to improve gaze stabilization needed for safe ambulation. MET 1/31/22    Long Term Goals: 8 weeks  1. Pt will report >75% compliance with progressed HEP. MET 2/16/2022  2. Pt will improve mCTSIB score to 30/30/30/15 to demonstrate improve integration of balance systems. MET 1/31/22  3. Pt will improve FGA score to 25/30 to show improved balance with gait. progressing  4. Pt will perform B SLS balance for 10 sec each to improve lower body dressing and decrease risk of falls. progressing  5. Pt will be able to perform seated horizontal and vertical VORx1 at >140 bpm for >45 sec to improve gaze stabilization needed for safe ambulation. MET 1/31/22  6. Pt will test negative for R BPPV in R Armando-Hallpike position for 2 consecutive visits to demonstrate clearance of R BPPV. progressing    Plan     Cont to check BPPV. Focus on B SLS balance (static and functional), walking with head turns and diagonal head turns, obstacle clearance, tandem ambulation, and stair navigation.     Laura Adame, PTA   02/21/2022

## 2022-02-21 NOTE — PROGRESS NOTES
Physical Therapy Daily Treatment Note     [facility]  Cl[facility] Number: [id number]    Therapy Diagnosis:   Encounter Diagnoses   Name Primary?    BPPV (benign paroxysmal positional vertigo), right Yes    Balance problem      Physician: Marino Wheeler D*    Visit Date: 2/21/2022    Physician Orders: PT Eval and Treat - vestibular therapy  Medical Diagnosis from Referral: H81.91 (ICD-10-CM) - Peripheral vestibulopathy of right ear  Evaluation Date: 1/4/2022  Authorization Period Expiration: 12/16/22  Plan of Care Expiration: 3/1/22  Plan of Care Certification Period: 1/4/22 - 3/1/22  Last reassessment: 1/31/22  Visit #/Visits authorized: 13/20 (+ initial eval)    Time In: 1300  Time Out: 1310  Total Billable Time: 10 minutes    Precautions: Standard and Fall    Subjective     Pt reports: oh hello!  She was compliant with home exercise program.  Response to previous treatment: no adverse effects  Functional change: ongoing    Pain: 0/10  Location: n/a    Objective     Eboni participated in canalith reposition procedures activities to improve: Balance, Coordination, Kinesthetic, Sense, Proprioception and Posture for 10 minutes. The following activities were included:    R Armando-Hallpike: positive; rotary downbeating nystagmus, immediate onset, fatigued within 30 sec. Dizziness reported.  1 deep head hang maneuver administered and tolerated well.       Home Exercises Provided and Patient Education Provided     Education provided:   - 1/6/22: initial HEP provided with seated hor/vert VORx1 and UE supported SLS    Written Home Exercises Provided: Patient instructed to cont prior HEP.  Exercises were reviewed and Eboni was able to demonstrate them prior to the end of the session.  Eboni demonstrated good  understanding of the education provided.     See EMR under Patient Instructions for exercises provided 1/6/2022.    Assessment     Eboni tolerated today's CRT well - pt tested positive for R  anterior BPPV with rotary, downbeating nystagmus, treated with a 3 min deep head hang maneuver followed by 3 min seated in full cervical flexion. Pt likely cont to experience recurrent BPPV due to pt's ROM deficits and soft tissue restriction in the head, neck, and shoulder area. Will cont to test and treat for BPPV as indicated. Pt passed off to PTA for remainder of today's session. Cont with POC.    Eboni Is progressing well towards her goals.   Pt prognosis is Good.     Pt will continue to benefit from skilled outpatient physical therapy to address the deficits listed in the problem list box on initial evaluation, provide pt/family education and to maximize pt's level of independence in the home and community environment.     Pt's spiritual, cultural and educational needs considered and pt agreeable to plan of care and goals.     Anticipated barriers to physical therapy: none      GOALS:   Short Term Goals: 4 weeks - progressing  1. Pt will be introduced to HEP and report >50% compliance. MET 1/31/22  2. Pt will improve mCTSIB score to 30/30/30/10 to demonstrate improve integration of balance systems. MET 1/31/22  3. Pt will improve FGA score to 22/30 to show improved balance with gait. MET 1/31/22  4. Pt will perform B SLS balance for 6 sec each to improve lower body dressing and decrease risk of falls. progressing  5. Pt will be able to perform seated horizontal and vertical VORx1 at >120 bpm for >30 sec to improve gaze stabilization needed for safe ambulation. MET 1/31/22    Long Term Goals: 8 weeks  1. Pt will report >75% compliance with progressed HEP. MET 2/16/2022  2. Pt will improve mCTSIB score to 30/30/30/15 to demonstrate improve integration of balance systems. MET 1/31/22  3. Pt will improve FGA score to 25/30 to show improved balance with gait. progressing  4. Pt will perform B SLS balance for 10 sec each to improve lower body dressing and decrease risk of falls. progressing  5. Pt will be able to  perform seated horizontal and vertical VORx1 at >140 bpm for >45 sec to improve gaze stabilization needed for safe ambulation. MET 1/31/22  6. Pt will test negative for R BPPV in R [place]-Hallpike position for 2 consecutive visits to demonstrate clearance of R BPPV. progressing    Plan     Cont to check BPPV. Focus on B SLS balance (static and functional), walking with head turns and diagonal head turns, obstacle clearance, tandem ambulation, and stair navigation.     Salvatore Estrada, PT   02/21/2022

## 2022-02-23 ENCOUNTER — CLINICAL SUPPORT (OUTPATIENT)
Dept: REHABILITATION | Facility: HOSPITAL | Age: 77
End: 2022-02-23
Attending: NURSE PRACTITIONER
Payer: MEDICARE

## 2022-02-23 ENCOUNTER — PATIENT OUTREACH (OUTPATIENT)
Dept: ADMINISTRATIVE | Facility: OTHER | Age: 77
End: 2022-02-23
Payer: MEDICARE

## 2022-02-23 DIAGNOSIS — H81.11 BPPV (BENIGN PAROXYSMAL POSITIONAL VERTIGO), RIGHT: Primary | ICD-10-CM

## 2022-02-23 DIAGNOSIS — R26.89 BALANCE PROBLEM: ICD-10-CM

## 2022-02-23 PROCEDURE — 97112 NEUROMUSCULAR REEDUCATION: CPT | Mod: HCNC,PO

## 2022-02-23 PROCEDURE — 95992 CANALITH REPOSITIONING PROC: CPT | Mod: HCNC,PO

## 2022-02-23 NOTE — PROGRESS NOTES
LINKS immunization registry updated  Care Everywhere updated  Health Maintenance updated  DIS/Chart reviewed for overdue Proactive [facility] Encounters (AGATHA) health maintenance testing (CRS, Breast Ca, Diabetic Eye Exam)   Orders entered:N/A  
You can access the [facility] Portal, offered by [facility], by registering with the following website: [email or web address]

## 2022-02-23 NOTE — PROGRESS NOTES
Physical Therapy Daily Treatment Note     Name: Eboni Nicholas  Clinic Number: [id number]    Therapy Diagnosis:   Encounter Diagnoses   Name Primary?    BPPV (benign paroxysmal positional vertigo), right Yes    Balance problem      Physician: Marino Wheeler D*    Visit Date: 2/23/2022    Physician Orders: PT Eval and Treat - vestibular therapy  Medical Diagnosis from Referral: H81.91 (ICD-10-CM) - Peripheral vestibulopathy of right ear  Evaluation Date: 1/4/2022  Authorization Period Expiration: 12/16/22  Plan of Care Expiration: 3/1/22  Plan of Care Certification Period: 1/4/22 - 3/1/22  Visit #/Visits authorized: 14/20 (+ initial eval)    Time In: 1317  Time Out: 1357  Total Billable Time: 40 minutes    Precautions: Standard and Fall    Subjective     Pt reports: feeling foggy today  She was compliant with home exercise program.  Response to previous treatment: no adverse effects  Functional change: ongoing    Pain: 0/10  Location: n/a    Objective       Eboni participated in neuromuscular re-education activities to improve: Balance, Coordination, Kinesthetic, Sense, Proprioception and Posture for 25 minutes. The following activities were included:    5 x 30 feet fast walking pace with quick pivot to start next lap  5 x 100 feet walking brisk pace with head turns every 2-3 steps  5 x 100 feet walking brisk pace with head pitches every 2-3 steps    Time required for seated rest break and frequent standing rest breaks due to severe deconditioning.    Eboni participated in canalith reposition procedures activities to improve: Balance, Coordination, Kinesthetic, Sense, Proprioception and Posture for 15 minutes. The following activities were included:    R [place]-Hallpike: positive; rotary upbeating nystagmus, immediate onset, fatigued within 30 sec. Dizziness reported. Several downward rotary beats following fatigue of upbeating rotary.  R [place]-Hallpike with pre-load position: positive; rotary upbeating  "nystagmus, immediate onset, fatigued within 30 sec. Dizziness reported. No downward rotary beats following.  Treated with 1 R Epley CRT  R [place]-Hallpike with pre-load position: negative, no dizziness reported      Home Exercises Provided and Patient Education Provided     Education provided:   - 1/6/22: initial HEP provided with seated hor/vert VORx1 and UE supported SLS    Written Home Exercises Provided: yes.  Exercises were reviewed and Eboni was able to demonstrate them prior to the end of the session.  Eboni demonstrated good  understanding of the education provided.     See EMR under Patient Instructions for exercises provided 1/6/2022.    Assessment     Eboni tolerated today's session fairly. Pt has frequent, recurrent BPPV - likely due to inner ear anatomy as well as ROM deficits in the neck and upper body from soft tissue restrictions. Pt did not feel her best today, stating she felt "foggy" upon entering clinic. Reassessment and POC extension will be performed at next follow up. Pt would benefit from continued PT to improve remaining vestibular deficits.     Eboni Is progressing well towards her goals.   Pt prognosis is Good.     Pt will continue to benefit from skilled outpatient physical therapy to address the deficits listed in the problem list box on initial evaluation, provide pt/family education and to maximize pt's level of independence in the home and community environment.     Pt's spiritual, cultural and educational needs considered and pt agreeable to plan of care and goals.     Anticipated barriers to physical therapy: none      GOALS:   Short Term Goals: 4 weeks - progressing  1. Pt will be introduced to HEP and report >50% compliance. MET 1/31/22  2. Pt will improve mCTSIB score to 30/30/30/10 to demonstrate improve integration of balance systems. MET 1/31/22  3. Pt will improve FGA score to 22/30 to show improved balance with gait. MET 1/31/22  4. Pt will perform B SLS balance for 6 sec " each to improve lower body dressing and decrease risk of falls. progressing  5. Pt will be able to perform seated horizontal and vertical VORx1 at >120 bpm for >30 sec to improve gaze stabilization needed for safe ambulation. MET 1/31/22    Long Term Goals: 8 weeks  1. Pt will report >75% compliance with progressed HEP. MET 2/16/2022  2. Pt will improve mCTSIB score to 30/30/30/15 to demonstrate improve integration of balance systems. MET 1/31/22  3. Pt will improve FGA score to 25/30 to show improved balance with gait. progressing  4. Pt will perform B SLS balance for 10 sec each to improve lower body dressing and decrease risk of falls. progressing  5. Pt will be able to perform seated horizontal and vertical VORx1 at >140 bpm for >45 sec to improve gaze stabilization needed for safe ambulation. MET 1/31/22  6. Pt will test negative for R BPPV in R [place]-Hallpike position for 2 consecutive visits to demonstrate clearance of R BPPV. progressing    Plan     Updated Plan of Care Certification Period: 2/23/22 - 3/31/22  Cont to check BPPV. Focus on B SLS balance (static and functional), walking with head turns and diagonal head turns, obstacle clearance, tandem ambulation, and stair navigation.     Salvatore Estrada, PT   02/23/2022

## 2022-02-24 ENCOUNTER — OFFICE VISIT (OUTPATIENT)
Dept: OTOLARYNGOLOGY | Facility: CLINIC | Age: 77
End: 2022-02-24
Payer: MEDICARE

## 2022-02-24 VITALS — WEIGHT: 225 LBS | HEIGHT: 63 IN | BODY MASS INDEX: 39.87 KG/M2

## 2022-02-24 DIAGNOSIS — D86.89 SARCOID OF NOSE: ICD-10-CM

## 2022-02-24 DIAGNOSIS — H69.92 DYSFUNCTION OF LEFT EUSTACHIAN TUBE: ICD-10-CM

## 2022-02-24 DIAGNOSIS — G47.33 OSA ON CPAP: ICD-10-CM

## 2022-02-24 DIAGNOSIS — J31.0 CHRONIC RHINITIS: Primary | ICD-10-CM

## 2022-02-24 DIAGNOSIS — J32.8 OTHER CHRONIC SINUSITIS: ICD-10-CM

## 2022-02-24 PROCEDURE — 31231 NASAL ENDOSCOPY DX: CPT | Mod: HCNC,S$GLB,, | Performed by: OTOLARYNGOLOGY

## 2022-02-24 PROCEDURE — 99999 PR PBB SHADOW E&M-EST. PATIENT-LVL IV: CPT | Mod: PBBFAC,HCNC,, | Performed by: OTOLARYNGOLOGY

## 2022-02-24 PROCEDURE — 1159F MED LIST DOCD IN RCRD: CPT | Mod: HCNC,CPTII,S$GLB, | Performed by: OTOLARYNGOLOGY

## 2022-02-24 PROCEDURE — 1160F PR REVIEW ALL MEDS BY PRESCRIBER/CLIN PHARMACIST DOCUMENTED: ICD-10-PCS | Mod: HCNC,CPTII,S$GLB, | Performed by: OTOLARYNGOLOGY

## 2022-02-24 PROCEDURE — 3288F FALL RISK ASSESSMENT DOCD: CPT | Mod: HCNC,CPTII,S$GLB, | Performed by: OTOLARYNGOLOGY

## 2022-02-24 PROCEDURE — 3008F PR BODY MASS INDEX (BMI) DOCUMENTED: ICD-10-PCS | Mod: HCNC,CPTII,S$GLB, | Performed by: OTOLARYNGOLOGY

## 2022-02-24 PROCEDURE — 1126F PR PAIN SEVERITY QUANTIFIED, NO PAIN PRESENT: ICD-10-PCS | Mod: HCNC,CPTII,S$GLB, | Performed by: OTOLARYNGOLOGY

## 2022-02-24 PROCEDURE — 31231 NASAL/SINUS ENDOSCOPY: ICD-10-PCS | Mod: HCNC,S$GLB,, | Performed by: OTOLARYNGOLOGY

## 2022-02-24 PROCEDURE — 99214 OFFICE O/P EST MOD 30 MIN: CPT | Mod: 25,HCNC,S$GLB, | Performed by: OTOLARYNGOLOGY

## 2022-02-24 PROCEDURE — 1101F PR PT FALLS ASSESS DOC 0-1 FALLS W/OUT INJ PAST YR: ICD-10-PCS | Mod: HCNC,CPTII,S$GLB, | Performed by: OTOLARYNGOLOGY

## 2022-02-24 PROCEDURE — 1160F RVW MEDS BY RX/DR IN RCRD: CPT | Mod: HCNC,CPTII,S$GLB, | Performed by: OTOLARYNGOLOGY

## 2022-02-24 PROCEDURE — 3288F PR FALLS RISK ASSESSMENT DOCUMENTED: ICD-10-PCS | Mod: HCNC,CPTII,S$GLB, | Performed by: OTOLARYNGOLOGY

## 2022-02-24 PROCEDURE — 99214 PR OFFICE/OUTPT VISIT, EST, LEVL IV, 30-39 MIN: ICD-10-PCS | Mod: 25,HCNC,S$GLB, | Performed by: OTOLARYNGOLOGY

## 2022-02-24 PROCEDURE — 1101F PT FALLS ASSESS-DOCD LE1/YR: CPT | Mod: HCNC,CPTII,S$GLB, | Performed by: OTOLARYNGOLOGY

## 2022-02-24 PROCEDURE — 99999 PR PBB SHADOW E&M-EST. PATIENT-LVL IV: ICD-10-PCS | Mod: PBBFAC,HCNC,, | Performed by: OTOLARYNGOLOGY

## 2022-02-24 PROCEDURE — 3008F BODY MASS INDEX DOCD: CPT | Mod: HCNC,CPTII,S$GLB, | Performed by: OTOLARYNGOLOGY

## 2022-02-24 PROCEDURE — 1159F PR MEDICATION LIST DOCUMENTED IN MEDICAL RECORD: ICD-10-PCS | Mod: HCNC,CPTII,S$GLB, | Performed by: OTOLARYNGOLOGY

## 2022-02-24 PROCEDURE — 1126F AMNT PAIN NOTED NONE PRSNT: CPT | Mod: HCNC,CPTII,S$GLB, | Performed by: OTOLARYNGOLOGY

## 2022-02-24 RX ORDER — ASPIRIN 325 MG
50 TABLET, DELAYED RELEASE (ENTERIC COATED) ORAL DAILY
COMMUNITY
End: 2023-10-13

## 2022-02-24 NOTE — PROCEDURES
Nasal/sinus endoscopy    Date/Time: 2/24/2022 10:00 AM  Performed by: Adolfo Meraz MD  Authorized by: Adolfo Meraz MD     Consent Done?:  Yes (Verbal)  Anesthesia:     Local anesthetic:  Lidocaine 4% and Alex-Synephrine 1/2%    Patient tolerance:  Patient tolerated the procedure well with no immediate complications  Nose:     Procedure Performed:  Nasal Endoscopy  External:      No external nasal deformity  Intranasal:      Mucosa no polyps     Mucosa ulcers not present     No mucosa lesions present     Turbinates not enlarged     No septum gross deformity  Nasopharynx:      No mucosa lesions     Adenoids not present     Posterior choanae patent     Eustachian tube not patent     Nonobstructive crusting at nasal valve  Normal MTs  No polyps  Prominent septal vessels without ulceration  Scarring with erythema over anterior ET bilaterally

## 2022-02-24 NOTE — PROGRESS NOTES
Subjective:      Eboni Nicholas is a 76 y.o. female who was referred to me by Dr. CAMILO Elizondo in consultation for sinusitis.    She relates a long history for many years of chronic nasal congestion and crusting that is now attributed to sarcoidosis.  This is mostly manageable with saline spray.  She has associated symptoms of fullness and heaviness in the head, predominantly in the temporal and parietal regions, which have been transiently improved with prednisone in the past.  She also notes more recently a dizziness that occurs sporadically and makes her fall to the right, and for which she has been attending vestibular therapy over the past 8 weeks with incomplete improvement.  She also notes recurrent aural fullness and had PE tubes many years ago.  She denies nasal blockage except when she has a scab, and denies hyposmia, rhinorrhea, postnasal drip, facial pressure, pruritic symptoms or notable sinus infections.    Current sinonasal medications as above.  The last course of antibiotics was 7 days of doxycycline in Dec 2021.  She does not regularly use nasal decongestant sprays.    She recalls previously having allergy testing, which was reportedly positive for some allergens in her youth but never had allergy shots..    She denies a history of asthma.    She denies a history of reflux symptoms.     She relates a diagnosis of obstructive sleep apnea with regular CPAP use.     She has not had sinonasal surgery.  She has not had a tonsillectomy.    She does not recall a prior history of nasal trauma.    SNOT-22 score: : (P) 22  NOSE score:: (P) 10%  ETDQ-7 score:: (P) 1.9      Past Medical History  She has a past medical history of Anxiety, Benign essential HTN, Cataract, Depression, GERD (gastroesophageal reflux disease), Glaucoma, Hyperlipidemia, Hypothyroid, CHIDI (obstructive sleep apnea), and Sarcoidosis.    Past Surgical History  She has a past surgical history that includes Hysterectomy and Thyroid  surgery.    Family History  Her family history includes Alcohol abuse in her brother; Arthritis in her sister; Cancer in her father; Diabetes in her maternal grandmother and son; Heart disease in her brother and brother; Liver disease in her brother and brother; No Known Problems in her daughter and daughter; Rheum arthritis in her sister; Stroke in her mother; Transient ischemic attack in her mother.    Social History  She reports that she has never smoked. She has never used smokeless tobacco. She reports previous alcohol use. She reports that she does not use drugs.    Allergies  She is allergic to brimonidine, phenobarbital, and sulfa (sulfonamide antibiotics).    Medications   She has a current medication list which includes the following prescription(s): aspirin, blood pressure monitor, calcium-vitamin d, co-enzyme q-10, dorzolamide, escitalopram oxalate, latanoprost, levocetirizine, levothyroxine, losartan, meclizine, multivitamin, simvastatin, and timolol maleate 0.5%.    Review of Systems     Constitutional: Negative for appetite change, chills, fatigue, fever and unexpected weight loss.      HENT: Positive for sinus pressure.  Negative for ear discharge, ear infection, ear pain, facial swelling, hearing loss, mouth sores, nosebleeds, postnasal drip, ringing in the ears, runny nose, sinus infection, sore throat, stuffy nose, tonsil infection, dental problems, trouble swallowing and voice change.      Eyes:  Negative for change in eyesight, eye drainage, eye itching and photophobia.     Respiratory:  Positive for sleep apnea. Negative for cough, shortness of breath, snoring and wheezing.      Cardiovascular:  Negative for chest pain, foot swelling, irregular heartbeat and swollen veins.     Gastrointestinal:  Negative for abdominal pain, acid reflux, constipation, diarrhea, heartburn and vomiting.     Genitourinary: Negative for difficulty urinating, sexual problems and frequent urination.     Musc: Positive  "for aching muscles. Negative for aching joints, back pain and neck pain.     Skin: Negative for rash.     Allergy: Negative for food allergies and seasonal allergies.     Endocrine: Negative for cold intolerance and heat intolerance.      Neurological: Positive for dizziness and light-headedness. Negative for headaches, seizures and tremors.      Hematologic: Negative for bruises/bleeds easily and swollen glands.      Psychiatric: Negative for decreased concentration, depression, nervous/anxious and sleep disturbance.               Objective:     Ht 5' 3" (1.6 m)   Wt 102.1 kg (225 lb)   BMI 39.86 kg/m²        Constitutional:   She appears well-developed. She is cooperative. Normal speech.  No hoarse voice.      Head:  Normocephalic. Salivary glands normal.  Facial strength is normal.      Ears:    Right Ear: No drainage or tenderness. Tympanic membrane is not perforated. Tympanic membrane mobility is normal. No middle ear effusion. No decreased hearing is noted.   Left Ear: No drainage or tenderness. Tympanic membrane is not perforated. Tympanic membrane mobility is normal.  No middle ear effusion. No decreased hearing is noted.     Nose:  No mucosal edema, rhinorrhea, septal deviation or polyps. No epistaxis. Turbinates normal, no turbinate masses and no turbinate hypertrophy.  Right sinus exhibits no maxillary sinus tenderness and no frontal sinus tenderness. Left sinus exhibits no maxillary sinus tenderness and no frontal sinus tenderness.   Nasal valve crusting on left    Mouth/Throat  Oropharynx clear and moist without lesions or asymmetry and normal uvula midline. She does not have dentures. Normal dentition. No oral lesions or mucous membrane lesions. No oropharyngeal exudate or posterior oropharyngeal erythema. Tonsils present, +1.  Mirror exam not performed due to patient tolerance.  Mirror exam not performed due to patient tolerance.      Neck:  Neck normal without thyromegaly masses, asymmetry, normal " tracheal structure, crepitus, and tenderness, thyroid normal, trachea normal and no adenopathy. Normal range of motion present.     She has no cervical adenopathy.     Cardiovascular:   Regular rhythm.      Pulmonary/Chest:   Effort normal.     Psychiatric:   She has a normal mood and affect. Her speech is normal and behavior is normal.     Neurological:   No cranial nerve deficit.     Skin:   No rash noted.       Procedure    Nasal endoscopy performed.  See procedure note.     Left nasal valve     Left nasal cavity     Left MT     Left choana with ET scarring     Right nasal valve with scarring     Right mT     Right choana with ET scarring        Data Reviewed    WBC (K/uL)   Date Value   08/07/2020 6.50     Eosinophil % (%)   Date Value   08/07/2020 4.2     Eos # (K/uL)   Date Value   08/07/2020 0.3     Platelets (K/uL)   Date Value   08/07/2020 213     Glucose (mg/dL)   Date Value   08/07/2020 107     No results found for: IGE    No sinus imaging available.     I independently reviewed the tracings of the complete audiometric evaluation performed 5/10/21.  I reviewed the audiogram with the patient as well.  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           Assessment:     1. Chronic rhinitis    2. Other chronic sinusitis    3. Sarcoid of nose    4. Dysfunction of left eustachian tube    5. CHIDI on CPAP         Plan:     I had a long discussion with the patient and her  regarding her condition and the further workup and management options.  She has intranasal scarring consistent with chronic sarcoidosis and cannot exclude inspissated mucus in the sphenoid or other sinuses to account for her symptoms of pressure and heaviness.  I ordered a CT scan of the sinuses to assess for intraluminal obstruction.  Based on these results we may consider additional antibiotics and/or steroids.  She is unable to use flonase or steroid sprays due to exacerbation of  nosebleeds.  She may continue saline spray as before.    Follow up for test results.

## 2022-02-24 NOTE — LETTER
2022    [place] [address]           OTOLARYNGOLOGY AND COMMUNICATION SCIENCES    Kayden Jessica MD, FACS          SURGICAL AND MEDICAL DISEASES OF HEAD AND NECK  MD Kayden Chawla MD, FACS  Surjit Berger III, MD    OTOLOGY, NEUROTOLOGY and SKULL-BASE SURGERY  Gurpreet Burnett MD, FACS  Talib Latham MD, Director    PEDIATRIC OTOLARYNGOLOGY & OTOLOGY  AMY Burnett MD, FAAP  Daisy Tubbs MD, FACS    FACIAL PLASTIC and RECONSTRUCTIVE SURGERY  GI Kaur III, MD, FACS    RHINOLOGY and SINUS SURGERY  Adolfo Meraz MD, MPH, FACS  Director   GI Kaur III, MD, FACS    LARYNGOLOGY  Osman Potts MD    SPEECH-LANGUAGE PATHOLOGY  Leena Pena, PhD, [facility]-SLP  Nia Miner, MS, CCC-SLP  Jazmyne Sandy, MS, CCC-SLP  Patricia De La Rosa MA, [facility]-SLP    AUDIOLOGY SECTION  Jami Blair, MS, CCC-A  CECELIA García, CCC-A  Arlyn Avla, CCC-A  Arlyn Brooke, CCC-A  Km Ramos Jr., CECELIA, CCA-A  CECELIA Ku, CCC-A  Arlyn Armendariz, CCC-A    [facility]  Head and Neck Surgical Oncology  HOMA Melton, FNP-C  Pedatric Otolaryngology  HOMA Mtz, PNP-C       Re:  Eboni Nicholas  :  1945    Dear Dr. Elizondo,    Thank you for referring your patient, Eboni Nicholas, to us for evaluation and treatment.  I have enclosed a copy of my clinic note for your review and records.  If you have any questions please do not hesitate to contact our office.     Thank you for allowing me to participate in the care of your patient.    Sincerely,        Adolfo Meraz MD, MPH, FACS    Director, Rhinology and Sinus Surgery  Department of Otorhinolaryngology  [facility]    Encl:  Progress note       [facility] [address]  phone [phone]  fax [phone]   [email or web address]

## 2022-03-02 ENCOUNTER — HOSPITAL ENCOUNTER (OUTPATIENT)
Dept: RADIOLOGY | Facility: OTHER | Age: 77
Discharge: HOME | End: 2022-03-02
Attending: OTOLARYNGOLOGY
Payer: MEDICARE

## 2022-03-02 DIAGNOSIS — D86.89 SARCOID OF NOSE: ICD-10-CM

## 2022-03-02 PROCEDURE — 70486 CT MAXILLOFACIAL W/O DYE: CPT | Mod: TC,HCNC

## 2022-03-02 PROCEDURE — 70486 CT MAXILLOFACIAL W/O DYE: CPT | Mod: 26,HCNC,, | Performed by: RADIOLOGY

## 2022-03-02 PROCEDURE — 70486 CT MEDTRONIC SINUSES WITHOUT: ICD-10-PCS | Mod: 26,HCNC,, | Performed by: RADIOLOGY

## 2022-03-03 ENCOUNTER — PATIENT MESSAGE (OUTPATIENT)
Dept: OTOLARYNGOLOGY | Facility: CLINIC | Age: 77
End: 2022-03-03
Payer: MEDICARE

## 2022-03-03 ENCOUNTER — CLINICAL SUPPORT (OUTPATIENT)
Dept: REHABILITATION | Facility: HOSPITAL | Age: 77
End: 2022-03-03
Payer: MEDICARE

## 2022-03-03 DIAGNOSIS — H81.11 BPPV (BENIGN PAROXYSMAL POSITIONAL VERTIGO), RIGHT: Primary | ICD-10-CM

## 2022-03-03 DIAGNOSIS — R26.89 BALANCE PROBLEM: ICD-10-CM

## 2022-03-03 PROCEDURE — 95992 CANALITH REPOSITIONING PROC: CPT | Mod: HCNC,PO

## 2022-03-03 PROCEDURE — 97112 NEUROMUSCULAR REEDUCATION: CPT | Mod: HCNC,PO,59

## 2022-03-03 NOTE — PROGRESS NOTES
Physical Therapy Daily Treatment Note     Name: Eboni Nicholas  Clinic Number: [id number]    Therapy Diagnosis:   Encounter Diagnoses   Name Primary?    BPPV (benign paroxysmal positional vertigo), right Yes    Balance problem      Physician: Marino Wheeler D*    Visit Date: 3/3/2022    Physician Orders: PT Eval and Treat - vestibular therapy  Medical Diagnosis from Referral: H81.91 (ICD-10-CM) - Peripheral vestibulopathy of right ear  Evaluation Date: 1/4/2022  Authorization Period Expiration: 12/16/22  Plan of Care Expiration: 3/1/22  Plan of Care Certification Period: 1/4/22 - 3/1/22  Updated Plan of Care Certification Period: 2/23/22 - 3/31/22  Last reassessment: 3/3/22  Visit #/Visits authorized: 16/40 (+ initial eval)    Time In: 1058 (pt arrived late)  Time Out: 1138  Total Billable Time: 40 minutes    Precautions: Standard and Fall    Subjective     Pt reports: went to the ENT the other day   She was compliant with home exercise program.  Response to previous treatment: no adverse effects  Functional change: ongoing    Pain: 0/10  Location: n/a    Objective       Eboni participated in neuromuscular re-education activities to improve: Balance, Coordination, Kinesthetic, Sense, Proprioception and Posture for 30 minutes. The following activities were included:      MCTSIB: feet together and arms crossed   Evaluation (01/04/2022) 1/26/22 3/3/22   EO on firm surface 30 sec 30 sec 30 sec   EC on firm surface 25 sec, min sway 30 sec, no sway 30 sec   EO on compliant surface 30 sec, min sway 30 sec, min sway 30 sec, min sway   EC on compliant surface 5 sec, LOB to R 17 sec 22 sec       Static standing balance: arms down   Evaluation (01/04/2022) 1/26/22 3/3/22   SLS R LE 3 sec  (<10 sec = HIGH FALL RISK) 2 sec 6 sec   SLS L LE 4 sec  (<10 sec = HIGH FALL RISK) 2 sec 7 sec   Tandem R LE front 19 sec 30 sec NT   Tandem L LE front 27 sec 30 sec NT       Functional Gait Assessment EVAL:  Score 19/30      Functional Gait Assessment 1/26/22 - 23/30  1. Gait on level surface (6m) =  (2) Mild impairment: 7-5.6 sec, uses A.D., mild gait deviations, or deviates 6-10 in  2. Change in Gait Speed =  (3) Normal: smooth change w/o loss of balance or gait deviation, deviates < 6 in, significant difference between speeds  3. Gait with horizontal head turns  = (2) Mild impairment: slight change in speed, deviates 6-10 in, OR uses A.D.  4. Gait with vertical head turns = (3) Normal: no change in gait, deviates <6 in  5. Gait with pivot turns = (3) Normal: performs safely in 3 sec, no LOB  6. Step over obstacle = (2) Mild impairment: able to step over 1 box w/o change in speed or LOB  7. Gait with Narrow YANDY = (2) Mild impairment: 7-9 steps  8. Gait with eyes closed = (2) Mild impairment: 7.1-9 sec, mild gait deviations, deviates 6-10 in  9. Ambulating Backwards = (3) Normal: no A.D., no LOB, normal gait pattern, deviates <6in  10. Steps = (1) Moderate impairment: step-to, uses rail    Functional Gait Assessment:    1. Gait on level surface (6m) =  (2) Mild impairment: 7-5.6 sec, uses A.D., mild gait deviations, or deviates 6-10 in  2. Change in Gait Speed =  (3) Normal: smooth change w/o loss of balance or gait deviation, deviates < 6 in, significant difference between speeds  3. Gait with horizontal head turns  = (2) Mild impairment: slight change in speed, deviates 6-10 in, OR uses A.D.  4. Gait with vertical head turns = (3) Normal: no change in gait, deviates <6 in  5. Gait with pivot turns = (3) Normal: performs safely in 3 sec, no LOB  6. Step over obstacle = (3) Normal: steps over 2 stacked boxes w/o change in speed or LOB  7. Gait with Narrow YANDY = (2) Mild impairment: 7-9 steps  8. Gait with eyes closed = (2) Mild impairment: 7.1-9 sec, mild gait deviations, deviates 6-10 in  9. Ambulating Backwards = (2) Mild impairment: uses A.D., slower speed, mild gait deviations, deviates 6-10 in  10. Steps = (2) Mild Impairment:  alternating feet, uses rail    Score 24/30     Score:   <22/30 fall risk   <20/30 fall risk in older adults   <18/30 fall risk in Parkinsons         Eboni participated in canalith reposition procedures activities to improve: Balance, Coordination, Kinesthetic, Sense, Proprioception and Posture for 10 minutes. The following activities were included:    R [place]-Hallpike with pre-load position: positive; rotary upbeating nystagmus, immediate onset, fatigued within 10 sec. Dizziness reported.  Treated with 1 R Epley CRT  R [place]-Hallpike: negative, no dizziness reported      Home Exercises Provided and Patient Education Provided     Education provided:   - 1/6/22: initial HEP provided with seated hor/vert VORx1 and UE supported SLS    Written Home Exercises Provided: yes.  Exercises were reviewed and Eboni was able to demonstrate them prior to the end of the session.  Eboni demonstrated good  understanding of the education provided.     See EMR under Patient Instructions for exercises provided 1/6/2022.    Assessment     Eboni tolerated today's reassessment fairly. Pt cont to have frequent, recurrent R BPPV - likely due to inner ear anatomy as well as ROM deficits in the neck and upper body from soft tissue restrictions. She cont to test positive in R [place]-Hallpike, tolerates CRT well, and subsequently tests negative. Pt recently saw ENT, but was a provider that focuses on the sinuses - pt would benefit from VNG testing. Pt has met B SLS STG and is steadily progressing toward meeting LTG for SLS balance as well as LTG for FGA. Pt given instruction to avoid tilting head forward or back for the next 24 hours in an attempt to maintain BPPV clearance. POC extended for one more month to cont working on B SLS balance, dynamic/walking balance, and to cont working toward clearing BPPV.  Pt would benefit from continued PT to improve remaining vestibular deficits.     Eboni Is progressing well towards her goals.   Pt prognosis  is Good.     Pt will continue to benefit from skilled outpatient physical therapy to address the deficits listed in the problem list box on initial evaluation, provide pt/family education and to maximize pt's level of independence in the home and community environment.     Pt's spiritual, cultural and educational needs considered and pt agreeable to plan of care and goals.     Anticipated barriers to physical therapy: none      GOALS:   Short Term Goals: 4 weeks - progressing  1. Pt will be introduced to HEP and report >50% compliance. MET 1/31/22  2. Pt will improve mCTSIB score to 30/30/30/10 to demonstrate improve integration of balance systems. MET 1/31/2  3. Pt will improve FGA score to 22/30 to show improved balance with gait. MET 1/31/22  4. Pt will perform B SLS balance for 6 sec each to improve lower body dressing and decrease risk of falls. MET 3/3/22  5. Pt will be able to perform seated horizontal and vertical VORx1 at >120 bpm for >30 sec to improve gaze stabilization needed for safe ambulation. MET 1/31/22    Long Term Goals: 8 weeks  1. Pt will report >75% compliance with progressed HEP. MET 2/16/2022  2. Pt will improve mCTSIB score to 30/30/30/15 to demonstrate improve integration of balance systems. MET 1/31/22  3. Pt will improve FGA score to 25/30 to show improved balance with gait. ongoing, 24/30 3/3/22  4. Pt will perform B SLS balance for 10 sec each to improve lower body dressing and decrease risk of falls. ongoing  5. Pt will be able to perform seated horizontal and vertical VORx1 at >140 bpm for >45 sec to improve gaze stabilization needed for safe ambulation. MET 1/31/22  6. Pt will test negative for R BPPV in R [place]-Hallpike position for 2 consecutive visits to demonstrate clearance of R BPPV. ongoing    Plan     Updated Plan of Care Certification Period: 2/23/22 - 3/31/22    Cont to check BPPV. Focus on B SLS balance (static and functional), walking with head turns and diagonal head  turns, obstacle clearance, tandem ambulation, and stair navigation.     Salvatore Estrada, PT   03/03/2022

## 2022-03-04 ENCOUNTER — PATIENT MESSAGE (OUTPATIENT)
Dept: OTOLARYNGOLOGY | Facility: CLINIC | Age: 77
End: 2022-03-04
Payer: MEDICARE

## 2022-03-04 DIAGNOSIS — R42 DIZZINESS: Primary | ICD-10-CM

## 2022-03-07 ENCOUNTER — PATIENT MESSAGE (OUTPATIENT)
Dept: OTOLARYNGOLOGY | Facility: CLINIC | Age: 77
End: 2022-03-07
Payer: MEDICARE

## 2022-03-07 ENCOUNTER — CLINICAL SUPPORT (OUTPATIENT)
Dept: REHABILITATION | Facility: HOSPITAL | Age: 77
End: 2022-03-07
Payer: MEDICARE

## 2022-03-07 DIAGNOSIS — H81.11 BPPV (BENIGN PAROXYSMAL POSITIONAL VERTIGO), RIGHT: Primary | ICD-10-CM

## 2022-03-07 DIAGNOSIS — R26.89 BALANCE PROBLEM: ICD-10-CM

## 2022-03-07 PROCEDURE — 97112 NEUROMUSCULAR REEDUCATION: CPT | Mod: PO

## 2022-03-07 PROCEDURE — 95992 CANALITH REPOSITIONING PROC: CPT | Mod: PO

## 2022-03-07 NOTE — PROGRESS NOTES
Physical Therapy Daily Treatment Note     Name: Eboni Nicholas  Clinic Number: [id number]    Therapy Diagnosis:   Encounter Diagnoses   Name Primary?    BPPV (benign paroxysmal positional vertigo), right Yes    Balance problem      Physician: Marino Wheeler D*    Visit Date: 3/7/2022    Physician Orders: PT Eval and Treat - vestibular therapy  Medical Diagnosis from Referral: H81.91 (ICD-10-CM) - Peripheral vestibulopathy of right ear  Evaluation Date: 1/4/2022  Authorization Period Expiration: 12/16/22  Plan of Care Expiration: 3/1/22  Plan of Care Certification Period: 1/4/22 - 3/1/22  Updated Plan of Care Certification Period: 2/23/22 - 3/31/22  Last reassessment: 3/3/22  Visit #/Visits authorized: 16/40 (+ initial eval)    Time In: 1005  Time Out: 1050  Total Billable Time: 45 minutes    Precautions: Standard and Fall    Subjective     Pt reports: ENT said she should see neurologist. Did not specify why.  She was compliant with home exercise program.  Response to previous treatment: no adverse effects  Functional change: ongoing    Pain: 0/10  Location: n/a    Objective       Eboni participated in neuromuscular re-education activities to improve: Balance, Coordination, Kinesthetic, Sense, Proprioception and Posture for 35 minutes. The following activities were included:    30 sec NBOS on blue foam EC arms down  30 sec NBOS on blue foam EC arms crossed  30 sec NBOS on blue foam EC arms down head turns; mod sway  4 sec, 24 sec, 30 sec Romberg on blue foam EC arms down    2 x 10 each side double orange cone taps with intermittent touchdown UE support  - CGA-Bj    14 sec, 30 sec modified R SLS with LLE resting on basketball, no UE support  30 sec modified L SLS with RLE resting on basketball, no UE support    60 sec modified R SLS with LLE resting on basketball with intermittent lifting L foot, off 1 UE touchdown support  60 sec modified L SLS with RLE resting on basketball with intermittent lifting  R foot, no UE support    4 laps at // bar:  - walking backwards  - forward walking with EC, BUE hovering over bars  - tandem walking     Eboni participated in canalith reposition procedures activities to improve: Balance, Coordination, Kinesthetic, Sense, Proprioception and Posture for 10 minutes. The following activities were included:    R [place]-Hallpike with pre-load position: positive; rotary upbeating nystagmus, immediate onset, fatigued within 30 sec.  Treated with 1 R Epley CRT  R [place]-Hallpike with pre-load position: negative, no dizziness reported      Home Exercises Provided and Patient Education Provided     Education provided:   - 1/6/22: initial HEP provided with seated hor/vert VORx1 and UE supported SLS    Written Home Exercises Provided: yes.  Exercises were reviewed and Eboni was able to demonstrate them prior to the end of the session.  Eboni demonstrated good  understanding of the education provided.     See EMR under Patient Instructions for exercises provided 1/6/2022.    Assessment     Eboni tolerated today's session well. Pt cont to return to therapy with recurrent R posterior BPPV that is cleared after 1 CRT and subsequently tests negative after maneuver each visit. Pt provided with some ENT providers to call and make an appointment for the purpose of VNG testing to get more information about her vestibular system. We cont to work on interventions that challenge SLS balance and dynamic balance which the pt tolerated well. Pt again given instruction to avoid tilting head forward or back for the next 24 hours in an attempt to maintain BPPV clearance. Pt would benefit from continued PT to improve remaining vestibular deficits.     bEoni Is progressing well towards her goals.   Pt prognosis is Good.     Pt will continue to benefit from skilled outpatient physical therapy to address the deficits listed in the problem list box on initial evaluation, provide pt/family education and to maximize  pt's level of independence in the home and community environment.     Pt's spiritual, cultural and educational needs considered and pt agreeable to plan of care and goals.     Anticipated barriers to physical therapy: none      GOALS:   Short Term Goals: 4 weeks - progressing  1. Pt will be introduced to HEP and report >50% compliance. MET 1/31/22  2. Pt will improve mCTSIB score to 30/30/30/10 to demonstrate improve integration of balance systems. MET 1/31/2  3. Pt will improve FGA score to 22/30 to show improved balance with gait. MET 1/31/22  4. Pt will perform B SLS balance for 6 sec each to improve lower body dressing and decrease risk of falls. MET 3/3/22  5. Pt will be able to perform seated horizontal and vertical VORx1 at >120 bpm for >30 sec to improve gaze stabilization needed for safe ambulation. MET 1/31/22    Long Term Goals: 8 weeks  1. Pt will report >75% compliance with progressed HEP. MET 2/16/2022  2. Pt will improve mCTSIB score to 30/30/30/15 to demonstrate improve integration of balance systems. MET 1/31/22  3. Pt will improve FGA score to 25/30 to show improved balance with gait. ongoing, 24/30 3/3/22  4. Pt will perform B SLS balance for 10 sec each to improve lower body dressing and decrease risk of falls. ongoing  5. Pt will be able to perform seated horizontal and vertical VORx1 at >140 bpm for >45 sec to improve gaze stabilization needed for safe ambulation. MET 1/31/22  6. Pt will test negative for R BPPV in R Hiller-Hallpike position for 2 consecutive visits to demonstrate clearance of R BPPV. ongoing    Plan     Updated Plan of Care Certification Period: 2/23/22 - 3/31/22    Cont to check BPPV. Focus on B SLS balance (static and functional), walking with head turns and diagonal head turns, obstacle clearance, tandem ambulation, and stair navigation.     Salvatore Estrada, PT   03/07/2022

## 2022-03-10 ENCOUNTER — PATIENT MESSAGE (OUTPATIENT)
Dept: OTOLARYNGOLOGY | Facility: CLINIC | Age: 77
End: 2022-03-10
Payer: MEDICARE

## 2022-03-14 ENCOUNTER — CLINICAL SUPPORT (OUTPATIENT)
Dept: REHABILITATION | Facility: HOSPITAL | Age: 77
End: 2022-03-14
Payer: MEDICARE

## 2022-03-14 DIAGNOSIS — R26.89 BALANCE PROBLEM: ICD-10-CM

## 2022-03-14 DIAGNOSIS — H81.11 BPPV (BENIGN PAROXYSMAL POSITIONAL VERTIGO), RIGHT: Primary | ICD-10-CM

## 2022-03-14 PROCEDURE — 97112 NEUROMUSCULAR REEDUCATION: CPT | Mod: PO,CQ

## 2022-03-14 NOTE — PROGRESS NOTES
Physical Therapy Daily Treatment Note     Name: Eboni Nicholas  Clinic Number: [id number]    Therapy Diagnosis:   Encounter Diagnoses   Name Primary?    BPPV (benign paroxysmal positional vertigo), right Yes    Balance problem      Physician: Marino Wheeler D*    Visit Date: 3/14/2022    Physician Orders: PT Eval and Treat - vestibular therapy  Medical Diagnosis from Referral: H81.91 (ICD-10-CM) - Peripheral vestibulopathy of right ear  Evaluation Date: 1/4/2022  Authorization Period Expiration: 12/16/22  Plan of Care Expiration: 3/1/22  Plan of Care Certification Period: 1/4/22 - 3/1/22  Updated Plan of Care Certification Period: 2/23/22 - 3/31/22  Last reassessment: 3/3/22  Visit #/Visits authorized: 16/40 (+ initial eval)    Time In: 1005  Time Out: 1050  Total Billable Time: 45 minutes    Precautions: Standard and Fall    Subjective     Pt reports: ENT said she should see neurologist. Did not specify why.  She was compliant with home exercise program.  Response to previous treatment: no adverse effects  Functional change: ongoing    Pain: 0/10  Location: n/a    Objective       Eboni participated in neuromuscular re-education activities to improve: Balance, Coordination, Kinesthetic, Sense, Proprioception and Posture for 35 minutes. The following activities were included:    Airex foam pad: CGA  30 sec NBOS on blue foam EC arms down  30 sec NBOS on blue foam EC arms crossed  30 sec NBOS on blue foam EC arms down head turns; mod sway  X 60 sec of medial/lateral weight shifting with occ 1 finger support   X 60 sec of anterior/posterior weight shifting with occ 1 finger support     // bar: CGA  2 Airex balance beams in front of each other.  X 6 laps of forward tandem ambulation, 1 UE support  X 6 laps of side stepping, occ 1 UE support  X 6 laps of forward marching with 3 sec hold, occ 1 UE support  X 6 laps of forward ambulation stepping over 5 hurdles with 1 UE support  X 6 laps of side stepping over  5 hurdles with 1 UE support    Hallway:  X 2 x 100ft of forward ambulation with head turns right to left, SBA  2 x 100 ft of forward ambulation with diagonal head turns in each direction, SBA    Eboni participated in canalith reposition procedures activities to improve: Balance, Coordination, Kinesthetic, Sense, Proprioception and Posture for 0 minutes. The following activities were included:      Home Exercises Provided and Patient Education Provided     Education provided:   - 1/6/22: initial HEP provided with seated hor/vert VORx1 and UE supported SLS    Written Home Exercises Provided: yes.  Exercises were reviewed and Eboni was able to demonstrate them prior to the end of the session.  Eboni demonstrated good  understanding of the education provided.     See EMR under Patient Instructions for exercises provided 1/6/2022.    Assessment     Eboni tolerated today's session well and did not have any problems noted.  Maritza reported her head felt less heavy following tx session and did not have any reports of dizziness following any of her exercises today.  Cont with plan of care.     Eboni Is progressing well towards her goals.   Pt prognosis is Good.     Pt will continue to benefit from skilled outpatient physical therapy to address the deficits listed in the problem list box on initial evaluation, provide pt/family education and to maximize pt's level of independence in the home and community environment.     Pt's spiritual, cultural and educational needs considered and pt agreeable to plan of care and goals.     Anticipated barriers to physical therapy: none      GOALS:   Short Term Goals: 4 weeks - progressing  1. Pt will be introduced to HEP and report >50% compliance. MET 1/31/22  2. Pt will improve mCTSIB score to 30/30/30/10 to demonstrate improve integration of balance systems. MET 1/31/2  3. Pt will improve FGA score to 22/30 to show improved balance with gait. MET 1/31/22  4. Pt will perform B SLS  balance for 6 sec each to improve lower body dressing and decrease risk of falls. MET 3/3/22  5. Pt will be able to perform seated horizontal and vertical VORx1 at >120 bpm for >30 sec to improve gaze stabilization needed for safe ambulation. MET 1/31/22    Long Term Goals: 8 weeks  1. Pt will report >75% compliance with progressed HEP. MET 2/16/2022  2. Pt will improve mCTSIB score to 30/30/30/15 to demonstrate improve integration of balance systems. MET 1/31/22  3. Pt will improve FGA score to 25/30 to show improved balance with gait. ongoing, 24/30 3/3/22  4. Pt will perform B SLS balance for 10 sec each to improve lower body dressing and decrease risk of falls. ongoing  5. Pt will be able to perform seated horizontal and vertical VORx1 at >140 bpm for >45 sec to improve gaze stabilization needed for safe ambulation. MET 1/31/22  6. Pt will test negative for R BPPV in R Filley-Hallpike position for 2 consecutive visits to demonstrate clearance of R BPPV. ongoing    Plan         Cont to check BPPV. Focus on B SLS balance (static and functional), walking with head turns and diagonal head turns, obstacle clearance, tandem ambulation, and stair navigation.     Laura Adame, PTA   03/14/2022

## 2022-03-16 ENCOUNTER — CLINICAL SUPPORT (OUTPATIENT)
Dept: REHABILITATION | Facility: HOSPITAL | Age: 77
End: 2022-03-16
Payer: MEDICARE

## 2022-03-16 ENCOUNTER — PATIENT MESSAGE (OUTPATIENT)
Dept: ADMINISTRATIVE | Facility: HOSPITAL | Age: 77
End: 2022-03-16
Payer: MEDICARE

## 2022-03-16 DIAGNOSIS — H81.11 BPPV (BENIGN PAROXYSMAL POSITIONAL VERTIGO), RIGHT: Primary | ICD-10-CM

## 2022-03-16 DIAGNOSIS — R26.89 BALANCE PROBLEM: ICD-10-CM

## 2022-03-16 PROCEDURE — 97112 NEUROMUSCULAR REEDUCATION: CPT | Mod: PO
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
## 2022-03-23 ENCOUNTER — CLINICAL SUPPORT (OUTPATIENT)
Dept: REHABILITATION | Facility: HOSPITAL | Age: 77
End: 2022-03-23
Payer: MEDICARE

## 2022-03-23 DIAGNOSIS — R26.89 BALANCE PROBLEM: ICD-10-CM

## 2022-03-23 DIAGNOSIS — H81.11 BPPV (BENIGN PAROXYSMAL POSITIONAL VERTIGO), RIGHT: Primary | ICD-10-CM

## 2022-03-23 PROCEDURE — 97112 NEUROMUSCULAR REEDUCATION: CPT | Mod: PO

## 2022-03-23 NOTE — PROGRESS NOTES
Physical Therapy Daily Treatment Note     Name: Eboni Nicholas  Clinic Number: [id number]    Therapy Diagnosis:   Encounter Diagnoses   Name Primary?    BPPV (benign paroxysmal positional vertigo), right Yes    Balance problem      Physician: Marino Wheeler D*    Visit Date: 3/23/2022    Physician Orders: PT Eval and Treat - vestibular therapy  Medical Diagnosis from Referral: H81.91 (ICD-10-CM) - Peripheral vestibulopathy of right ear  Evaluation Date: 1/4/2022  Authorization Period Expiration: 12/16/22  Plan of Care Expiration: 3/1/22  Plan of Care Certification Period: 1/4/22 - 3/1/22  Updated Plan of Care Certification Period: 2/23/22 - 3/31/22  Last reassessment: 3/3/22  Visit #/Visits authorized: 19/40 (+ initial eval)    Time In: 1045  Time Out: 1130  Total Billable Time: 45 minutes    Precautions: Standard and Fall    Subjective     Pt reports: no changes  She was compliant with home exercise program.  Response to previous treatment: no adverse effects  Functional change: ongoing    Pain: 0/10  Location: n/a    Objective     Eboni participated in neuromuscular re-education activities to improve: Balance, Coordination, Kinesthetic, Sense, Proprioception and Posture for 45 minutes. The following activities were included:    20 sec trial (dizziness reported), 60 sec (no dizziness reported) seated horizontal VORx1 at 130 bpm   60 sec seated horizontal VORx1 at 140 bpm;  No dizziness reported    60 sec standing wide YANDY horizontal VORx1 at 140 bpm; no sway or dizziness  30 sec standing hip width YANDY horizontal VORx1 at 140 bpm; no sway or dizziness  60 sec standing Romberg horizontal VORx1 at 140 bpm; mild sway, no dizziness  2 min standing Romberg horizontal VORx1 at 160 bpm; mild sway, no dizziness    4 x 100 feet forward ambulation with head turns every 2-3 steps (verbally cued); min-mod veering  4 x 100 feet forward ambulation with head pitches every 2-3 steps (verbally cued); min-mod  veering    4 laps at side of raised mat  - tandem walking, intermittent 1 UE touchdown support    5 min total working on each side SLS balance with UE support available on stance leg side as needed    Eboni participated in canalith reposition procedures activities to improve: Balance, Coordination, Kinesthetic, Sense, Proprioception and Posture for 0 minutes. The following activities were included:       Home Exercises Provided and Patient Education Provided     Education provided:   - 1/6/22: initial HEP provided with seated hor/vert VORx1 and UE supported SLS  - 3/23/22: walking with head turns and pitches    Written Home Exercises Provided: yes.  Exercises were reviewed and Eboni was able to demonstrate them prior to the end of the session.  Eboni demonstrated good  understanding of the education provided.     See EMR under Patient Instructions for exercises provided 1/6/2022.    Assessment     Eboni tolerated today's session well. We focused on returning to progressing VORx1 for R vestibular hypofunction as we are holding off on addressing BPPV until after pt's visit with ENT. Pt tolerated well with initial dizziness in seated position after one trial but able to quickly progress speed and to perform standing in Romberg position. Pt provided with walking head turns and pitches as well as instructions to perform horizontal VORx1 standing with feet together as quickly as she can while maintaining focus on her target, up to 2 minutes, but stopping if symptoms increase. Pt verbalized understanding and demonstrated good performance. Cont with POC.    Eboni Is progressing well towards her goals.   Pt prognosis is Good.     Pt will continue to benefit from skilled outpatient physical therapy to address the deficits listed in the problem list box on initial evaluation, provide pt/family education and to maximize pt's level of independence in the home and community environment.     Pt's spiritual, cultural and  educational needs considered and pt agreeable to plan of care and goals.     Anticipated barriers to physical therapy: none      GOALS:   Short Term Goals: 4 weeks - progressing  1. Pt will be introduced to HEP and report >50% compliance. MET 1/31/22  2. Pt will improve mCTSIB score to 30/30/30/10 to demonstrate improve integration of balance systems. MET 1/31/2  3. Pt will improve FGA score to 22/30 to show improved balance with gait. MET 1/31/22  4. Pt will perform B SLS balance for 6 sec each to improve lower body dressing and decrease risk of falls. MET 3/3/22  5. Pt will be able to perform seated horizontal and vertical VORx1 at >120 bpm for >30 sec to improve gaze stabilization needed for safe ambulation. MET 1/31/22    Long Term Goals: 8 weeks  1. Pt will report >75% compliance with progressed HEP. MET 2/16/2022  2. Pt will improve mCTSIB score to 30/30/30/15 to demonstrate improve integration of balance systems. MET 1/31/22  3. Pt will improve FGA score to 25/30 to show improved balance with gait. ongoing, 24/30 3/3/22  4. Pt will perform B SLS balance for 10 sec each to improve lower body dressing and decrease risk of falls. ongoing  5. Pt will be able to perform seated horizontal and vertical VORx1 at >140 bpm for >45 sec to improve gaze stabilization needed for safe ambulation. MET 1/31/22  6. Pt will test negative for R BPPV in R Kimberly-Hallpike position for 2 consecutive visits to demonstrate clearance of R BPPV. ongoing    Plan      Cont with POC. Adjusting POC for 1x per week sessions.    Salvatore Estrada, PT   03/23/2022

## 2022-03-24 ENCOUNTER — PATIENT MESSAGE (OUTPATIENT)
Dept: INTERNAL MEDICINE | Facility: CLINIC | Age: 77
End: 2022-03-24
Payer: MEDICARE

## 2022-03-25 ENCOUNTER — TELEPHONE (OUTPATIENT)
Dept: INTERNAL MEDICINE | Facility: CLINIC | Age: 77
End: 2022-03-25
Payer: MEDICARE

## 2022-03-25 DIAGNOSIS — E78.5 DYSLIPIDEMIA: Primary | ICD-10-CM

## 2022-03-30 ENCOUNTER — CLINICAL SUPPORT (OUTPATIENT)
Dept: REHABILITATION | Facility: HOSPITAL | Age: 77
End: 2022-03-30
Payer: MEDICARE

## 2022-03-30 DIAGNOSIS — R26.89 BALANCE PROBLEM: ICD-10-CM

## 2022-03-30 DIAGNOSIS — H81.11 BPPV (BENIGN PAROXYSMAL POSITIONAL VERTIGO), RIGHT: Primary | ICD-10-CM

## 2022-03-30 PROCEDURE — 97112 NEUROMUSCULAR REEDUCATION: CPT | Mod: PO

## 2022-03-30 NOTE — PROGRESS NOTES
Physical Therapy Daily Treatment Note     Name: Eboni Nicholas  Clinic Number: [id number]    Therapy Diagnosis:   Encounter Diagnoses   Name Primary?    BPPV (benign paroxysmal positional vertigo), right Yes    Balance problem      Physician: Marino Wheeler D*    Visit Date: 3/30/2022    Physician Orders: PT Eval and Treat - vestibular therapy  Medical Diagnosis from Referral: H81.91 (ICD-10-CM) - Peripheral vestibulopathy of right ear  Evaluation Date: 1/4/2022  Authorization Period Expiration: 12/16/22  Plan of Care Expiration: 3/1/22  Plan of Care Certification Period: 1/4/22 - 3/1/22  Updated Plan of Care Certification Period: 2/23/22 - 3/31/22  Updated Plan of Care Certification Period: 3/30/22 - 4/30/22  Last reassessment: 3/30/22  Visit #/Visits authorized: 20/40 (+ initial eval)    Time In: 1050  Time Out: 1130  Total Billable Time: 40 minutes    Precautions: Standard and Fall    Subjective     Pt reports: just fullness in her head   She was compliant with home exercise program.  Response to previous treatment: no adverse effects  Functional change: ongoing    Pain: 0/10  Location: n/a    Objective     Eboni participated in neuromuscular re-education activities to improve: Balance, Coordination, Kinesthetic, Sense, Proprioception and Posture for 40 minutes. The following activities were included:      MCTSIB: feet together and arms down   Evaluation (01/04/2022) 1/26/22 3/3/22 3/30/22   EO on firm surface 30 sec 30 sec 30 sec 30 sec   EC on firm surface 25 sec, min sway 30 sec, no sway 30 sec 30 sec   EO on compliant surface 30 sec, min sway 30 sec, min sway 30 sec, min sway 30 sec   EC on compliant surface 5 sec, LOB to R 17 sec 22 sec 30 sec with arms down;  30 sec with arms crossed       Static standing balance: arms down   Evaluation (01/04/2022) 1/26/22 3/3/22 3/30/22   SLS R LE 3 sec  (<10 sec = HIGH FALL RISK) 2 sec 6 sec 4 sec   SLS L LE 4 sec  (<10 sec = HIGH FALL RISK) 2 sec 7 sec 8  sec   Tandem R LE front 19 sec 30 sec NT NT   Tandem L LE front 27 sec 30 sec NT NT       Functional Gait Assessment EVAL:  Score 19/30     Functional Gait Assessment 1/26/22 - 23/30  1. Gait on level surface (6m) =  (2) Mild impairment: 7-5.6 sec, uses A.D., mild gait deviations, or deviates 6-10 in  2. Change in Gait Speed =  (3) Normal: smooth change w/o loss of balance or gait deviation, deviates < 6 in, significant difference between speeds  3. Gait with horizontal head turns  = (2) Mild impairment: slight change in speed, deviates 6-10 in, OR uses A.D.  4. Gait with vertical head turns = (3) Normal: no change in gait, deviates <6 in  5. Gait with pivot turns = (3) Normal: performs safely in 3 sec, no LOB  6. Step over obstacle = (2) Mild impairment: able to step over 1 box w/o change in speed or LOB  7. Gait with Narrow YANDY = (2) Mild impairment: 7-9 steps  8. Gait with eyes closed = (2) Mild impairment: 7.1-9 sec, mild gait deviations, deviates 6-10 in  9. Ambulating Backwards = (3) Normal: no A.D., no LOB, normal gait pattern, deviates <6in  10. Steps = (1) Moderate impairment: step-to, uses rail    Functional Gait Assessment:    1. Gait on level surface (6m) =  (2) Mild impairment: 7-5.6 sec, uses A.D., mild gait deviations, or deviates 6-10 in  2. Change in Gait Speed =  (3) Normal: smooth change w/o loss of balance or gait deviation, deviates < 6 in, significant difference between speeds  3. Gait with horizontal head turns  = (2) Mild impairment: slight change in speed, deviates 6-10 in, OR uses A.D.  4. Gait with vertical head turns = (3) Normal: no change in gait, deviates <6 in  5. Gait with pivot turns = (3) Normal: performs safely in 3 sec, no LOB  6. Step over obstacle = (3) Normal: steps over 2 stacked boxes w/o change in speed or LOB  7. Gait with Narrow YANDY = (2) Mild impairment: 7-9 steps  8. Gait with eyes closed = (2) Mild impairment: 7.1-9 sec, mild gait deviations, deviates 6-10 in  9.  Ambulating Backwards = (2) Mild impairment: uses A.D., slower speed, mild gait deviations, deviates 6-10 in  10. Steps = (2) Mild Impairment: alternating feet, uses rail    Score 24/30     Score:   <22/30 fall risk   <20/30 fall risk in older adults   <18/30 fall risk in Parkinsons     Functional Gait Assessment:    1. Gait on level surface (6m) =  (2) Mild impairment: 7-5.6 sec, uses A.D., mild gait deviations, or deviates 6-10 in  2. Change in Gait Speed =  (3) Normal: smooth change w/o loss of balance or gait deviation, deviates < 6 in, significant difference between speeds  3. Gait with horizontal head turns  = (2) Mild impairment: slight change in speed, deviates 6-10 in, OR uses A.D.  4. Gait with vertical head turns = (3) Normal: no change in gait, deviates <6 in  5. Gait with pivot turns = (3) Normal: performs safely in 3 sec, no LOB  6. Step over obstacle = (3) Normal: steps over 2 stacked boxes w/o change in speed or LOB  7. Gait with Narrow YANDY = (1) Moderate impairment: 4-7 steps  8. Gait with eyes closed = (2) Mild impairment: 7.1-9 sec, mild gait deviations, deviates 6-10 in  9. Ambulating Backwards = (2) Mild impairment: uses A.D., slower speed, mild gait deviations, deviates 6-10 in  10. Steps = (2) Mild Impairment: alternating feet, uses rail    Score 23/30     60 sec standing wide YANDY horizontal VORx1 at 150 bpm;  No dizziness or loss of fixation  30 sec standing wide YANDY horizontal VORx1 at 160 bpm;  No dizziness or loss of fixation  60 sec standing wide YANDY horizontal VORx1 at 170 bpm;  No dizziness or loss of fixation  60 sec standing wide YANDY horizontal VORx1 at 180 bpm;  No dizziness or loss of fixation    5 min total working on each side SLS balance with UE support available on stance leg side as needed    1 x 10 each side non-alternating step up to foam fitter with opposite leg drive  - CGA with occasional Bj Lyn participated in canalith reposition procedures activities to improve:  Balance, Coordination, Kinesthetic, Sense, Proprioception and Posture for 0 minutes. The following activities were included:       Home Exercises Provided and Patient Education Provided     Education provided:   - 1/6/22: initial HEP provided with seated hor/vert VORx1 and UE supported SLS  - 3/23/22: walking with head turns and pitches  - 3/30/22: provided instructions to perform standing VORx1 at 180 bpm and progress to 200 bpm as tolerated (2 min)    Written Home Exercises Provided: yes.  Exercises were reviewed and Eboni was able to demonstrate them prior to the end of the session.  Eboni demonstrated good  understanding of the education provided.     See EMR under Patient Instructions for exercises provided 1/6/2022.    Assessment     Eboni tolerated today's reassessment fairly well. Pt fully passed the MCTSIB but still demonstrates difficulty with lateral weight shifting and SLS balance. She also demonstrated 1 point decline on the FGA and still has some difficulty with dynamic balance, possibly from continual UVH of her R vestibular system. For this reason, we progressed standing VORx1 to 180 bpm and PT provided this for HEP with instruction to progress to 200 bpm as tolerated. Pt will be seeing the ENT as pt has recurrent BPPV of the R inner ear that is easily cleared but continues to return. POC extended for one more month.     Eboni Is progressing well towards her goals.   Pt prognosis is Good.     Pt will continue to benefit from skilled outpatient physical therapy to address the deficits listed in the problem list box on initial evaluation, provide pt/family education and to maximize pt's level of independence in the home and community environment.     Pt's spiritual, cultural and educational needs considered and pt agreeable to plan of care and goals.     Anticipated barriers to physical therapy: none      GOALS:   Short Term Goals: 4 weeks - progressing  1. Pt will be introduced to HEP and report  >50% compliance. MET 1/31/22  2. Pt will improve mCTSIB score to 30/30/30/10 to demonstrate improve integration of balance systems. MET 1/31/2  3. Pt will improve FGA score to 22/30 to show improved balance with gait. MET 1/31/22  4. Pt will perform B SLS balance for 6 sec each to improve lower body dressing and decrease risk of falls. MET 3/3/22  5. Pt will be able to perform seated horizontal and vertical VORx1 at >120 bpm for >30 sec to improve gaze stabilization needed for safe ambulation. MET 1/31/22    Long Term Goals: 8 weeks  1. Pt will report >75% compliance with progressed HEP. MET 2/16/2022  2. Pt will improve mCTSIB score to 30/30/30/15 to demonstrate improve integration of balance systems. MET 1/31/22  3. Pt will improve FGA score to 25/30 to show improved balance with gait. ongoing, 24/30 3/3/22; 23/30 3/30/22  4. Pt will perform B SLS balance for 10 sec each to improve lower body dressing and decrease risk of falls. ongoing  5. Pt will be able to perform seated horizontal and vertical VORx1 at >140 bpm for >45 sec to improve gaze stabilization needed for safe ambulation. MET 1/31/22  6. Pt will test negative for R BPPV in R [place]-Hallpike position for 2 consecutive visits to demonstrate clearance of R BPPV. ongoing    Plan      Updated Plan of Care Certification Period: 3/30/22 - 4/30/22    POC extended one more month.    Salvatore Estrada, PT   03/30/2022

## 2022-04-04 ENCOUNTER — OFFICE VISIT (OUTPATIENT)
Dept: OTOLARYNGOLOGY | Facility: CLINIC | Age: 77
End: 2022-04-04
Payer: MEDICARE

## 2022-04-04 VITALS
WEIGHT: 231.38 LBS | TEMPERATURE: 97 F | SYSTOLIC BLOOD PRESSURE: 164 MMHG | DIASTOLIC BLOOD PRESSURE: 67 MMHG | BODY MASS INDEX: 40.99 KG/M2 | HEART RATE: 77 BPM

## 2022-04-04 DIAGNOSIS — G47.33 OSA ON CPAP: ICD-10-CM

## 2022-04-04 DIAGNOSIS — H69.92 DYSFUNCTION OF LEFT EUSTACHIAN TUBE: ICD-10-CM

## 2022-04-04 DIAGNOSIS — D86.89 SARCOID OF NOSE: ICD-10-CM

## 2022-04-04 DIAGNOSIS — R51.9 NONINTRACTABLE EPISODIC HEADACHE, UNSPECIFIED HEADACHE TYPE: ICD-10-CM

## 2022-04-04 DIAGNOSIS — J30.89 NON-SEASONAL ALLERGIC RHINITIS, UNSPECIFIED TRIGGER: Primary | ICD-10-CM

## 2022-04-04 DIAGNOSIS — H81.391 PERIPHERAL VERTIGO INVOLVING RIGHT EAR: ICD-10-CM

## 2022-04-04 DIAGNOSIS — H90.A21 SENSORINEURAL HEARING LOSS (SNHL) OF RIGHT EAR WITH RESTRICTED HEARING OF LEFT EAR: ICD-10-CM

## 2022-04-04 PROCEDURE — 3288F PR FALLS RISK ASSESSMENT DOCUMENTED: ICD-10-PCS | Mod: CPTII,S$GLB,, | Performed by: SPECIALIST

## 2022-04-04 PROCEDURE — 1101F PT FALLS ASSESS-DOCD LE1/YR: CPT | Mod: CPTII,S$GLB,, | Performed by: SPECIALIST

## 2022-04-04 PROCEDURE — 99999 PR PBB SHADOW E&M-EST. PATIENT-LVL III: ICD-10-PCS | Mod: PBBFAC,,, | Performed by: SPECIALIST

## 2022-04-04 PROCEDURE — 3077F PR MOST RECENT SYSTOLIC BLOOD PRESSURE >= 140 MM HG: ICD-10-PCS | Mod: CPTII,S$GLB,, | Performed by: SPECIALIST

## 2022-04-04 PROCEDURE — 3008F PR BODY MASS INDEX (BMI) DOCUMENTED: ICD-10-PCS | Mod: CPTII,S$GLB,, | Performed by: SPECIALIST

## 2022-04-04 PROCEDURE — 3078F PR MOST RECENT DIASTOLIC BLOOD PRESSURE < 80 MM HG: ICD-10-PCS | Mod: CPTII,S$GLB,, | Performed by: SPECIALIST

## 2022-04-04 PROCEDURE — 3077F SYST BP >= 140 MM HG: CPT | Mod: CPTII,S$GLB,, | Performed by: SPECIALIST

## 2022-04-04 PROCEDURE — 1126F PR PAIN SEVERITY QUANTIFIED, NO PAIN PRESENT: ICD-10-PCS | Mod: CPTII,S$GLB,, | Performed by: SPECIALIST

## 2022-04-04 PROCEDURE — 3008F BODY MASS INDEX DOCD: CPT | Mod: CPTII,S$GLB,, | Performed by: SPECIALIST

## 2022-04-04 PROCEDURE — 1126F AMNT PAIN NOTED NONE PRSNT: CPT | Mod: CPTII,S$GLB,, | Performed by: SPECIALIST

## 2022-04-04 PROCEDURE — 3288F FALL RISK ASSESSMENT DOCD: CPT | Mod: CPTII,S$GLB,, | Performed by: SPECIALIST

## 2022-04-04 PROCEDURE — 1101F PR PT FALLS ASSESS DOC 0-1 FALLS W/OUT INJ PAST YR: ICD-10-PCS | Mod: CPTII,S$GLB,, | Performed by: SPECIALIST

## 2022-04-04 PROCEDURE — 99214 OFFICE O/P EST MOD 30 MIN: CPT | Mod: S$GLB,,, | Performed by: SPECIALIST

## 2022-04-04 PROCEDURE — 99999 PR PBB SHADOW E&M-EST. PATIENT-LVL III: CPT | Mod: PBBFAC,,, | Performed by: SPECIALIST

## 2022-04-04 PROCEDURE — 3078F DIAST BP <80 MM HG: CPT | Mod: CPTII,S$GLB,, | Performed by: SPECIALIST

## 2022-04-04 PROCEDURE — 99214 PR OFFICE/OUTPT VISIT, EST, LEVL IV, 30-39 MIN: ICD-10-PCS | Mod: S$GLB,,, | Performed by: SPECIALIST

## 2022-04-04 RX ORDER — LEVOCETIRIZINE DIHYDROCHLORIDE 5 MG/1
5 TABLET, FILM COATED ORAL NIGHTLY
Qty: 90 TABLET | Refills: 3 | Status: SHIPPED | OUTPATIENT
Start: 2022-04-04 | End: 2022-06-07 | Stop reason: SDUPTHER

## 2022-04-04 RX ORDER — MONTELUKAST SODIUM 10 MG/1
TABLET ORAL
Qty: 90 TABLET | Refills: 3 | Status: SHIPPED | OUTPATIENT
Start: 2022-04-04 | End: 2022-06-07 | Stop reason: SDUPTHER

## 2022-04-04 NOTE — PROGRESS NOTES
Subjective:       Patient ID: Eboni Nicholas is a 76 y.o. female.    Chief Complaint: Ear Fullness (With fullness in the head and ears) and Dizziness    The patient is coming in for evaluation for fullness in the left ear and chronic pressure and pain in the occipital area on the left that has been ongoing for the last 4 months.  She has been using levo cetirizine which has not been very helpful.  She also has a vestibulopathy in the right ear with a 62% reduced response.  She has been seeing a physical therapist for that who was noted some problems which she felt were related to positional vertigo.  She has glaucoma and is on drops for that.            Review of Systems     Constitutional: Positive for fatigue.  Negative for appetite change, chills, fever and unexpected weight loss.      HENT: Positive for ear pain, postnasal drip, runny nose and sinus pressure.  Negative for ear discharge, ear infection, facial swelling, hearing loss, mouth sores, nosebleeds, ringing in the ears, sinus infection, sore throat, stuffy nose, tonsil infection, dental problems, trouble swallowing and voice change.      Eyes:  Negative for change in eyesight, eye drainage, eye itching and photophobia.     Respiratory:  Positive for cough and sleep apnea. Negative for shortness of breath, snoring and wheezing.      Cardiovascular:  Negative for chest pain, foot swelling, irregular heartbeat and swollen veins.     Gastrointestinal:  Negative for abdominal pain, acid reflux, constipation, diarrhea, heartburn and vomiting.     Genitourinary: Negative for difficulty urinating, sexual problems and frequent urination.     Musc: Positive for aching muscles. Negative for aching joints, back pain and neck pain.     Skin: Negative for rash.     Allergy: Negative for food allergies and seasonal allergies.     Endocrine: Negative for cold intolerance and heat intolerance.      Neurological: Positive for dizziness, headaches and light-headedness.  Negative for seizures and tremors.      Hematologic: Negative for bruises/bleeds easily and swollen glands.      Psychiatric: Negative for decreased concentration, depression, nervous/anxious and sleep disturbance.                Objective:      Physical Exam  Vitals and nursing note reviewed.   Constitutional:       General: She is awake.      Appearance: Normal appearance. She is well-developed and well-groomed. She is obese.   HENT:      Head: Normocephalic.      Jaw: There is normal jaw occlusion.      Salivary Glands: Right salivary gland is not diffusely enlarged. Left salivary gland is not diffusely enlarged.      Right Ear: Hearing, ear canal and external ear normal. Tympanic membrane is retracted. Tympanic membrane has decreased mobility ( decreased mobility on pneumatic no otoscopy).      Left Ear: Hearing, ear canal and external ear normal. Tympanic membrane is retracted. Tympanic membrane has decreased mobility (Non mobile on pneumatic otoscopy).      Nose: Septal deviation ( to the right), mucosal edema (cyanotic, boggy inferior turbinates bilaterally) and rhinorrhea (clear mucus bilaterally) present. No nasal deformity. Rhinorrhea is clear.      Right Turbinates: Enlarged and pale.      Left Turbinates: Enlarged and pale.      Mouth/Throat:      Lips: No lesions.      Mouth: Mucous membranes are moist. No oral lesions.      Dentition: No gum lesions.      Tongue: Lesions ( enlargement base of tongue with narrowing of oropharyngeal inlet) present.      Palate: Lesions ( long/thin soft palate) present. No mass.      Pharynx: Oropharynx is clear. Uvula midline.      Tonsils: No tonsillar exudate. 2+ on the right. 2+ on the left.      Comments: Oral cavity-tonsils 2+/4+ enlarged, long thin soft palate, enlarged base of tongue with narrowing of the oropharyngeal inlet  Eyes:      General: Lids are normal.         Right eye: No discharge.         Left eye: No discharge.      Conjunctiva/sclera:      Right  eye: Right conjunctiva is injected. No exudate.     Left eye: Left conjunctiva is injected. No exudate.     Pupils: Pupils are equal, round, and reactive to light.   Neck:      Thyroid: No thyroid mass or thyromegaly.      Trachea: Trachea normal. No tracheal deviation.   Cardiovascular:      Rate and Rhythm: Normal rate and regular rhythm.      Pulses: Normal pulses.      Heart sounds: Normal heart sounds.   Pulmonary:      Effort: Pulmonary effort is normal.      Breath sounds: Normal breath sounds. No stridor. No decreased breath sounds, wheezing, rhonchi or rales.   Abdominal:      General: Bowel sounds are normal.      Palpations: Abdomen is soft.      Tenderness: There is no abdominal tenderness.   Musculoskeletal:         General: Normal range of motion.      Cervical back: Normal range of motion. No muscular tenderness.   Lymphadenopathy:      Head:      Right side of head: No submental, submandibular, preauricular, posterior auricular or occipital adenopathy.      Left side of head: No submental, submandibular, preauricular, posterior auricular or occipital adenopathy.      Cervical: No cervical adenopathy.   Skin:     General: Skin is warm and dry.      Findings: No petechiae or rash.      Nails: There is no clubbing.   Neurological:      Mental Status: She is alert and oriented to person, place, and time.      Cranial Nerves: No cranial nerve deficit.      Sensory: No sensory deficit.      Gait: Gait normal.   Psychiatric:         Speech: Speech normal.         Behavior: Behavior normal. Behavior is cooperative.         Thought Content: Thought content normal.         Judgment: Judgment normal.         Assessment:       1. Non-seasonal allergic rhinitis, unspecified trigger    2. Dysfunction of left eustachian tube    3. Nonintractable episodic headache, unspecified headache type    4. Peripheral vertigo involving right ear    5. Sarcoid of nose    6. CHIDI on CPAP    7. Sensorineural hearing loss (SNHL) of  right ear with restricted hearing of left ear        Plan:       In a secure chat message with the patient's optometrist , Dr. Osman Campbell, he felt short-term use of a nasal steroid would be safe.  He would like to check the patient's eye pressures prior to starting a nasal steroid and after she has started again.  We will notify her of that.  I am placing her on Xyzal in the morning and a might nighttime dose of montelukast.  In 2 weeks, if she continues to have issues I will start her on budesonide nasal irrigations.  I will contact her physical therapist to better define the issues of concern.  I will recheck the patient in 4 weeks.

## 2022-04-05 NOTE — PROGRESS NOTES
Physical Therapy Daily Treatment Note     Name: Eboni Nicholas  Clinic Number: [id number]    Therapy Diagnosis:   Encounter Diagnoses   Name Primary?    BPPV (benign paroxysmal positional vertigo), right Yes    Balance problem      Physician: Marino Wheeler D*    Visit Date: 4/6/2022    Physician Orders: PT Eval and Treat - vestibular therapy  Medical Diagnosis from Referral: H81.91 (ICD-10-CM) - Peripheral vestibulopathy of right ear  Evaluation Date: 1/4/2022  Authorization Period Expiration: 12/16/22  Plan of Care Expiration: 3/1/22  Plan of Care Certification Period: 1/4/22 - 3/1/22  Updated Plan of Care Certification Period: 2/23/22 - 3/31/22  Updated Plan of Care Certification Period: 3/30/22 - 4/30/22  Last reassessment: 3/30/22  Visit #/Visits authorized: 21/40 (+ initial eval)    Time In: 1043  Time Out: 1123  Total Billable Time: 40 minutes    Precautions: Standard and Fall    Subjective     Pt reports: her visit with Dr. Robles went well.   She was compliant with home exercise program.  Response to previous treatment: no adverse effects  Functional change: ongoing    Pain: 0/10  Location: n/a    Objective     Eboni participated in neuromuscular re-education activities to improve: Balance, Coordination, Kinesthetic, Sense, Proprioception and Posture for 40 minutes. The following activities were included:    R [place]-Hallpike: negative    4 x 100 feet walking with head turns every 3 steps (last round every 2 steps)  6 laps at ballet bar tandem walking with occasional 1 UE touchdown support  4 x 30 feet forward walking with eyes closed  4 x 30 feet walking backwards    2 min standing hip width YANDY horizontal VORx1 at 180 bpm;  No dizziness or loss of fixation  2 min standing near [place] YANDY horizontal VORx1 at 190 bpm;  No dizziness or loss of fixation    30 sec each side modified SLS with opposite LE resting on soccer ball laterally  - mod sway on L SLS    1 x 10 each side non-alternating step up  to foam fitter with opposite LE orange cone tap  - CGA with occasional Bj    Eboni participated in canalith reposition procedures activities to improve: Balance, Coordination, Kinesthetic, Sense, Proprioception and Posture for 0 minutes. The following activities were included:         Home Exercises Provided and Patient Education Provided     Education provided:   - 1/6/22: initial HEP provided with seated hor/vert VORx1 and UE supported SLS  - 3/23/22: walking with head turns and pitches  - 3/30/22: provided instructions to perform standing VORx1 at 180 bpm and progress to 200 bpm as tolerated (2 min)    Written Home Exercises Provided: yes.  Exercises were reviewed and Eboni was able to demonstrate them prior to the end of the session.  Eboni demonstrated good  understanding of the education provided.     See EMR under Patient Instructions for exercises provided 1/6/2022.    Assessment     Eboni tolerated today's session well. Pt tested negative for R posterior BPPV today upon initial testing and she reported she has not had vertigo for a while. The remainder of our session we focused on dynamic balance and SLS which she demonstrated some improvements with. Pending negative BPPV testing with next two sessions and satisfactory achievement of goals set at initial eval, pt may be appropriate for discharge at the end of her POC. Pt and PT in agreement with this decision. Continue with POC.    Eboni Is progressing well towards her goals.   Pt prognosis is Good.     Pt will continue to benefit from skilled outpatient physical therapy to address the deficits listed in the problem list box on initial evaluation, provide pt/family education and to maximize pt's level of independence in the home and community environment.     Pt's spiritual, cultural and educational needs considered and pt agreeable to plan of care and goals.     Anticipated barriers to physical therapy: none      GOALS:   Short Term Goals: 4 weeks -  progressing  1. Pt will be introduced to HEP and report >50% compliance. MET 1/31/22  2. Pt will improve mCTSIB score to 30/30/30/10 to demonstrate improve integration of balance systems. MET 1/31/2  3. Pt will improve FGA score to 22/30 to show improved balance with gait. MET 1/31/22  4. Pt will perform B SLS balance for 6 sec each to improve lower body dressing and decrease risk of falls. MET 3/3/22  5. Pt will be able to perform seated horizontal and vertical VORx1 at >120 bpm for >30 sec to improve gaze stabilization needed for safe ambulation. MET 1/31/22    Long Term Goals: 8 weeks  1. Pt will report >75% compliance with progressed HEP. MET 2/16/2022  2. Pt will improve mCTSIB score to 30/30/30/15 to demonstrate improve integration of balance systems. MET 1/31/22  3. Pt will improve FGA score to 25/30 to show improved balance with gait. ongoing, 24/30 3/3/22; 23/30 3/30/22  4. Pt will perform B SLS balance for 10 sec each to improve lower body dressing and decrease risk of falls. ongoing  5. Pt will be able to perform seated horizontal and vertical VORx1 at >140 bpm for >45 sec to improve gaze stabilization needed for safe ambulation. MET 1/31/22  6. Pt will test negative for R BPPV in R [place]-Hallpike position for 2 consecutive visits to demonstrate clearance of R BPPV. Partially met 4/6/22;     Plan     Continue with POC.    Salvatore Estrada, PT   04/06/2022

## 2022-04-06 ENCOUNTER — CLINICAL SUPPORT (OUTPATIENT)
Dept: REHABILITATION | Facility: HOSPITAL | Age: 77
End: 2022-04-06
Payer: MEDICARE

## 2022-04-06 DIAGNOSIS — H81.11 BPPV (BENIGN PAROXYSMAL POSITIONAL VERTIGO), RIGHT: Primary | ICD-10-CM

## 2022-04-06 DIAGNOSIS — R26.89 BALANCE PROBLEM: ICD-10-CM

## 2022-04-06 PROCEDURE — 97112 NEUROMUSCULAR REEDUCATION: CPT | Mod: PO

## 2022-04-10 ENCOUNTER — PATIENT MESSAGE (OUTPATIENT)
Dept: INTERNAL MEDICINE | Facility: CLINIC | Age: 77
End: 2022-04-10
Payer: MEDICARE

## 2022-04-14 ENCOUNTER — PATIENT MESSAGE (OUTPATIENT)
Dept: OTOLARYNGOLOGY | Facility: CLINIC | Age: 77
End: 2022-04-14
Payer: MEDICARE

## 2022-04-14 ENCOUNTER — PES CALL (OUTPATIENT)
Dept: ADMINISTRATIVE | Facility: CLINIC | Age: 77
End: 2022-04-14
Payer: MEDICARE

## 2022-04-20 ENCOUNTER — CLINICAL SUPPORT (OUTPATIENT)
Dept: REHABILITATION | Facility: HOSPITAL | Age: 77
End: 2022-04-20
Payer: MEDICARE

## 2022-04-20 DIAGNOSIS — H81.11 BPPV (BENIGN PAROXYSMAL POSITIONAL VERTIGO), RIGHT: Primary | ICD-10-CM

## 2022-04-20 DIAGNOSIS — R26.89 BALANCE PROBLEM: ICD-10-CM

## 2022-04-20 PROCEDURE — 95992 CANALITH REPOSITIONING PROC: CPT | Mod: PO

## 2022-04-20 PROCEDURE — 97112 NEUROMUSCULAR REEDUCATION: CPT | Mod: PO

## 2022-04-20 NOTE — PROGRESS NOTES
Physical Therapy Daily Treatment Note     Name: Eboni Nicholas  Clinic Number: [id number]    Therapy Diagnosis:   Encounter Diagnoses   Name Primary?    BPPV (benign paroxysmal positional vertigo), right Yes    Balance problem      Physician: Marino Wheeler D*    Visit Date: 4/20/2022    Physician Orders: PT Eval and Treat - vestibular therapy  Medical Diagnosis from Referral: H81.91 (ICD-10-CM) - Peripheral vestibulopathy of right ear  Evaluation Date: 1/4/2022  Authorization Period Expiration: 12/16/22  Plan of Care Expiration: 3/1/22  Plan of Care Certification Period: 1/4/22 - 3/1/22  Updated Plan of Care Certification Period: 2/23/22 - 3/31/22  Updated Plan of Care Certification Period: 3/30/22 - 4/30/22  Last reassessment: 3/30/22  Visit #/Visits authorized: 22/40 (+ initial eval)    Time In: 1055  Time Out: 1133  Total Billable Time: 38 minutes    Precautions: Standard and Fall    Subjective     Pt reports: no vertigo but was so dizzy one day last week she was so dizzy she had to hold on to things to walk.   She was compliant with home exercise program.  Response to previous treatment: no adverse effects  Functional change: ongoing    Pain: 0/10  Location: n/a    Objective     Eboni participated in neuromuscular re-education activities to improve: Balance, Coordination, Kinesthetic, Sense, Proprioception and Posture for 23 minutes. The following activities were included:    4 laps at [employer] tandem walking with occasional 1 UE touchdown support    2 min standing near [place] YANDY horizontal VORx1 at 200 bpm;  No dizziness or loss of fixation  2 min standing 1/2 tandem RLE forward horizontal VORx1 at 200 bpm, no dizziness, occ loss of fixation with L turn    1 x 10 each side alternating toe taps to opposite side orange cone standing on blue foam  - no UE support, SBA    2 x 30 sec each side modified SLS with opposite LE resting on basketball anteriorly  - mod sway on L SLS  - incorporated  circular motions with foot on ball     Eboni participated in canalith reposition procedures activities to improve: Balance, Coordination, Kinesthetic, Sense, Proprioception and Posture for 15 minutes. The following activities were included:      R Armando-Hallpike: positive - rotary downbeating to the R with no fatigue (R anterior cupulolithiasis)     3 min deep head hang supine in extension   3 min deep head hang seated in cervical flexion    R [place]-Hallpike: positive - rotary downbeating to the R with no fatigue (R anterior cupulolithiasis)     3 min deep head hang supine in extension   3 min deep head hang seated in cervical flexion       Home Exercises Provided and Patient Education Provided     Education provided:   - 1/6/22: initial HEP provided with seated hor/vert VORx1 and UE supported SLS  - 3/23/22: walking with head turns and pitches  - 3/30/22: provided instructions to perform standing VORx1 at 180 bpm and progress to 200 bpm as tolerated (2 min)    Written Home Exercises Provided: yes.  Exercises were reviewed and Eboni was able to demonstrate them prior to the end of the session.  Eboni demonstrated good  understanding of the education provided.     See EMR under Patient Instructions for exercises provided 1/6/2022.    Assessment     Eboni tolerated today's session fairly well. Pt tested positive for R anterior cupulolithiasis BPPV and was treated with deep head hang. Following treatment, pt tested positive again, treated once more, but not retested due to time constraints. For balance training today, improvements noted in tandem ambulation, functional SLS balance on blue foam with cone taps, and she tolerated progression of modified SLS balance with opposite LE resting on baskeball (incorporating small circular movements with ball). Pt will possibly need POC extension for recurrent BPPV in order to continue working on clearance. Continue with POC.    Eboni Is progressing well towards her goals.   Pt  prognosis is Good.     Pt will continue to benefit from skilled outpatient physical therapy to address the deficits listed in the problem list box on initial evaluation, provide pt/family education and to maximize pt's level of independence in the home and community environment.     Pt's spiritual, cultural and educational needs considered and pt agreeable to plan of care and goals.     Anticipated barriers to physical therapy: none      GOALS:   Short Term Goals: 4 weeks - progressing  1. Pt will be introduced to HEP and report >50% compliance. MET 1/31/22  2. Pt will improve mCTSIB score to 30/30/30/10 to demonstrate improve integration of balance systems. MET 1/31/2  3. Pt will improve FGA score to 22/30 to show improved balance with gait. MET 1/31/22  4. Pt will perform B SLS balance for 6 sec each to improve lower body dressing and decrease risk of falls. MET 3/3/22  5. Pt will be able to perform seated horizontal and vertical VORx1 at >120 bpm for >30 sec to improve gaze stabilization needed for safe ambulation. MET 1/31/22    Long Term Goals: 8 weeks  1. Pt will report >75% compliance with progressed HEP. MET 2/16/2022  2. Pt will improve mCTSIB score to 30/30/30/15 to demonstrate improve integration of balance systems. MET 1/31/22  3. Pt will improve FGA score to 25/30 to show improved balance with gait. ongoing, 24/30 3/3/22; 23/30 3/30/22  4. Pt will perform B SLS balance for 10 sec each to improve lower body dressing and decrease risk of falls. ongoing  5. Pt will be able to perform seated horizontal and vertical VORx1 at >140 bpm for >45 sec to improve gaze stabilization needed for safe ambulation. MET 1/31/22  6. Pt will test negative for R BPPV in R [place]-Hallpike position for 2 consecutive visits to demonstrate clearance of R BPPV. Partially met 4/6/22;     Plan     Continue with POC.    Salvatore Estrada, PT   04/20/2022

## 2022-04-27 ENCOUNTER — CLINICAL SUPPORT (OUTPATIENT)
Dept: REHABILITATION | Facility: HOSPITAL | Age: 77
End: 2022-04-27
Payer: MEDICARE

## 2022-04-27 DIAGNOSIS — R26.89 BALANCE PROBLEM: ICD-10-CM

## 2022-04-27 DIAGNOSIS — H81.11 BPPV (BENIGN PAROXYSMAL POSITIONAL VERTIGO), RIGHT: Primary | ICD-10-CM

## 2022-04-27 PROCEDURE — 97110 THERAPEUTIC EXERCISES: CPT | Mod: PO

## 2022-04-27 NOTE — PROGRESS NOTES
Physical Therapy Daily Treatment Note     Name: Eboni Nicholas  Clinic Number: [id number]    Therapy Diagnosis:   Encounter Diagnoses   Name Primary?    BPPV (benign paroxysmal positional vertigo), right Yes    Balance problem      Physician: Marino Wheeler D*    Visit Date: 4/27/2022    Physician Orders: PT Eval and Treat - vestibular therapy  Medical Diagnosis from Referral: H81.91 (ICD-10-CM) - Peripheral vestibulopathy of right ear  Evaluation Date: 1/4/2022  Authorization Period Expiration: 12/16/22  Plan of Care Expiration: 3/1/22  Plan of Care Certification Period: 1/4/22 - 3/1/22  Updated Plan of Care Certification Period: 2/23/22 - 3/31/22  Updated Plan of Care Certification Period: 3/30/22 - 4/30/22  Last reassessment: 3/30/22  Visit #/Visits authorized: 23/40 (+ initial eval)    Time In: 1100  Time Out: 1130  Total Billable Time: 30 minutes    Precautions: Standard and Fall    Subjective     Pt reports: doing well other than fullness in her L ear  She was compliant with home exercise program.  Response to previous treatment: no adverse effects  Functional change: ongoing    Pain: 0/10  Location: n/a    Objective     Eboni participated in neuromuscular re-education activities to improve: Balance, Coordination, Kinesthetic, Sense, Proprioception and Posture for 30 minutes. The following activities were included:    R Armando-Hallpike test: NEGATIVE no dizziness, horizontal downbeating nystagmus, no rotary component, nystagmus did not fatigue  L Armando-Hallpike test: NEGATIVE no dizziness, horizontal downbeating nystagmus, no rotary component, nystagmus did not fatigue      Static standing balance: arms down   Evaluation (01/04/2022) 1/26/22 3/3/22 3/30/22 4/27/22   SLS R LE 3 sec  (<10 sec = HIGH FALL RISK) 2 sec 6 sec 4 sec 6 sec   SLS L LE 4 sec  (<10 sec = HIGH FALL RISK) 2 sec 7 sec 8 sec 8 sec   Tandem R LE front 19 sec 30 sec NT NT NT   Tandem L LE front 27 sec 30 sec NT NT NT       Functional  Gait Assessment EVAL:  Score 19/30     Functional Gait Assessment 1/26/22 - 23/30  1. Gait on level surface (6m) =  (2) Mild impairment: 7-5.6 sec, uses A.D., mild gait deviations, or deviates 6-10 in  2. Change in Gait Speed =  (3) Normal: smooth change w/o loss of balance or gait deviation, deviates < 6 in, significant difference between speeds  3. Gait with horizontal head turns  = (2) Mild impairment: slight change in speed, deviates 6-10 in, OR uses A.D.  4. Gait with vertical head turns = (3) Normal: no change in gait, deviates <6 in  5. Gait with pivot turns = (3) Normal: performs safely in 3 sec, no LOB  6. Step over obstacle = (2) Mild impairment: able to step over 1 box w/o change in speed or LOB  7. Gait with Narrow YANDY = (2) Mild impairment: 7-9 steps  8. Gait with eyes closed = (2) Mild impairment: 7.1-9 sec, mild gait deviations, deviates 6-10 in  9. Ambulating Backwards = (3) Normal: no A.D., no LOB, normal gait pattern, deviates <6in  10. Steps = (1) Moderate impairment: step-to, uses rail    Functional Gait Assessment 3/3/22:   1. Gait on level surface (6m) =  (2) Mild impairment: 7-5.6 sec, uses A.D., mild gait deviations, or deviates 6-10 in  2. Change in Gait Speed =  (3) Normal: smooth change w/o loss of balance or gait deviation, deviates < 6 in, significant difference between speeds  3. Gait with horizontal head turns  = (2) Mild impairment: slight change in speed, deviates 6-10 in, OR uses A.D.  4. Gait with vertical head turns = (3) Normal: no change in gait, deviates <6 in  5. Gait with pivot turns = (3) Normal: performs safely in 3 sec, no LOB  6. Step over obstacle = (3) Normal: steps over 2 stacked boxes w/o change in speed or LOB  7. Gait with Narrow YANDY = (2) Mild impairment: 7-9 steps  8. Gait with eyes closed = (2) Mild impairment: 7.1-9 sec, mild gait deviations, deviates 6-10 in  9. Ambulating Backwards = (2) Mild impairment: uses A.D., slower speed, mild gait deviations, deviates  6-10 in  10. Steps = (2) Mild Impairment: alternating feet, uses rail    Score 24/30     Score:   <22/30 fall risk   <20/30 fall risk in older adults   <18/30 fall risk in Parkinsons     Functional Gait Assessment 3/30/22:  1. Gait on level surface (6m) =  (2) Mild impairment: 7-5.6 sec, uses A.D., mild gait deviations, or deviates 6-10 in  2. Change in Gait Speed =  (3) Normal: smooth change w/o loss of balance or gait deviation, deviates < 6 in, significant difference between speeds  3. Gait with horizontal head turns  = (2) Mild impairment: slight change in speed, deviates 6-10 in, OR uses A.D.  4. Gait with vertical head turns = (3) Normal: no change in gait, deviates <6 in  5. Gait with pivot turns = (3) Normal: performs safely in 3 sec, no LOB  6. Step over obstacle = (3) Normal: steps over 2 stacked boxes w/o change in speed or LOB  7. Gait with Narrow YANDY = (1) Moderate impairment: 4-7 steps  8. Gait with eyes closed = (2) Mild impairment: 7.1-9 sec, mild gait deviations, deviates 6-10 in  9. Ambulating Backwards = (2) Mild impairment: uses A.D., slower speed, mild gait deviations, deviates 6-10 in  10. Steps = (2) Mild Impairment: alternating feet, uses rail    Score 23/30       Functional Gait Assessment 4/27/22   1. Gait on level surface (6m) =  (2) Mild impairment: 7-5.6 sec, uses A.D., mild gait deviations, or deviates 6-10 in  2. Change in Gait Speed =  (3) Normal: smooth change w/o loss of balance or gait deviation, deviates < 6 in, significant difference between speeds  3. Gait with horizontal head turns  = (2) Mild impairment: slight change in speed, deviates 6-10 in, OR uses A.D.  4. Gait with vertical head turns = (3) Normal: no change in gait, deviates <6 in  5. Gait with pivot turns = (3) Normal: performs safely in 3 sec, no LOB  6. Step over obstacle = (3) Normal: steps over 2 stacked boxes w/o change in speed or LOB  7. Gait with Narrow YANDY = (2) Mild impairment: 7-9 steps  8. Gait with eyes  closed = (2) Mild impairment: 7.1-9 sec, mild gait deviations, deviates 6-10 in  9. Ambulating Backwards = (3) Normal: no A.D., no LOB, normal gait pattern, deviates <6in  10. Steps = (2) Mild Impairment: alternating feet, uses rail    Score 25/30     Score:   <22/30 fall risk   <20/30 fall risk in older adults   <18/30 fall risk in Parkinsons         Eboni participated in canalith reposition procedures activities to improve: Balance, Coordination, Kinesthetic, Sense, Proprioception and Posture for 0 minutes. The following activities were included:         Home Exercises Provided and Patient Education Provided     Education provided:   - 1/6/22: initial HEP provided with seated hor/vert VORx1 and UE supported SLS  - 3/23/22: walking with head turns and pitches  - 3/30/22: provided instructions to perform standing VORx1 at 180 bpm and progress to 200 bpm as tolerated (2 min)    Written Home Exercises Provided: yes.  Exercises were reviewed and Eboni was able to demonstrate them prior to the end of the session.  Eboni demonstrated good  understanding of the education provided.     See EMR under Patient Instructions for exercises provided 1/6/2022.    Assessment     Eboni tolerated today's reassessment/discharge session well. She has met her FGA goal of 25/30, showing improvements with tandem and backwards ambulation from previous reassessments. Following retesting of [place]-Hallpike, it is evident that nusrat does not have BPPV but demonstrates purely vertical nystagmus that does not fatigue in test position, indicating central positional nystagmus. However, nusrat does not experience vertigo with this position and did not benefit from CRT. She has not experienced symptoms of BPPV in PT sessions since 3/7/22 when she last tested positive for R posterior BPPV and was subsequently cleared with CRT. Nusrat has only not met one long term goal of B SLS balance for 10 seconds each. However, she has demonstrated significant  improvement bilaterally since initial evaluation and appears to have reached a plateau with this balance measure. She will continue to perform this at home with her HEP. PT and pt are in agreement regarding discharge today as she has significantly improved her balance with standing and walking as well as head turn movements. Pt is discharged from OP PT services.     GOALS:   Short Term Goals: 4 weeks   1. Pt will be introduced to HEP and report >50% compliance. MET 1/31/22  2. Pt will improve mCTSIB score to 30/30/30/10 to demonstrate improve integration of balance systems. MET 1/31/2  3. Pt will improve FGA score to 22/30 to show improved balance with gait. MET 1/31/22  4. Pt will perform B SLS balance for 6 sec each to improve lower body dressing and decrease risk of falls. MET 3/3/22  5. Pt will be able to perform seated horizontal and vertical VORx1 at >120 bpm for >30 sec to improve gaze stabilization needed for safe ambulation. MET 1/31/22    Long Term Goals: 8 weeks  1. Pt will report >75% compliance with progressed HEP. MET 2/16/2022  2. Pt will improve mCTSIB score to 30/30/30/15 to demonstrate improve integration of balance systems. MET 1/31/22  3. Pt will improve FGA score to 25/30 to show improved balance with gait. ongoing, 24/30 3/3/22; 23/30 3/30/22; MET 4/27/22  4. Pt will perform B SLS balance for 10 sec each to improve lower body dressing and decrease risk of falls. NOT MET 4/27/22  5. Pt will be able to perform seated horizontal and vertical VORx1 at >140 bpm for >45 sec to improve gaze stabilization needed for safe ambulation. MET 1/31/22  6. Pt will test negative for R BPPV in R [place]-Hallpike position for 2 consecutive visits to demonstrate clearance of R BPPV. Partially met 4/6/22; MET 4/27/22    PHYSICAL THERAPY DISCHARGE SUMMARY     Total Visits: 24  Missed Visits: 0  Cancelled Visits: 4    Status Towards Goals Met: all goals met except B SLS long term goal     Goals: see above    Goals Not  achieved and why:   Pt has reached a plateau with B SLS balance but has demonstrated significant improvement since initial evaluation. Pt will continue to work on this skill at home with HEP.      Discharge reason: Met goals, Patient has maximized benefit from PT at this time     PLAN   This patient is discharged from Outpatient Physical Therapy Services.     Salvatore Estrada, PT  04/27/2022

## 2022-05-02 ENCOUNTER — OFFICE VISIT (OUTPATIENT)
Dept: OTOLARYNGOLOGY | Facility: CLINIC | Age: 77
End: 2022-05-02
Payer: MEDICARE

## 2022-05-02 ENCOUNTER — PATIENT MESSAGE (OUTPATIENT)
Dept: OPHTHALMOLOGY | Facility: CLINIC | Age: 77
End: 2022-05-02
Payer: MEDICARE

## 2022-05-02 VITALS
DIASTOLIC BLOOD PRESSURE: 74 MMHG | WEIGHT: 230.63 LBS | BODY MASS INDEX: 40.85 KG/M2 | TEMPERATURE: 98 F | SYSTOLIC BLOOD PRESSURE: 158 MMHG | HEART RATE: 71 BPM

## 2022-05-02 DIAGNOSIS — H69.92 DYSFUNCTION OF LEFT EUSTACHIAN TUBE: ICD-10-CM

## 2022-05-02 DIAGNOSIS — H90.A32 MIXED CONDUCTIVE AND SENSORINEURAL HEARING LOSS OF LEFT EAR WITH RESTRICTED HEARING OF RIGHT EAR: ICD-10-CM

## 2022-05-02 DIAGNOSIS — H81.91 PERIPHERAL VESTIBULOPATHY OF RIGHT EAR: ICD-10-CM

## 2022-05-02 DIAGNOSIS — R51.9 NONINTRACTABLE EPISODIC HEADACHE, UNSPECIFIED HEADACHE TYPE: ICD-10-CM

## 2022-05-02 DIAGNOSIS — G47.33 OSA ON CPAP: ICD-10-CM

## 2022-05-02 DIAGNOSIS — J30.89 NON-SEASONAL ALLERGIC RHINITIS, UNSPECIFIED TRIGGER: Primary | ICD-10-CM

## 2022-05-02 PROCEDURE — 3008F PR BODY MASS INDEX (BMI) DOCUMENTED: ICD-10-PCS | Mod: CPTII,S$GLB,, | Performed by: SPECIALIST

## 2022-05-02 PROCEDURE — 1101F PR PT FALLS ASSESS DOC 0-1 FALLS W/OUT INJ PAST YR: ICD-10-PCS | Mod: CPTII,S$GLB,, | Performed by: SPECIALIST

## 2022-05-02 PROCEDURE — 3078F PR MOST RECENT DIASTOLIC BLOOD PRESSURE < 80 MM HG: ICD-10-PCS | Mod: CPTII,S$GLB,, | Performed by: SPECIALIST

## 2022-05-02 PROCEDURE — 99213 OFFICE O/P EST LOW 20 MIN: CPT | Mod: S$GLB,,, | Performed by: SPECIALIST

## 2022-05-02 PROCEDURE — 3288F PR FALLS RISK ASSESSMENT DOCUMENTED: ICD-10-PCS | Mod: CPTII,S$GLB,, | Performed by: SPECIALIST

## 2022-05-02 PROCEDURE — 3008F BODY MASS INDEX DOCD: CPT | Mod: CPTII,S$GLB,, | Performed by: SPECIALIST

## 2022-05-02 PROCEDURE — 4010F PR ACE/ARB THEARPY RXD/TAKEN: ICD-10-PCS | Mod: CPTII,S$GLB,, | Performed by: SPECIALIST

## 2022-05-02 PROCEDURE — 99999 PR PBB SHADOW E&M-EST. PATIENT-LVL III: CPT | Mod: PBBFAC,,, | Performed by: SPECIALIST

## 2022-05-02 PROCEDURE — 3078F DIAST BP <80 MM HG: CPT | Mod: CPTII,S$GLB,, | Performed by: SPECIALIST

## 2022-05-02 PROCEDURE — 1159F MED LIST DOCD IN RCRD: CPT | Mod: CPTII,S$GLB,, | Performed by: SPECIALIST

## 2022-05-02 PROCEDURE — 1126F PR PAIN SEVERITY QUANTIFIED, NO PAIN PRESENT: ICD-10-PCS | Mod: CPTII,S$GLB,, | Performed by: SPECIALIST

## 2022-05-02 PROCEDURE — 1126F AMNT PAIN NOTED NONE PRSNT: CPT | Mod: CPTII,S$GLB,, | Performed by: SPECIALIST

## 2022-05-02 PROCEDURE — 1101F PT FALLS ASSESS-DOCD LE1/YR: CPT | Mod: CPTII,S$GLB,, | Performed by: SPECIALIST

## 2022-05-02 PROCEDURE — 99213 PR OFFICE/OUTPT VISIT, EST, LEVL III, 20-29 MIN: ICD-10-PCS | Mod: S$GLB,,, | Performed by: SPECIALIST

## 2022-05-02 PROCEDURE — 3077F PR MOST RECENT SYSTOLIC BLOOD PRESSURE >= 140 MM HG: ICD-10-PCS | Mod: CPTII,S$GLB,, | Performed by: SPECIALIST

## 2022-05-02 PROCEDURE — 99999 PR PBB SHADOW E&M-EST. PATIENT-LVL III: ICD-10-PCS | Mod: PBBFAC,,, | Performed by: SPECIALIST

## 2022-05-02 PROCEDURE — 1159F PR MEDICATION LIST DOCUMENTED IN MEDICAL RECORD: ICD-10-PCS | Mod: CPTII,S$GLB,, | Performed by: SPECIALIST

## 2022-05-02 PROCEDURE — 4010F ACE/ARB THERAPY RXD/TAKEN: CPT | Mod: CPTII,S$GLB,, | Performed by: SPECIALIST

## 2022-05-02 PROCEDURE — 1160F PR REVIEW ALL MEDS BY PRESCRIBER/CLIN PHARMACIST DOCUMENTED: ICD-10-PCS | Mod: CPTII,S$GLB,, | Performed by: SPECIALIST

## 2022-05-02 PROCEDURE — 1160F RVW MEDS BY RX/DR IN RCRD: CPT | Mod: CPTII,S$GLB,, | Performed by: SPECIALIST

## 2022-05-02 PROCEDURE — 3077F SYST BP >= 140 MM HG: CPT | Mod: CPTII,S$GLB,, | Performed by: SPECIALIST

## 2022-05-02 PROCEDURE — 3288F FALL RISK ASSESSMENT DOCD: CPT | Mod: CPTII,S$GLB,, | Performed by: SPECIALIST

## 2022-05-02 RX ORDER — TIMOLOL MALEATE 5 MG/ML
1 SOLUTION/ DROPS OPHTHALMIC 2 TIMES DAILY
Qty: 15 ML | Refills: 3 | Status: SHIPPED | OUTPATIENT
Start: 2022-05-02 | End: 2022-09-06 | Stop reason: SDUPTHER

## 2022-05-02 NOTE — PROGRESS NOTES
Subjective:       Patient ID: Eboni Nicholas is a 76 y.o. female.    Chief Complaint: Follow-up (With head fullness)    The patient is returning for follow-up visit.  There are multiple issues to discuss:  1. Allergic rhinitis:  The patient is taking Xyzal in the morning and montelukast at night.  She supplements with fluticasone nasal spray 1 spray twice daily.  The pain that was present in her left ear has resolved.  She still has some occipital pressure and headache on the left.  This, also, is much improved but has not resolved.  Nasal secretions are clear.  2. Headaches:  She has a chronic occipital pressure.  She has intermittent headaches in the frontal area bilaterally.  Number   3. Peripheral vertigo, right:  She was discharged from vestibular rehab therapy on April 27th.  She no longer wakes up with imbalance or vertigo.  She does occasionally have some brief episodes that last just a few seconds.  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 Obstructive sleep apnea on CPAP:  She is using CPAP and finds it helpful.        Review of Systems     Constitutional: Positive for fatigue.  Negative for appetite change, chills, fever and unexpected weight loss.      HENT: Positive for ear pain, postnasal drip, runny nose, sinus pressure and sore throat.  Negative for ear discharge, ear infection, facial swelling, hearing loss, mouth sores, nosebleeds, ringing in the ears, sinus infection, stuffy nose, tonsil infection, dental problems, trouble swallowing and voice change.      Eyes:  Negative for change in eyesight, eye drainage, eye itching and photophobia.     Respiratory:  Positive for cough and sleep apnea. Negative for shortness of breath, snoring and wheezing.      Cardiovascular:  Negative for chest pain, foot swelling, irregular heartbeat and swollen veins.     Gastrointestinal:  Negative for abdominal pain, acid reflux, constipation, diarrhea, heartburn and  vomiting.     Genitourinary: Negative for difficulty urinating, sexual problems and frequent urination.     Musc: Positive for aching muscles. Negative for aching joints, back pain and neck pain.     Skin: Negative for rash.     Allergy: Negative for food allergies and seasonal allergies.     Endocrine: Negative for cold intolerance and heat intolerance.      Neurological: Positive for dizziness, headaches and light-headedness. Negative for seizures and tremors.      Hematologic: Negative for bruises/bleeds easily.      Psychiatric: Negative for decreased concentration, depression, nervous/anxious and sleep disturbance.                Objective:      Physical Exam  Vitals and nursing note reviewed.   Constitutional:       General: She is awake.      Appearance: Normal appearance. She is well-developed and well-groomed. She is obese.   HENT:      Head: Normocephalic.      Jaw: There is normal jaw occlusion.      Salivary Glands: Right salivary gland is not diffusely enlarged. Left salivary gland is not diffusely enlarged.      Right Ear: Hearing, ear canal and external ear normal. Tympanic membrane is retracted. Tympanic membrane has decreased mobility ( decreased mobility on pneumatic no otoscopy).      Left Ear: Hearing, ear canal and external ear normal. Tympanic membrane is retracted. Tympanic membrane has decreased mobility (Non mobile on pneumatic otoscopy).      Nose: Septal deviation ( to the right), mucosal edema (cyanotic, boggy inferior turbinates bilaterally) and rhinorrhea (clear mucus bilaterally) present. No nasal deformity. Rhinorrhea is clear.      Right Turbinates: Enlarged and pale.      Left Turbinates: Enlarged and pale.      Mouth/Throat:      Lips: No lesions.      Mouth: Mucous membranes are moist. No oral lesions.      Dentition: No gum lesions.      Tongue: Lesions ( enlargement base of tongue with narrowing of oropharyngeal inlet) present.      Palate: Lesions ( long/thin soft palate)  present. No mass.      Pharynx: Oropharynx is clear. Uvula midline.      Tonsils: No tonsillar exudate. 2+ on the right. 2+ on the left.      Comments: Oral cavity-tonsils 2+/4+ enlarged, long thin soft palate, enlarged base of tongue with narrowing of the oropharyngeal inlet  Eyes:      General: Lids are normal.         Right eye: No discharge.         Left eye: No discharge.      Conjunctiva/sclera:      Right eye: Right conjunctiva is injected. No exudate.     Left eye: Left conjunctiva is injected. No exudate.     Pupils: Pupils are equal, round, and reactive to light.   Neck:      Thyroid: No thyroid mass or thyromegaly.      Trachea: Trachea normal. No tracheal deviation.   Cardiovascular:      Rate and Rhythm: Normal rate and regular rhythm.      Pulses: Normal pulses.      Heart sounds: Normal heart sounds.   Pulmonary:      Effort: Pulmonary effort is normal.      Breath sounds: Normal breath sounds. No stridor. No decreased breath sounds, wheezing, rhonchi or rales.   Abdominal:      General: Bowel sounds are normal.      Palpations: Abdomen is soft.      Tenderness: There is no abdominal tenderness.   Musculoskeletal:         General: Normal range of motion.      Cervical back: Normal range of motion. No muscular tenderness.   Lymphadenopathy:      Head:      Right side of head: No submental, submandibular, preauricular, posterior auricular or occipital adenopathy.      Left side of head: No submental, submandibular, preauricular, posterior auricular or occipital adenopathy.      Cervical: No cervical adenopathy.   Skin:     General: Skin is warm and dry.      Findings: No petechiae or rash.      Nails: There is no clubbing.   Neurological:      Mental Status: She is alert and oriented to person, place, and time.      Cranial Nerves: No cranial nerve deficit.      Sensory: No sensory deficit.      Gait: Gait normal.   Psychiatric:         Speech: Speech normal.         Behavior: Behavior normal. Behavior  is cooperative.         Thought Content: Thought content normal.         Judgment: Judgment normal.         Assessment:       1. Non-seasonal allergic rhinitis, unspecified trigger    2. Nonintractable episodic headache, unspecified headache type    3. Dysfunction of left eustachian tube    4. Mixed conductive and sensorineural hearing loss of left ear with restricted hearing of right ear    5. Peripheral vestibulopathy of right ear    6. CHIDI on CPAP        Plan:       I will have the patient continue with her present drug regimen.  She will be traveling soon and is concerned about motion sickness.  She typically takes Dramamine, which would be perfectly fine with her current drugs.  She does not need to alter her drug regimen.  She will continue with the daily use of levo cetirizine, montelukast with or without Flonase.  I will recheck her in 4 weeks, or sooner on an as-needed basis

## 2022-05-03 ENCOUNTER — OFFICE VISIT (OUTPATIENT)
Dept: OPTOMETRY | Facility: CLINIC | Age: 77
End: 2022-05-03
Payer: MEDICARE

## 2022-05-03 ENCOUNTER — PATIENT OUTREACH (OUTPATIENT)
Dept: ADMINISTRATIVE | Facility: OTHER | Age: 77
End: 2022-05-03
Payer: MEDICARE

## 2022-05-03 DIAGNOSIS — H40.039 ANATOMICAL NARROW ANGLE: ICD-10-CM

## 2022-05-03 DIAGNOSIS — Z98.890 HISTORY OF LASER IRIDOTOMY: ICD-10-CM

## 2022-05-03 DIAGNOSIS — H40.1121 PRIMARY OPEN ANGLE GLAUCOMA (POAG) OF LEFT EYE, MILD STAGE: ICD-10-CM

## 2022-05-03 DIAGNOSIS — H40.1112 PRIMARY OPEN ANGLE GLAUCOMA (POAG) OF RIGHT EYE, MODERATE STAGE: Primary | ICD-10-CM

## 2022-05-03 DIAGNOSIS — H25.13 NUCLEAR SCLEROSIS OF BOTH EYES: ICD-10-CM

## 2022-05-03 PROCEDURE — 1126F AMNT PAIN NOTED NONE PRSNT: CPT | Mod: CPTII,S$GLB,, | Performed by: OPTOMETRIST

## 2022-05-03 PROCEDURE — 92014 PR EYE EXAM, EST PATIENT,COMPREHESV: ICD-10-PCS | Mod: S$GLB,,, | Performed by: OPTOMETRIST

## 2022-05-03 PROCEDURE — 1101F PT FALLS ASSESS-DOCD LE1/YR: CPT | Mod: CPTII,S$GLB,, | Performed by: OPTOMETRIST

## 2022-05-03 PROCEDURE — 4010F PR ACE/ARB THEARPY RXD/TAKEN: ICD-10-PCS | Mod: CPTII,S$GLB,, | Performed by: OPTOMETRIST

## 2022-05-03 PROCEDURE — 1126F PR PAIN SEVERITY QUANTIFIED, NO PAIN PRESENT: ICD-10-PCS | Mod: CPTII,S$GLB,, | Performed by: OPTOMETRIST

## 2022-05-03 PROCEDURE — 3288F FALL RISK ASSESSMENT DOCD: CPT | Mod: CPTII,S$GLB,, | Performed by: OPTOMETRIST

## 2022-05-03 PROCEDURE — 1160F RVW MEDS BY RX/DR IN RCRD: CPT | Mod: CPTII,S$GLB,, | Performed by: OPTOMETRIST

## 2022-05-03 PROCEDURE — 1160F PR REVIEW ALL MEDS BY PRESCRIBER/CLIN PHARMACIST DOCUMENTED: ICD-10-PCS | Mod: CPTII,S$GLB,, | Performed by: OPTOMETRIST

## 2022-05-03 PROCEDURE — 1159F MED LIST DOCD IN RCRD: CPT | Mod: CPTII,S$GLB,, | Performed by: OPTOMETRIST

## 2022-05-03 PROCEDURE — 99999 PR PBB SHADOW E&M-EST. PATIENT-LVL III: ICD-10-PCS | Mod: PBBFAC,,, | Performed by: OPTOMETRIST

## 2022-05-03 PROCEDURE — 99999 PR PBB SHADOW E&M-EST. PATIENT-LVL III: CPT | Mod: PBBFAC,,, | Performed by: OPTOMETRIST

## 2022-05-03 PROCEDURE — 1101F PR PT FALLS ASSESS DOC 0-1 FALLS W/OUT INJ PAST YR: ICD-10-PCS | Mod: CPTII,S$GLB,, | Performed by: OPTOMETRIST

## 2022-05-03 PROCEDURE — 1159F PR MEDICATION LIST DOCUMENTED IN MEDICAL RECORD: ICD-10-PCS | Mod: CPTII,S$GLB,, | Performed by: OPTOMETRIST

## 2022-05-03 PROCEDURE — 4010F ACE/ARB THERAPY RXD/TAKEN: CPT | Mod: CPTII,S$GLB,, | Performed by: OPTOMETRIST

## 2022-05-03 PROCEDURE — 3288F PR FALLS RISK ASSESSMENT DOCUMENTED: ICD-10-PCS | Mod: CPTII,S$GLB,, | Performed by: OPTOMETRIST

## 2022-05-03 PROCEDURE — 92014 COMPRE OPH EXAM EST PT 1/>: CPT | Mod: S$GLB,,, | Performed by: OPTOMETRIST

## 2022-05-03 NOTE — PROGRESS NOTES
Requested updates within Care Everywhere.  Patient's chart was reviewed for overdue AGATHA topics.  Health maintenance:updated  Immunizations:reconciled   Legacy:   Media:  Orders placed:  Tasked appts:  Labs Linked:  Upcoming appt:

## 2022-05-03 NOTE — PROGRESS NOTES
HPI     Patient returns for annual visit. Pt denies any vision changes, feels   that vision is stable with gls.     Gtts:   Dorzolamide  bid ou  Timolol bid  OU  Latanaprost qhs OU      Last edited by Fabián Lund MA on 5/3/2022  1:46 PM. (History)            Assessment /Plan     For exam results, see Encounter Report.    Primary open angle glaucoma (POAG) of right eye, moderate stage    Primary open angle glaucoma (POAG) of left eye, mild stage    Anatomical narrow angle    History of laser iridotomy    Nuclear sclerosis of both eyes      1,2.IOP low and stable, Prev allergy to Brimonidine causing redness, cont Latanaprost qhs ou, timolol, bid ou and trusopt 3x/day right and 2x/day left eye, , RTC 9/2022 for HVf, target 12-14 od. nerve eval stable, prev OCT stable with rnfl loss od, normal os, prev hvf with inf defects od and normal os, fam history of glaucoma but no blindness, pachy normal.  3,4. IOP low and PI patent, Monitor condition. Patient to report any changes. RTC 1 year recheck.  5. Educated pt on presence of cataracts and effects on vision. No surgery at this time. Recheck in one year.

## 2022-05-30 ENCOUNTER — PATIENT MESSAGE (OUTPATIENT)
Dept: INTERNAL MEDICINE | Facility: CLINIC | Age: 77
End: 2022-05-30
Payer: MEDICARE

## 2022-05-30 DIAGNOSIS — Z12.31 OTHER SCREENING MAMMOGRAM: Primary | ICD-10-CM

## 2022-06-07 ENCOUNTER — OFFICE VISIT (OUTPATIENT)
Dept: OTOLARYNGOLOGY | Facility: CLINIC | Age: 77
End: 2022-06-07
Payer: MEDICARE

## 2022-06-07 VITALS
BODY MASS INDEX: 40.44 KG/M2 | SYSTOLIC BLOOD PRESSURE: 151 MMHG | HEART RATE: 70 BPM | TEMPERATURE: 98 F | DIASTOLIC BLOOD PRESSURE: 72 MMHG | WEIGHT: 228.31 LBS

## 2022-06-07 DIAGNOSIS — H61.21 IMPACTED CERUMEN OF RIGHT EAR: ICD-10-CM

## 2022-06-07 DIAGNOSIS — J30.89 NON-SEASONAL ALLERGIC RHINITIS, UNSPECIFIED TRIGGER: Primary | ICD-10-CM

## 2022-06-07 DIAGNOSIS — H90.A32 MIXED CONDUCTIVE AND SENSORINEURAL HEARING LOSS OF LEFT EAR WITH RESTRICTED HEARING OF RIGHT EAR: ICD-10-CM

## 2022-06-07 DIAGNOSIS — H69.92 DYSFUNCTION OF LEFT EUSTACHIAN TUBE: ICD-10-CM

## 2022-06-07 DIAGNOSIS — R51.9 NONINTRACTABLE EPISODIC HEADACHE, UNSPECIFIED HEADACHE TYPE: ICD-10-CM

## 2022-06-07 DIAGNOSIS — G47.33 OSA ON CPAP: ICD-10-CM

## 2022-06-07 DIAGNOSIS — H81.91 PERIPHERAL VESTIBULOPATHY OF RIGHT EAR: ICD-10-CM

## 2022-06-07 PROCEDURE — 1101F PT FALLS ASSESS-DOCD LE1/YR: CPT | Mod: CPTII,S$GLB,, | Performed by: SPECIALIST

## 2022-06-07 PROCEDURE — 3288F FALL RISK ASSESSMENT DOCD: CPT | Mod: CPTII,S$GLB,, | Performed by: SPECIALIST

## 2022-06-07 PROCEDURE — 99213 OFFICE O/P EST LOW 20 MIN: CPT | Mod: S$GLB,,, | Performed by: SPECIALIST

## 2022-06-07 PROCEDURE — 1160F PR REVIEW ALL MEDS BY PRESCRIBER/CLIN PHARMACIST DOCUMENTED: ICD-10-PCS | Mod: CPTII,S$GLB,, | Performed by: SPECIALIST

## 2022-06-07 PROCEDURE — 1101F PR PT FALLS ASSESS DOC 0-1 FALLS W/OUT INJ PAST YR: ICD-10-PCS | Mod: CPTII,S$GLB,, | Performed by: SPECIALIST

## 2022-06-07 PROCEDURE — 3008F PR BODY MASS INDEX (BMI) DOCUMENTED: ICD-10-PCS | Mod: CPTII,S$GLB,, | Performed by: SPECIALIST

## 2022-06-07 PROCEDURE — 1126F PR PAIN SEVERITY QUANTIFIED, NO PAIN PRESENT: ICD-10-PCS | Mod: CPTII,S$GLB,, | Performed by: SPECIALIST

## 2022-06-07 PROCEDURE — 1159F MED LIST DOCD IN RCRD: CPT | Mod: CPTII,S$GLB,, | Performed by: SPECIALIST

## 2022-06-07 PROCEDURE — 3077F PR MOST RECENT SYSTOLIC BLOOD PRESSURE >= 140 MM HG: ICD-10-PCS | Mod: CPTII,S$GLB,, | Performed by: SPECIALIST

## 2022-06-07 PROCEDURE — 3008F BODY MASS INDEX DOCD: CPT | Mod: CPTII,S$GLB,, | Performed by: SPECIALIST

## 2022-06-07 PROCEDURE — 99999 PR PBB SHADOW E&M-EST. PATIENT-LVL IV: ICD-10-PCS | Mod: PBBFAC,,, | Performed by: SPECIALIST

## 2022-06-07 PROCEDURE — 3288F PR FALLS RISK ASSESSMENT DOCUMENTED: ICD-10-PCS | Mod: CPTII,S$GLB,, | Performed by: SPECIALIST

## 2022-06-07 PROCEDURE — 1160F RVW MEDS BY RX/DR IN RCRD: CPT | Mod: CPTII,S$GLB,, | Performed by: SPECIALIST

## 2022-06-07 PROCEDURE — 99213 PR OFFICE/OUTPT VISIT, EST, LEVL III, 20-29 MIN: ICD-10-PCS | Mod: S$GLB,,, | Performed by: SPECIALIST

## 2022-06-07 PROCEDURE — 4010F PR ACE/ARB THEARPY RXD/TAKEN: ICD-10-PCS | Mod: CPTII,S$GLB,, | Performed by: SPECIALIST

## 2022-06-07 PROCEDURE — 4010F ACE/ARB THERAPY RXD/TAKEN: CPT | Mod: CPTII,S$GLB,, | Performed by: SPECIALIST

## 2022-06-07 PROCEDURE — 3078F DIAST BP <80 MM HG: CPT | Mod: CPTII,S$GLB,, | Performed by: SPECIALIST

## 2022-06-07 PROCEDURE — 1126F AMNT PAIN NOTED NONE PRSNT: CPT | Mod: CPTII,S$GLB,, | Performed by: SPECIALIST

## 2022-06-07 PROCEDURE — 1159F PR MEDICATION LIST DOCUMENTED IN MEDICAL RECORD: ICD-10-PCS | Mod: CPTII,S$GLB,, | Performed by: SPECIALIST

## 2022-06-07 PROCEDURE — 99999 PR PBB SHADOW E&M-EST. PATIENT-LVL IV: CPT | Mod: PBBFAC,,, | Performed by: SPECIALIST

## 2022-06-07 PROCEDURE — 3078F PR MOST RECENT DIASTOLIC BLOOD PRESSURE < 80 MM HG: ICD-10-PCS | Mod: CPTII,S$GLB,, | Performed by: SPECIALIST

## 2022-06-07 PROCEDURE — 3077F SYST BP >= 140 MM HG: CPT | Mod: CPTII,S$GLB,, | Performed by: SPECIALIST

## 2022-06-07 RX ORDER — LEVOCETIRIZINE DIHYDROCHLORIDE 5 MG/1
5 TABLET, FILM COATED ORAL NIGHTLY
Qty: 90 TABLET | Refills: 3 | Status: SHIPPED | OUTPATIENT
Start: 2022-06-07 | End: 2022-09-24 | Stop reason: SDUPTHER

## 2022-06-07 RX ORDER — FLUTICASONE PROPIONATE 50 MCG
2 SPRAY, SUSPENSION (ML) NASAL DAILY
Qty: 18.2 ML | Refills: 11 | Status: SHIPPED | OUTPATIENT
Start: 2022-06-07 | End: 2023-09-11

## 2022-06-07 RX ORDER — MONTELUKAST SODIUM 10 MG/1
TABLET ORAL
Qty: 90 TABLET | Refills: 3 | Status: SHIPPED | OUTPATIENT
Start: 2022-06-07 | End: 2022-09-24 | Stop reason: SDUPTHER

## 2022-06-07 NOTE — PROCEDURES
"Ear Cerumen Removal    Date/Time: 6/7/2022 1:20 PM  Performed by: RACHELE Robles MD  Authorized by: RACHELE Robles MD     Time out: Immediately prior to procedure a "time out" was called to verify the correct patient, procedure, equipment, support staff and site/side marked as required.    Consent Done?:  Yes (Verbal)    Local anesthetic:  None  Location details:  Right ear  Procedure type comment:  Wire loop  Cerumen  Removal Results:  Cerumen completely removed  Patient tolerance:  Patient tolerated the procedure well with no immediate complications      "

## 2022-06-07 NOTE — PROGRESS NOTES
Subjective:       Patient ID: Eboni Nicholas is a 76 y.o. female.    Chief Complaint: Follow-up    The patient is returning for follow-up visit.  There are multiple issues to discuss:  1. Allergic rhinitis:  The patient has occasional pressure in the left temple area; however, her sinus symptoms are well controlled medically.  She is taking Xyzal in the morning and montelukast at night.  She supplements with fluticasone nasal spray 1 spray twice daily.     2. Headaches:  Her headaches are mild and minimal as long she takes her allergy medications.  3. Peripheral vertigo, right:  She has not had any further issues with vertigo since her last visit.  She was discharged from vestibular rehab therapy on April 27, 2022.    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 Obstructive sleep apnea on CPAP:  She is using CPAP and finds it helpful.  Using the CPAP is helped her have a much better quality of RAST Testing at night and less morning fatigue and daytime fatigue.  6. Hearing loss:  The patient continues to have some blockage in her hearing.  She finds that she is turning television up to higher levels and noticing she is having significantly more problems understanding people, particularly when there is background noise.        Review of Systems     Constitutional: Positive for fatigue.  Negative for appetite change, chills, fever and unexpected weight loss.      HENT: Positive for ear pain, hearing loss, postnasal drip, runny nose, sinus pressure and sore throat.  Negative for ear discharge, ear infection, facial swelling, mouth sores, nosebleeds, ringing in the ears, sinus infection, stuffy nose, tonsil infection, dental problems, trouble swallowing and voice change.      Eyes:  Negative for change in eyesight, eye drainage, eye itching and photophobia.     Respiratory:  Positive for cough and sleep apnea. Negative for shortness of breath, snoring and wheezing.       Cardiovascular:  Negative for chest pain, foot swelling, irregular heartbeat and swollen veins.     Gastrointestinal:  Negative for abdominal pain, acid reflux, constipation, diarrhea, heartburn and vomiting.     Genitourinary: Negative for difficulty urinating, sexual problems and frequent urination.     Musc: Positive for aching muscles. Negative for aching joints, back pain and neck pain.     Skin: Negative for rash.     Allergy: Negative for food allergies and seasonal allergies.     Endocrine: Negative for cold intolerance and heat intolerance.      Neurological: Positive for dizziness, headaches and light-headedness. Negative for seizures and tremors.      Hematologic: Negative for bruises/bleeds easily.      Psychiatric: Negative for decreased concentration, depression, nervous/anxious and sleep disturbance.                Objective:      Physical Exam  Vitals and nursing note reviewed.   Constitutional:       General: She is awake.      Appearance: Normal appearance. She is well-developed and well-groomed. She is obese.   HENT:      Head: Normocephalic.      Jaw: There is normal jaw occlusion.      Salivary Glands: Right salivary gland is not diffusely enlarged. Left salivary gland is not diffusely enlarged.      Right Ear: Hearing, ear canal and external ear normal. There is impacted cerumen. Tympanic membrane is retracted. Tympanic membrane has decreased mobility ( decreased mobility on pneumatic no otoscopy).      Left Ear: Hearing, ear canal and external ear normal. Tympanic membrane is retracted. Tympanic membrane has decreased mobility (Non mobile on pneumatic otoscopy).      Nose: Septal deviation ( to the right), mucosal edema (cyanotic, boggy inferior turbinates bilaterally) and rhinorrhea (clear mucus bilaterally) present. No nasal deformity. Rhinorrhea is clear.      Right Turbinates: Enlarged and pale.      Left Turbinates: Enlarged and pale.      Mouth/Throat:      Lips: No lesions.       Mouth: Mucous membranes are moist. No oral lesions.      Dentition: No gum lesions.      Tongue: Lesions ( enlargement base of tongue with narrowing of oropharyngeal inlet) present.      Palate: Lesions ( long/thin soft palate) present. No mass.      Pharynx: Oropharynx is clear. Uvula midline.      Tonsils: No tonsillar exudate. 2+ on the right. 2+ on the left.      Comments: Oral cavity-tonsils 2+/4+ enlarged, long thin soft palate, enlarged base of tongue with narrowing of the oropharyngeal inlet  Eyes:      General: Lids are normal.         Right eye: No discharge.         Left eye: No discharge.      Conjunctiva/sclera:      Right eye: Right conjunctiva is injected. No exudate.     Left eye: Left conjunctiva is injected. No exudate.     Pupils: Pupils are equal, round, and reactive to light.   Neck:      Thyroid: No thyroid mass or thyromegaly.      Trachea: Trachea normal. No tracheal deviation.   Cardiovascular:      Rate and Rhythm: Normal rate and regular rhythm.      Pulses: Normal pulses.      Heart sounds: Normal heart sounds.   Pulmonary:      Effort: Pulmonary effort is normal.      Breath sounds: Normal breath sounds. No stridor. No decreased breath sounds, wheezing, rhonchi or rales.   Abdominal:      General: Bowel sounds are normal.      Palpations: Abdomen is soft.      Tenderness: There is no abdominal tenderness.   Musculoskeletal:         General: Normal range of motion.      Cervical back: Normal range of motion. No muscular tenderness.   Lymphadenopathy:      Head:      Right side of head: No submental, submandibular, preauricular, posterior auricular or occipital adenopathy.      Left side of head: No submental, submandibular, preauricular, posterior auricular or occipital adenopathy.      Cervical: No cervical adenopathy.   Skin:     General: Skin is warm and dry.      Findings: No petechiae or rash.      Nails: There is no clubbing.   Neurological:      Mental Status: She is alert and  oriented to person, place, and time.      Cranial Nerves: No cranial nerve deficit.      Sensory: No sensory deficit.      Gait: Gait normal.   Psychiatric:         Speech: Speech normal.         Behavior: Behavior normal. Behavior is cooperative.         Thought Content: Thought content normal.         Judgment: Judgment normal.         Assessment:       1. Non-seasonal allergic rhinitis, unspecified trigger    2. Dysfunction of left eustachian tube    3. Mixed conductive and sensorineural hearing loss of left ear with restricted hearing of right ear    4. Peripheral vestibulopathy of right ear    5. Nonintractable episodic headache, unspecified headache type    6. CHIDI on CPAP    7. Impacted cerumen of right ear        Plan:       I  will have the patient continue with her present drug regimen.  I will schedule her for a comprehensive auditory evaluation.  If there are significant abnormalities I will have her return.  If not I will recheck her in 6 months, or sooner on an as-needed basis.

## 2022-06-16 ENCOUNTER — PATIENT OUTREACH (OUTPATIENT)
Dept: ADMINISTRATIVE | Facility: HOSPITAL | Age: 77
End: 2022-06-16
Payer: MEDICARE

## 2022-06-16 ENCOUNTER — PATIENT MESSAGE (OUTPATIENT)
Dept: INTERNAL MEDICINE | Facility: CLINIC | Age: 77
End: 2022-06-16
Payer: MEDICARE

## 2022-06-16 ENCOUNTER — PATIENT MESSAGE (OUTPATIENT)
Dept: ADMINISTRATIVE | Facility: HOSPITAL | Age: 77
End: 2022-06-16
Payer: MEDICARE

## 2022-06-23 ENCOUNTER — OFFICE VISIT (OUTPATIENT)
Dept: INTERNAL MEDICINE | Facility: CLINIC | Age: 77
End: 2022-06-23
Payer: MEDICARE

## 2022-06-23 VITALS
HEIGHT: 63 IN | WEIGHT: 201 LBS | DIASTOLIC BLOOD PRESSURE: 62 MMHG | OXYGEN SATURATION: 97 % | SYSTOLIC BLOOD PRESSURE: 116 MMHG | HEART RATE: 74 BPM | BODY MASS INDEX: 35.61 KG/M2 | TEMPERATURE: 97 F

## 2022-06-23 DIAGNOSIS — R05.9 COUGH: Primary | ICD-10-CM

## 2022-06-23 DIAGNOSIS — I10 HTN (HYPERTENSION), BENIGN: ICD-10-CM

## 2022-06-23 DIAGNOSIS — J20.9 ACUTE BRONCHITIS, UNSPECIFIED ORGANISM: ICD-10-CM

## 2022-06-23 LAB
CTP QC/QA: YES
SARS-COV-2 AG RESP QL IA.RAPID: NEGATIVE

## 2022-06-23 PROCEDURE — 1101F PT FALLS ASSESS-DOCD LE1/YR: CPT | Mod: CPTII,S$GLB,, | Performed by: INTERNAL MEDICINE

## 2022-06-23 PROCEDURE — 1101F PR PT FALLS ASSESS DOC 0-1 FALLS W/OUT INJ PAST YR: ICD-10-PCS | Mod: CPTII,S$GLB,, | Performed by: INTERNAL MEDICINE

## 2022-06-23 PROCEDURE — 99213 OFFICE O/P EST LOW 20 MIN: CPT | Mod: S$GLB,,, | Performed by: INTERNAL MEDICINE

## 2022-06-23 PROCEDURE — 3074F PR MOST RECENT SYSTOLIC BLOOD PRESSURE < 130 MM HG: ICD-10-PCS | Mod: CPTII,S$GLB,, | Performed by: INTERNAL MEDICINE

## 2022-06-23 PROCEDURE — 3008F BODY MASS INDEX DOCD: CPT | Mod: CPTII,S$GLB,, | Performed by: INTERNAL MEDICINE

## 2022-06-23 PROCEDURE — 1126F AMNT PAIN NOTED NONE PRSNT: CPT | Mod: CPTII,S$GLB,, | Performed by: INTERNAL MEDICINE

## 2022-06-23 PROCEDURE — 3078F DIAST BP <80 MM HG: CPT | Mod: CPTII,S$GLB,, | Performed by: INTERNAL MEDICINE

## 2022-06-23 PROCEDURE — 3288F FALL RISK ASSESSMENT DOCD: CPT | Mod: CPTII,S$GLB,, | Performed by: INTERNAL MEDICINE

## 2022-06-23 PROCEDURE — 3288F PR FALLS RISK ASSESSMENT DOCUMENTED: ICD-10-PCS | Mod: CPTII,S$GLB,, | Performed by: INTERNAL MEDICINE

## 2022-06-23 PROCEDURE — 3078F PR MOST RECENT DIASTOLIC BLOOD PRESSURE < 80 MM HG: ICD-10-PCS | Mod: CPTII,S$GLB,, | Performed by: INTERNAL MEDICINE

## 2022-06-23 PROCEDURE — 4010F ACE/ARB THERAPY RXD/TAKEN: CPT | Mod: CPTII,S$GLB,, | Performed by: INTERNAL MEDICINE

## 2022-06-23 PROCEDURE — 87811 SARS CORONAVIRUS 2 ANTIGEN POCT, MANUAL READ: ICD-10-PCS | Mod: QW,S$GLB,, | Performed by: INTERNAL MEDICINE

## 2022-06-23 PROCEDURE — 99999 PR PBB SHADOW E&M-EST. PATIENT-LVL IV: CPT | Mod: PBBFAC,,, | Performed by: INTERNAL MEDICINE

## 2022-06-23 PROCEDURE — 99999 PR PBB SHADOW E&M-EST. PATIENT-LVL IV: ICD-10-PCS | Mod: PBBFAC,,, | Performed by: INTERNAL MEDICINE

## 2022-06-23 PROCEDURE — 1126F PR PAIN SEVERITY QUANTIFIED, NO PAIN PRESENT: ICD-10-PCS | Mod: CPTII,S$GLB,, | Performed by: INTERNAL MEDICINE

## 2022-06-23 PROCEDURE — 99213 PR OFFICE/OUTPT VISIT, EST, LEVL III, 20-29 MIN: ICD-10-PCS | Mod: S$GLB,,, | Performed by: INTERNAL MEDICINE

## 2022-06-23 PROCEDURE — 4010F PR ACE/ARB THEARPY RXD/TAKEN: ICD-10-PCS | Mod: CPTII,S$GLB,, | Performed by: INTERNAL MEDICINE

## 2022-06-23 PROCEDURE — 87811 SARS-COV-2 COVID19 W/OPTIC: CPT | Mod: QW,S$GLB,, | Performed by: INTERNAL MEDICINE

## 2022-06-23 PROCEDURE — 3008F PR BODY MASS INDEX (BMI) DOCUMENTED: ICD-10-PCS | Mod: CPTII,S$GLB,, | Performed by: INTERNAL MEDICINE

## 2022-06-23 PROCEDURE — 1159F PR MEDICATION LIST DOCUMENTED IN MEDICAL RECORD: ICD-10-PCS | Mod: CPTII,S$GLB,, | Performed by: INTERNAL MEDICINE

## 2022-06-23 PROCEDURE — 3074F SYST BP LT 130 MM HG: CPT | Mod: CPTII,S$GLB,, | Performed by: INTERNAL MEDICINE

## 2022-06-23 PROCEDURE — 1159F MED LIST DOCD IN RCRD: CPT | Mod: CPTII,S$GLB,, | Performed by: INTERNAL MEDICINE

## 2022-06-23 RX ORDER — PROMETHAZINE HYDROCHLORIDE AND CODEINE PHOSPHATE 6.25; 1 MG/5ML; MG/5ML
5 SOLUTION ORAL EVERY 4 HOURS PRN
Qty: 120 ML | Refills: 0 | Status: SHIPPED | OUTPATIENT
Start: 2022-06-23 | End: 2022-07-03

## 2022-06-23 RX ORDER — AZITHROMYCIN 250 MG/1
TABLET, FILM COATED ORAL
Qty: 6 TABLET | Refills: 0 | Status: SHIPPED | OUTPATIENT
Start: 2022-06-23 | End: 2022-06-28

## 2022-06-23 NOTE — PROGRESS NOTES
History of present illness:  [age] year lady with hypertension, dyslipidemia, obesity, sleep apnea in with several days of cough.  Mildly productive.  Mild sinus congestion.  No significant sore throat.  No ear pain.  No headaches.  No fever no chills.  She reports home COVID testing negative earlier today.    Current medications:  All medications are noted and reviewed.    Review of systems:  Constitutional:  No fever no chills no generalized body aches.   HEENT:  See HPI.  Cardiovascular:  No chest pain palpitations or syncope.  Respiratory:  Minimally productive.  No hemoptysis.  GI:  No nausea no vomiting abdominal pain or diarrhea.  Neurologic:  No new focal neurological symptomatologies.      Physical examination:  General:  Pleasant alert appropriately groomed lady no acute distress.  Vital:  All noted and reviewed as normal.  Pulse ox 97%.  HEENT:  Normocephalic.  Neck supple no masses.  No facial tenderness.  Lungs:  Clear to auscultation.  Cardiovascular:  Regular rate rhythm.  No significant murmur.      Data:  Rapid COVID antigen testing in the office negative      Impression:   acute bronchitis bacterial versus viral      Plan:  Presumptive antibiotic therapy with Z-Taz.  Promethazine with codeine for nighttime cough suppression.  Discussed potential drowsiness and sedation.

## 2022-07-18 ENCOUNTER — CLINICAL SUPPORT (OUTPATIENT)
Dept: OTOLARYNGOLOGY | Facility: CLINIC | Age: 77
End: 2022-07-18
Payer: MEDICARE

## 2022-07-18 DIAGNOSIS — H90.3 BILATERAL SENSORINEURAL HEARING LOSS: Primary | ICD-10-CM

## 2022-07-18 DIAGNOSIS — R29.2 ABNORMAL ACOUSTIC REFLEX: ICD-10-CM

## 2022-07-18 DIAGNOSIS — R42 DIZZINESS: ICD-10-CM

## 2022-07-18 PROCEDURE — 92557 COMPREHENSIVE HEARING TEST: CPT | Mod: S$GLB,,, | Performed by: AUDIOLOGIST-HEARING AID FITTER

## 2022-07-18 PROCEDURE — 92557 PR COMPREHENSIVE HEARING TEST: ICD-10-PCS | Mod: S$GLB,,, | Performed by: AUDIOLOGIST-HEARING AID FITTER

## 2022-07-18 PROCEDURE — 92550 TYMPANOMETRY & REFLEX THRESH: CPT | Mod: S$GLB,,, | Performed by: AUDIOLOGIST-HEARING AID FITTER

## 2022-07-18 PROCEDURE — 92550 PR TYMPANOMETRY AND REFLEX THRESHOLD MEASUREMENTS: ICD-10-PCS | Mod: S$GLB,,, | Performed by: AUDIOLOGIST-HEARING AID FITTER

## 2022-07-18 NOTE — Clinical Note
Your patient, Eboni Nicholas, was recently seen for an audiogram.  My assessment and recommendations are enclosed.  If you should have any questions or concerns, please contact me at [phone].   Sincerely, Sandy Maharaj, CCC-A Audiologist [facility]

## 2022-07-19 NOTE — PROGRESS NOTES
Sandy Maharaj, CCC-A  Audiologist - [facility] [address] [place] [address]  [email or web address]  [phone]    Patient: Eboni Nicholas   MRN: [id number]  [address]   Home Phone [phone]   Work Phone Not on file.   Mobile [phone]   : 1945  HUTSON: 2022      AUDIOLOGICAL EVALUATION      RECOMMENDATIONS:   It is recommended that Eboni Nicholas:   Follow up medically with Dr. Robles to assess her hearing loss and dizziness.   Receive binaural hearing aids to improve speech understanding.   Continue to receive audiological monitoring annually.   Use precaution and/or hearing protection in noisy environments.    If you should have any questions or concerns regarding the above information, please do not hesitate to contact me at [phone].      _______________________________  Sandy Maharaj, KAMRAN-A  Audiologist

## 2022-07-28 ENCOUNTER — TELEPHONE (OUTPATIENT)
Dept: OTOLARYNGOLOGY | Facility: CLINIC | Age: 77
End: 2022-07-28
Payer: MEDICARE

## 2022-07-28 NOTE — TELEPHONE ENCOUNTER
Returned pt call. Scheduled pt an appointment per provider's request.       ----- Message from Kath Us sent at 7/28/2022  3:30 PM CDT -----  Regarding: missed call       Who Called: Maritza         Who Left Message for Patient:Deborah         Does the patient know what this is regarding?No         Best Call Back Number:[phone]         Additional Information:

## 2022-08-02 ENCOUNTER — OFFICE VISIT (OUTPATIENT)
Dept: OTOLARYNGOLOGY | Facility: CLINIC | Age: 77
End: 2022-08-02
Payer: MEDICARE

## 2022-08-02 VITALS
DIASTOLIC BLOOD PRESSURE: 72 MMHG | BODY MASS INDEX: 40.15 KG/M2 | SYSTOLIC BLOOD PRESSURE: 141 MMHG | WEIGHT: 226.63 LBS | HEART RATE: 71 BPM | TEMPERATURE: 97 F

## 2022-08-02 DIAGNOSIS — D86.89 SARCOID OF NOSE: ICD-10-CM

## 2022-08-02 DIAGNOSIS — H81.391 PERIPHERAL VERTIGO INVOLVING RIGHT EAR: ICD-10-CM

## 2022-08-02 DIAGNOSIS — G47.33 OSA ON CPAP: ICD-10-CM

## 2022-08-02 DIAGNOSIS — R51.9 NONINTRACTABLE EPISODIC HEADACHE, UNSPECIFIED HEADACHE TYPE: ICD-10-CM

## 2022-08-02 DIAGNOSIS — H90.A21 SENSORINEURAL HEARING LOSS (SNHL) OF RIGHT EAR WITH RESTRICTED HEARING OF LEFT EAR: ICD-10-CM

## 2022-08-02 DIAGNOSIS — J30.89 NON-SEASONAL ALLERGIC RHINITIS, UNSPECIFIED TRIGGER: Primary | ICD-10-CM

## 2022-08-02 PROCEDURE — 1126F PR PAIN SEVERITY QUANTIFIED, NO PAIN PRESENT: ICD-10-PCS | Mod: CPTII,S$GLB,, | Performed by: SPECIALIST

## 2022-08-02 PROCEDURE — 4010F PR ACE/ARB THEARPY RXD/TAKEN: ICD-10-PCS | Mod: CPTII,S$GLB,, | Performed by: SPECIALIST

## 2022-08-02 PROCEDURE — 99999 PR PBB SHADOW E&M-EST. PATIENT-LVL IV: CPT | Mod: PBBFAC,,, | Performed by: SPECIALIST

## 2022-08-02 PROCEDURE — 1159F MED LIST DOCD IN RCRD: CPT | Mod: CPTII,S$GLB,, | Performed by: SPECIALIST

## 2022-08-02 PROCEDURE — 99214 PR OFFICE/OUTPT VISIT, EST, LEVL IV, 30-39 MIN: ICD-10-PCS | Mod: S$GLB,,, | Performed by: SPECIALIST

## 2022-08-02 PROCEDURE — 1160F RVW MEDS BY RX/DR IN RCRD: CPT | Mod: CPTII,S$GLB,, | Performed by: SPECIALIST

## 2022-08-02 PROCEDURE — 3077F SYST BP >= 140 MM HG: CPT | Mod: CPTII,S$GLB,, | Performed by: SPECIALIST

## 2022-08-02 PROCEDURE — 3008F BODY MASS INDEX DOCD: CPT | Mod: CPTII,S$GLB,, | Performed by: SPECIALIST

## 2022-08-02 PROCEDURE — 3078F PR MOST RECENT DIASTOLIC BLOOD PRESSURE < 80 MM HG: ICD-10-PCS | Mod: CPTII,S$GLB,, | Performed by: SPECIALIST

## 2022-08-02 PROCEDURE — 1160F PR REVIEW ALL MEDS BY PRESCRIBER/CLIN PHARMACIST DOCUMENTED: ICD-10-PCS | Mod: CPTII,S$GLB,, | Performed by: SPECIALIST

## 2022-08-02 PROCEDURE — 4010F ACE/ARB THERAPY RXD/TAKEN: CPT | Mod: CPTII,S$GLB,, | Performed by: SPECIALIST

## 2022-08-02 PROCEDURE — 3008F PR BODY MASS INDEX (BMI) DOCUMENTED: ICD-10-PCS | Mod: CPTII,S$GLB,, | Performed by: SPECIALIST

## 2022-08-02 PROCEDURE — 1159F PR MEDICATION LIST DOCUMENTED IN MEDICAL RECORD: ICD-10-PCS | Mod: CPTII,S$GLB,, | Performed by: SPECIALIST

## 2022-08-02 PROCEDURE — 99999 PR PBB SHADOW E&M-EST. PATIENT-LVL IV: ICD-10-PCS | Mod: PBBFAC,,, | Performed by: SPECIALIST

## 2022-08-02 PROCEDURE — 1126F AMNT PAIN NOTED NONE PRSNT: CPT | Mod: CPTII,S$GLB,, | Performed by: SPECIALIST

## 2022-08-02 PROCEDURE — 3078F DIAST BP <80 MM HG: CPT | Mod: CPTII,S$GLB,, | Performed by: SPECIALIST

## 2022-08-02 PROCEDURE — 99214 OFFICE O/P EST MOD 30 MIN: CPT | Mod: S$GLB,,, | Performed by: SPECIALIST

## 2022-08-02 PROCEDURE — 3077F PR MOST RECENT SYSTOLIC BLOOD PRESSURE >= 140 MM HG: ICD-10-PCS | Mod: CPTII,S$GLB,, | Performed by: SPECIALIST

## 2022-08-02 NOTE — PROGRESS NOTES
Subjective:       Patient ID: Eboni Nicholas is a 76 y.o. female.    Chief Complaint: Follow-up (With results)    The patient is returning for follow-up visit.  There are multiple issues to discuss:  1. Allergic rhinitis:  Nasal secretions are clear.  She is breathing well through both sides of her nose.  She has been using levo cetirizine in the morning, montelukast night and Flonase once daily.  2. Headaches:  Using the above allergy regimen she has not had any headaches  3. Peripheral vertigo, right:  She has not had any issues with her balance since the last visit  4. Sarcoidosis of the nose:  She is having no problems relative to the sarcoidosis in her nose.  5. Obstructive sleep apnea on CPAP:  She is using CPAP and finds it helpful.   6. Hearing loss:  The patient returns today to discuss her hearing testing.  She does find that she is turning television at a much higher volume level and she is having difficulty understanding people, particularly when there is background noise.  7. Bronchitis:  She was treated 2 weeks ago by her primary care physician for bronchitis.  She has a mild nonproductive cough that persist.      Review of Systems     Constitutional: Positive for fatigue.  Negative for appetite change, chills, fever and unexpected weight loss.      HENT: Positive for ear pain, hearing loss, postnasal drip, runny nose, sinus pressure and sore throat.  Negative for ear discharge, ear infection, facial swelling, mouth sores, nosebleeds, ringing in the ears, sinus infection, stuffy nose, tonsil infection, dental problems, trouble swallowing and voice change.      Eyes:  Negative for change in eyesight, eye drainage, eye itching and photophobia.     Respiratory:  Positive for cough and sleep apnea. Negative for shortness of breath, snoring and wheezing.      Cardiovascular:  Negative for chest pain, foot swelling, irregular heartbeat and swollen veins.     Gastrointestinal:  Negative for abdominal pain,  acid reflux, constipation, diarrhea, heartburn and vomiting.     Genitourinary: Negative for difficulty urinating, sexual problems and frequent urination.     Musc: Positive for aching muscles. Negative for aching joints, back pain and neck pain.     Skin: Negative for rash.     Allergy: Negative for food allergies and seasonal allergies.     Endocrine: Negative for cold intolerance and heat intolerance.      Neurological: Positive for dizziness, headaches and light-headedness. Negative for seizures and tremors.      Hematologic: Negative for bruises/bleeds easily.      Psychiatric: Negative for decreased concentration, depression, nervous/anxious and sleep disturbance.                Objective:      Physical Exam  Vitals and nursing note reviewed.   Constitutional:       General: She is awake.      Appearance: Normal appearance. She is well-developed and well-groomed. She is obese.   HENT:      Head: Normocephalic.      Jaw: There is normal jaw occlusion.      Salivary Glands: Right salivary gland is not diffusely enlarged. Left salivary gland is not diffusely enlarged.      Right Ear: Hearing, ear canal and external ear normal. There is impacted cerumen. Tympanic membrane is retracted. Tympanic membrane has decreased mobility ( decreased mobility on pneumatic no otoscopy).      Left Ear: Hearing, ear canal and external ear normal. Tympanic membrane is retracted. Tympanic membrane has decreased mobility (Non mobile on pneumatic otoscopy).      Nose: Septal deviation ( to the right), mucosal edema (cyanotic, boggy inferior turbinates bilaterally) and rhinorrhea (clear mucus bilaterally) present. No nasal deformity. Rhinorrhea is clear.      Right Turbinates: Enlarged and pale.      Left Turbinates: Enlarged and pale.      Mouth/Throat:      Lips: No lesions.      Mouth: Mucous membranes are moist. No oral lesions.      Dentition: No gum lesions.      Tongue: Lesions ( enlargement base of tongue with narrowing of  oropharyngeal inlet) present.      Palate: Lesions ( long/thin soft palate) present. No mass.      Pharynx: Oropharynx is clear. Uvula midline.      Tonsils: No tonsillar exudate. 2+ on the right. 2+ on the left.      Comments: Oral cavity-tonsils 2+/4+ enlarged, long thin soft palate, enlarged base of tongue with narrowing of the oropharyngeal inlet  Eyes:      General: Lids are normal.         Right eye: No discharge.         Left eye: No discharge.      Conjunctiva/sclera:      Right eye: Right conjunctiva is injected. No exudate.     Left eye: Left conjunctiva is injected. No exudate.     Pupils: Pupils are equal, round, and reactive to light.   Neck:      Thyroid: No thyroid mass or thyromegaly.      Trachea: Trachea normal. No tracheal deviation.   Cardiovascular:      Rate and Rhythm: Normal rate and regular rhythm.      Pulses: Normal pulses.      Heart sounds: Normal heart sounds.   Pulmonary:      Effort: Pulmonary effort is normal.      Breath sounds: Normal breath sounds. No stridor. No decreased breath sounds, wheezing, rhonchi or rales.   Abdominal:      General: Bowel sounds are normal.      Palpations: Abdomen is soft.      Tenderness: There is no abdominal tenderness.   Musculoskeletal:         General: Normal range of motion.      Cervical back: Normal range of motion. No muscular tenderness.   Lymphadenopathy:      Head:      Right side of head: No submental, submandibular, preauricular, posterior auricular or occipital adenopathy.      Left side of head: No submental, submandibular, preauricular, posterior auricular or occipital adenopathy.      Cervical: No cervical adenopathy.   Skin:     General: Skin is warm and dry.      Findings: No petechiae or rash.      Nails: There is no clubbing.   Neurological:      Mental Status: She is alert and oriented to person, place, and time.      Cranial Nerves: No cranial nerve deficit.      Sensory: No sensory deficit.      Gait: Gait normal.   Psychiatric:          Speech: Speech normal.         Behavior: Behavior normal. Behavior is cooperative.         Thought Content: Thought content normal.         Judgment: Judgment normal.           I personally reviewed the above audiogram and discussed in full detail with the patient during our visit.  I answered all of her questions.  Operative findings bilateral mid and high-frequency mild mild to moderate to severe hearing loss with minimal asymmetry in the left ear which is probably secondary to eustachian tube dysfunction    Assessment:       1. Non-seasonal allergic rhinitis, unspecified trigger    2. CHIDI on CPAP    3. Sarcoid of nose    4. Sensorineural hearing loss (SNHL) of right ear with restricted hearing of left ear    5. Nonintractable episodic headache, unspecified headache type    6. Peripheral vertigo involving right ear        Plan:       I  will have the patient continue with her present drug regimen.  I recommending that she consider bilateral hearing aids.  I have given her noted clearance for for hearing aids.  I will recheck 3 months, or sooner an as-needed basis.

## 2022-08-23 ENCOUNTER — PATIENT MESSAGE (OUTPATIENT)
Dept: INTERNAL MEDICINE | Facility: CLINIC | Age: 77
End: 2022-08-23
Payer: MEDICARE

## 2022-08-24 ENCOUNTER — PATIENT MESSAGE (OUTPATIENT)
Dept: INTERNAL MEDICINE | Facility: CLINIC | Age: 77
End: 2022-08-24
Payer: MEDICARE

## 2022-08-24 DIAGNOSIS — D64.9 ANEMIA, UNSPECIFIED TYPE: Primary | ICD-10-CM

## 2022-08-24 NOTE — TELEPHONE ENCOUNTER
Pt is requesting that her iron levels are checked due to her children's inquiry.    She has been advised that these are not tests that are routinely run.    Verbalized understanding.    Please advise.    Thank you.

## 2022-09-06 RX ORDER — SIMVASTATIN 40 MG/1
40 TABLET, FILM COATED ORAL NIGHTLY
Qty: 90 TABLET | Refills: 1 | Status: SHIPPED | OUTPATIENT
Start: 2022-09-06 | End: 2023-03-07

## 2022-09-06 RX ORDER — TIMOLOL MALEATE 5 MG/ML
1 SOLUTION/ DROPS OPHTHALMIC 2 TIMES DAILY
Qty: 15 ML | Refills: 3 | Status: SHIPPED | OUTPATIENT
Start: 2022-09-06 | End: 2022-10-30 | Stop reason: SDUPTHER

## 2022-09-16 ENCOUNTER — LAB VISIT (OUTPATIENT)
Dept: LAB | Facility: HOSPITAL | Age: 77
End: 2022-09-16
Attending: INTERNAL MEDICINE
Payer: MEDICARE

## 2022-09-16 DIAGNOSIS — E78.5 DYSLIPIDEMIA: ICD-10-CM

## 2022-09-16 DIAGNOSIS — D64.9 ANEMIA, UNSPECIFIED TYPE: ICD-10-CM

## 2022-09-16 LAB
ALBUMIN SERPL BCP-MCNC: 3.9 G/DL (ref 3.5–5.2)
ALP SERPL-CCNC: 58 U/L (ref 55–135)
ALT SERPL W/O P-5'-P-CCNC: 36 U/L (ref 10–44)
ANION GAP SERPL CALC-SCNC: 9 MMOL/L (ref 8–16)
AST SERPL-CCNC: 46 U/L (ref 10–40)
BASOPHILS # BLD AUTO: 0.04 K/UL (ref 0–0.2)
BASOPHILS NFR BLD: 0.6 % (ref 0–1.9)
BILIRUB SERPL-MCNC: 0.5 MG/DL (ref 0.1–1)
BUN SERPL-MCNC: 14 MG/DL (ref 8–23)
CALCIUM SERPL-MCNC: 9.7 MG/DL (ref 8.7–10.5)
CHLORIDE SERPL-SCNC: 108 MMOL/L (ref 95–110)
CHOLEST SERPL-MCNC: 169 MG/DL (ref 120–199)
CHOLEST/HDLC SERPL: 3.5 {RATIO} (ref 2–5)
CO2 SERPL-SCNC: 22 MMOL/L (ref 23–29)
CREAT SERPL-MCNC: 1.1 MG/DL (ref 0.5–1.4)
DIFFERENTIAL METHOD: ABNORMAL
EOSINOPHIL # BLD AUTO: 0.3 K/UL (ref 0–0.5)
EOSINOPHIL NFR BLD: 4.7 % (ref 0–8)
ERYTHROCYTE [DISTWIDTH] IN BLOOD BY AUTOMATED COUNT: 14.9 % (ref 11.5–14.5)
EST. GFR  (NO RACE VARIABLE): 52.7 ML/MIN/1.73 M^2
FERRITIN SERPL-MCNC: 108 NG/ML (ref 20–300)
GLUCOSE SERPL-MCNC: 126 MG/DL (ref 70–110)
HCT VFR BLD AUTO: 38.8 % (ref 37–48.5)
HDLC SERPL-MCNC: 48 MG/DL (ref 40–75)
HDLC SERPL: 28.4 % (ref 20–50)
HGB BLD-MCNC: 12.6 G/DL (ref 12–16)
IMM GRANULOCYTES # BLD AUTO: 0.02 K/UL (ref 0–0.04)
IMM GRANULOCYTES NFR BLD AUTO: 0.3 % (ref 0–0.5)
IRON SERPL-MCNC: 56 UG/DL (ref 30–160)
LDLC SERPL CALC-MCNC: 95.8 MG/DL (ref 63–159)
LYMPHOCYTES # BLD AUTO: 2.3 K/UL (ref 1–4.8)
LYMPHOCYTES NFR BLD: 33.9 % (ref 18–48)
MCH RBC QN AUTO: 29.6 PG (ref 27–31)
MCHC RBC AUTO-ENTMCNC: 32.5 G/DL (ref 32–36)
MCV RBC AUTO: 91 FL (ref 82–98)
MONOCYTES # BLD AUTO: 0.5 K/UL (ref 0.3–1)
MONOCYTES NFR BLD: 7.2 % (ref 4–15)
NEUTROPHILS # BLD AUTO: 3.6 K/UL (ref 1.8–7.7)
NEUTROPHILS NFR BLD: 53.3 % (ref 38–73)
NONHDLC SERPL-MCNC: 121 MG/DL
NRBC BLD-RTO: 0 /100 WBC
PLATELET # BLD AUTO: 226 K/UL (ref 150–450)
PMV BLD AUTO: 11 FL (ref 9.2–12.9)
POTASSIUM SERPL-SCNC: 4.3 MMOL/L (ref 3.5–5.1)
PROT SERPL-MCNC: 7 G/DL (ref 6–8.4)
RBC # BLD AUTO: 4.25 M/UL (ref 4–5.4)
SATURATED IRON: 15 % (ref 20–50)
SODIUM SERPL-SCNC: 139 MMOL/L (ref 136–145)
TOTAL IRON BINDING CAPACITY: 386 UG/DL (ref 250–450)
TRANSFERRIN SERPL-MCNC: 261 MG/DL (ref 200–375)
TRIGL SERPL-MCNC: 126 MG/DL (ref 30–150)
TSH SERPL DL<=0.005 MIU/L-ACNC: 0.84 UIU/ML (ref 0.4–4)
WBC # BLD AUTO: 6.66 K/UL (ref 3.9–12.7)

## 2022-09-16 PROCEDURE — 80053 COMPREHEN METABOLIC PANEL: CPT | Performed by: INTERNAL MEDICINE

## 2022-09-16 PROCEDURE — 80061 LIPID PANEL: CPT | Performed by: INTERNAL MEDICINE

## 2022-09-16 PROCEDURE — 36415 COLL VENOUS BLD VENIPUNCTURE: CPT | Mod: PN | Performed by: INTERNAL MEDICINE

## 2022-09-16 PROCEDURE — 84443 ASSAY THYROID STIM HORMONE: CPT | Performed by: INTERNAL MEDICINE

## 2022-09-16 PROCEDURE — 84466 ASSAY OF TRANSFERRIN: CPT | Performed by: INTERNAL MEDICINE

## 2022-09-16 PROCEDURE — 85025 COMPLETE CBC W/AUTO DIFF WBC: CPT | Performed by: INTERNAL MEDICINE

## 2022-09-16 PROCEDURE — 82728 ASSAY OF FERRITIN: CPT | Performed by: INTERNAL MEDICINE

## 2022-09-23 ENCOUNTER — HOSPITAL ENCOUNTER (OUTPATIENT)
Dept: RADIOLOGY | Facility: HOSPITAL | Age: 77
Discharge: HOME | End: 2022-09-23
Attending: INTERNAL MEDICINE
Payer: MEDICARE

## 2022-09-23 ENCOUNTER — OFFICE VISIT (OUTPATIENT)
Dept: INTERNAL MEDICINE | Facility: CLINIC | Age: 77
End: 2022-09-23
Payer: MEDICARE

## 2022-09-23 VITALS
DIASTOLIC BLOOD PRESSURE: 72 MMHG | HEIGHT: 63 IN | WEIGHT: 229.38 LBS | TEMPERATURE: 97 F | SYSTOLIC BLOOD PRESSURE: 120 MMHG | BODY MASS INDEX: 40.64 KG/M2 | HEART RATE: 65 BPM | OXYGEN SATURATION: 96 % | RESPIRATION RATE: 20 BRPM

## 2022-09-23 DIAGNOSIS — E03.9 ACQUIRED HYPOTHYROIDISM: ICD-10-CM

## 2022-09-23 DIAGNOSIS — I10 HTN (HYPERTENSION), BENIGN: ICD-10-CM

## 2022-09-23 DIAGNOSIS — Z86.2 HX OF SARCOIDOSIS: ICD-10-CM

## 2022-09-23 DIAGNOSIS — G47.33 OSA (OBSTRUCTIVE SLEEP APNEA): ICD-10-CM

## 2022-09-23 DIAGNOSIS — Z12.31 OTHER SCREENING MAMMOGRAM: ICD-10-CM

## 2022-09-23 DIAGNOSIS — E78.5 DYSLIPIDEMIA: ICD-10-CM

## 2022-09-23 DIAGNOSIS — Z00.00 ENCOUNTER FOR PREVENTIVE HEALTH EXAMINATION: Primary | ICD-10-CM

## 2022-09-23 DIAGNOSIS — E66.01 MORBID OBESITY: ICD-10-CM

## 2022-09-23 PROCEDURE — 99397 PER PM REEVAL EST PAT 65+ YR: CPT | Mod: S$GLB,,, | Performed by: INTERNAL MEDICINE

## 2022-09-23 PROCEDURE — 77067 MAMMO DIGITAL SCREENING BILAT WITH TOMO: ICD-10-PCS | Mod: 26,,, | Performed by: RADIOLOGY

## 2022-09-23 PROCEDURE — 4010F ACE/ARB THERAPY RXD/TAKEN: CPT | Mod: CPTII,S$GLB,, | Performed by: INTERNAL MEDICINE

## 2022-09-23 PROCEDURE — 3008F BODY MASS INDEX DOCD: CPT | Mod: CPTII,S$GLB,, | Performed by: INTERNAL MEDICINE

## 2022-09-23 PROCEDURE — 77063 BREAST TOMOSYNTHESIS BI: CPT | Mod: 26,,, | Performed by: RADIOLOGY

## 2022-09-23 PROCEDURE — 3288F FALL RISK ASSESSMENT DOCD: CPT | Mod: CPTII,S$GLB,, | Performed by: INTERNAL MEDICINE

## 2022-09-23 PROCEDURE — 77063 BREAST TOMOSYNTHESIS BI: CPT | Mod: TC,PO

## 2022-09-23 PROCEDURE — 3078F PR MOST RECENT DIASTOLIC BLOOD PRESSURE < 80 MM HG: ICD-10-PCS | Mod: CPTII,S$GLB,, | Performed by: INTERNAL MEDICINE

## 2022-09-23 PROCEDURE — 99999 PR PBB SHADOW E&M-EST. PATIENT-LVL IV: CPT | Mod: PBBFAC,,, | Performed by: INTERNAL MEDICINE

## 2022-09-23 PROCEDURE — 3074F SYST BP LT 130 MM HG: CPT | Mod: CPTII,S$GLB,, | Performed by: INTERNAL MEDICINE

## 2022-09-23 PROCEDURE — 99397 PR PREVENTIVE VISIT,EST,65 & OVER: ICD-10-PCS | Mod: S$GLB,,, | Performed by: INTERNAL MEDICINE

## 2022-09-23 PROCEDURE — 3074F PR MOST RECENT SYSTOLIC BLOOD PRESSURE < 130 MM HG: ICD-10-PCS | Mod: CPTII,S$GLB,, | Performed by: INTERNAL MEDICINE

## 2022-09-23 PROCEDURE — 77063 MAMMO DIGITAL SCREENING BILAT WITH TOMO: ICD-10-PCS | Mod: 26,,, | Performed by: RADIOLOGY

## 2022-09-23 PROCEDURE — 4010F PR ACE/ARB THEARPY RXD/TAKEN: ICD-10-PCS | Mod: CPTII,S$GLB,, | Performed by: INTERNAL MEDICINE

## 2022-09-23 PROCEDURE — 3008F PR BODY MASS INDEX (BMI) DOCUMENTED: ICD-10-PCS | Mod: CPTII,S$GLB,, | Performed by: INTERNAL MEDICINE

## 2022-09-23 PROCEDURE — 1126F AMNT PAIN NOTED NONE PRSNT: CPT | Mod: CPTII,S$GLB,, | Performed by: INTERNAL MEDICINE

## 2022-09-23 PROCEDURE — 1159F PR MEDICATION LIST DOCUMENTED IN MEDICAL RECORD: ICD-10-PCS | Mod: CPTII,S$GLB,, | Performed by: INTERNAL MEDICINE

## 2022-09-23 PROCEDURE — 3288F PR FALLS RISK ASSESSMENT DOCUMENTED: ICD-10-PCS | Mod: CPTII,S$GLB,, | Performed by: INTERNAL MEDICINE

## 2022-09-23 PROCEDURE — 3078F DIAST BP <80 MM HG: CPT | Mod: CPTII,S$GLB,, | Performed by: INTERNAL MEDICINE

## 2022-09-23 PROCEDURE — 1101F PT FALLS ASSESS-DOCD LE1/YR: CPT | Mod: CPTII,S$GLB,, | Performed by: INTERNAL MEDICINE

## 2022-09-23 PROCEDURE — 99999 PR PBB SHADOW E&M-EST. PATIENT-LVL IV: ICD-10-PCS | Mod: PBBFAC,,, | Performed by: INTERNAL MEDICINE

## 2022-09-23 PROCEDURE — 77067 SCR MAMMO BI INCL CAD: CPT | Mod: 26,,, | Performed by: RADIOLOGY

## 2022-09-23 PROCEDURE — 1101F PR PT FALLS ASSESS DOC 0-1 FALLS W/OUT INJ PAST YR: ICD-10-PCS | Mod: CPTII,S$GLB,, | Performed by: INTERNAL MEDICINE

## 2022-09-23 PROCEDURE — 1126F PR PAIN SEVERITY QUANTIFIED, NO PAIN PRESENT: ICD-10-PCS | Mod: CPTII,S$GLB,, | Performed by: INTERNAL MEDICINE

## 2022-09-23 PROCEDURE — 1159F MED LIST DOCD IN RCRD: CPT | Mod: CPTII,S$GLB,, | Performed by: INTERNAL MEDICINE

## 2022-09-23 NOTE — PROGRESS NOTES
History of present illness:   [age] year lady in today for general health assessment.      Current medications:  All medications are noted and reviewed.      Review of systems:   General: no fever, chills, generalized body aches. No unexpected weight loss.  Eyes:  No visual disturbances.  HEENT:  No hoarseness, dysphagia, ear pain.  Respiratory:  No cough, no shortness of breath.  Cardiovascular: no chest pain, palpitations, cough, exertional limb pain. No edema.  GI: no nausea, vomiting.  No abdominal pain. No change in bowel habits.  No melena, no hematochezia.  : no dysuria. No change in the color or character of the urine. No urinary frequency.  Musculoskeletal: no joint pain or swelling.  Neurologic:  No focal neurological complaints.  No headaches.  Skin:  No rashes or other concerns.  Psych:  No new emotional issues.      Health screenings:  Colonoscopy 2013.    Mammogram scheduled for today.    Vaccinations are up-to-date.  Eye exam up-to-date      Physical examination:   GENERAL:  Alert, appropriately groomed, no acute distress.  VS:  Blood pressure 120/72.  Other vital signs normal.  EYES: sclerae white ,nonicteric. PERRL.  HEENT:  Normocephalic. Ear canals and tympanic membranes normal. Mouth and pharynx normal. No thyromegaly. Trachea midline and freely mobile.  LUNGS:  Clear to ascultation and normal to percussion.  CARDIOVASCULAR:  Normal heart sounds.  No significant murmur. Carotids full bilaterally without bruit.  Pedal pulses intact .  No abdominal bruit.  No peripheral extremity edema.  GI: the abdomen is obese, soft, no distension. No masses , tenderness, organomegaly.    LYMPHATIC:  No axillary, inguinal , cervical adenopathy.  MUSCULOSKELETAL:  Range of motion, stability and strength of the right and left upper and lower extremities normal. No swollen or tender joints  NEUROLOGIC:  DTR's normal. No gross motor or sensory deficits apparent, gait normal.  SKIN:  No rashes.   MS:  Alert,  oriented , affect and mood all appropriate    Data:  Recent health maintenance laboratory data all noted reviewed and all reasonable.        Impression:   [age] year lady with several stable chronic medical issues.      Hypertension controlled.      Dyslipidemia statin therapy.      Hypothyroidism on replacement therapy.      Obstructive sleep apnea on CPAP    Morbid obesity BMI 40.64.      History of sarcoidosis quiescent        Plan:   Continue current pharmacologic regimens.    Return clinic in six months for follow-up.

## 2022-09-24 DIAGNOSIS — H69.92 DYSFUNCTION OF LEFT EUSTACHIAN TUBE: ICD-10-CM

## 2022-09-26 RX ORDER — LEVOCETIRIZINE DIHYDROCHLORIDE 5 MG/1
5 TABLET, FILM COATED ORAL NIGHTLY
Qty: 90 TABLET | Refills: 3 | Status: SHIPPED | OUTPATIENT
Start: 2022-09-26 | End: 2022-12-31 | Stop reason: SDUPTHER

## 2022-09-26 RX ORDER — MONTELUKAST SODIUM 10 MG/1
TABLET ORAL
Qty: 90 TABLET | Refills: 3 | Status: SHIPPED | OUTPATIENT
Start: 2022-09-26 | End: 2023-01-03 | Stop reason: SDUPTHER

## 2022-09-27 PROBLEM — Z86.2 HX OF SARCOIDOSIS: Status: ACTIVE | Noted: 2022-09-27

## 2022-09-27 PROBLEM — E66.01 MORBID OBESITY: Status: ACTIVE | Noted: 2022-09-27

## 2022-10-05 ENCOUNTER — IMMUNIZATION (OUTPATIENT)
Dept: INTERNAL MEDICINE | Facility: CLINIC | Age: 77
End: 2022-10-05
Payer: MEDICARE

## 2022-10-05 DIAGNOSIS — Z23 NEED FOR VACCINATION: Primary | ICD-10-CM

## 2022-10-05 PROCEDURE — 0124A PR IMMUNIZ ADMIN, SARS-COV- 2 COVID-19 VACCINE, 30MCG/0.3ML, BIVALENT, BOOSTER DOSE: CPT | Mod: S$GLB,,, | Performed by: INTERNAL MEDICINE

## 2022-10-05 PROCEDURE — 91312 COVID-19, MRNA, LNP-S, BIVALENT BOOSTER, PF, 30 MCG/0.3 ML DOSE: CPT | Mod: S$GLB,,, | Performed by: INTERNAL MEDICINE

## 2022-10-05 PROCEDURE — 90694 VACC AIIV4 NO PRSRV 0.5ML IM: CPT | Mod: S$GLB,,, | Performed by: INTERNAL MEDICINE

## 2022-10-05 PROCEDURE — G0008 FLU VACCINE - QUADRIVALENT - ADJUVANTED: ICD-10-PCS | Mod: S$GLB,,, | Performed by: INTERNAL MEDICINE

## 2022-10-05 PROCEDURE — 90694 FLU VACCINE - QUADRIVALENT - ADJUVANTED: ICD-10-PCS | Mod: S$GLB,,, | Performed by: INTERNAL MEDICINE

## 2022-10-05 PROCEDURE — G0008 ADMIN INFLUENZA VIRUS VAC: HCPCS | Mod: S$GLB,,, | Performed by: INTERNAL MEDICINE

## 2022-10-05 PROCEDURE — 91312 COVID-19, MRNA, LNP-S, BIVALENT BOOSTER, PF, 30 MCG/0.3 ML DOSE: ICD-10-PCS | Mod: S$GLB,,, | Performed by: INTERNAL MEDICINE

## 2022-10-05 PROCEDURE — 0124A COVID-19, MRNA, LNP-S, BIVALENT BOOSTER, PF, 30 MCG/0.3 ML DOSE: CPT | Mod: CV19,PBBFAC | Performed by: INTERNAL MEDICINE

## 2022-10-05 PROCEDURE — 0124A PR IMMUNIZ ADMIN, SARS-COV- 2 COVID-19 VACCINE, 30MCG/0.3ML, BIVALENT, BOOSTER DOSE: ICD-10-PCS | Mod: S$GLB,,, | Performed by: INTERNAL MEDICINE

## 2022-11-08 ENCOUNTER — OFFICE VISIT (OUTPATIENT)
Dept: OTOLARYNGOLOGY | Facility: CLINIC | Age: 77
End: 2022-11-08
Payer: MEDICARE

## 2022-11-08 VITALS
BODY MASS INDEX: 40.71 KG/M2 | HEART RATE: 81 BPM | SYSTOLIC BLOOD PRESSURE: 127 MMHG | WEIGHT: 229.81 LBS | TEMPERATURE: 97 F | DIASTOLIC BLOOD PRESSURE: 71 MMHG

## 2022-11-08 DIAGNOSIS — J34.89 INTRANASAL SYNECHIAE: ICD-10-CM

## 2022-11-08 DIAGNOSIS — J30.9 ALLERGIC RHINITIS, UNSPECIFIED SEASONALITY, UNSPECIFIED TRIGGER: ICD-10-CM

## 2022-11-08 DIAGNOSIS — D86.89 SARCOID OF NOSE: ICD-10-CM

## 2022-11-08 DIAGNOSIS — G47.33 OSA ON CPAP: ICD-10-CM

## 2022-11-08 DIAGNOSIS — J34.89 NASAL CRUSTING: ICD-10-CM

## 2022-11-08 DIAGNOSIS — J30.89 NON-SEASONAL ALLERGIC RHINITIS, UNSPECIFIED TRIGGER: Primary | ICD-10-CM

## 2022-11-08 PROCEDURE — 99999 PR PBB SHADOW E&M-EST. PATIENT-LVL IV: CPT | Mod: PBBFAC,,, | Performed by: SPECIALIST

## 2022-11-08 PROCEDURE — 99213 OFFICE O/P EST LOW 20 MIN: CPT | Mod: S$GLB,,, | Performed by: SPECIALIST

## 2022-11-08 PROCEDURE — 4010F PR ACE/ARB THEARPY RXD/TAKEN: ICD-10-PCS | Mod: CPTII,S$GLB,, | Performed by: SPECIALIST

## 2022-11-08 PROCEDURE — 1159F MED LIST DOCD IN RCRD: CPT | Mod: CPTII,S$GLB,, | Performed by: SPECIALIST

## 2022-11-08 PROCEDURE — 1126F AMNT PAIN NOTED NONE PRSNT: CPT | Mod: CPTII,S$GLB,, | Performed by: SPECIALIST

## 2022-11-08 PROCEDURE — 99213 PR OFFICE/OUTPT VISIT, EST, LEVL III, 20-29 MIN: ICD-10-PCS | Mod: S$GLB,,, | Performed by: SPECIALIST

## 2022-11-08 PROCEDURE — 1159F PR MEDICATION LIST DOCUMENTED IN MEDICAL RECORD: ICD-10-PCS | Mod: CPTII,S$GLB,, | Performed by: SPECIALIST

## 2022-11-08 PROCEDURE — 4010F ACE/ARB THERAPY RXD/TAKEN: CPT | Mod: CPTII,S$GLB,, | Performed by: SPECIALIST

## 2022-11-08 PROCEDURE — 1101F PT FALLS ASSESS-DOCD LE1/YR: CPT | Mod: CPTII,S$GLB,, | Performed by: SPECIALIST

## 2022-11-08 PROCEDURE — 3078F PR MOST RECENT DIASTOLIC BLOOD PRESSURE < 80 MM HG: ICD-10-PCS | Mod: CPTII,S$GLB,, | Performed by: SPECIALIST

## 2022-11-08 PROCEDURE — 3288F FALL RISK ASSESSMENT DOCD: CPT | Mod: CPTII,S$GLB,, | Performed by: SPECIALIST

## 2022-11-08 PROCEDURE — 3074F SYST BP LT 130 MM HG: CPT | Mod: CPTII,S$GLB,, | Performed by: SPECIALIST

## 2022-11-08 PROCEDURE — 1101F PR PT FALLS ASSESS DOC 0-1 FALLS W/OUT INJ PAST YR: ICD-10-PCS | Mod: CPTII,S$GLB,, | Performed by: SPECIALIST

## 2022-11-08 PROCEDURE — 3074F PR MOST RECENT SYSTOLIC BLOOD PRESSURE < 130 MM HG: ICD-10-PCS | Mod: CPTII,S$GLB,, | Performed by: SPECIALIST

## 2022-11-08 PROCEDURE — 3078F DIAST BP <80 MM HG: CPT | Mod: CPTII,S$GLB,, | Performed by: SPECIALIST

## 2022-11-08 PROCEDURE — 1126F PR PAIN SEVERITY QUANTIFIED, NO PAIN PRESENT: ICD-10-PCS | Mod: CPTII,S$GLB,, | Performed by: SPECIALIST

## 2022-11-08 PROCEDURE — 3288F PR FALLS RISK ASSESSMENT DOCUMENTED: ICD-10-PCS | Mod: CPTII,S$GLB,, | Performed by: SPECIALIST

## 2022-11-08 PROCEDURE — 99999 PR PBB SHADOW E&M-EST. PATIENT-LVL IV: ICD-10-PCS | Mod: PBBFAC,,, | Performed by: SPECIALIST

## 2022-11-08 NOTE — PROGRESS NOTES
Subjective:       Patient ID: Eboni Nicholas is a 77 y.o. female.    Chief Complaint: Follow-up    The patient is returning for follow-up visit.  There are multiple issues to discuss:  1. Allergic rhinitis:   The patient is noticing significant nasal crusting.  The crusting light yellow in color but otherwise nasal secretions are clear.  She has been using levo cetirizine in the morning, montelukast night and Flonase once daily.  2. Headaches:  Using the above allergy regimen she has not had any headaches  3. Peripheral vertigo, right:  She is had no further episodes of vertigo.    4. Sarcoidosis of the nose:  She is having no problems relative to the sarcoidosis in her nose.  5. Obstructive sleep apnea on CPAP:  She is using CPAP and finds it helpful.   6. Hearing loss:  She is noticed no significant change in her hearing.           Review of Systems     Constitutional: Positive for fatigue.  Negative for appetite change, chills, fever and unexpected weight loss.      HENT: Positive for ear pain, hearing loss, postnasal drip, runny nose, sinus pressure and sore throat.  Negative for ear discharge, ear infection, facial swelling, mouth sores, nosebleeds, ringing in the ears, sinus infection, stuffy nose, tonsil infection, dental problems, trouble swallowing and voice change.      Eyes:  Negative for change in eyesight, eye drainage, eye itching and photophobia.     Respiratory:  Positive for cough and sleep apnea. Negative for shortness of breath, snoring and wheezing.      Cardiovascular:  Negative for chest pain, foot swelling, irregular heartbeat and swollen veins.     Gastrointestinal:  Negative for abdominal pain, acid reflux, constipation, diarrhea, heartburn and vomiting.     Genitourinary: Negative for difficulty urinating, sexual problems and frequent urination.     Musc: Positive for aching muscles. Negative for aching joints, back pain and neck pain.     Skin: Negative for rash.     Allergy: Negative for  food allergies and seasonal allergies.     Endocrine: Negative for cold intolerance and heat intolerance.      Neurological: Positive for dizziness, headaches and light-headedness. Negative for seizures and tremors.      Hematologic: Negative for bruises/bleeds easily and swollen glands.      Psychiatric: Negative for decreased concentration, depression, nervous/anxious and sleep disturbance.              Objective:      Physical Exam  Vitals and nursing note reviewed.   Constitutional:       General: She is awake.      Appearance: Normal appearance. She is well-developed and well-groomed. She is obese.   HENT:      Head: Normocephalic.      Jaw: There is normal jaw occlusion.      Salivary Glands: Right salivary gland is not diffusely enlarged. Left salivary gland is not diffusely enlarged.      Right Ear: Hearing, ear canal and external ear normal. There is impacted cerumen. Tympanic membrane is retracted. Tympanic membrane has decreased mobility ( decreased mobility on pneumatic no otoscopy).      Left Ear: Hearing, ear canal and external ear normal. Tympanic membrane is retracted. Tympanic membrane has decreased mobility (Non mobile on pneumatic otoscopy).      Nose: Septal deviation ( to the right), mucosal edema (cyanotic, boggy inferior turbinates bilaterally) and rhinorrhea (clear mucus bilaterally) present. No nasal deformity. Rhinorrhea is clear.      Right Turbinates: Enlarged and pale.      Left Turbinates: Enlarged and pale.      Mouth/Throat:      Lips: No lesions.      Mouth: Mucous membranes are moist. No oral lesions.      Dentition: No gum lesions.      Tongue: Lesions ( enlargement base of tongue with narrowing of oropharyngeal inlet) present.      Palate: Lesions ( long/thin soft palate) present. No mass.      Pharynx: Oropharynx is clear. Uvula midline.      Tonsils: No tonsillar exudate. 2+ on the right. 2+ on the left.      Comments: Oral cavity-tonsils 2+/4+ enlarged, long thin soft palate,  enlarged base of tongue with narrowing of the oropharyngeal inlet  Eyes:      General: Lids are normal.         Right eye: No discharge.         Left eye: No discharge.      Conjunctiva/sclera:      Right eye: Right conjunctiva is injected. No exudate.     Left eye: Left conjunctiva is injected. No exudate.     Pupils: Pupils are equal, round, and reactive to light.   Neck:      Thyroid: No thyroid mass or thyromegaly.      Trachea: Trachea normal. No tracheal deviation.   Cardiovascular:      Rate and Rhythm: Normal rate and regular rhythm.      Pulses: Normal pulses.      Heart sounds: Normal heart sounds.   Pulmonary:      Effort: Pulmonary effort is normal.      Breath sounds: Normal breath sounds. No stridor. No decreased breath sounds, wheezing, rhonchi or rales.   Abdominal:      General: Bowel sounds are normal.      Palpations: Abdomen is soft.      Tenderness: There is no abdominal tenderness.   Musculoskeletal:         General: Normal range of motion.      Cervical back: Normal range of motion. No muscular tenderness.   Lymphadenopathy:      Head:      Right side of head: No submental, submandibular, preauricular, posterior auricular or occipital adenopathy.      Left side of head: No submental, submandibular, preauricular, posterior auricular or occipital adenopathy.      Cervical: No cervical adenopathy.   Skin:     General: Skin is warm and dry.      Findings: No petechiae or rash.      Nails: There is no clubbing.   Neurological:      Mental Status: She is alert and oriented to person, place, and time.      Cranial Nerves: No cranial nerve deficit.      Sensory: No sensory deficit.      Gait: Gait normal.   Psychiatric:         Speech: Speech normal.         Behavior: Behavior normal. Behavior is cooperative.         Thought Content: Thought content normal.         Judgment: Judgment normal.         Assessment:       1. Non-seasonal allergic rhinitis, unspecified trigger    2. CHIDI on CPAP    3.  Sarcoid of nose    4. Nasal crusting    5. Allergic rhinitis, unspecified seasonality, unspecified trigger    6. Intranasal synechiae          Plan:       I  will have the patient use saline nasal irrigations.  I am providing her with a NeilMed irrigations bottle.  I will also have her use NeilMed nasal gel spray as a nasal moisturizer.  If symptoms are controlled I will recheck her in 3 months.  Otherwise, I will recheck her on an as-needed basis.

## 2022-12-27 ENCOUNTER — OFFICE VISIT (OUTPATIENT)
Dept: OPTOMETRY | Facility: CLINIC | Age: 77
End: 2022-12-27
Payer: MEDICARE

## 2022-12-27 ENCOUNTER — CLINICAL SUPPORT (OUTPATIENT)
Dept: OPHTHALMOLOGY | Facility: CLINIC | Age: 77
End: 2022-12-27
Payer: MEDICARE

## 2022-12-27 DIAGNOSIS — H40.1121 PRIMARY OPEN ANGLE GLAUCOMA (POAG) OF LEFT EYE, MILD STAGE: ICD-10-CM

## 2022-12-27 DIAGNOSIS — H40.1112 PRIMARY OPEN ANGLE GLAUCOMA (POAG) OF RIGHT EYE, MODERATE STAGE: ICD-10-CM

## 2022-12-27 DIAGNOSIS — Z98.890 HISTORY OF LASER IRIDOTOMY: ICD-10-CM

## 2022-12-27 DIAGNOSIS — H40.039 ANATOMICAL NARROW ANGLE: ICD-10-CM

## 2022-12-27 DIAGNOSIS — H40.1112 PRIMARY OPEN ANGLE GLAUCOMA (POAG) OF RIGHT EYE, MODERATE STAGE: Primary | ICD-10-CM

## 2022-12-27 DIAGNOSIS — H25.13 NUCLEAR SCLEROSIS OF BOTH EYES: ICD-10-CM

## 2022-12-27 PROCEDURE — 99213 PR OFFICE/OUTPT VISIT, EST, LEVL III, 20-29 MIN: ICD-10-PCS | Mod: HCNC,S$GLB,, | Performed by: OPTOMETRIST

## 2022-12-27 PROCEDURE — 99999 PR PBB SHADOW E&M-EST. PATIENT-LVL III: ICD-10-PCS | Mod: PBBFAC,HCNC,, | Performed by: OPTOMETRIST

## 2022-12-27 PROCEDURE — 1101F PT FALLS ASSESS-DOCD LE1/YR: CPT | Mod: HCNC,CPTII,S$GLB, | Performed by: OPTOMETRIST

## 2022-12-27 PROCEDURE — 4010F PR ACE/ARB THEARPY RXD/TAKEN: ICD-10-PCS | Mod: HCNC,CPTII,S$GLB, | Performed by: OPTOMETRIST

## 2022-12-27 PROCEDURE — 1159F MED LIST DOCD IN RCRD: CPT | Mod: HCNC,CPTII,S$GLB, | Performed by: OPTOMETRIST

## 2022-12-27 PROCEDURE — 1126F PR PAIN SEVERITY QUANTIFIED, NO PAIN PRESENT: ICD-10-PCS | Mod: HCNC,CPTII,S$GLB, | Performed by: OPTOMETRIST

## 2022-12-27 PROCEDURE — 1159F PR MEDICATION LIST DOCUMENTED IN MEDICAL RECORD: ICD-10-PCS | Mod: HCNC,CPTII,S$GLB, | Performed by: OPTOMETRIST

## 2022-12-27 PROCEDURE — 1101F PR PT FALLS ASSESS DOC 0-1 FALLS W/OUT INJ PAST YR: ICD-10-PCS | Mod: HCNC,CPTII,S$GLB, | Performed by: OPTOMETRIST

## 2022-12-27 PROCEDURE — 4010F ACE/ARB THERAPY RXD/TAKEN: CPT | Mod: HCNC,CPTII,S$GLB, | Performed by: OPTOMETRIST

## 2022-12-27 PROCEDURE — 1126F AMNT PAIN NOTED NONE PRSNT: CPT | Mod: HCNC,CPTII,S$GLB, | Performed by: OPTOMETRIST

## 2022-12-27 PROCEDURE — 1160F RVW MEDS BY RX/DR IN RCRD: CPT | Mod: HCNC,CPTII,S$GLB, | Performed by: OPTOMETRIST

## 2022-12-27 PROCEDURE — 1160F PR REVIEW ALL MEDS BY PRESCRIBER/CLIN PHARMACIST DOCUMENTED: ICD-10-PCS | Mod: HCNC,CPTII,S$GLB, | Performed by: OPTOMETRIST

## 2022-12-27 PROCEDURE — 99213 OFFICE O/P EST LOW 20 MIN: CPT | Mod: HCNC,S$GLB,, | Performed by: OPTOMETRIST

## 2022-12-27 PROCEDURE — 3288F PR FALLS RISK ASSESSMENT DOCUMENTED: ICD-10-PCS | Mod: HCNC,CPTII,S$GLB, | Performed by: OPTOMETRIST

## 2022-12-27 PROCEDURE — 99999 PR PBB SHADOW E&M-EST. PATIENT-LVL III: CPT | Mod: PBBFAC,HCNC,, | Performed by: OPTOMETRIST

## 2022-12-27 PROCEDURE — 3288F FALL RISK ASSESSMENT DOCD: CPT | Mod: HCNC,CPTII,S$GLB, | Performed by: OPTOMETRIST

## 2022-12-27 NOTE — PROGRESS NOTES
HPI    76 Y/o female is here for 6 mos iop check with no C/o about ocular health   Pt denies pain and discomfort   No f/f    Eye med: Dorzolamide  bid ou   Timolol bid  OU   Latanaprost qhs OU    Last edited by Rod Jain MA on 12/27/2022  3:32 PM.            Assessment /Plan     For exam results, see Encounter Report.    Primary open angle glaucoma (POAG) of right eye, moderate stage    Primary open angle glaucoma (POAG) of left eye, mild stage    Anatomical narrow angle    History of laser iridotomy    Nuclear sclerosis of both eyes      1,2. IOP still low and stable, Prev allergy to Brimonidine causing redness, cont Latanaprost qhs ou, timolol, bid ou and trusopt 3x/day right and 2x/day left eye, ,today HVf stable od with inf arcuate and normal os, target 12-14 od. nerve eval stable, today OCT stable with rnfl loss od, normal os, prev hvf with inf defects od and normal os, fam history of glaucoma but no blindness, pachy normal. RTC 4 mos iop ck  3,4. IOP normal and PI patent  5.Educated pt on presence of cataracts and effects on vision. No surgery at this time. Recheck in one year.

## 2022-12-27 NOTE — PROGRESS NOTES
Oct done ou     24-2  SF DONE OU       REL & FIX =  GOOD OU      COOP=      GOOD       NO ALLERGIES TO LATEX OR ADHESIVES AT THIS TIME    JTHOMAS    NO NEW MRX     USED WRX 11/20       PL + .50 X 12   OD    -.25 + .75 X 30   OS

## 2023-01-05 NOTE — ADDENDUM NOTE
Addended by: MATEUSZ ANAYA on: 8/5/2019 12:18 PM     Modules accepted: Orders     Detail Level: Detailed Detail Level: Zone Hide Additional Notes?: No

## 2023-01-20 ENCOUNTER — PES CALL (OUTPATIENT)
Dept: ADMINISTRATIVE | Facility: CLINIC | Age: 78
End: 2023-01-20
Payer: MEDICARE

## 2023-02-01 NOTE — PROGRESS NOTES
[facility] Note    Chief Complaint      Chief Complaint   Patient presents with    Health Risk Assessment       History of Present Illness      Eboni Nicholas is a 77 y.o. female who presents today for   Chief Complaint   Patient presents with    Health Risk Assessment         Ms. Nicholas is a very pleasant 76 y/o female who is new to me and an established patient under [facility]. She presents to clinic today for her AWV required by medicare. She denies any SOB, chest pain, N/V, unintentional weight loss, loss of appetite, fatigue, diarrhea, constipation. She is active daily and remains independent with ADL's.        Review of Systems   Constitutional: Negative.    HENT: Negative.     Eyes: Negative.    Respiratory: Negative.     Cardiovascular: Negative.    Gastrointestinal: Negative.    Genitourinary: Negative.    Musculoskeletal: Negative.    Skin: Negative.    Neurological: Negative.    Endo/Heme/Allergies: Negative.    Psychiatric/Behavioral: Negative.     All 12 systems otherwise negative.     Family History:  family history includes Alcohol abuse in her brother; Cancer in her father; Coronary artery disease in her maternal grandfather; Diabetes in her maternal grandmother; Diabetes type I in her son; Heart disease in her brother and brother; Liver disease in her brother and brother; No Known Problems in her daughter, daughter, paternal grandfather, and paternal grandmother; Rheum arthritis in her sister; Stroke in her mother; Transient ischemic attack in her mother.   Family history was reviewed with patient.     Medications:  Outpatient Encounter Medications as of 2/14/2023   Medication Sig Dispense Refill    aspirin (ECOTRIN) 81 MG EC tablet Take 81 mg by mouth once daily.      blood pressure monitor Kit       calcium-vitamin D 250-100 mg-unit per tablet Take 1 tablet by mouth once daily.       co-enzyme Q-10 50 mg capsule Take 50 mg by mouth once daily.      dorzolamide (TRUSOPT)  2 % ophthalmic solution INSTILL 1 DROP INTO BOTH EYES TWICE A DAY 10 mL 6    EScitalopram oxalate (LEXAPRO) 20 MG tablet TAKE 1 TABLET BY MOUTH EVERY DAY 90 tablet 1    fluticasone propionate (FLONASE) 50 mcg/actuation nasal spray 2 sprays (100 mcg total) by Each Nostril route once daily. 18.2 mL 11    latanoprost 0.005 % ophthalmic solution PLACE 1 DROP INTO BOTH EYES ONCE DAILY. 7.5 mL 3    levocetirizine (XYZAL) 5 MG tablet Take 1 tablet (5 mg total) by mouth every evening. 1 tablet by mouth every morning 90 tablet 3    levothyroxine (SYNTHROID) 100 MCG tablet Take 1 tablet (100 mcg total) by mouth once daily. 90 tablet 1    losartan (COZAAR) 50 MG tablet TAKE ONE TABLET BY MOUTH DAILY, THEN MAY REPEAT THE DOSE IF BP > 140/90 180 tablet 3    montelukast (SINGULAIR) 10 mg tablet One tablet by mouth every evening 90 tablet 3    multivitamin (THERAGRAN) per tablet Take 1 tablet by mouth once daily.      simvastatin (ZOCOR) 40 MG tablet Take 1 tablet (40 mg total) by mouth every evening. 90 tablet 1    timolol maleate 0.5% (TIMOPTIC) 0.5 % Drop Place 1 drop into both eyes 2 (two) times daily. 15 mL 3    [DISCONTINUED] levothyroxine (SYNTHROID) 100 MCG tablet Take 1 tablet (100 mcg total) by mouth once daily. 90 tablet 1    [DISCONTINUED] meclizine (ANTIVERT) 12.5 mg tablet Take 1 tablet (12.5 mg total) by mouth 3 (three) times daily as needed for Dizziness. (Patient not taking: Reported on 2/14/2023) 30 tablet 1     No facility-administered encounter medications on file as of 2/14/2023.     Review for Opioid Screening: Pt does not have Rx for Opioids    Review for Substance Use Disorders: Patient does not use controlled substances       Allergies:  Review of patient's allergies indicates:   Allergen Reactions    Brimonidine Itching     Itchy red eyes with brimonidine    Phenobarbital Rash    Sulfa (sulfonamide antibiotics) Rash       Health Maintenance:  Health Maintenance   Topic Date Due    TETANUS VACCINE   02/15/2023 (Originally 10/10/1963)    Lipid Panel  09/16/2027    Hepatitis C Screening  Completed    DEXA Scan  Discontinued     Health Maintenance Topics with due status: Not Due       Topic Last Completion Date    Colorectal Cancer Screening 01/13/2021    Lipid Panel 09/16/2022       Physical Exam       ]    Physical Exam  Constitutional:       Appearance: Normal appearance. She is obese.   HENT:      Head: Normocephalic and atraumatic.      Nose: Nose normal.      Mouth/Throat:      Mouth: Mucous membranes are moist.      Pharynx: Oropharynx is clear.   Eyes:      Extraocular Movements: Extraocular movements intact.      Conjunctiva/sclera: Conjunctivae normal.      Pupils: Pupils are equal, round, and reactive to light.   Cardiovascular:      Rate and Rhythm: Normal rate and regular rhythm.      Pulses: Normal pulses.      Heart sounds: Normal heart sounds.   Pulmonary:      Effort: Pulmonary effort is normal.      Breath sounds: Normal breath sounds.   Abdominal:      General: Abdomen is flat. Bowel sounds are normal.      Palpations: Abdomen is soft.   Musculoskeletal:         General: Normal range of motion.      Cervical back: Normal range of motion and neck supple.   Skin:     General: Skin is warm and dry.      Capillary Refill: Capillary refill takes less than 2 seconds.   Neurological:      General: No focal deficit present.      Mental Status: She is alert and oriented to person, place, and time. Mental status is at baseline.   Psychiatric:         Mood and Affect: Mood normal.         Behavior: Behavior normal.         Thought Content: Thought content normal.         Judgment: Judgment normal.          Assessment/Plan     Eboni Nicholas is a 77 y.o.female with:    Encounter for preventive health examination    As above, continue current medications and maintain follow up with specialists.  Return to clinic as needed.    I spent 42 minutes on the day of this encounter for preparing, evaluating,  examining, treating, and discussing plan of care with this patient.  Greater than 50% of this time was spent face to face with patient.  All questions were answered to patient's satisfaction.         Rose Bowman, STUART-C  [facility]                  I offered to discuss advanced care planning, including how to pick a person who would make decisions for you if you were unable to make them for yourself, called a health care power of , and what kind of decisions you might make such as use of life sustaining treatments such as ventilators and tube feeding when faced with a life limiting illness recorded on a living will that they will need to know. (How you want to be cared for as you near the end of your natural life)     X Patient is interested in learning more about how to make advanced directives.  I provided them paperwork and offered to discuss this with them.  I offered to discuss advanced care planning, including how to pick a person who would make decisions for you if you were unable to make them for yourself, called a health care power of , and what kind of decisions you might make such as use of life sustaining treatments such as ventilators and tube feeding when faced with a life limiting illness recorded on a living will that they will need to know. (How you want to be cared for as you near the end of your natural life)     X Patient is interested in learning more about how to make advanced directives.  I provided them paperwork and offered to discuss this with them.

## 2023-02-07 DIAGNOSIS — Z00.00 ENCOUNTER FOR MEDICARE ANNUAL WELLNESS EXAM: ICD-10-CM

## 2023-02-09 DIAGNOSIS — Z00.00 ENCOUNTER FOR MEDICARE ANNUAL WELLNESS EXAM: ICD-10-CM

## 2023-02-13 ENCOUNTER — TELEPHONE (OUTPATIENT)
Dept: ADMINISTRATIVE | Facility: CLINIC | Age: 78
End: 2023-02-13
Payer: MEDICARE

## 2023-02-14 ENCOUNTER — OFFICE VISIT (OUTPATIENT)
Dept: PRIMARY CARE CLINIC | Facility: CLINIC | Age: 78
End: 2023-02-14
Payer: MEDICARE

## 2023-02-14 ENCOUNTER — OFFICE VISIT (OUTPATIENT)
Dept: OTOLARYNGOLOGY | Facility: CLINIC | Age: 78
End: 2023-02-14
Payer: MEDICARE

## 2023-02-14 VITALS
BODY MASS INDEX: 40.71 KG/M2 | WEIGHT: 229.81 LBS | TEMPERATURE: 97 F | SYSTOLIC BLOOD PRESSURE: 132 MMHG | DIASTOLIC BLOOD PRESSURE: 87 MMHG | HEART RATE: 72 BPM

## 2023-02-14 DIAGNOSIS — H81.391 PERIPHERAL VERTIGO INVOLVING RIGHT EAR: ICD-10-CM

## 2023-02-14 DIAGNOSIS — J30.89 NON-SEASONAL ALLERGIC RHINITIS, UNSPECIFIED TRIGGER: Primary | ICD-10-CM

## 2023-02-14 DIAGNOSIS — Z86.2 HX OF SARCOIDOSIS: ICD-10-CM

## 2023-02-14 DIAGNOSIS — R51.9 NONINTRACTABLE EPISODIC HEADACHE, UNSPECIFIED HEADACHE TYPE: ICD-10-CM

## 2023-02-14 DIAGNOSIS — H90.A21 SENSORINEURAL HEARING LOSS (SNHL) OF RIGHT EAR WITH RESTRICTED HEARING OF LEFT EAR: ICD-10-CM

## 2023-02-14 DIAGNOSIS — Z00.00 ENCOUNTER FOR PREVENTIVE HEALTH EXAMINATION: Primary | ICD-10-CM

## 2023-02-14 DIAGNOSIS — J34.89 NASAL CRUSTING: ICD-10-CM

## 2023-02-14 PROCEDURE — 3288F PR FALLS RISK ASSESSMENT DOCUMENTED: ICD-10-PCS | Mod: HCNC,CPTII,S$GLB, | Performed by: NURSE PRACTITIONER

## 2023-02-14 PROCEDURE — 1160F RVW MEDS BY RX/DR IN RCRD: CPT | Mod: HCNC,CPTII,S$GLB, | Performed by: NURSE PRACTITIONER

## 2023-02-14 PROCEDURE — 1126F AMNT PAIN NOTED NONE PRSNT: CPT | Mod: HCNC,CPTII,S$GLB, | Performed by: SPECIALIST

## 2023-02-14 PROCEDURE — 1170F PR FUNCTIONAL STATUS ASSESSED: ICD-10-PCS | Mod: HCNC,CPTII,S$GLB, | Performed by: NURSE PRACTITIONER

## 2023-02-14 PROCEDURE — 99999 PR PBB SHADOW E&M-EST. PATIENT-LVL III: ICD-10-PCS | Mod: PBBFAC,HCNC,, | Performed by: NURSE PRACTITIONER

## 2023-02-14 PROCEDURE — 1159F PR MEDICATION LIST DOCUMENTED IN MEDICAL RECORD: ICD-10-PCS | Mod: HCNC,CPTII,S$GLB, | Performed by: NURSE PRACTITIONER

## 2023-02-14 PROCEDURE — 3075F PR MOST RECENT SYSTOLIC BLOOD PRESS GE 130-139MM HG: ICD-10-PCS | Mod: HCNC,CPTII,S$GLB, | Performed by: SPECIALIST

## 2023-02-14 PROCEDURE — 1101F PR PT FALLS ASSESS DOC 0-1 FALLS W/OUT INJ PAST YR: ICD-10-PCS | Mod: HCNC,CPTII,S$GLB, | Performed by: NURSE PRACTITIONER

## 2023-02-14 PROCEDURE — 3079F DIAST BP 80-89 MM HG: CPT | Mod: HCNC,CPTII,S$GLB, | Performed by: SPECIALIST

## 2023-02-14 PROCEDURE — 1159F MED LIST DOCD IN RCRD: CPT | Mod: HCNC,CPTII,S$GLB, | Performed by: NURSE PRACTITIONER

## 2023-02-14 PROCEDURE — 99999 PR PBB SHADOW E&M-EST. PATIENT-LVL III: CPT | Mod: PBBFAC,HCNC,, | Performed by: NURSE PRACTITIONER

## 2023-02-14 PROCEDURE — 99213 PR OFFICE/OUTPT VISIT, EST, LEVL III, 20-29 MIN: ICD-10-PCS | Mod: HCNC,S$GLB,, | Performed by: SPECIALIST

## 2023-02-14 PROCEDURE — G0439 PR MEDICARE ANNUAL WELLNESS SUBSEQUENT VISIT: ICD-10-PCS | Mod: HCNC,S$GLB,, | Performed by: NURSE PRACTITIONER

## 2023-02-14 PROCEDURE — 3075F SYST BP GE 130 - 139MM HG: CPT | Mod: HCNC,CPTII,S$GLB, | Performed by: SPECIALIST

## 2023-02-14 PROCEDURE — 1101F PT FALLS ASSESS-DOCD LE1/YR: CPT | Mod: HCNC,CPTII,S$GLB, | Performed by: NURSE PRACTITIONER

## 2023-02-14 PROCEDURE — 99999 PR PBB SHADOW E&M-EST. PATIENT-LVL IV: CPT | Mod: PBBFAC,HCNC,, | Performed by: SPECIALIST

## 2023-02-14 PROCEDURE — 3288F FALL RISK ASSESSMENT DOCD: CPT | Mod: HCNC,CPTII,S$GLB, | Performed by: NURSE PRACTITIONER

## 2023-02-14 PROCEDURE — 1126F PR PAIN SEVERITY QUANTIFIED, NO PAIN PRESENT: ICD-10-PCS | Mod: HCNC,CPTII,S$GLB, | Performed by: SPECIALIST

## 2023-02-14 PROCEDURE — 1160F PR REVIEW ALL MEDS BY PRESCRIBER/CLIN PHARMACIST DOCUMENTED: ICD-10-PCS | Mod: HCNC,CPTII,S$GLB, | Performed by: NURSE PRACTITIONER

## 2023-02-14 PROCEDURE — 99213 OFFICE O/P EST LOW 20 MIN: CPT | Mod: HCNC,S$GLB,, | Performed by: SPECIALIST

## 2023-02-14 PROCEDURE — 3079F PR MOST RECENT DIASTOLIC BLOOD PRESSURE 80-89 MM HG: ICD-10-PCS | Mod: HCNC,CPTII,S$GLB, | Performed by: SPECIALIST

## 2023-02-14 PROCEDURE — 99999 PR PBB SHADOW E&M-EST. PATIENT-LVL IV: ICD-10-PCS | Mod: PBBFAC,HCNC,, | Performed by: SPECIALIST

## 2023-02-14 PROCEDURE — G0439 PPPS, SUBSEQ VISIT: HCPCS | Mod: HCNC,S$GLB,, | Performed by: NURSE PRACTITIONER

## 2023-02-14 PROCEDURE — 1170F FXNL STATUS ASSESSED: CPT | Mod: HCNC,CPTII,S$GLB, | Performed by: NURSE PRACTITIONER

## 2023-02-14 NOTE — PROGRESS NOTES
Subjective:       Patient ID: Eboni Nicholas is a 77 y.o. female.    Chief Complaint: Follow-up (With being better)      The patient is returning for follow-up visit.  There are multiple issues to discuss:  1. Allergic rhinitis:  She has done very well since her last visit.  Since the patient starting using nasal gel spray the crusting in her nose has resolved.    She has been using levo cetirizine in the morning, montelukast night and Flonase once daily.  2. Headaches:  Using the above allergy regimen she has not had any headaches  3. Peripheral vertigo, right:  She is had no further episodes of vertigo.    4. Sarcoidosis of the nose:  She is having no problems relative to the sarcoidosis in her nose.  5. Obstructive sleep apnea on CPAP:  She is using CPAP and finds it helpful.  She does use a nasal mask device and finds it difficult to use.    6. Hearing loss:  She is noticed no significant change in her hearing.           Review of Systems     Constitutional: Positive for fatigue.  Negative for appetite change, chills, fever and unexpected weight loss.      HENT: Positive for ear pain, hearing loss, postnasal drip, runny nose, sinus pressure and sore throat.  Negative for ear discharge, ear infection, facial swelling, mouth sores, nosebleeds, ringing in the ears, sinus infection, stuffy nose, tonsil infection, dental problems, trouble swallowing and voice change.      Eyes:  Negative for change in eyesight, eye drainage, eye itching and photophobia.     Respiratory:  Positive for cough and sleep apnea. Negative for shortness of breath, snoring and wheezing.      Cardiovascular:  Negative for chest pain, foot swelling, irregular heartbeat and swollen veins.     Gastrointestinal:  Negative for abdominal pain, acid reflux, constipation, diarrhea, heartburn and vomiting.     Genitourinary: Negative for difficulty urinating, sexual problems and frequent urination.     Musc: Positive for aching muscles. Negative for  aching joints, back pain and neck pain.     Skin: Negative for rash.     Allergy: Negative for food allergies and seasonal allergies.     Endocrine: Negative for cold intolerance and heat intolerance.      Neurological: Positive for dizziness, headaches and light-headedness. Negative for seizures and tremors.      Hematologic: Negative for bruises/bleeds easily and swollen glands.      Psychiatric: Negative for decreased concentration, depression, nervous/anxious and sleep disturbance.              Objective:      Physical Exam  Vitals and nursing note reviewed.   Constitutional:       General: She is awake.      Appearance: Normal appearance. She is well-developed and well-groomed. She is obese.   HENT:      Head: Normocephalic.      Jaw: There is normal jaw occlusion.      Salivary Glands: Right salivary gland is not diffusely enlarged. Left salivary gland is not diffusely enlarged.      Right Ear: Hearing, ear canal and external ear normal. There is impacted cerumen. Tympanic membrane is retracted. Tympanic membrane has decreased mobility ( decreased mobility on pneumatic no otoscopy).      Left Ear: Hearing, ear canal and external ear normal. Tympanic membrane is retracted. Tympanic membrane has decreased mobility (Non mobile on pneumatic otoscopy).      Nose: Septal deviation ( to the right), mucosal edema (cyanotic, boggy inferior turbinates bilaterally) and rhinorrhea (clear mucus bilaterally) present. No nasal deformity. Rhinorrhea is clear.      Right Turbinates: Enlarged and pale.      Left Turbinates: Enlarged and pale.      Mouth/Throat:      Lips: No lesions.      Mouth: Mucous membranes are moist. No oral lesions.      Dentition: No gum lesions.      Tongue: Lesions ( enlargement base of tongue with narrowing of oropharyngeal inlet) present.      Palate: Lesions ( long/thin soft palate) present. No mass.      Pharynx: Oropharynx is clear. Uvula midline.      Tonsils: No tonsillar exudate. 2+ on the  right. 2+ on the left.      Comments: Oral cavity-tonsils 2+/4+ enlarged, long thin soft palate, enlarged base of tongue with narrowing of the oropharyngeal inlet  Eyes:      General: Lids are normal.         Right eye: No discharge.         Left eye: No discharge.      Conjunctiva/sclera:      Right eye: Right conjunctiva is injected. No exudate.     Left eye: Left conjunctiva is injected. No exudate.     Pupils: Pupils are equal, round, and reactive to light.   Neck:      Thyroid: No thyroid mass or thyromegaly.      Trachea: Trachea normal. No tracheal deviation.   Cardiovascular:      Rate and Rhythm: Normal rate and regular rhythm.      Pulses: Normal pulses.      Heart sounds: Normal heart sounds.   Pulmonary:      Effort: Pulmonary effort is normal.      Breath sounds: Normal breath sounds. No stridor. No decreased breath sounds, wheezing, rhonchi or rales.   Abdominal:      General: Bowel sounds are normal.      Palpations: Abdomen is soft.      Tenderness: There is no abdominal tenderness.   Musculoskeletal:         General: Normal range of motion.      Cervical back: Normal range of motion. No muscular tenderness.   Lymphadenopathy:      Head:      Right side of head: No submental, submandibular, preauricular, posterior auricular or occipital adenopathy.      Left side of head: No submental, submandibular, preauricular, posterior auricular or occipital adenopathy.      Cervical: No cervical adenopathy.   Skin:     General: Skin is warm and dry.      Findings: No petechiae or rash.      Nails: There is no clubbing.   Neurological:      Mental Status: She is alert and oriented to person, place, and time.      Cranial Nerves: No cranial nerve deficit.      Sensory: No sensory deficit.      Gait: Gait normal.   Psychiatric:         Speech: Speech normal.         Behavior: Behavior normal. Behavior is cooperative.         Thought Content: Thought content normal.         Judgment: Judgment normal.            Assessment:       1. Non-seasonal allergic rhinitis, unspecified trigger    2. Nasal crusting    3. Peripheral vertigo involving right ear    4. Sensorineural hearing loss (SNHL) of right ear with restricted hearing of left ear    5. Nonintractable episodic headache, unspecified headache type    6. Hx of sarcoidosis            Plan:       I   am recommending that she try using a nasal pillow type device.  She will continue with other medications.  I will recheck her in 6 months, or sooner on an as-needed basis.

## 2023-02-14 NOTE — PATIENT INSTRUCTIONS
Counseling and Referral of Other Preventative  (Italic type indicates deductible and co-insurance are waived)    Patient Name: Eboni Nicholas  Today's Date: 2/14/2023    Health Maintenance       Date Due Completion Date    TETANUS VACCINE 02/15/2023 (Originally 10/10/1963) ---    Shingles Vaccine (1 of 2) 02/15/2023 (Originally 10/10/1997) ---    Colorectal Cancer Screening 08/27/2023 1/13/2021    Lipid Panel 09/16/2027 9/16/2022        No orders of the defined types were placed in this encounter.    The following information is provided to all patients.  This information is to help you find resources for any of the problems found today that may be affecting your health:                Living healthy guide: www.[facility].[place].[facility]      Understanding Diabetes: www.diabetes.org      Eating healthy: www.cdc.gov/healthyweight      [facility] home safety checklist: www.cdc.gov/steadi/patient.html      Agency on Aging: www.goea.[place]      Alcoholics anonymous (AA): www.aa.org      Physical Activity: www.bao.nih.gov/yc0vdzz      Tobacco use: www.quitwithusla.org     Counseling and Referral of Other Preventative  (Italic type indicates deductible and co-insurance are waived)    Patient Name: Eboni Nicholas  Today's Date: 2/14/2023    Health Maintenance       Date Due Completion Date    TETANUS VACCINE 02/15/2023 (Originally 10/10/1963) ---    Shingles Vaccine (1 of 2) 02/15/2023 (Originally 10/10/1997) ---    Colorectal Cancer Screening 08/27/2023 1/13/2021    Lipid Panel 09/16/2027 9/16/2022        No orders of the defined types were placed in this encounter.    The following information is provided to all patients.  This information is to help you find resources for any of the problems found today that may be affecting your health:                Living healthy guide: www.[facility].[place]      Understanding Diabetes: www.diabetes.org      Eating healthy: www.cdc.gov/healthyweight      CDC home safety checklist:  www.cdc.gov/steadi/patient.html      Agency on Aging: www.goea.[place]      Alcoholics anonymous (AA): www.aa.org      Physical Activity: www.bao.nih.gov/zd2iwpo      Tobacco use: www.quitwithusla.org

## 2023-02-15 ENCOUNTER — TELEPHONE (OUTPATIENT)
Dept: OTOLARYNGOLOGY | Facility: CLINIC | Age: 78
End: 2023-02-15
Payer: MEDICARE

## 2023-02-15 NOTE — TELEPHONE ENCOUNTER
"----- Message from RACHELE Robles MD sent at 2/14/2023  5:06 PM CST -----  Please call patient tomorrow.  She needs to contact the DME provider for her CPAP and ask them to switch her delivery device to "nasal pillows".  I talked to her about it during her visit but I forgot to write it down for her.  Called and spoke with patient today she states thanks so much because she forgot to ask you on yesterday.  "

## 2023-03-20 RX ORDER — ESCITALOPRAM OXALATE 20 MG/1
20 TABLET ORAL DAILY
Qty: 90 TABLET | Refills: 1 | Status: SHIPPED | OUTPATIENT
Start: 2023-03-20 | End: 2023-09-11

## 2023-05-03 RX ORDER — LEVOTHYROXINE SODIUM 100 UG/1
100 TABLET ORAL DAILY
Qty: 90 TABLET | Refills: 1 | Status: SHIPPED | OUTPATIENT
Start: 2023-05-03 | End: 2024-01-28 | Stop reason: SDUPTHER

## 2023-05-23 ENCOUNTER — OFFICE VISIT (OUTPATIENT)
Dept: OPTOMETRY | Facility: CLINIC | Age: 78
End: 2023-05-23
Payer: MEDICARE

## 2023-05-23 DIAGNOSIS — H40.1121 PRIMARY OPEN ANGLE GLAUCOMA (POAG) OF LEFT EYE, MILD STAGE: ICD-10-CM

## 2023-05-23 DIAGNOSIS — H25.13 NUCLEAR SCLEROSIS OF BOTH EYES: ICD-10-CM

## 2023-05-23 DIAGNOSIS — H40.039 ANATOMICAL NARROW ANGLE: ICD-10-CM

## 2023-05-23 DIAGNOSIS — Z98.890 HISTORY OF LASER IRIDOTOMY: ICD-10-CM

## 2023-05-23 DIAGNOSIS — H40.1112 PRIMARY OPEN ANGLE GLAUCOMA (POAG) OF RIGHT EYE, MODERATE STAGE: Primary | ICD-10-CM

## 2023-05-23 PROCEDURE — 1160F PR REVIEW ALL MEDS BY PRESCRIBER/CLIN PHARMACIST DOCUMENTED: ICD-10-PCS | Mod: HCNC,CPTII,S$GLB, | Performed by: OPTOMETRIST

## 2023-05-23 PROCEDURE — 3288F PR FALLS RISK ASSESSMENT DOCUMENTED: ICD-10-PCS | Mod: HCNC,CPTII,S$GLB, | Performed by: OPTOMETRIST

## 2023-05-23 PROCEDURE — 99214 PR OFFICE/OUTPT VISIT, EST, LEVL IV, 30-39 MIN: ICD-10-PCS | Mod: HCNC,S$GLB,, | Performed by: OPTOMETRIST

## 2023-05-23 PROCEDURE — 1126F AMNT PAIN NOTED NONE PRSNT: CPT | Mod: HCNC,CPTII,S$GLB, | Performed by: OPTOMETRIST

## 2023-05-23 PROCEDURE — 99999 PR PBB SHADOW E&M-EST. PATIENT-LVL III: ICD-10-PCS | Mod: PBBFAC,HCNC,, | Performed by: OPTOMETRIST

## 2023-05-23 PROCEDURE — 1126F PR PAIN SEVERITY QUANTIFIED, NO PAIN PRESENT: ICD-10-PCS | Mod: HCNC,CPTII,S$GLB, | Performed by: OPTOMETRIST

## 2023-05-23 PROCEDURE — 1159F PR MEDICATION LIST DOCUMENTED IN MEDICAL RECORD: ICD-10-PCS | Mod: HCNC,CPTII,S$GLB, | Performed by: OPTOMETRIST

## 2023-05-23 PROCEDURE — 99999 PR PBB SHADOW E&M-EST. PATIENT-LVL III: CPT | Mod: PBBFAC,HCNC,, | Performed by: OPTOMETRIST

## 2023-05-23 PROCEDURE — 1101F PT FALLS ASSESS-DOCD LE1/YR: CPT | Mod: HCNC,CPTII,S$GLB, | Performed by: OPTOMETRIST

## 2023-05-23 PROCEDURE — 1159F MED LIST DOCD IN RCRD: CPT | Mod: HCNC,CPTII,S$GLB, | Performed by: OPTOMETRIST

## 2023-05-23 PROCEDURE — 1160F RVW MEDS BY RX/DR IN RCRD: CPT | Mod: HCNC,CPTII,S$GLB, | Performed by: OPTOMETRIST

## 2023-05-23 PROCEDURE — 3288F FALL RISK ASSESSMENT DOCD: CPT | Mod: HCNC,CPTII,S$GLB, | Performed by: OPTOMETRIST

## 2023-05-23 PROCEDURE — 1101F PR PT FALLS ASSESS DOC 0-1 FALLS W/OUT INJ PAST YR: ICD-10-PCS | Mod: HCNC,CPTII,S$GLB, | Performed by: OPTOMETRIST

## 2023-05-23 PROCEDURE — 99214 OFFICE O/P EST MOD 30 MIN: CPT | Mod: HCNC,S$GLB,, | Performed by: OPTOMETRIST

## 2023-05-23 RX ORDER — DORZOLAMIDE HCL 20 MG/ML
1 SOLUTION/ DROPS OPHTHALMIC 3 TIMES DAILY
Qty: 10 ML | Refills: 6 | Status: SHIPPED | OUTPATIENT
Start: 2023-05-23 | End: 2023-11-13 | Stop reason: SDUPTHER

## 2023-05-23 RX ORDER — TIMOLOL MALEATE 5 MG/ML
1 SOLUTION/ DROPS OPHTHALMIC 2 TIMES DAILY
Qty: 15 ML | Refills: 3 | Status: SHIPPED | OUTPATIENT
Start: 2023-05-23 | End: 2024-05-22

## 2023-05-25 NOTE — PROGRESS NOTES
HPI     Glaucoma    In both eyes.  Side effects of treatment include none.  Treatment   compliance is always.           Comments    78 Y/o female is here for IOP check  Pt denies pain and discomfort   No f/f  No vision changes  Eye med: : Dorzolamide  bid ou   Timolol bid  OU   Latanaprost qhs OU          Last edited by Osman Campbell, OD on 5/25/2023  8:11 AM.            Assessment /Plan     For exam results, see Encounter Report.    Primary open angle glaucoma (POAG) of right eye, moderate stage  -     dorzolamide (TRUSOPT) 2 % ophthalmic solution; Place 1 drop into both eyes 3 (three) times daily.  Dispense: 10 mL; Refill: 6  -     timolol maleate 0.5% (TIMOPTIC) 0.5 % Drop; Place 1 drop into both eyes 2 (two) times daily.  Dispense: 15 mL; Refill: 3    Primary open angle glaucoma (POAG) of left eye, mild stage  -     dorzolamide (TRUSOPT) 2 % ophthalmic solution; Place 1 drop into both eyes 3 (three) times daily.  Dispense: 10 mL; Refill: 6  -     timolol maleate 0.5% (TIMOPTIC) 0.5 % Drop; Place 1 drop into both eyes 2 (two) times daily.  Dispense: 15 mL; Refill: 3    Anatomical narrow angle    History of laser iridotomy    Nuclear sclerosis of both eyes      1,2. IOP still low and stable at target, Prev allergy to Brimonidine causing redness, cont Latanaprost qhs ou, timolol, bid ou and trusopt 3x/day right and 2x/day left eye, ,today HVf stable od with inf arcuate and normal os, target 12-14 od. nerve eval stable, prev OCT stable with rnfl loss od, normal os, prev hvf with inf defects od and normal os, fam history of glaucoma but no blindness, pachy normal. RTC 4 mos iop ck  3,4. Stable iop, pi patent  5. Educated pt on presence of cataracts and effects on vision. No surgery at this time. Recheck in one year.

## 2023-07-26 RX ORDER — MONTELUKAST SODIUM 10 MG/1
TABLET ORAL
Qty: 90 TABLET | Refills: 3 | Status: SHIPPED | OUTPATIENT
Start: 2023-07-26 | End: 2024-01-28 | Stop reason: SDUPTHER

## 2023-08-04 ENCOUNTER — IMMUNIZATION (OUTPATIENT)
Dept: INTERNAL MEDICINE | Facility: CLINIC | Age: 78
End: 2023-08-04
Payer: MEDICARE

## 2023-08-04 DIAGNOSIS — Z23 NEED FOR VACCINATION: Primary | ICD-10-CM

## 2023-08-04 PROCEDURE — 0124A COVID-19, MRNA, LNP-S, BIVALENT BOOSTER, PF, 30 MCG/0.3 ML DOSE: CPT | Mod: HCNC,S$GLB,, | Performed by: INTERNAL MEDICINE

## 2023-08-04 PROCEDURE — 0124A COVID-19, MRNA, LNP-S, BIVALENT BOOSTER, PF, 30 MCG/0.3 ML DOSE: ICD-10-PCS | Mod: HCNC,S$GLB,, | Performed by: INTERNAL MEDICINE

## 2023-08-04 PROCEDURE — 91312 COVID-19, MRNA, LNP-S, BIVALENT BOOSTER, PF, 30 MCG/0.3 ML DOSE: CPT | Mod: HCNC,S$GLB,, | Performed by: INTERNAL MEDICINE

## 2023-08-04 PROCEDURE — 91312 COVID-19, MRNA, LNP-S, BIVALENT BOOSTER, PF, 30 MCG/0.3 ML DOSE: ICD-10-PCS | Mod: HCNC,S$GLB,, | Performed by: INTERNAL MEDICINE

## 2023-08-14 ENCOUNTER — PATIENT MESSAGE (OUTPATIENT)
Dept: ADMINISTRATIVE | Facility: HOSPITAL | Age: 78
End: 2023-08-14
Payer: MEDICARE

## 2023-08-31 RX ORDER — SIMVASTATIN 40 MG/1
TABLET, FILM COATED ORAL
Qty: 90 TABLET | Refills: 1 | Status: SHIPPED | OUTPATIENT
Start: 2023-08-31 | End: 2023-10-21 | Stop reason: SDUPTHER

## 2023-09-11 RX ORDER — ESCITALOPRAM OXALATE 20 MG/1
20 TABLET ORAL
Qty: 90 TABLET | Refills: 1 | Status: SHIPPED | OUTPATIENT
Start: 2023-09-11

## 2023-09-11 RX ORDER — FLUTICASONE PROPIONATE 50 MCG
2 SPRAY, SUSPENSION (ML) NASAL
Qty: 48 ML | Refills: 3 | Status: SHIPPED | OUTPATIENT
Start: 2023-09-11

## 2023-09-20 DIAGNOSIS — I15.9 SECONDARY HYPERTENSION: ICD-10-CM

## 2023-10-04 ENCOUNTER — PATIENT OUTREACH (OUTPATIENT)
Dept: ADMINISTRATIVE | Facility: HOSPITAL | Age: 78
End: 2023-10-04
Payer: MEDICARE

## 2023-10-04 NOTE — PROGRESS NOTES
Health Maintenance Due   Topic Date Due    TETANUS VACCINE  Never done    Shingles Vaccine (1 of 2) Never done    Colorectal Cancer Screening  08/27/2023    Influenza Vaccine (1) 09/01/2023    COVID-19 Vaccine (6 - Pfizer risk series) 09/29/2023     Chart reviewed.   Immunizations: Reconciled  Orders placed: N/A  Upcoming appts to satisfy AGATHA topics: N/A

## 2023-10-13 ENCOUNTER — OFFICE VISIT (OUTPATIENT)
Dept: INTERNAL MEDICINE | Facility: CLINIC | Age: 78
End: 2023-10-13
Payer: MEDICARE

## 2023-10-13 ENCOUNTER — LAB VISIT (OUTPATIENT)
Dept: LAB | Facility: HOSPITAL | Age: 78
End: 2023-10-13
Attending: INTERNAL MEDICINE
Payer: MEDICARE

## 2023-10-13 VITALS
DIASTOLIC BLOOD PRESSURE: 80 MMHG | HEART RATE: 82 BPM | WEIGHT: 228.5 LBS | TEMPERATURE: 97 F | OXYGEN SATURATION: 96 % | HEIGHT: 63 IN | RESPIRATION RATE: 18 BRPM | BODY MASS INDEX: 40.49 KG/M2 | SYSTOLIC BLOOD PRESSURE: 110 MMHG

## 2023-10-13 DIAGNOSIS — G47.33 OSA (OBSTRUCTIVE SLEEP APNEA): ICD-10-CM

## 2023-10-13 DIAGNOSIS — E03.9 ACQUIRED HYPOTHYROIDISM: ICD-10-CM

## 2023-10-13 DIAGNOSIS — Z86.2 HX OF SARCOIDOSIS: ICD-10-CM

## 2023-10-13 DIAGNOSIS — I10 HTN (HYPERTENSION), BENIGN: ICD-10-CM

## 2023-10-13 DIAGNOSIS — Z12.11 COLON CANCER SCREENING: ICD-10-CM

## 2023-10-13 DIAGNOSIS — E78.5 DYSLIPIDEMIA: ICD-10-CM

## 2023-10-13 DIAGNOSIS — Z00.00 ENCOUNTER FOR PREVENTIVE HEALTH EXAMINATION: Primary | ICD-10-CM

## 2023-10-13 DIAGNOSIS — Z12.31 OTHER SCREENING MAMMOGRAM: ICD-10-CM

## 2023-10-13 DIAGNOSIS — E66.01 MORBID OBESITY: ICD-10-CM

## 2023-10-13 LAB
BACTERIA #/AREA URNS AUTO: ABNORMAL /HPF
BILIRUB UR QL STRIP: NEGATIVE
CLARITY UR REFRACT.AUTO: CLEAR
COLOR UR AUTO: YELLOW
GLUCOSE UR QL STRIP: NEGATIVE
HGB UR QL STRIP: NEGATIVE
KETONES UR QL STRIP: NEGATIVE
LEUKOCYTE ESTERASE UR QL STRIP: ABNORMAL
MICROSCOPIC COMMENT: ABNORMAL
NITRITE UR QL STRIP: NEGATIVE
PH UR STRIP: 6 [PH] (ref 5–8)
PROT UR QL STRIP: NEGATIVE
SP GR UR STRIP: 1.03 (ref 1–1.03)
SQUAMOUS #/AREA URNS AUTO: 7 /HPF
URN SPEC COLLECT METH UR: ABNORMAL
WBC #/AREA URNS AUTO: 16 /HPF (ref 0–5)

## 2023-10-13 PROCEDURE — 1101F PT FALLS ASSESS-DOCD LE1/YR: CPT | Mod: HCNC,CPTII,S$GLB, | Performed by: INTERNAL MEDICINE

## 2023-10-13 PROCEDURE — 3079F DIAST BP 80-89 MM HG: CPT | Mod: HCNC,CPTII,S$GLB, | Performed by: INTERNAL MEDICINE

## 2023-10-13 PROCEDURE — 81001 URINALYSIS AUTO W/SCOPE: CPT | Mod: HCNC | Performed by: INTERNAL MEDICINE

## 2023-10-13 PROCEDURE — 1126F PR PAIN SEVERITY QUANTIFIED, NO PAIN PRESENT: ICD-10-PCS | Mod: HCNC,CPTII,S$GLB, | Performed by: INTERNAL MEDICINE

## 2023-10-13 PROCEDURE — 99999 PR PBB SHADOW E&M-EST. PATIENT-LVL V: ICD-10-PCS | Mod: PBBFAC,HCNC,, | Performed by: INTERNAL MEDICINE

## 2023-10-13 PROCEDURE — G0008 ADMIN INFLUENZA VIRUS VAC: HCPCS | Mod: HCNC,S$GLB,, | Performed by: INTERNAL MEDICINE

## 2023-10-13 PROCEDURE — 1126F AMNT PAIN NOTED NONE PRSNT: CPT | Mod: HCNC,CPTII,S$GLB, | Performed by: INTERNAL MEDICINE

## 2023-10-13 PROCEDURE — 99397 PER PM REEVAL EST PAT 65+ YR: CPT | Mod: HCNC,S$GLB,, | Performed by: INTERNAL MEDICINE

## 2023-10-13 PROCEDURE — 99397 PR PREVENTIVE VISIT,EST,65 & OVER: ICD-10-PCS | Mod: HCNC,S$GLB,, | Performed by: INTERNAL MEDICINE

## 2023-10-13 PROCEDURE — 3079F PR MOST RECENT DIASTOLIC BLOOD PRESSURE 80-89 MM HG: ICD-10-PCS | Mod: HCNC,CPTII,S$GLB, | Performed by: INTERNAL MEDICINE

## 2023-10-13 PROCEDURE — 3074F SYST BP LT 130 MM HG: CPT | Mod: HCNC,CPTII,S$GLB, | Performed by: INTERNAL MEDICINE

## 2023-10-13 PROCEDURE — 1159F MED LIST DOCD IN RCRD: CPT | Mod: HCNC,CPTII,S$GLB, | Performed by: INTERNAL MEDICINE

## 2023-10-13 PROCEDURE — 1101F PR PT FALLS ASSESS DOC 0-1 FALLS W/OUT INJ PAST YR: ICD-10-PCS | Mod: HCNC,CPTII,S$GLB, | Performed by: INTERNAL MEDICINE

## 2023-10-13 PROCEDURE — 99999 PR PBB SHADOW E&M-EST. PATIENT-LVL V: CPT | Mod: PBBFAC,HCNC,, | Performed by: INTERNAL MEDICINE

## 2023-10-13 PROCEDURE — 3288F FALL RISK ASSESSMENT DOCD: CPT | Mod: HCNC,CPTII,S$GLB, | Performed by: INTERNAL MEDICINE

## 2023-10-13 PROCEDURE — G0008 FLU VACCINE - QUADRIVALENT - ADJUVANTED: ICD-10-PCS | Mod: HCNC,S$GLB,, | Performed by: INTERNAL MEDICINE

## 2023-10-13 PROCEDURE — 90694 VACC AIIV4 NO PRSRV 0.5ML IM: CPT | Mod: HCNC,S$GLB,, | Performed by: INTERNAL MEDICINE

## 2023-10-13 PROCEDURE — 3288F PR FALLS RISK ASSESSMENT DOCUMENTED: ICD-10-PCS | Mod: HCNC,CPTII,S$GLB, | Performed by: INTERNAL MEDICINE

## 2023-10-13 PROCEDURE — 90694 FLU VACCINE - QUADRIVALENT - ADJUVANTED: ICD-10-PCS | Mod: HCNC,S$GLB,, | Performed by: INTERNAL MEDICINE

## 2023-10-13 PROCEDURE — 1159F PR MEDICATION LIST DOCUMENTED IN MEDICAL RECORD: ICD-10-PCS | Mod: HCNC,CPTII,S$GLB, | Performed by: INTERNAL MEDICINE

## 2023-10-13 PROCEDURE — 3074F PR MOST RECENT SYSTOLIC BLOOD PRESSURE < 130 MM HG: ICD-10-PCS | Mod: HCNC,CPTII,S$GLB, | Performed by: INTERNAL MEDICINE

## 2023-10-13 NOTE — PROGRESS NOTES
History of present illness:   78-year-old lady in today for general health assessment.      Current medications:  Medications all noted reviewed.      Review of systems:   General: no fever, chills, generalized body aches. No unexpected weight loss.  Eyes:  No visual disturbances.  HEENT:  No hoarseness, dysphagia, ear pain.  Respiratory:  No cough, no shortness of breath.  Cardiovascular: no chest pain, palpitations, cough, exertional limb pain. No edema.  GI: no nausea, vomiting.  No abdominal pain. No change in bowel habits.  No melena, no hematochezia.  : no dysuria. No change in the color or character of the urine. No urinary frequency.  Musculoskeletal: no joint pain or swelling.  Neurologic:  No focal neurological complaints.  She states for couple of weeks she is had a feeling general fullness in her head.  It is not an overt headache.  Feels it more in the back of her head.  No URI symptoms or allergy issues.  No visual disturbances.  No nausea.  No gait issues .  Rather ill-defined sensation.  Skin:  No rashes or other concerns.  Psych:  No emotional issues    Health screenings:  Colonoscopy 2013.    Mammogram September 2022.    She is had the pneumococcal vaccines.    She is not yet had the influenza vaccine for this season.  Bone densitometry 2019 normal.    Physical examination:   GENERAL:  Alert, appropriately groomed, no acute distress.  VS:  Blood pressure 130/78 taken by this examiner.  EYES: sclerae white ,nonicteric. PERRL.  HEENT:  Normocephalic. Ear canals and tympanic membranes normal. Mouth and pharynx normal. No thyromegaly. Trachea midline and freely mobile.  LUNGS:  Clear to ascultation and normal to percussion.  CARDIOVASCULAR:  Normal heart sounds.  No significant murmur. Carotids full bilaterally without bruit.  Pedal pulses intact .  No abdominal bruit.  No peripheral extremity edema.  GI: the abdomen is soft, no distension. No masses , tenderness, organomegaly.    LYMPHATIC:  No  axillary, inguinal , cervical adenopathy.  MUSCULOSKELETAL:  Range of motion, stability and strength of the right and left upper and lower extremities normal. No swollen or tender joints  NEUROLOGIC:  DTR's normal. No gross motor or sensory deficits apparent, gait normal.  SKIN:  No rashes.   MS:  Alert, oriented , affect and mood all appropriate      Impression:  [age] year lady with several chronic medical issues.      Hypertension is well controlled.      Dyslipidemia on statin therapy.      Hypothyroidism on replacement therapy.      Obstructive sleep apnea.    Morbid obesity BMI 40.48.      History of sarcoidosis.      Recent ill-defined had fullness no clear etiology, possible muscle tension versus other.        Plan:  Update health maintenance laboratory data to include CBC, chemistry profile, lipid profile, TSH, urinalysis.    Mammogram.    Influenza vaccine today.    Stool fit testing.    Advise over the next couple of weeks if his symptoms persist or change.    Otherwise return to clinic in six months for follow-up.

## 2023-10-20 ENCOUNTER — PATIENT MESSAGE (OUTPATIENT)
Dept: RADIOLOGY | Facility: HOSPITAL | Age: 78
End: 2023-10-20
Payer: MEDICARE

## 2023-10-23 ENCOUNTER — PATIENT MESSAGE (OUTPATIENT)
Dept: OPTOMETRY | Facility: CLINIC | Age: 78
End: 2023-10-23
Payer: MEDICARE

## 2023-10-23 RX ORDER — SIMVASTATIN 40 MG/1
40 TABLET, FILM COATED ORAL NIGHTLY
Qty: 90 TABLET | Refills: 1 | Status: SHIPPED | OUTPATIENT
Start: 2023-10-23 | End: 2024-01-28 | Stop reason: SDUPTHER

## 2023-10-24 ENCOUNTER — LAB VISIT (OUTPATIENT)
Dept: LAB | Facility: HOSPITAL | Age: 78
End: 2023-10-24
Attending: INTERNAL MEDICINE
Payer: MEDICARE

## 2023-10-24 DIAGNOSIS — Z12.11 COLON CANCER SCREENING: ICD-10-CM

## 2023-10-24 PROCEDURE — 82274 ASSAY TEST FOR BLOOD FECAL: CPT | Mod: HCNC | Performed by: INTERNAL MEDICINE

## 2023-10-31 LAB — HEMOCCULT STL QL IA: NEGATIVE

## 2023-11-09 ENCOUNTER — HOSPITAL ENCOUNTER (OUTPATIENT)
Dept: RADIOLOGY | Facility: HOSPITAL | Age: 78
Discharge: HOME | End: 2023-11-09
Attending: INTERNAL MEDICINE
Payer: MEDICARE

## 2023-11-09 VITALS — BODY MASS INDEX: 40.74 KG/M2 | WEIGHT: 230 LBS

## 2023-11-09 DIAGNOSIS — Z12.31 OTHER SCREENING MAMMOGRAM: ICD-10-CM

## 2023-11-09 PROCEDURE — 77067 SCR MAMMO BI INCL CAD: CPT | Mod: 26,HCNC,, | Performed by: RADIOLOGY

## 2023-11-09 PROCEDURE — 77063 BREAST TOMOSYNTHESIS BI: CPT | Mod: 26,HCNC,, | Performed by: RADIOLOGY

## 2023-11-09 PROCEDURE — 77067 MAMMO DIGITAL SCREENING BILAT WITH TOMO: ICD-10-PCS | Mod: 26,HCNC,, | Performed by: RADIOLOGY

## 2023-11-09 PROCEDURE — 77063 MAMMO DIGITAL SCREENING BILAT WITH TOMO: ICD-10-PCS | Mod: 26,HCNC,, | Performed by: RADIOLOGY

## 2023-11-09 PROCEDURE — 77067 SCR MAMMO BI INCL CAD: CPT | Mod: TC,HCNC

## 2023-11-13 DIAGNOSIS — H40.1121 PRIMARY OPEN ANGLE GLAUCOMA (POAG) OF LEFT EYE, MILD STAGE: ICD-10-CM

## 2023-11-13 DIAGNOSIS — H40.1112 PRIMARY OPEN ANGLE GLAUCOMA (POAG) OF RIGHT EYE, MODERATE STAGE: ICD-10-CM

## 2023-11-13 DIAGNOSIS — H40.039 ANATOMICAL NARROW ANGLE: ICD-10-CM

## 2023-11-14 RX ORDER — DORZOLAMIDE HCL 20 MG/ML
1 SOLUTION/ DROPS OPHTHALMIC 3 TIMES DAILY
Qty: 10 ML | Refills: 6 | Status: SHIPPED | OUTPATIENT
Start: 2023-11-14 | End: 2023-12-27 | Stop reason: SDUPTHER

## 2023-11-14 RX ORDER — LATANOPROST 50 UG/ML
1 SOLUTION/ DROPS OPHTHALMIC DAILY
Qty: 7.5 ML | Refills: 3 | Status: SHIPPED | OUTPATIENT
Start: 2023-11-14 | End: 2024-11-13

## 2023-12-27 DIAGNOSIS — H40.1112 PRIMARY OPEN ANGLE GLAUCOMA (POAG) OF RIGHT EYE, MODERATE STAGE: ICD-10-CM

## 2023-12-27 DIAGNOSIS — H40.1121 PRIMARY OPEN ANGLE GLAUCOMA (POAG) OF LEFT EYE, MILD STAGE: ICD-10-CM

## 2023-12-30 RX ORDER — DORZOLAMIDE HCL 20 MG/ML
1 SOLUTION/ DROPS OPHTHALMIC 3 TIMES DAILY
Qty: 10 ML | Refills: 6 | Status: SHIPPED | OUTPATIENT
Start: 2023-12-30 | End: 2024-12-29

## 2024-01-29 RX ORDER — MONTELUKAST SODIUM 10 MG/1
TABLET ORAL
Qty: 90 TABLET | Refills: 3 | Status: SHIPPED | OUTPATIENT
Start: 2024-01-29 | End: 2024-04-25 | Stop reason: SDUPTHER

## 2024-01-29 RX ORDER — SIMVASTATIN 40 MG/1
40 TABLET, FILM COATED ORAL NIGHTLY
Qty: 90 TABLET | Refills: 3 | Status: SHIPPED | OUTPATIENT
Start: 2024-01-29 | End: 2024-04-25 | Stop reason: SDUPTHER

## 2024-01-29 RX ORDER — LEVOTHYROXINE SODIUM 100 UG/1
100 TABLET ORAL DAILY
Qty: 90 TABLET | Refills: 3 | Status: SHIPPED | OUTPATIENT
Start: 2024-01-29 | End: 2024-04-25 | Stop reason: SDUPTHER

## 2024-02-02 ENCOUNTER — OFFICE VISIT (OUTPATIENT)
Dept: OTOLARYNGOLOGY | Facility: CLINIC | Age: 79
End: 2024-02-02
Payer: MEDICARE

## 2024-02-02 VITALS
RESPIRATION RATE: 18 BRPM | TEMPERATURE: 98 F | HEART RATE: 80 BPM | OXYGEN SATURATION: 98 % | DIASTOLIC BLOOD PRESSURE: 73 MMHG | SYSTOLIC BLOOD PRESSURE: 138 MMHG

## 2024-02-02 DIAGNOSIS — J30.89 NON-SEASONAL ALLERGIC RHINITIS, UNSPECIFIED TRIGGER: Primary | ICD-10-CM

## 2024-02-02 DIAGNOSIS — R26.89 IMPAIRMENT OF BALANCE: ICD-10-CM

## 2024-02-02 DIAGNOSIS — D86.89 SARCOID OF NOSE: ICD-10-CM

## 2024-02-02 DIAGNOSIS — R51.9 NONINTRACTABLE EPISODIC HEADACHE, UNSPECIFIED HEADACHE TYPE: ICD-10-CM

## 2024-02-02 DIAGNOSIS — H81.391 PERIPHERAL VERTIGO INVOLVING RIGHT EAR: ICD-10-CM

## 2024-02-02 DIAGNOSIS — J34.89 NASAL CRUSTING: ICD-10-CM

## 2024-02-02 DIAGNOSIS — G47.33 OSA ON CPAP: ICD-10-CM

## 2024-02-02 DIAGNOSIS — H90.A21 SENSORINEURAL HEARING LOSS (SNHL) OF RIGHT EAR WITH RESTRICTED HEARING OF LEFT EAR: ICD-10-CM

## 2024-02-02 PROCEDURE — 1160F RVW MEDS BY RX/DR IN RCRD: CPT | Mod: HCNC,CPTII,S$GLB, | Performed by: SPECIALIST

## 2024-02-02 PROCEDURE — 1126F AMNT PAIN NOTED NONE PRSNT: CPT | Mod: HCNC,CPTII,S$GLB, | Performed by: SPECIALIST

## 2024-02-02 PROCEDURE — 1159F MED LIST DOCD IN RCRD: CPT | Mod: HCNC,CPTII,S$GLB, | Performed by: SPECIALIST

## 2024-02-02 PROCEDURE — 99999 PR PBB SHADOW E&M-EST. PATIENT-LVL V: CPT | Mod: PBBFAC,HCNC,, | Performed by: SPECIALIST

## 2024-02-02 PROCEDURE — 99214 OFFICE O/P EST MOD 30 MIN: CPT | Mod: HCNC,S$GLB,, | Performed by: SPECIALIST

## 2024-02-02 PROCEDURE — 3288F FALL RISK ASSESSMENT DOCD: CPT | Mod: HCNC,CPTII,S$GLB, | Performed by: SPECIALIST

## 2024-02-02 PROCEDURE — 3078F DIAST BP <80 MM HG: CPT | Mod: HCNC,CPTII,S$GLB, | Performed by: SPECIALIST

## 2024-02-02 PROCEDURE — 1101F PT FALLS ASSESS-DOCD LE1/YR: CPT | Mod: HCNC,CPTII,S$GLB, | Performed by: SPECIALIST

## 2024-02-02 PROCEDURE — 3075F SYST BP GE 130 - 139MM HG: CPT | Mod: HCNC,CPTII,S$GLB, | Performed by: SPECIALIST

## 2024-02-02 NOTE — PROGRESS NOTES
Subjective:       Patient ID: Eboni Nicholas is a 78 y.o. female.    Chief Complaint: Follow-up      The patient is returning for follow-up visit.  I have not seen her in 11 months.  There are multiple issues to discuss:  1. Allergic rhinitis:  She is having some nasal congestion and postnasal drip.  Nasal secretions are clear.  She has been using combination of cetirizine in the morning, montelukast at night and fluticasone spray as needed.  Allergy symptoms are well controlled with this regimen.    2. Headaches:  She is having pressure and fullness in her right ear and occipital areas.  Nasal secretions are clear.  3. Right peripheral vestibular disorder: Patient does have chronic imbalance.  She is having some issues with feeling that she is falling to the left, particularly when she is driving her automobile.  Her  is 100% disabled and she is his sole support.  4. Sarcoidosis of the nose:  There is no change in this issue  5. Obstructive sleep apnea using CPAP:  The patient does use her CPAP every night.  6. Sensorineural hearing loss:  The patient's last hearing evaluation was 1-1/2 years ago.  She has not noticed a big change or worsening in her hearing.            Review of Systems     Constitutional: Positive for fatigue.  Negative for appetite change, chills, fever and unexpected weight loss.      HENT: Positive for hearing loss, postnasal drip, ringing in the ears, runny nose, sinus pressure and stuffy nose.  Negative for ear discharge, ear infection, ear pain, facial swelling, mouth sores, nosebleeds, sinus infection, sore throat, tonsil infection, dental problems, trouble swallowing and voice change.      Eyes:  Negative for change in eyesight, eye drainage, eye itching and photophobia.     Respiratory:  Positive for cough, sleep apnea and snoring. Negative for shortness of breath and wheezing.      Cardiovascular:  Negative for chest pain, foot swelling, irregular heartbeat and swollen veins.      Gastrointestinal:  Negative for abdominal pain, acid reflux, constipation, diarrhea, heartburn and vomiting.     Genitourinary: Negative for difficulty urinating, sexual problems and frequent urination.     Musc: Negative for aching joints, aching muscles, back pain and neck pain.     Skin: Negative for rash.     Allergy: Negative for food allergies and seasonal allergies.     Endocrine: Negative for cold intolerance and heat intolerance.      Neurological: Positive for headaches and light-headedness. Negative for dizziness, seizures and tremors.      Hematologic: Negative for bruises/bleeds easily and swollen glands.      Psychiatric: Negative for decreased concentration, depression, nervous/anxious and sleep disturbance.                Objective:      Physical Exam  Vitals and nursing note reviewed.   Constitutional:       General: She is awake.      Appearance: Normal appearance. She is well-developed and well-groomed. She is obese.   HENT:      Head: Normocephalic.      Jaw: There is normal jaw occlusion.      Salivary Glands: Right salivary gland is not diffusely enlarged or tender. Left salivary gland is not diffusely enlarged or tender.      Right Ear: Ear canal and external ear normal. Decreased hearing noted. No drainage. Tympanic membrane is retracted.      Left Ear: Ear canal and external ear normal. Decreased hearing noted. No drainage. Tympanic membrane is retracted.      Nose: Septal deviation (to the right), mucosal edema (cyanotic, boggy inferior turbinates bilaterally) and rhinorrhea (clear mucus bilaterally) present. No nasal deformity. Rhinorrhea is clear.      Right Turbinates: Enlarged and pale.      Left Turbinates: Enlarged and pale.      Mouth/Throat:      Lips: No lesions.      Mouth: Mucous membranes are moist. No oral lesions.      Dentition: No gum lesions.      Tongue: No lesions.      Palate: No mass and lesions.      Pharynx: Oropharynx is clear. Uvula midline.      Tonsils: 2+ on  the right. 2+ on the left.   Eyes:      General: Lids are normal.         Right eye: No discharge.         Left eye: No discharge.      Conjunctiva/sclera:      Right eye: Right conjunctiva is injected. No exudate.     Left eye: Left conjunctiva is injected. No exudate.     Pupils: Pupils are equal, round, and reactive to light.   Neck:      Thyroid: No thyroid mass or thyromegaly.      Trachea: Trachea normal. No tracheal deviation.   Cardiovascular:      Rate and Rhythm: Normal rate and regular rhythm.      Pulses: Normal pulses.      Heart sounds: Normal heart sounds.   Pulmonary:      Effort: Pulmonary effort is normal.      Breath sounds: Normal breath sounds. No stridor. No decreased breath sounds, wheezing, rhonchi or rales.   Abdominal:      General: Bowel sounds are normal.      Palpations: Abdomen is soft.      Tenderness: There is no abdominal tenderness.   Musculoskeletal:      Cervical back: Neck supple. No muscular tenderness. Decreased range of motion.   Lymphadenopathy:      Head:      Right side of head: No submental, submandibular, preauricular, posterior auricular or occipital adenopathy.      Left side of head: No submental, submandibular, preauricular, posterior auricular or occipital adenopathy.      Cervical: No cervical adenopathy.   Skin:     General: Skin is warm and dry.      Findings: No petechiae or rash.      Nails: There is no clubbing.   Neurological:      Mental Status: She is alert and oriented to person, place, and time.      Cranial Nerves: No cranial nerve deficit.      Sensory: No sensory deficit.      Gait: Gait normal.      Comments: Neuro otologic-Romberg negative, tandem Romberg falls to the left, mildly wide-based gait, tandem gait not performed   Psychiatric:         Speech: Speech normal.         Behavior: Behavior normal. Behavior is cooperative.         Thought Content: Thought content normal.         Judgment: Judgment normal.         Assessment:       1. Non-seasonal  allergic rhinitis, unspecified trigger    2. Nasal crusting    3. Peripheral vertigo involving right ear    4. Sensorineural hearing loss (SNHL) of right ear with restricted hearing of left ear    5. Nonintractable episodic headache, unspecified headache type    6. CHIDI on CPAP    7. Sarcoid of nose        Plan:       I will refer the patient for vestibular rehab therapy.  She will continue with her current drug regimen.  I will recheck her in 6 weeks.                DISCLAIMER: This note was prepared with Twist voice recognition transcription software. Garbled syntax, mangled pronouns, and other bizarre constructions may be attributed to that software system. While efforts were made to correct any mistakes made by this voice recognition program, some errors and/or omissions may remain in the note that were missed when the note was originally created.

## 2024-02-19 ENCOUNTER — OFFICE VISIT (OUTPATIENT)
Dept: URGENT CARE | Facility: CLINIC | Age: 79
End: 2024-02-19
Payer: MEDICARE

## 2024-02-19 VITALS
WEIGHT: 227 LBS | BODY MASS INDEX: 40.22 KG/M2 | HEART RATE: 61 BPM | DIASTOLIC BLOOD PRESSURE: 84 MMHG | RESPIRATION RATE: 16 BRPM | TEMPERATURE: 99 F | SYSTOLIC BLOOD PRESSURE: 139 MMHG | HEIGHT: 63 IN | OXYGEN SATURATION: 96 %

## 2024-02-19 DIAGNOSIS — U07.1 COVID-19 VIRUS DETECTED: ICD-10-CM

## 2024-02-19 DIAGNOSIS — J06.9 VIRAL URI WITH COUGH: Primary | ICD-10-CM

## 2024-02-19 DIAGNOSIS — U07.1 COVID: ICD-10-CM

## 2024-02-19 LAB
CTP QC/QA: YES
SARS-COV-2 AG RESP QL IA.RAPID: POSITIVE

## 2024-02-19 PROCEDURE — 99213 OFFICE O/P EST LOW 20 MIN: CPT | Mod: S$GLB,,, | Performed by: FAMILY MEDICINE

## 2024-02-19 PROCEDURE — 87811 SARS-COV-2 COVID19 W/OPTIC: CPT | Mod: QW,S$GLB,, | Performed by: FAMILY MEDICINE

## 2024-02-19 RX ORDER — BENZONATATE 200 MG/1
200 CAPSULE ORAL 3 TIMES DAILY PRN
Qty: 30 CAPSULE | Refills: 0 | Status: SHIPPED | OUTPATIENT
Start: 2024-02-19 | End: 2024-02-29

## 2024-02-19 RX ORDER — PROMETHAZINE HYDROCHLORIDE AND DEXTROMETHORPHAN HYDROBROMIDE 6.25; 15 MG/5ML; MG/5ML
5 SYRUP ORAL NIGHTLY PRN
Qty: 118 ML | Refills: 0 | Status: SHIPPED | OUTPATIENT
Start: 2024-02-19

## 2024-02-19 RX ORDER — FLUTICASONE PROPIONATE 50 MCG
2 SPRAY, SUSPENSION (ML) NASAL NIGHTLY
Qty: 9.9 ML | Refills: 0 | Status: SHIPPED | OUTPATIENT
Start: 2024-02-19 | End: 2024-02-26

## 2024-02-19 NOTE — PATIENT INSTRUCTIONS
Below are suggestions for symptomatic relief of your upper respiratory symptoms:              -Salt water gargles to soothe throat pain.              -Chloroseptic spray and Cepacol lozenges also help to numb throat pain.              -Warm herbal teas with honey/lemon/thu can help soothe sore throat and hoarseness              -Nasal saline spray reduces inflammation and dryness.              -Warm face compresses to help with facial sinus pain/pressure.              -Humidifiers and steam can help with nasal dryness and congestion              -Vicks vapor rub at night for chest congestion.              -Flonase OTC or Nasacort OTC for nasal congestion and post-nasal drip. Ok to use twice daily for the first week, then reduce to once daily after symptoms have begun to improve.              -Afrin is a nasal spray that can give immediate relief of nasal congestion but you cannot use this medication for more than 3 days              -Simple foods like chicken noodle soup.              - Mucinex for congestion or Mucinex DM for cough during the day time. Delsym helps with coughing at night. Mucinex-D if you have sinus pressure/sinus pain or chest congestion. (caution if history of high blood pressure or palpitations). You must increase your water intake when using expectorants (Mucinex).             -Zyrtec/Claritin/Allegra/Xyzal should help with allergies.  -If you DO NOT have Hypertension or any history of palpitations, it is ok to take over the counter Sudafed or Mucinex D or Allegra-D or Claritin-D or Zyrtec-D.  -If you do take one of the above, it is ok to combine that with plain over the counter Mucinex or Allegra or Claritin or Zyrtec. If, for example, you are taking Zyrtec -D, you can combine that with Mucinex, but not Mucinex-D.  If you are taking Mucinex-D, you can combine that with plain Allegra or Claritin or Zyrtec.   -If you DO have Hypertension or palpitations, it is safe to take Coricidin HBP for  relief of sinus symptoms.     Your test was POSITIVE for COVID-19 (coronavirus).       Please isolate yourself at home.  You may leave home and/or return to work once the following conditions are met:    If you were not hospitalized and are not moderately to severely immunocompromised:   More than 5 days since symptoms first appeared AND  More than 24 hours fever free without medications AND  Symptoms are improving  Continue to wear a mask around others for 5 additional days.    If you were hospitalized OR are moderately to severely immunocompromised:  More than 20 days since symptoms first appeared  More than 24 hours fever free without medications  Symptoms have improved    If you had no symptoms but tested positive:  More than 5 days since the date of the first positive test (20 days if moderately to severely immunocompromised). If you develop symptoms, then use the guidelines above.  Continue to wear a mask around others for 5 additional days.      Contact Tracing    As one of the next steps, you will receive a call or text from the [place] Department of Health ([facility]) COVID-19 Tracing Team. See the contact information below so you know not to ignore the health departments call. It is important that you contact them back immediately so they can help.      Contact Tracer Number:  [phone]  Caller ID for most carriers: [facility]     What is contact tracing?  Contact tracing is a process that helps identify everyone who has been in close contact with an infected person. Contact tracers let those people know they may have been exposed and guide them on next steps. Confidentiality is important for everyone; no one will be told who may have exposed them to the virus.  Your involvement is important. The more we know about where and how this virus is spreading, the better chance we have at stopping it from spreading further.  What does exposure mean?  Exposure means you have been within 6 feet for more than  15 minutes with a person who has or had COVID-19.  What kind of questions do the contact tracers ask?  A contact tracer will confirm your basic contact information including name, address, phone number, and next of kin, as well as asking about any symptoms you may have had. Theyll also ask you how you think you may have gotten sick, such as places where you may have been exposed to the virus, and people you were with. Those names will never be shared with anyone outside of that call, and will only be used to help trace and stop the spread of the virus.   I have privacy concerns. How will the state use my information?  Your privacy about your health is important. All calls are completed using call centers that use the appropriate health privacy protection measures (HIPAA compliance), meaning that your patient information is safe. No one will ever ask you any questions related to immigration status. Your health comes first.   Do I have to participate?  You do not have to participate, but we strongly encourage you to. Contact tracing can help us catch and control new outbreaks as theyre developing to keep your friends and family safe.   What if I dont hear from anyone?  If you dont receive a call within 24 hours, you can call the number above right away to inquire about your status. That line is open from 8:00 am - 8:00 p.m., 7 days a week.  Contact tracing saves lives! Together, we have the power to beat this virus and keep our loved ones and neighbors safe.    For more information see CDC link below.      https://www.cdc.gov/coronavirus/2019-ncov/hcp/guidance-prevent-spread.html#precautions        Sources:  CDC, [place] [facility]           Seek immediate care in the emergency room in the event of severe abdominal pain, chest pain, respiratory distress, fever unresponsive to antipyretic, dehydration, loss of consciousness, seizure.

## 2024-02-19 NOTE — PROGRESS NOTES
"Subjective:      Patient ID: Eboni Nicholas is a 78 y.o. female.    Vitals:  height is 5' 3" (1.6 m) and weight is 103 kg (227 lb). Her temperature is 98.7 °F (37.1 °C). Her blood pressure is 139/84 and her pulse is 61. Her respiration is 16 and oxygen saturation is 96%.     Chief Complaint: Cough    Patient presents with a sore throat and a productive cough that started about 8 days. Patient has not tried any over the counter meds.     Cough  This is a new problem. The current episode started in the past 7 days. The problem has been unchanged. The problem occurs constantly. The cough is Productive of sputum. Associated symptoms include nasal congestion, rhinorrhea and a sore throat. Pertinent negatives include no chest pain, chills, ear congestion, ear pain, fever, headaches, heartburn, hemoptysis, myalgias, postnasal drip, rash, shortness of breath, sweats, weight loss or wheezing. Nothing aggravates the symptoms. She has tried nothing for the symptoms. The treatment provided no relief. There is no history of asthma, bronchiectasis, bronchitis, COPD, emphysema, environmental allergies or pneumonia.       Constitution: Negative for chills and fever.   HENT:  Positive for sore throat. Negative for ear pain and postnasal drip.    Cardiovascular:  Negative for chest pain.   Respiratory:  Positive for cough. Negative for bloody sputum, shortness of breath and wheezing.    Gastrointestinal:  Negative for heartburn.   Musculoskeletal:  Negative for muscle ache.   Skin:  Negative for rash.   Allergic/Immunologic: Negative for environmental allergies.   Neurological:  Negative for headaches.      Objective:     Vitals:    02/19/24 1357   BP: 139/84   BP Location: Right arm   Patient Position: Sitting   BP Method: Large (Automatic)   Pulse: 61   Resp: 16   Temp: 98.7 °F (37.1 °C)   SpO2: 96%   Weight: 103 kg (227 lb)   Height: 5' 3" (1.6 m)      Physical Exam   Constitutional: She is oriented to person, place, and time.  " Non-toxic appearance. She does not appear ill. No distress.   HENT:   Ears:   Right Ear: Tympanic membrane normal.   Left Ear: Tympanic membrane normal.   Nose: Congestion present.   Mouth/Throat: No posterior oropharyngeal erythema.   Eyes: Conjunctivae are normal.   Cardiovascular: Normal rate, regular rhythm, normal heart sounds and normal pulses.   Pulmonary/Chest: Effort normal and breath sounds normal.   Neurological: She is alert and oriented to person, place, and time.   Skin: Skin is not diaphoretic.   Psychiatric: Mood, judgment and thought content normal.     Results for orders placed or performed in visit on 02/19/24   SARS Coronavirus 2 Antigen, POCT Manual Read   Result Value Ref Range    SARS Coronavirus 2 Antigen Positive (A) Negative     Acceptable Yes         Assessment:     1. Viral URI with cough    2. COVID        Plan:       Viral URI with cough  -     SARS Coronavirus 2 Antigen, POCT Manual Read  -     benzonatate (TESSALON) 200 MG capsule; Take 1 capsule (200 mg total) by mouth 3 (three) times daily as needed for Cough.  Dispense: 30 capsule; Refill: 0  -     promethazine-dextromethorphan (PROMETHAZINE-DM) 6.25-15 mg/5 mL Syrp; Take 5 mLs by mouth nightly as needed (cough).  Dispense: 118 mL; Refill: 0  -     fluticasone propionate (FLONASE) 50 mcg/actuation nasal spray; 2 sprays (100 mcg total) by Each Nostril route every evening. for 7 days  Dispense: 9.9 mL; Refill: 0    2. COVID  Outside 5 day window period for antiviral. Lung clear. Mild URI symptoms. Will treat supportively. CDC guidelines discussed. If no improvement or worsening symptoms within 7 days instructed to follow up with PCP or return to urgent care. Patient verbalized understanding.    Patient Instructions   Below are suggestions for symptomatic relief of your upper respiratory symptoms:              -Salt water gargles to soothe throat pain.              -Chloroseptic spray and Cepacol lozenges also help to  numb throat pain.              -Warm herbal teas with honey/lemon/thu can help soothe sore throat and hoarseness              -Nasal saline spray reduces inflammation and dryness.              -Warm face compresses to help with facial sinus pain/pressure.              -Humidifiers and steam can help with nasal dryness and congestion              -Vicks vapor rub at night for chest congestion.              -Flonase OTC or Nasacort OTC for nasal congestion and post-nasal drip. Ok to use twice daily for the first week, then reduce to once daily after symptoms have begun to improve.              -Afrin is a nasal spray that can give immediate relief of nasal congestion but you cannot use this medication for more than 3 days              -Simple foods like chicken noodle soup.              - Mucinex for congestion or Mucinex DM for cough during the day time. Delsym helps with coughing at night. Mucinex-D if you have sinus pressure/sinus pain or chest congestion. (caution if history of high blood pressure or palpitations). You must increase your water intake when using expectorants (Mucinex).             -Zyrtec/Claritin/Allegra/Xyzal should help with allergies.  -If you DO NOT have Hypertension or any history of palpitations, it is ok to take over the counter Sudafed or Mucinex D or Allegra-D or Claritin-D or Zyrtec-D.  -If you do take one of the above, it is ok to combine that with plain over the counter Mucinex or Allegra or Claritin or Zyrtec. If, for example, you are taking Zyrtec -D, you can combine that with Mucinex, but not Mucinex-D.  If you are taking Mucinex-D, you can combine that with plain Allegra or Claritin or Zyrtec.   -If you DO have Hypertension or palpitations, it is safe to take Coricidin HBP for relief of sinus symptoms.     Your test was POSITIVE for COVID-19 (coronavirus).       Please isolate yourself at home.  You may leave home and/or return to work once the following conditions are  met:    If you were not hospitalized and are not moderately to severely immunocompromised:   More than 5 days since symptoms first appeared AND  More than 24 hours fever free without medications AND  Symptoms are improving  Continue to wear a mask around others for 5 additional days.    If you were hospitalized OR are moderately to severely immunocompromised:  More than 20 days since symptoms first appeared  More than 24 hours fever free without medications  Symptoms have improved    If you had no symptoms but tested positive:  More than 5 days since the date of the first positive test (20 days if moderately to severely immunocompromised). If you develop symptoms, then use the guidelines above.  Continue to wear a mask around others for 5 additional days.      Contact Tracing    As one of the next steps, you will receive a call or text from the [place] Department of Health ([facility]) COVID-19 Tracing Team. See the contact information below so you know not to ignore the health departments call. It is important that you contact them back immediately so they can help.      Contact Tracer Number:  [phone]  Caller ID for most carriers: [facility]     What is contact tracing?  Contact tracing is a process that helps identify everyone who has been in close contact with an infected person. Contact tracers let those people know they may have been exposed and guide them on next steps. Confidentiality is important for everyone; no one will be told who may have exposed them to the virus.  Your involvement is important. The more we know about where and how this virus is spreading, the better chance we have at stopping it from spreading further.  What does exposure mean?  Exposure means you have been within 6 feet for more than 15 minutes with a person who has or had COVID-19.  What kind of questions do the contact tracers ask?  A contact tracer will confirm your basic contact information including name, address, phone  number, and next of kin, as well as asking about any symptoms you may have had. Theyll also ask you how you think you may have gotten sick, such as places where you may have been exposed to the virus, and people you were with. Those names will never be shared with anyone outside of that call, and will only be used to help trace and stop the spread of the virus.   I have privacy concerns. How will the state use my information?  Your privacy about your health is important. All calls are completed using call centers that use the appropriate health privacy protection measures (HIPAA compliance), meaning that your patient information is safe. No one will ever ask you any questions related to immigration status. Your health comes first.   Do I have to participate?  You do not have to participate, but we strongly encourage you to. Contact tracing can help us catch and control new outbreaks as theyre developing to keep your friends and family safe.   What if I dont hear from anyone?  If you dont receive a call within 24 hours, you can call the number above right away to inquire about your status. That line is open from 8:00 am - 8:00 p.m., 7 days a week.  Contact tracing saves lives! Together, we have the power to beat this virus and keep our loved ones and neighbors safe.    For more information see CDC link below.      https://www.cdc.gov/coronavirus/2019-ncov/hcp/guidance-prevent-spread.html#precautions        Sources:  CDC, [place] [facility]           Seek immediate care in the emergency room in the event of severe abdominal pain, chest pain, respiratory distress, fever unresponsive to antipyretic, dehydration, loss of consciousness, seizure.

## 2024-02-29 DIAGNOSIS — H69.92 DYSFUNCTION OF LEFT EUSTACHIAN TUBE: ICD-10-CM

## 2024-03-01 RX ORDER — LEVOCETIRIZINE DIHYDROCHLORIDE 5 MG/1
5 TABLET, FILM COATED ORAL NIGHTLY
Qty: 90 TABLET | Refills: 3 | Status: SHIPPED | OUTPATIENT
Start: 2024-03-01

## 2024-03-05 NOTE — PATIENT INSTRUCTIONS
Counseling and Referral of Other Preventative  (Italic type indicates deductible and co-insurance are waived)    Patient Name: Eboni Nicholas  Today's Date: 3/5/2024    Health Maintenance       Date Due Completion Date    TETANUS VACCINE Never done ---    Shingles Vaccine (1 of 2) Never done ---    RSV Vaccine (Age 60+ and Pregnant patients) (1 - 1-dose 60+ series) Never done ---    COVID-19 Vaccine (6 - 2023-24 season) 09/29/2023 8/4/2023    Lipid Panel 10/13/2028 10/13/2023        No orders of the defined types were placed in this encounter.    The following information is provided to all patients.  This information is to help you find resources for any of the problems found today that may be affecting your health:                  Living healthy guide: www.[facility].[place].gov      Understanding Diabetes: www.diabetes.org      Eating healthy: www.cdc.gov/healthyweight      [facility] home safety checklist: www.cdc.gov/steadi/patient.html      Agency on Aging: www.goea.[place].gov      Alcoholics anonymous (AA): www.aa.org      Physical Activity: www.bao.nih.gov/te7mmfh      Tobacco use: www.quitwithusla.org

## 2024-03-05 NOTE — PROGRESS NOTES
"  Eboni Nicholas presented for a  Medicare AWV and comprehensive Health Risk Assessment today.  She is a patient of Dr. Elizondo and is new to me.  The following components were reviewed and updated:    Medical history  Family History  Social history  Allergies and Current Medications  Health Risk Assessment  Health Maintenance  Care Team     ** See Completed Assessments for Annual Wellness Visit within the encounter summary.**    The following assessments were completed:  Living Situation  CAGE  Depression Screening  Timed Get Up and Go  Whisper Test  Cognitive Function Screening  Nutrition Screening  ADL Screening  PAQ Screening  Review for opioid screen: pt does not have rx for opioid  Review for substance use disorder:  pt does not use substance        Vitals:    03/06/24 0906   BP: 136/82   BP Location: Left arm   Patient Position: Sitting   Pulse: 78   Resp: 16   Temp: 99 °F (37.2 °C)   TempSrc: Oral   SpO2: 96%   Weight: 103.2 kg (227 lb 8.2 oz)   Height: 5' 3" (1.6 m)     Body mass index is 40.3 kg/m².  Physical Exam  Vitals reviewed.   Constitutional:       Appearance: She is obese.   HENT:      Head: Normocephalic and atraumatic.   Cardiovascular:      Rate and Rhythm: Normal rate and regular rhythm.      Pulses: Normal pulses.           Radial pulses are 2+ on the right side and 2+ on the left side.        Dorsalis pedis pulses are 2+ on the right side and 2+ on the left side.        Posterior tibial pulses are 2+ on the right side and 2+ on the left side.      Heart sounds: Normal heart sounds.   Pulmonary:      Effort: Pulmonary effort is normal.      Breath sounds: Normal breath sounds.   Musculoskeletal:      Right lower leg: No edema.      Left lower leg: No edema.   Skin:     General: Skin is warm and dry.      Capillary Refill: Capillary refill takes less than 2 seconds.   Neurological:      General: No focal deficit present.      Mental Status: She is alert and oriented to person, place, and time. "   Psychiatric:         Mood and Affect: Mood normal.         Behavior: Behavior normal.        Diagnoses and health risks identified today and associated recommendations/orders:    1. Encounter for Medicare annual wellness exam  Assessment and evaluation performed as stated above  -     Ambulatory Referral/Consult to Enhanced Annual Wellness Visit (eAWV)    2. Major depressive disorder with single episode, in full remission  Stable on Lexapro.  Followed by PCP.    3. BPPV (benign paroxysmal positional vertigo), right  Assessment & Plan:  Stable.  Pt will begin therapy 4/2024.  Followed by pcp.      4. Sensorineural hearing loss (SNHL) of right ear with restricted hearing of left ear  Assessment & Plan:  Stable.  Pt has Rx for hearing aids.  Followed by Dr. Robles.      5. HTN (hypertension), benign  Assessment & Plan:  Stable on losartan.  Encouraged pt increase water intake.  She currently only drinks 1, 16oz bottle daily.  Pt does not add Na to diet.  Encouraged pt dedicate time to exercise.  Start at 10 min/day, most days of the week.      6. Hyperlipidemia, unspecified hyperlipidemia type  Assessment & Plan:  Stable on statin.  Pt adhering to low fat low cholesterol diet.  She uses air fryer often.  Followed by pcp.      7. Left ventricular diastolic dysfunction with preserved systolic function  Stable on current regimen.  Followed by PCP    8. Acquired hypothyroidism  Stable on levothyroxine.  Followed by PCP    9. CHIDI (obstructive sleep apnea)  Assessment & Plan:  Stable on CPAP.  Followed by sleep medicine    10. Class 3 severe obesity due to excess calories with serious comorbidity and body mass index (BMI) of 40.0 to 44.9 in adult  Stable.  Encouraged patient to increase activity and reduce caloric intake.  Followed by PCP.      Provided Eboni with a 5-10 year written screening schedule and personal prevention plan. Recommendations were developed using the USPSTF age appropriate recommendations. Education,  counseling, and referrals were provided as needed. After Visit Summary printed and given to patient which includes a list of additional screenings\tests needed.    Follow up in about 1 year (around 3/6/2025), or if symptoms worsen or fail to improve.      Edita Jc NP      Portions of this note may have been generated using voice recognition software.  Please excuse any spelling/grammatical errors. Occasional wrong-word or sound-a-like substitutions may have also occurred due to the inherent limitations of voice recognition software. Please read the chart carefully and recognize, using context, where substitutions have occurred.    I offered to discuss advanced care planning, including how to pick a person who would make decisions for you if you were unable to make them for yourself, called a health care power of , and what kind of decisions you might make such as use of life sustaining treatments such as ventilators and tube feeding when faced with a life limiting illness recorded on a living will that they will need to know. (How you want to be cared for as you near the end of your natural life)     X Patient is interested in learning more about how to make advanced directives.  I provided them paperwork and offered to discuss this with them.

## 2024-03-06 ENCOUNTER — OFFICE VISIT (OUTPATIENT)
Dept: PRIMARY CARE CLINIC | Facility: CLINIC | Age: 79
End: 2024-03-06
Payer: MEDICARE

## 2024-03-06 VITALS
DIASTOLIC BLOOD PRESSURE: 82 MMHG | BODY MASS INDEX: 40.31 KG/M2 | HEART RATE: 78 BPM | SYSTOLIC BLOOD PRESSURE: 136 MMHG | WEIGHT: 227.5 LBS | OXYGEN SATURATION: 96 % | TEMPERATURE: 99 F | RESPIRATION RATE: 16 BRPM | HEIGHT: 63 IN

## 2024-03-06 DIAGNOSIS — H81.11 BPPV (BENIGN PAROXYSMAL POSITIONAL VERTIGO), RIGHT: ICD-10-CM

## 2024-03-06 DIAGNOSIS — I51.89 LEFT VENTRICULAR DIASTOLIC DYSFUNCTION WITH PRESERVED SYSTOLIC FUNCTION: ICD-10-CM

## 2024-03-06 DIAGNOSIS — E78.5 HYPERLIPIDEMIA, UNSPECIFIED HYPERLIPIDEMIA TYPE: ICD-10-CM

## 2024-03-06 DIAGNOSIS — F32.5 MAJOR DEPRESSIVE DISORDER WITH SINGLE EPISODE, IN FULL REMISSION: ICD-10-CM

## 2024-03-06 DIAGNOSIS — Z00.00 ENCOUNTER FOR MEDICARE ANNUAL WELLNESS EXAM: Primary | ICD-10-CM

## 2024-03-06 DIAGNOSIS — E66.01 CLASS 3 SEVERE OBESITY DUE TO EXCESS CALORIES WITH SERIOUS COMORBIDITY AND BODY MASS INDEX (BMI) OF 40.0 TO 44.9 IN ADULT: ICD-10-CM

## 2024-03-06 DIAGNOSIS — E03.9 ACQUIRED HYPOTHYROIDISM: ICD-10-CM

## 2024-03-06 DIAGNOSIS — I10 HTN (HYPERTENSION), BENIGN: ICD-10-CM

## 2024-03-06 DIAGNOSIS — H90.A21 SENSORINEURAL HEARING LOSS (SNHL) OF RIGHT EAR WITH RESTRICTED HEARING OF LEFT EAR: ICD-10-CM

## 2024-03-06 DIAGNOSIS — G47.33 OSA (OBSTRUCTIVE SLEEP APNEA): ICD-10-CM

## 2024-03-06 PROCEDURE — 1159F MED LIST DOCD IN RCRD: CPT | Mod: HCNC,CPTII,S$GLB,

## 2024-03-06 PROCEDURE — 3288F FALL RISK ASSESSMENT DOCD: CPT | Mod: HCNC,CPTII,S$GLB,

## 2024-03-06 PROCEDURE — 1101F PT FALLS ASSESS-DOCD LE1/YR: CPT | Mod: HCNC,CPTII,S$GLB,

## 2024-03-06 PROCEDURE — 3075F SYST BP GE 130 - 139MM HG: CPT | Mod: HCNC,CPTII,S$GLB,

## 2024-03-06 PROCEDURE — 99999 PR PBB SHADOW E&M-EST. PATIENT-LVL V: CPT | Mod: PBBFAC,HCNC,,

## 2024-03-06 PROCEDURE — 1126F AMNT PAIN NOTED NONE PRSNT: CPT | Mod: HCNC,CPTII,S$GLB,

## 2024-03-06 PROCEDURE — G0439 PPPS, SUBSEQ VISIT: HCPCS | Mod: HCNC,S$GLB,,

## 2024-03-06 PROCEDURE — 3079F DIAST BP 80-89 MM HG: CPT | Mod: HCNC,CPTII,S$GLB,

## 2024-03-06 PROCEDURE — 1160F RVW MEDS BY RX/DR IN RCRD: CPT | Mod: HCNC,CPTII,S$GLB,

## 2024-03-06 RX ORDER — UBIDECARENONE 30 MG
30 CAPSULE ORAL DAILY
COMMUNITY

## 2024-03-06 NOTE — ASSESSMENT & PLAN NOTE
Stable.  Pt adhering to low fat low cholesterol diet.  She uses air fryer often.  Followed by pcp.

## 2024-03-06 NOTE — ASSESSMENT & PLAN NOTE
Stable.  Encouraged pt increase water intake.  She currently only drinks 1, 16oz bottle daily.  Pt does not add Na to diet.  Encouraged pt dedicate time to exercise.  Start at 10 min/day, most days of the week.

## 2024-04-02 ENCOUNTER — CLINICAL SUPPORT (OUTPATIENT)
Dept: REHABILITATION | Facility: HOSPITAL | Age: 79
End: 2024-04-02
Payer: MEDICARE

## 2024-04-02 DIAGNOSIS — R26.89 IMPAIRMENT OF BALANCE: ICD-10-CM

## 2024-04-02 DIAGNOSIS — H81.391 PERIPHERAL VERTIGO INVOLVING RIGHT EAR: ICD-10-CM

## 2024-04-02 PROBLEM — H81.11 BPPV (BENIGN PAROXYSMAL POSITIONAL VERTIGO), RIGHT: Status: RESOLVED | Noted: 2022-01-04 | Resolved: 2024-04-02

## 2024-04-02 PROCEDURE — 97162 PT EVAL MOD COMPLEX 30 MIN: CPT | Mod: HCNC,PO

## 2024-04-02 NOTE — PLAN OF CARE
[facility]  Physical Therapy Neurological Rehabilitation Initial Evaluation    Name: Eboni Nicholas  Clinic Number: [id number]    Therapy Diagnosis:   Encounter Diagnoses   Name Primary?    Peripheral vertigo involving right ear     Impairment of balance      Physician: RACHELE Robles MD    Physician Orders: PT Eval and Treat   Medical Diagnosis from Referral:   H81.391 (ICD-10-CM) - Peripheral vertigo involving right ear   R26.89 (ICD-10-CM) - Impairment of balance     Evaluation Date: 4/2/2024  Authorization Period Expiration: 02/01/25  Plan of Care Expiration: 05/31/24  Visit # / Visits authorized: 01/ 01    Time In: 13:00  Time Out: 13:45  Total Billable Time: 45 minutes    Precautions: Standard    Subjective   Date of onset: 4 months    History of current condition - Eboni reports: that she has been having dizziness mainly when driving, worse when she is looking straight vs head movements, when the car is in motion. Denies dizziness with positional changes. Endorses fullness on left side of head and that left ear feels full at times. Denies any falls, illness, or URI around onset of symptoms. Not currently taking meclizine. Endorses that she will bring her cane only occasionally when she is walking for a while. Endorses some difficulty with balance when negotiating steps.      History of Current Symptoms   Triggers: driving in car, mainly when looking straight ahead   Alleviating Factors: let's symptoms pass   Description of symptoms: lightheadedness, no spinning sensation   Onset: 4 months ago   Frequency: variable   Duration: variable, but can last a few minutes, < 10 minutes and then fullness will come back   Positional changes:  No symptoms getting into and out of bed    Limitations due to symptoms:increased difficulty with driving    History of migraines: no  Blood Pressure: takes medication, mostly well controlled with medication  History of Heart Condition: no     Medical  History:   Past Medical History:   Diagnosis Date    Anxiety     Benign essential HTN     Cataract     Depression     GERD (gastroesophageal reflux disease)     Glaucoma     Hyperlipidemia     Hypothyroid     CHIDI (obstructive sleep apnea)     Sarcoidosis        Surgical History:   Eboni Nicholas  has a past surgical history that includes Hysterectomy (2004) and Thyroid surgery.    Medications:   Eboni has a current medication list which includes the following prescription(s): aspirin, blood pressure monitor, calcium-vitamin d, co-enzyme q-10, dorzolamide, escitalopram oxalate, fluticasone propionate, latanoprost, levocetirizine, levothyroxine, losartan, montelukast, multivitamin, promethazine-dextromethorphan, simvastatin, and timolol maleate 0.5%.    Allergies:   Review of patient's allergies indicates:   Allergen Reactions    Brimonidine Itching     Itchy red eyes with brimonidine    Phenobarbital Rash    Sulfa (sulfonamide antibiotics) Rash        Imaging: imaging available in EPIC reviewed prior to evaluation  VNG: none recently  Audiogram: none recently    Prior Therapy: neuro PT in 04/27/22  Social History: lives with her spouse  Falls: 0   DME: shower cane, cane  Home Environment: [facility], [address]   Exercise Routine / History: stays active helping her , walks occasionally throughout the week   Family Present at time of Eval:  present in waiting room   Occupation: retired  Prior Level of Function: (I) with functional mobility/ADL, occ uses single point cane (SPC) for long distance ambulation, driving, cooking; someone comes to clean; assists   Current Level of Function: (I) with functional mobility/ADL, occ uses single point cane (SPC) for long distance ambulation, driving, cooking; someone comes to clean; assists     Pain:  Denies pain at this time.     Patient's goals: to improve dizziness    Objective   - Follows commands: 100% of time   - Speech: no deficits       Mental status:  "alert, oriented to person, place, and time, normal mood, behavior, speech, dress, motor activity, and thought processes  Appearance: Casually dressed  Behavior:  calm and cooperative  Attention Span and Concentration:  Normal    Posture Alignment in sitting/standing:   Head: forward head   Scapulae: rounded shoulders   Trunk: slouched posture   Pelvis: NT   Legs: grossly WFL     Sensation: Light Touch: Intact          Proprioception: NT, Kinesthesia NT    Auditory: endorses history of decreased hearing on right side but nothing has changed recently         Visual/Oculomotor Screen: denies changes   Ocular range of motion: WFL  Spontaneous nystagmus (fixation present): none present  Gaze-evoked nystagmus (fixation present): none noted  Tracking/Smooth Pursuits:Intact with tracking pen in all planes, no visual slippage, no dizziness  Saccades: WFL, slight decreased speed, but no visual slippage, no dizziness  Convergence: WFL, <8 cm  VOR cancellation: WFL, no saccadic intrusions, no dizziness    Acuity:wears glasses  Visual field: Intact  VCR: NT    VOR 1: Intact, decreased speed but no visual slippage, no dizziness  Head Thrust Test: NT  DVA: NT      ROM:   CERVICAL SPINE  Flexion: no dizziness  Extension: no dizziness  L rotation: no dizziness, R rotation: no dizziness  Are concurrent symptoms present with any of these movements: no concordant dizziness    Modified VAS (Vertebral Artery Screen), in sitting (rotation, then extension):  R: (-)  L: (-)        REJI SENSORY TESTING:  (P= Pass, F= Fail; note any sway; hold each position for 30")  Condition 1: (firm surface/feet together/eyes open) P  Condition 2: (firm surface/feet together/eyes closed) P, mod sway  Condition 3: (firm surface/feet in tandem/eyes open) NT  Condition 4: (firm surface/feet in tandem/eyes closed) NT  Condition 5: (soft surface/feet together/eyes open) P  Condition 6: (soft surface/feet together/eyes closed) F, 12 seconds  Condition 7: (Fukuda " step test), measure distance varied from center starting position, > 30 deg deviation to either side indicates hypofunction of biased side NT      Functional Gait Assessment:   1. Gait on level surface =  3  2. Change in Gait Speed = 2  3. Gait with horizontal head turns  = 3  4. Gait with vertical head turns = 3  5. Gait with pivot turns = 2  6. Step over obstacle = 2  7. Gait with Narrow YANDY = 0  8. Gait with eyes closed = 2  9. Ambulating Backwards = 1  10. Steps = 1     Score 19/30   Score:   <22/30 fall risk   <20/30 fall risk in older adults   <18/30 fall risk in Parkinsons       Gait Assessment:  - AD used: none  - Assistance: (I)  - Distance: community distances    Endurance Deficit: mild    POSITIONAL CANAL TESTING  Looking for nystagmus (slow phase followed by quick phase to the affected side for BPPV)    Supported [place] (posterior / CL anterior)   Right : (+)down beat non fatiguing nystagmus, (+) vertigo   Left: (-) vertigo, (-) nystagmus  Horizontal Canals- supine roll   Right: (+) pure torsional non fatiguing nystagmus, (-) dizziness   Left:  (+) pure torsional non fatiguing nystagmus, (-) dizziness  Treatment Performed: not indicated this date    CMS Impairment/Limitation/Restriction for FOTO for Vestibular Survey    Therapist reviewed FOTO scores for Eboni Nicholas on 4/2/2024.   FOTO documents entered into [employer] - see Media section.    Limitation Score: 50%         TREATMENT   No treatment provided this date. Entire time spent on evaluation.     Home Exercises and Patient Education Provided    Education provided:   - plan of care (POC), scheduling    Written Home Exercises Provided: to be provided as indicated    Assessment   Eboni is a 78 y.o. female referred to outpatient Physical Therapy with a medical diagnosis of Peripheral vertigo involving right ear; Imbalance. Patient presents with chief complaints of increased left sided head/ear fullness and increased dizziness elicited mainly when  looking straight ahead when driving. Denies positional triggers and true vertigo symptoms. Oculomotor testing WFL with no reproduction of concordant dizziness symptoms. No concordant dizziness elicited with cervical AROM screen. VAS test (-) bilaterally. During GST, increased difficulty noted with vision eliminated conditions. Performance on Functional Gait Assessment places patient in elevated fall risk category. During CRTs, pure down beating nystagmus noted in right [place] pike position, that did not fatigue while test position maintained. Additionally, pure non-fatiguing torsional nystagmus noted in each supine roll test position. Nystagmus patterns not indicative of BPPV, but more of central paroxysmal positional nystagmus. However, PT to continue to monitor in subsequent sessions. At this time, vestibular plan of care (POC) to focus on balance and motion tolerance training.     Patient prognosis is Good.   Patient will benefit from skilled outpatient Physical Therapy to address the deficits stated above and in the chart below, provide patient/family education, and to maximize patient's level of independence.     Plan of care discussed with patient: Yes  Patient's spiritual, cultural and educational needs considered and patient is agreeable to the plan of care and goals as stated below:     Anticipated Barriers for therapy: co-morbidities    Medical Necessity is demonstrated by the following  History  Co-morbidities and personal factors that may impact the plan of care Co-morbidities:   Headache, depression, glaucoma, bilateral nuclear sclerosis, BPPV, SNHL, left Eustachian tube d/o, peripheral vertigo involving right ear, hypertension, hypothyroidism, obesity, shoulder pain, impaired balance    Personal Factors:   no deficits     high   Examination  Body Structures and Functions, activity limitations and participation restrictions that may impact the plan of care Body Regions:   head  lower  extremities  trunk    Body Systems:    gross symmetry  ROM  strength  gross coordinated movement  balance  gait  transfers  transitions  motor control  motor learning    Participation Restrictions:   None noted    Activity limitations:   Learning and applying knowledge  no deficits    General Tasks and Commands  no deficits    Communication  no deficits    Mobility  driving (bike, car, motorcycle)    Self care  no deficits    Domestic Life  doing house work (cleaning house, washing dishes, laundry)    Interactions/Relationships  no deficits    Life Areas  no deficits    Community and Social Life  community life  recreation and leisure         moderate   Clinical Presentation evolving clinical presentation with changing clinical characteristics moderate   Decision Making/ Complexity Score: moderate     Goals:  Short Term Goals: 4 weeks   Patient to be (I) with established home exercise program.   Patient to pass GST condition 2 with no more than min additional sway for improved balance in low vision environments.   Patient to improve GST condition 6 to at least 20 seconds for improved balance in low vision environments.   Patient to improve Functional Gait Assessment score to at least 22/20 for improved balance in community environments.     Long Term Goals: 8 weeks   Patient to be (I) with advanced home exercise program.   Patient to pass GST condition 2 with no more than slight additional sway for improved balance in low vision environments.   Patient to pass GST condition 6 to at least 30 seconds with no more than mod additional sway for improved balance in low vision environments.   Patient to improve Functional Gait Assessment score to at least 25/20 for improved balance in community environments.     Plan   Plan of care Certification: 4/2/2024 to 05/31/24.    Outpatient Physical Therapy 2 times weekly for 8 weeks to include the following interventions: Gait Training, Manual Therapy, Moist Heat/ Ice,  Neuromuscular Re-ed, Orthotic Management and Training, Patient Education, Self Care, Therapeutic Activities, Therapeutic Exercise, and Canalith Repositioning Procedures.     Balance and motion tolerance training    Nubia Birmingham, PT

## 2024-04-12 ENCOUNTER — DOCUMENTATION ONLY (OUTPATIENT)
Dept: REHABILITATION | Facility: HOSPITAL | Age: 79
End: 2024-04-12
Payer: MEDICARE

## 2024-04-12 NOTE — PROGRESS NOTES
PT/PTA met face to face to discuss pt's treatment plan and progress towards established goals.  Continue with current PT POC with focus on balance.  Patient will be seen by physical therapist at least every sixth treatment or 30 days, whichever occurs first.    Laura Adame, PTA  04/12/2024

## 2024-04-18 NOTE — PROGRESS NOTES
"[facility]   Physical Therapy Treatment Note      Name: Eboni BROOKS [facility]  Clinic Number: [id number]    Therapy Diagnosis:   Encounter Diagnosis   Name Primary?    Impairment of balance Yes     Physician: RACHELE Robles MD    Visit Date: 4/19/2024    Physician Orders: PT Eval and Treat   Medical Diagnosis from Referral:   H81.391 (ICD-10-CM) - Peripheral vertigo involving right ear   R26.89 (ICD-10-CM) - Impairment of balance      Evaluation Date: 4/2/2024  Authorization Period Expiration: 12/31/24  Plan of Care Expiration: 05/31/24  Visit # / Visits authorized: 01/ 20 + eval     Time In: 11:15  Time Out: 12:00  Total Billable Time: 45 minutes     Precautions: Standard    PTA Visit #: 0/5       Subjective     Patient reports: that overall symptoms are doing better. Symptoms of dizziness and left sided head fullness are improved since evaluation. No dizziness to start session. Only some left sided head pressure to start session. Mild cramp throughout right knee and hip that started this morning, but no pain.     Home exercise program not provided at this time.   Response to previous treatment: no adverse response  Functional change: ongoing    Pain: 0/10  Location: NA    Objective      Objective Measures updated at progress report unless specified.     Treatment     Eboni received the treatments listed below:      therapeutic exercises to develop strength, endurance, ROM, flexibility, posture, and core stabilization for 10 minutes including:  2 x 30" bilateral seated hamstring stretch with stool    X 6 min Sci Fit recumbent stepper L1 for cardiovascular endurance    neuromuscular re-education activities to improve: Balance, Coordination, and Sense for 20 minutes. The following activities were included:  Foam pad: CGA, no upper extremity support  3 x 30" stance with eyes closed  2 x 30" right<>left head movements  2 x 30" up <> down head movements    X 4 laps tandem ambulation in // bars, " "occ touchdown support    2 x 30" each lower extremity in front, tandem stance with eyes open, CGA, no upper extremity support    therapeutic activities to improve functional performance for 15 minutes, including:    Hallway ambulation:   2 x 100 feet with right<>left head movements, SBA  2 x 100 feet with up <> down head movements, SBA  2 x 100 feet forward <> backward ambulation tossing <> catching ball, CGA      Patient Education and Home Exercises       Education provided:   - plan of care (POC), scheduling    Written Home Exercises Provided: to be provided as appropriate    Assessment     Eboni tolerated initial follow up session well with reports of improved baseline symptoms of dizziness. Improved performance noted with both dynamic and static balance activities today compared to evaluation. No dizziness reproduced during today's session. Endurance does remain limited so initiated Sci Fit recumbent stepper. Patient remains appropriate for continued PT intervention to further address balance, endurance, and functional mobility deficits.     Eboni Is progressing well towards her goals.   Patient prognosis is Good.     Patient will continue to benefit from skilled outpatient physical therapy to address the deficits listed in the problem list box on initial evaluation, provide pt/family education and to maximize pt's level of independence in the home and community environment.     Patient's spiritual, cultural and educational needs considered and pt agreeable to plan of care and goals.     Anticipated barriers to physical therapy: co-morbidities    Goals:  Short Term Goals: 4 weeks   Patient to be (I) with established home exercise program.  Ongoing  Patient to pass GST condition 2 with no more than min additional sway for improved balance in low vision environments. Ongoing  Patient to improve GST condition 6 to at least 20 seconds for improved balance in low vision environments. Ongoing  Patient to improve " Functional Gait Assessment score to at least 22/20 for improved balance in community environments. Ongoing     Long Term Goals: 8 weeks   Patient to be (I) with advanced home exercise program. Ongoing  Patient to pass GST condition 2 with no more than slight additional sway for improved balance in low vision environments. Ongoing  Patient to pass GST condition 6 to at least 30 seconds with no more than mod additional sway for improved balance in low vision environments. Ongoing  Patient to improve Functional Gait Assessment score to at least 25/20 for improved balance in community environments. Ongoing    Plan     Progress activity tolerance, balance, and motion tolerance as tolerated.     Nubia Birmingham, PT

## 2024-04-19 ENCOUNTER — CLINICAL SUPPORT (OUTPATIENT)
Dept: REHABILITATION | Facility: HOSPITAL | Age: 79
End: 2024-04-19
Payer: MEDICARE

## 2024-04-19 DIAGNOSIS — R26.89 IMPAIRMENT OF BALANCE: Primary | ICD-10-CM

## 2024-04-19 PROCEDURE — 97112 NEUROMUSCULAR REEDUCATION: CPT | Mod: PO

## 2024-04-19 PROCEDURE — 97110 THERAPEUTIC EXERCISES: CPT | Mod: PO

## 2024-04-19 PROCEDURE — 97530 THERAPEUTIC ACTIVITIES: CPT | Mod: PO

## 2024-04-22 RX ORDER — FLUTICASONE PROPIONATE 50 MCG
2 SPRAY, SUSPENSION (ML) NASAL DAILY
Qty: 48 ML | Refills: 3 | Status: SHIPPED | OUTPATIENT
Start: 2024-04-22

## 2024-04-22 RX ORDER — MONTELUKAST SODIUM 10 MG/1
TABLET ORAL
Qty: 90 TABLET | Refills: 3 | OUTPATIENT
Start: 2024-04-22

## 2024-04-22 RX ORDER — SIMVASTATIN 40 MG/1
40 TABLET, FILM COATED ORAL NIGHTLY
Qty: 90 TABLET | Refills: 3 | OUTPATIENT
Start: 2024-04-22

## 2024-04-22 RX ORDER — LEVOTHYROXINE SODIUM 100 UG/1
100 TABLET ORAL DAILY
Qty: 90 TABLET | Refills: 3 | OUTPATIENT
Start: 2024-04-22

## 2024-04-22 NOTE — TELEPHONE ENCOUNTER
Refill Routing Note   Medication(s) are not appropriate for processing by [facility] for the following reason(s):        Non-participating provider    [facility] action(s):  Route             Appointments  past 12m or future 3m with PCP    Date Provider   Last Visit   3/6/2024 Edita cJ NP   Next Visit   Visit date not found Edita Jc NP   ED visits in past 90 days: 0        Note composed:2:27 PM 04/22/2024

## 2024-04-25 ENCOUNTER — OFFICE VISIT (OUTPATIENT)
Dept: PRIMARY CARE CLINIC | Facility: CLINIC | Age: 79
End: 2024-04-25
Payer: MEDICARE

## 2024-04-25 VITALS
SYSTOLIC BLOOD PRESSURE: 134 MMHG | HEIGHT: 63 IN | OXYGEN SATURATION: 96 % | BODY MASS INDEX: 39.72 KG/M2 | HEART RATE: 71 BPM | DIASTOLIC BLOOD PRESSURE: 76 MMHG | WEIGHT: 224.19 LBS

## 2024-04-25 DIAGNOSIS — Z86.2 HX OF SARCOIDOSIS: ICD-10-CM

## 2024-04-25 DIAGNOSIS — F32.5 MAJOR DEPRESSIVE DISORDER WITH SINGLE EPISODE, IN FULL REMISSION: ICD-10-CM

## 2024-04-25 DIAGNOSIS — Z12.11 SCREENING FOR COLON CANCER: ICD-10-CM

## 2024-04-25 DIAGNOSIS — G47.33 OSA (OBSTRUCTIVE SLEEP APNEA): ICD-10-CM

## 2024-04-25 DIAGNOSIS — R07.9 CHEST PAIN, UNSPECIFIED TYPE: ICD-10-CM

## 2024-04-25 DIAGNOSIS — I10 HTN (HYPERTENSION), BENIGN: ICD-10-CM

## 2024-04-25 DIAGNOSIS — Z13.6 ENCOUNTER FOR SCREENING FOR CARDIOVASCULAR DISORDERS: ICD-10-CM

## 2024-04-25 DIAGNOSIS — E03.9 ACQUIRED HYPOTHYROIDISM: ICD-10-CM

## 2024-04-25 DIAGNOSIS — E78.5 HYPERLIPIDEMIA, UNSPECIFIED HYPERLIPIDEMIA TYPE: ICD-10-CM

## 2024-04-25 DIAGNOSIS — Z86.2 PERSONAL HISTORY OF SARCOIDOSIS: ICD-10-CM

## 2024-04-25 DIAGNOSIS — R26.89 IMBALANCE: ICD-10-CM

## 2024-04-25 DIAGNOSIS — N18.31 CHRONIC KIDNEY DISEASE, STAGE 3A: Primary | ICD-10-CM

## 2024-04-25 DIAGNOSIS — J30.9 CHRONIC ALLERGIC RHINITIS: ICD-10-CM

## 2024-04-25 PROCEDURE — 3078F DIAST BP <80 MM HG: CPT | Mod: CPTII,S$GLB,, | Performed by: STUDENT IN AN ORGANIZED HEALTH CARE EDUCATION/TRAINING PROGRAM

## 2024-04-25 PROCEDURE — 1159F MED LIST DOCD IN RCRD: CPT | Mod: CPTII,S$GLB,, | Performed by: STUDENT IN AN ORGANIZED HEALTH CARE EDUCATION/TRAINING PROGRAM

## 2024-04-25 PROCEDURE — 3075F SYST BP GE 130 - 139MM HG: CPT | Mod: CPTII,S$GLB,, | Performed by: STUDENT IN AN ORGANIZED HEALTH CARE EDUCATION/TRAINING PROGRAM

## 2024-04-25 PROCEDURE — 1160F RVW MEDS BY RX/DR IN RCRD: CPT | Mod: CPTII,S$GLB,, | Performed by: STUDENT IN AN ORGANIZED HEALTH CARE EDUCATION/TRAINING PROGRAM

## 2024-04-25 PROCEDURE — 3288F FALL RISK ASSESSMENT DOCD: CPT | Mod: CPTII,S$GLB,, | Performed by: STUDENT IN AN ORGANIZED HEALTH CARE EDUCATION/TRAINING PROGRAM

## 2024-04-25 PROCEDURE — 93010 ELECTROCARDIOGRAM REPORT: CPT | Mod: S$GLB,,, | Performed by: INTERNAL MEDICINE

## 2024-04-25 PROCEDURE — 99214 OFFICE O/P EST MOD 30 MIN: CPT | Mod: S$GLB,,, | Performed by: STUDENT IN AN ORGANIZED HEALTH CARE EDUCATION/TRAINING PROGRAM

## 2024-04-25 PROCEDURE — 93005 ELECTROCARDIOGRAM TRACING: CPT | Mod: S$GLB,,, | Performed by: STUDENT IN AN ORGANIZED HEALTH CARE EDUCATION/TRAINING PROGRAM

## 2024-04-25 PROCEDURE — 99999 PR PBB SHADOW E&M-EST. PATIENT-LVL V: CPT | Mod: PBBFAC,,, | Performed by: STUDENT IN AN ORGANIZED HEALTH CARE EDUCATION/TRAINING PROGRAM

## 2024-04-25 PROCEDURE — 1126F AMNT PAIN NOTED NONE PRSNT: CPT | Mod: CPTII,S$GLB,, | Performed by: STUDENT IN AN ORGANIZED HEALTH CARE EDUCATION/TRAINING PROGRAM

## 2024-04-25 PROCEDURE — 1101F PT FALLS ASSESS-DOCD LE1/YR: CPT | Mod: CPTII,S$GLB,, | Performed by: STUDENT IN AN ORGANIZED HEALTH CARE EDUCATION/TRAINING PROGRAM

## 2024-04-25 RX ORDER — MONTELUKAST SODIUM 10 MG/1
TABLET ORAL
Qty: 90 TABLET | Refills: 3 | Status: SHIPPED | OUTPATIENT
Start: 2024-04-25

## 2024-04-25 RX ORDER — ESCITALOPRAM OXALATE 20 MG/1
20 TABLET ORAL DAILY
Qty: 90 TABLET | Refills: 3 | Status: SHIPPED | OUTPATIENT
Start: 2024-04-25

## 2024-04-25 RX ORDER — SIMVASTATIN 40 MG/1
40 TABLET, FILM COATED ORAL NIGHTLY
Qty: 90 TABLET | Refills: 3 | Status: SHIPPED | OUTPATIENT
Start: 2024-04-25

## 2024-04-25 RX ORDER — LEVOTHYROXINE SODIUM 100 UG/1
100 TABLET ORAL DAILY
Qty: 90 TABLET | Refills: 3 | Status: SHIPPED | OUTPATIENT
Start: 2024-04-25

## 2024-04-25 NOTE — PROGRESS NOTES
Office visit  Patient: Eboni Nicholas   4/26/2024     Assessment:     1. Chronic kidney disease, stage 3a    2. Major depressive disorder with single episode, in full remission    3. HTN (hypertension), benign    4. Acquired hypothyroidism    5. CHIDI (obstructive sleep apnea)    6. Hx of sarcoidosis    7. Personal history of sarcoidosis    8. Encounter for screening for cardiovascular disorders    9. Screening for colon cancer    10. Chest pain, unspecified type    11. Hyperlipidemia, unspecified hyperlipidemia type    12. Chronic allergic rhinitis    13. Imbalance      Plan:       1. Chronic kidney disease, stage 3a  -     Comprehensive Metabolic Panel; Future; Expected date: 04/25/2024        -stable, will check creatinine; avoid nephrotoxic agents    2. Major depressive disorder with single episode, in full remission  -     EScitalopram oxalate (LEXAPRO) 20 MG tablet; Take 1 tablet (20 mg total) by mouth once daily.  Dispense: 90 tablet; Refill: 3        -stable, continue current medication    3. HTN (hypertension), benign       -stable, continue losartan    4. Acquired hypothyroidism  -     levothyroxine (SYNTHROID) 100 MCG tablet; Take 1 tablet (100 mcg total) by mouth once daily.  Dispense: 90 tablet; Refill: 3        -stable, will check TSH; continue levothyroxine    5. CHIDI (obstructive sleep apnea)       -stable, continue CPAP    6. Hx of sarcoidosis       -stable    7. Personal history of sarcoidosis  -     TSH; Future; Expected date: 04/25/2024  -     CBC Auto Differential; Future; Expected date: 04/25/2024    8. Encounter for screening for cardiovascular disorders  -     Lipid Panel; Future; Expected date: 04/25/2024    9. Screening for colon cancer  -     Fecal Immunochemical Test (iFOBT); Future; Expected date: 04/25/2024    10. Chest pain, unspecified type  -     IN OFFICE EKG 12-LEAD (to [facility])        -ECG unremarkable - no ST elevations, no arrhythmias    11. Hyperlipidemia, unspecified hyperlipidemia  "type  -     simvastatin (ZOCOR) 40 MG tablet; Take 1 tablet (40 mg total) by mouth every evening.  Dispense: 90 tablet; Refill: 3        -stable, continue simvastatin    12. Chronic allergic rhinitis  -     montelukast (SINGULAIR) 10 mg tablet; One tablet by mouth every evening  Dispense: 90 tablet; Refill: 3        -stable    13. Imbalance  -     Ambulatory referral/consult to Neurology; Future; Expected date: 05/02/2024        -ongoing problem; patient's description of "fullness" in her head is somewhat puzzling; wonder if imbalance is central vs peripheral    Return to clinic in 6 months or sooner as needed.            CHIEF COMPLAINT: annual physical and establish care    HPI: Eboni Nicholas is a 78 y.o. female who presents for an annual physical. She reports that she's been having trouble with "fullness in her head."  On Tuesday, she jeremiah like the back of her head and the side of her head felt really full. She also had chills, sweating, and elevated BP of 149 mm Hg.  She took an extra losartan.  She also felt a burning in her left chest underneath her breast at the time.  She didn't feel nauseous.          Current Outpatient Medications   Medication Instructions    aspirin (ECOTRIN) 81 mg, Daily    blood pressure monitor Kit No dose, route, or frequency recorded.    calcium-vitamin D 250-100 mg-unit per tablet 1 tablet, Oral, Daily    co-enzyme Q-10 30 mg, Oral, Daily    dorzolamide (TRUSOPT) 2 % ophthalmic solution 1 drop, Both Eyes, 3 times daily    EScitalopram oxalate (LEXAPRO) 20 mg, Oral, Daily    fluticasone propionate (FLONASE) 100 mcg, Each Nostril, Daily    latanoprost 0.005 % ophthalmic solution 1 drop, Both Eyes, Daily    levocetirizine (XYZAL) 5 mg, Oral, Nightly, 1 tablet by mouth every morning    levothyroxine (SYNTHROID) 100 mcg, Oral, Daily    losartan (COZAAR) 50 MG tablet TAKE ONE TABLET BY MOUTH DAILY, THEN MAY REPEAT THE DOSE IF BP > 140/90    montelukast (SINGULAIR) 10 mg tablet One " "tablet by mouth every evening    multivitamin (THERAGRAN) per tablet 1 tablet, Daily    promethazine-dextromethorphan (PROMETHAZINE-DM) 6.25-15 mg/5 mL Syrp 5 mLs, Oral, Nightly PRN    simvastatin (ZOCOR) 40 mg, Oral, Nightly    timolol maleate 0.5% (TIMOPTIC) 0.5 % Drop 1 drop, Both Eyes, 2 times daily       No results found for: "HGBA1C"  No results found for: "MICALBCREAT"  Lab Results   Component Value Date    LDLCALC 101.6 10/13/2023    LDLCALC 95.8 09/16/2022    CHOL 185 10/13/2023    HDL 46 10/13/2023    TRIG 187 (H) 10/13/2023       Lab Results   Component Value Date     10/13/2023    K 4.3 10/13/2023     10/13/2023    CO2 24 10/13/2023     (H) 10/13/2023    BUN 16 10/13/2023    CREATININE 1.2 10/13/2023    CALCIUM 9.4 10/13/2023    PROT 7.1 10/13/2023    ALBUMIN 3.9 10/13/2023    BILITOT 0.4 10/13/2023    ALKPHOS 57 10/13/2023    AST 46 (H) 10/13/2023    ALT 48 (H) 10/13/2023    ANIONGAP 9 10/13/2023    ESTGFRAFRICA >60.0 08/07/2020    EGFRNONAA 56.4 (A) 08/07/2020    WBC 7.18 10/13/2023    HGB 13.3 10/13/2023    HGB 12.6 09/16/2022    HCT 42.0 10/13/2023    MCV 96 10/13/2023     10/13/2023    TSH 1.189 10/13/2023    HEPCAB Negative 01/31/2020       Lab Results   Component Value Date    TANJBRNK57ZV 56 04/10/2012    VJIVOIVM95 697 03/24/2005    FERRITIN 108 09/16/2022    IRON 56 09/16/2022    TRANSFERRIN 261 09/16/2022    TIBC 386 09/16/2022    FESATURATED 15 (L) 09/16/2022         Past Medical History:   Diagnosis Date    Anxiety     Benign essential HTN     Cataract     Depression     GERD (gastroesophageal reflux disease)     Glaucoma     Hyperlipidemia     Hypothyroid     CHIDI (obstructive sleep apnea)     Sarcoidosis      Past Surgical History:   Procedure Laterality Date    HYSTERECTOMY  2004    THYROID SURGERY       Vitals:    04/25/24 1331   BP: 134/76   Pulse: 71   SpO2: 96%   Weight: 101.7 kg (224 lb 3.3 oz)   Height: 5' 3" (1.6 m)   PainSc: 0-No pain     Objective: "   Physical Exam  Vitals reviewed.   Constitutional:       General: She is not in acute distress.     Appearance: Normal appearance. She is well-developed.   HENT:      Head: Normocephalic and atraumatic.      Right Ear: External ear normal.      Left Ear: External ear normal.      Nose: Nose normal.      Mouth/Throat:      Mouth: Mucous membranes are moist.   Eyes:      General: No scleral icterus.        Right eye: No discharge.         Left eye: No discharge.      Conjunctiva/sclera: Conjunctivae normal.   Cardiovascular:      Rate and Rhythm: Normal rate and regular rhythm.      Heart sounds: Normal heart sounds. No murmur heard.     No friction rub. No gallop.   Pulmonary:      Effort: Pulmonary effort is normal. No respiratory distress.      Breath sounds: Normal breath sounds. No wheezing, rhonchi or rales.   Musculoskeletal:         General: No deformity.      Right lower leg: No edema.      Left lower leg: No edema.   Skin:     General: Skin is warm and dry.   Neurological:      General: No focal deficit present.      Mental Status: She is alert and oriented to person, place, and time.   Psychiatric:         Mood and Affect: Mood normal.         Behavior: Behavior normal.         Thought Content: Thought content normal.             Maricruz Lloyd MD  Internal Medicine and Pediatrics

## 2024-04-26 ENCOUNTER — CLINICAL SUPPORT (OUTPATIENT)
Dept: REHABILITATION | Facility: HOSPITAL | Age: 79
End: 2024-04-26
Payer: MEDICARE

## 2024-04-26 DIAGNOSIS — R26.89 IMPAIRMENT OF BALANCE: Primary | ICD-10-CM

## 2024-04-26 LAB
OHS QRS DURATION: 80 MS
OHS QTC CALCULATION: 420 MS

## 2024-04-26 PROCEDURE — 97530 THERAPEUTIC ACTIVITIES: CPT | Mod: PO,CQ

## 2024-04-26 PROCEDURE — 97110 THERAPEUTIC EXERCISES: CPT | Mod: PO,CQ

## 2024-04-26 PROCEDURE — 97112 NEUROMUSCULAR REEDUCATION: CPT | Mod: PO,CQ

## 2024-04-26 NOTE — PROGRESS NOTES
"[facility]   Physical Therapy Treatment Note      Name: Eboni Nicholas  Clinic Number: [id number]    Therapy Diagnosis:   Encounter Diagnosis   Name Primary?    Impairment of balance Yes     Physician: RACHELE Robles MD    Visit Date: 4/26/2024    Physician Orders: PT Eval and Treat   Medical Diagnosis from Referral:   H81.391 (ICD-10-CM) - Peripheral vertigo involving right ear   R26.89 (ICD-10-CM) - Impairment of balance      Evaluation Date: 4/2/2024  Authorization Period Expiration: 12/31/24  Plan of Care Expiration: 05/31/24  Visit # / Visits authorized: 02/ 20 + eval     Time In: 11:15  Time Out: 12:00  Total Billable Time: 45 minutes     Precautions: Standard    PTA Visit #: 1/5       Subjective     Patient reports: " Doing pretty good, just head feels full on that left side."     Home exercise program not provided at this time.   Response to previous treatment: no adverse response  Functional change: ongoing    Pain: 0/10  Location: NA    Objective      Objective Measures updated at progress report unless specified.     Treatment     Eboni received the treatments listed below:      therapeutic exercises to develop strength, endurance, ROM, flexibility, posture, and core stabilization for 8 minutes including:    X 8 min Sci Fit recumbent stepper L1 for cardiovascular endurance    neuromuscular re-education activities to improve: Balance, Coordination, and Sense for 22 minutes. The following activities were included:  Foam pad: CGA, no upper extremity support  2 x 30 sec of static standing with eyes opened  3 x 30" stance with eyes closed  2 x 30" right<>left head movements  2 x 30" up <> down head movements  2 x 30 sec of tandem stance     X 4 laps tandem ambulation in // bars, occ touchdown support      therapeutic activities to improve functional performance for 15 minutes, including:    Hallway ambulation:   2 x 100 feet with right<>left head movements, SBA  2 x 100 feet with " up <> down head movements, SBA  2 x 100 feet forward <> backward ambulation tossing <> catching ball, CGA      Patient Education and Home Exercises       Education provided:   - plan of care (POC), scheduling    Written Home Exercises Provided: to be provided as appropriate    Assessment     Eboni tolerated her tx session well and did not have any problems noted.  Eboni cont to focus on balance and was able to increase her time on the stepper.   Patient remains appropriate for continued PT intervention to further address balance, endurance, and functional mobility deficits.     Eboni Is progressing well towards her goals.   Patient prognosis is Good.     Patient will continue to benefit from skilled outpatient physical therapy to address the deficits listed in the problem list box on initial evaluation, provide pt/family education and to maximize pt's level of independence in the home and community environment.     Patient's spiritual, cultural and educational needs considered and pt agreeable to plan of care and goals.     Anticipated barriers to physical therapy: co-morbidities    Goals:  Short Term Goals: 4 weeks   Patient to be (I) with established home exercise program.  Ongoing  Patient to pass GST condition 2 with no more than min additional sway for improved balance in low vision environments. Ongoing  Patient to improve GST condition 6 to at least 20 seconds for improved balance in low vision environments. Ongoing  Patient to improve Functional Gait Assessment score to at least 22/20 for improved balance in community environments. Ongoing     Long Term Goals: 8 weeks   Patient to be (I) with advanced home exercise program. Ongoing  Patient to pass GST condition 2 with no more than slight additional sway for improved balance in low vision environments. Ongoing  Patient to pass GST condition 6 to at least 30 seconds with no more than mod additional sway for improved balance in low vision environments.  Ongoing  Patient to improve Functional Gait Assessment score to at least 25/20 for improved balance in community environments. Ongoing    Plan     Progress activity tolerance, balance, and motion tolerance as tolerated.     Laura Adame, PTA

## 2024-04-27 NOTE — PROGRESS NOTES
Subjective:       Patient ID: Eboni Nicholas is a 78 y.o. female.    Chief Complaint: Follow-up      The patient is returning for follow-up visit.  I have not seen her in 11 months.  There are multiple issues to discuss:  1. Allergic rhinitis:  Nasal secretions are clear.  The patient does have periodic nasal congestion and rhinorrhea.  She uses her montelukast and fluticasone sprays every day and supplements with cetirizine when needed.  2. Headaches:  Headaches are much improved as long as she takes her allergy meds.  3. Right peripheral vestibular disorder:  The patient does have chronic imbalance.  She is having some issues with feeling that she is falling to the left, particularly when she is driving her automobile.  She is currently doing vestibular rehabilitative therapy.  It is very difficult for her to make it to the facility from a transportation point of view.  She has concerns that she is not making much progress.  4. Sarcoidosis of the nose:  There is no change in this issue.  She does have chronic crusting in her nose.  If she uses NeilMed nasal gel spray the crusting is relieved but then returns quickly.  5. Obstructive sleep apnea using CPAP:  The patient does use her CPAP every night.  6. Sensorineural hearing loss:  The patient feels that her hearing has worsened.  Her last audiogram was in 2022.  Her  states that she turns television volume level very high          Review of Systems     Constitutional: Positive for fatigue.  Negative for appetite change, chills, fever and unexpected weight loss.      HENT: Positive for hearing loss, postnasal drip, ringing in the ears, runny nose, sinus pressure and stuffy nose.  Negative for ear discharge, ear infection, ear pain, facial swelling, mouth sores, nosebleeds, sinus infection, sore throat, tonsil infection, dental problems, trouble swallowing and voice change.      Eyes:  Negative for change in eyesight, eye drainage, eye itching and  photophobia.     Respiratory:  Positive for cough, sleep apnea and snoring. Negative for shortness of breath and wheezing.      Cardiovascular:  Negative for chest pain, foot swelling, irregular heartbeat and swollen veins.     Gastrointestinal:  Negative for abdominal pain, acid reflux, constipation, diarrhea, heartburn and vomiting.     Genitourinary: Negative for difficulty urinating, sexual problems and frequent urination.     Musc: Positive for aching muscles. Negative for aching joints, back pain and neck pain.     Skin: Negative for rash.     Allergy: Negative for food allergies and seasonal allergies.     Endocrine: Negative for cold intolerance and heat intolerance.      Neurological: Positive for headaches and light-headedness. Negative for dizziness, seizures and tremors.      Hematologic: Negative for bruises/bleeds easily and swollen glands.      Psychiatric: Negative for decreased concentration, depression, nervous/anxious and sleep disturbance.                Objective:      Physical Exam  Vitals and nursing note reviewed. Chaperone present: Patient in her  come together for the visit, both have an appointment.   Constitutional:       General: She is awake.      Appearance: Normal appearance. She is well-developed and well-groomed. She is obese.   HENT:      Head: Normocephalic.      Jaw: There is normal jaw occlusion.      Salivary Glands: Right salivary gland is not diffusely enlarged or tender. Left salivary gland is not diffusely enlarged or tender.      Right Ear: Ear canal and external ear normal. Decreased hearing noted. No drainage. There is impacted cerumen. Tympanic membrane is retracted.      Left Ear: Ear canal and external ear normal. Decreased hearing noted. No drainage. Tympanic membrane is retracted.      Nose: Septal deviation (to the right), mucosal edema (cyanotic, boggy inferior turbinates bilaterally) and rhinorrhea (clear mucus bilaterally) present. No nasal deformity.  Rhinorrhea is clear.      Right Turbinates: Enlarged and pale.      Left Turbinates: Enlarged and pale.      Mouth/Throat:      Lips: No lesions.      Mouth: Mucous membranes are moist. No oral lesions.      Dentition: Abnormal dentition. No gum lesions.      Tongue: No lesions.      Palate: No mass and lesions.      Pharynx: Oropharynx is clear. Uvula midline.      Tonsils: 2+ on the right. 2+ on the left.   Eyes:      General: Lids are normal. Vision grossly intact.         Right eye: No discharge.         Left eye: No discharge.      Extraocular Movements: Extraocular movements intact.      Conjunctiva/sclera: Conjunctivae normal.      Pupils: Pupils are equal, round, and reactive to light.   Neck:      Thyroid: No thyroid mass or thyromegaly.      Trachea: Trachea normal. No tracheal deviation.   Cardiovascular:      Rate and Rhythm: Normal rate and regular rhythm.      Pulses: Normal pulses.      Heart sounds: Normal heart sounds.   Pulmonary:      Effort: Pulmonary effort is normal.      Breath sounds: Normal breath sounds. No stridor. No decreased breath sounds, wheezing, rhonchi or rales.   Abdominal:      General: Bowel sounds are normal.      Palpations: Abdomen is soft.      Tenderness: There is no abdominal tenderness.   Musculoskeletal:      Cervical back: Neck supple. No muscular tenderness. Decreased range of motion.   Lymphadenopathy:      Head:      Right side of head: No submental, submandibular, preauricular, posterior auricular or occipital adenopathy.      Left side of head: No submental, submandibular, preauricular, posterior auricular or occipital adenopathy.      Cervical: No cervical adenopathy.   Skin:     General: Skin is warm and dry.      Findings: No petechiae or rash.      Nails: There is no clubbing.   Neurological:      Mental Status: She is alert and oriented to person, place, and time.      Cranial Nerves: No cranial nerve deficit.      Sensory: No sensory deficit.      Gait: Gait  normal.      Comments: Neuro otologic-Romberg negative, tandem Romberg falls to the left, mildly wide-based gait, tandem gait not performed   Psychiatric:         Speech: Speech normal.         Behavior: Behavior normal. Behavior is cooperative.         Thought Content: Thought content normal.         Judgment: Judgment normal.         Procedure-  Cerumen impactions was removed from the right ear using 8 French suction and bayonet forceps.  The  patient tolerated procedure well was discharged post procedure.    Assessment:       1. Non-seasonal allergic rhinitis, unspecified trigger    2. Nasal crusting    3. Impairment of balance    4. Peripheral vertigo involving right ear    5. Mixed conductive and sensorineural hearing loss of left ear with restricted hearing of right ear    6. CHIDI on CPAP          Plan:       I will have the patient perform routine application and nasal gel to her nasal passages to reduce crusting.  She would like to stop her vestibular rehabilitative therapy if the therapist is in agreement.  I need to send a note to the therapist.  I will recheck her in 3 months, or sooner on an as-needed basis.                  DISCLAIMER: This note was prepared with [employer] voice recognition transcription software. Garbled syntax, mangled pronouns, and other bizarre constructions may be attributed to that software system. While efforts were made to correct any mistakes made by this voice recognition program, some errors and/or omissions may remain in the note that were missed when the note was originally created.

## 2024-04-28 DIAGNOSIS — H40.1121 PRIMARY OPEN ANGLE GLAUCOMA (POAG) OF LEFT EYE, MILD STAGE: ICD-10-CM

## 2024-04-28 DIAGNOSIS — H40.1112 PRIMARY OPEN ANGLE GLAUCOMA (POAG) OF RIGHT EYE, MODERATE STAGE: ICD-10-CM

## 2024-04-29 RX ORDER — TIMOLOL MALEATE 5 MG/ML
1 SOLUTION/ DROPS OPHTHALMIC 2 TIMES DAILY
Qty: 15 ML | Refills: 3 | Status: SHIPPED | OUTPATIENT
Start: 2024-04-29

## 2024-04-29 RX ORDER — LOSARTAN POTASSIUM 50 MG/1
TABLET ORAL
Qty: 180 TABLET | Refills: 1 | Status: SHIPPED | OUTPATIENT
Start: 2024-04-29

## 2024-04-30 ENCOUNTER — OFFICE VISIT (OUTPATIENT)
Dept: OTOLARYNGOLOGY | Facility: CLINIC | Age: 79
End: 2024-04-30
Payer: MEDICARE

## 2024-04-30 VITALS
WEIGHT: 222.88 LBS | BODY MASS INDEX: 39.48 KG/M2 | SYSTOLIC BLOOD PRESSURE: 121 MMHG | DIASTOLIC BLOOD PRESSURE: 61 MMHG | OXYGEN SATURATION: 95 % | HEART RATE: 78 BPM

## 2024-04-30 DIAGNOSIS — G47.33 OSA ON CPAP: ICD-10-CM

## 2024-04-30 DIAGNOSIS — H90.A32 MIXED CONDUCTIVE AND SENSORINEURAL HEARING LOSS OF LEFT EAR WITH RESTRICTED HEARING OF RIGHT EAR: ICD-10-CM

## 2024-04-30 DIAGNOSIS — R26.89 IMPAIRMENT OF BALANCE: ICD-10-CM

## 2024-04-30 DIAGNOSIS — J34.89 NASAL CRUSTING: ICD-10-CM

## 2024-04-30 DIAGNOSIS — J30.89 NON-SEASONAL ALLERGIC RHINITIS, UNSPECIFIED TRIGGER: Primary | ICD-10-CM

## 2024-04-30 DIAGNOSIS — H61.21 IMPACTED CERUMEN OF RIGHT EAR: ICD-10-CM

## 2024-04-30 DIAGNOSIS — H81.391 PERIPHERAL VERTIGO INVOLVING RIGHT EAR: ICD-10-CM

## 2024-04-30 PROCEDURE — 99214 OFFICE O/P EST MOD 30 MIN: CPT | Mod: 25,S$GLB,, | Performed by: SPECIALIST

## 2024-04-30 PROCEDURE — 1101F PT FALLS ASSESS-DOCD LE1/YR: CPT | Mod: CPTII,S$GLB,, | Performed by: SPECIALIST

## 2024-04-30 PROCEDURE — 1160F RVW MEDS BY RX/DR IN RCRD: CPT | Mod: CPTII,S$GLB,, | Performed by: SPECIALIST

## 2024-04-30 PROCEDURE — 69210 REMOVE IMPACTED EAR WAX UNI: CPT | Mod: RT,S$GLB,, | Performed by: SPECIALIST

## 2024-04-30 PROCEDURE — 99999 PR PBB SHADOW E&M-EST. PATIENT-LVL IV: CPT | Mod: PBBFAC,,, | Performed by: SPECIALIST

## 2024-04-30 PROCEDURE — 3078F DIAST BP <80 MM HG: CPT | Mod: CPTII,S$GLB,, | Performed by: SPECIALIST

## 2024-04-30 PROCEDURE — 3288F FALL RISK ASSESSMENT DOCD: CPT | Mod: CPTII,S$GLB,, | Performed by: SPECIALIST

## 2024-04-30 PROCEDURE — 1159F MED LIST DOCD IN RCRD: CPT | Mod: CPTII,S$GLB,, | Performed by: SPECIALIST

## 2024-04-30 PROCEDURE — 1126F AMNT PAIN NOTED NONE PRSNT: CPT | Mod: CPTII,S$GLB,, | Performed by: SPECIALIST

## 2024-04-30 PROCEDURE — 3074F SYST BP LT 130 MM HG: CPT | Mod: CPTII,S$GLB,, | Performed by: SPECIALIST

## 2024-04-30 NOTE — PROCEDURES
"Ear Cerumen Removal    Date/Time: 4/30/2024 1:40 PM    Performed by: RACHELE Robles MD  Authorized by: RACHELE Robles MD    Time out: Immediately prior to procedure a "time out" was called to verify the correct patient, procedure, equipment, support staff and site/side marked as required.    Consent Done?:  Yes (Verbal)    Local anesthetic:  None  Location details:  Right ear  Procedure type comment:  8 Fr suction and bayonet forceps  Cerumen  Removal Results:  Cerumen completely removed  Patient tolerance:  Patient tolerated the procedure well with no immediate complications    "

## 2024-05-01 ENCOUNTER — CLINICAL SUPPORT (OUTPATIENT)
Dept: REHABILITATION | Facility: HOSPITAL | Age: 79
End: 2024-05-01
Payer: MEDICARE

## 2024-05-01 DIAGNOSIS — R26.89 IMPAIRMENT OF BALANCE: Primary | ICD-10-CM

## 2024-05-01 PROCEDURE — 97112 NEUROMUSCULAR REEDUCATION: CPT | Mod: PO

## 2024-05-01 PROCEDURE — 97110 THERAPEUTIC EXERCISES: CPT | Mod: PO

## 2024-05-01 PROCEDURE — 97530 THERAPEUTIC ACTIVITIES: CPT | Mod: PO

## 2024-05-01 NOTE — PROGRESS NOTES
"[facility]   Physical Therapy Treatment Note      [facility]  [facility] Number: [id number]    Therapy Diagnosis:   Encounter Diagnosis   Name Primary?    Impairment of balance Yes       Physician: RACHELE Robles MD    Visit Date: 5/1/2024    Physician Orders: PT Eval and Treat   Medical Diagnosis from Referral:   H81.391 (ICD-10-CM) - Peripheral vertigo involving right ear   R26.89 (ICD-10-CM) - Impairment of balance      Evaluation Date: 4/2/2024  Authorization Period Expiration: 12/31/24  Plan of Care Expiration: 05/31/24  Visit # / Visits authorized: 03/ 20 + eval     Time In: 11:20  Time Out: 12:00  Total Billable Time: 40 minutes     Precautions: Standard    PTA Visit #: 0/5       Subjective     Patient reports: Still feeling the pressure in head over left ear. Reported it alleviating throughout session.    Home exercise program not provided at this time.   Response to previous treatment: no adverse response  Functional change: ongoing    Pain: 0/10  Location: NA    Objective      Objective Measures updated at progress report unless specified.     Treatment     Eboni received the treatments listed below:      therapeutic exercises to develop strength, endurance, ROM, flexibility, posture, and core stabilization for 8 minutes including:    X 8 min Sci Fit recumbent stepper L3 [place] hills for cardiovascular endurance     neuromuscular re-education activities to improve: Balance, Coordination, and Sense for 20 minutes. The following activities were included:  Foam pad: CGA/SBA, no upper extremity support  2  x 30 sec of static standing with eyes opened   3 x 30" stance with eyes closed  2 x 30" right<>left head movements  2 x 30" up <> down head movements  2 x 30 sec split stance with front foot on airex - eyes open and eyes closed x30s each      therapeutic activities to improve functional performance for 12 minutes, including:    Vestibular sit<>stands x 10 (visual " "target on cone at feet during all transitions)     Hallway ambulation:   2 x 100 feet with right<>left head movements, SBA  2 x 100 feet with up <> down head movements, SBA  2 x 100 feet with diagonal head movements, SBA   x 100 feet forward ambulation tossing <> catching ball, SBA      Patient Education and Home Exercises       Education provided:   - plan of care (POC), scheduling    Written Home Exercises Provided: to be provided as appropriate    Assessment     Eboni did well with today's session with her left ear/head fullness alleviating throughout the session. She has only mild sway for most Airex pad tasks. Initial steps in hallway have deviation but then she is able to correct to a normalized straight pathway. Good tolerance of Scifit today rating "moderate" effort/difficulty level.  Patient remains appropriate for continued PT intervention to further address balance, endurance, and functional mobility deficits.     Eboni Is progressing well towards her goals.   Patient prognosis is Good.     Patient will continue to benefit from skilled outpatient physical therapy to address the deficits listed in the problem list box on initial evaluation, provide pt/family education and to maximize pt's level of independence in the home and community environment.     Patient's spiritual, cultural and educational needs considered and pt agreeable to plan of care and goals.     Anticipated barriers to physical therapy: co-morbidities    Goals:  Short Term Goals: 4 weeks   Patient to be (I) with established home exercise program.  Ongoing  Patient to pass GST condition 2 with no more than min additional sway for improved balance in low vision environments. Ongoing  Patient to improve GST condition 6 to at least 20 seconds for improved balance in low vision environments. Ongoing  Patient to improve Functional Gait Assessment score to at least 22/20 for improved balance in community environments. Ongoing     Long Term " Goals: 8 weeks   Patient to be (I) with advanced home exercise program. Ongoing  Patient to pass GST condition 2 with no more than slight additional sway for improved balance in low vision environments. Ongoing  Patient to pass GST condition 6 to at least 30 seconds with no more than mod additional sway for improved balance in low vision environments. Ongoing  Patient to improve Functional Gait Assessment score to at least 25/20 for improved balance in community environments. Ongoing    Plan     Progress activity tolerance, balance, and motion tolerance as tolerated.     Emerita Rush, PT

## 2024-05-03 ENCOUNTER — DOCUMENTATION ONLY (OUTPATIENT)
Dept: REHABILITATION | Facility: HOSPITAL | Age: 79
End: 2024-05-03
Payer: MEDICARE

## 2024-05-03 ENCOUNTER — CLINICAL SUPPORT (OUTPATIENT)
Dept: REHABILITATION | Facility: HOSPITAL | Age: 79
End: 2024-05-03
Payer: MEDICARE

## 2024-05-03 DIAGNOSIS — R26.89 IMPAIRMENT OF BALANCE: Primary | ICD-10-CM

## 2024-05-03 PROCEDURE — 97530 THERAPEUTIC ACTIVITIES: CPT | Mod: PO

## 2024-05-03 PROCEDURE — 97112 NEUROMUSCULAR REEDUCATION: CPT | Mod: PO

## 2024-05-03 NOTE — PROGRESS NOTES
"[facility]   Physical Therapy Treatment Note      Name: Eboni Nicholas  Clinic Number: [id number]    Therapy Diagnosis:   Encounter Diagnosis   Name Primary?    Impairment of balance Yes       Physician: RACHELE Robles MD    Visit Date: 5/3/2024    Physician Orders: PT Eval and Treat   Medical Diagnosis from Referral:   H81.391 (ICD-10-CM) - Peripheral vertigo involving right ear   R26.89 (ICD-10-CM) - Impairment of balance      Evaluation Date: 4/2/2024  Authorization Period Expiration: 12/31/24  Plan of Care Expiration: 05/31/24  Visit # / Visits authorized: 04/ 20 + eval     Time In: 11:15  Time Out: 11:55  Total Billable Time: 40 minutes     Precautions: Standard    PTA Visit #: 0/5       Subjective     Patient reports: My head starts to feel better after I am here working.  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   Home exercise program not provided at this time.   Response to previous treatment: no adverse response  Functional change: ongoing    Pain: 2/10  Location: head pressure     Objective      Objective Measures updated at progress report unless specified.   REJI SENSORY TESTING:  (P= Pass, F= Fail; note any sway; hold each position for 30")  Condition 1: (firm surface/feet together/eyes open) P  Condition 2: (firm surface/feet together/eyes closed) P no sway  Condition 3: (firm surface/feet in tandem/eyes open) P min sway   Condition 4: (firm surface/feet in tandem/eyes closed) F 2 sec  Condition 5: (soft surface/feet together/eyes open) P  Condition 6: (soft surface/feet together/eyes closed) P, min sway   Condition 7: (Fukuda step test), measure distance varied from center starting position, > 30 deg deviation to either side indicates hypofunction of biased side NT    Functional Gait Assessment:   1. Gait on level surface =  2   (3) Normal: less than 5.5 sec, no A.D., no imbalance, normal gait pattern, deviates< 6in   (2) Mild " impairment: 7-5.6 sec, uses A.D., mild gait deviations, or deviates 6-10 in   (1) Moderate impairment: > 7 sec, slow speed, imbalance, deviates 10-15 in.   (0) Severe impairment: needs assist, deviates >15 in, reach/touch wall  2. Change in Gait Speed = 3   (3) Normal: smooth change w/o loss of balance or gait deviation, deviates < 6 in, significant difference between speeds   (2) Mild impairment: changes speed, but demonstrates mild gait deviations, deviates 6-10 in, OR no deviations but unable to significantly speed, OR uses A.D.   (1) Moderate impairment: minor changes to speed, OR changes speed w/ significant deviations, deviates 10-15 in, OR  Changes speed , but loses balance & recovers   (0) Severe impairment: cannot change speed, deviates >15 in, or loses balance & needs assist  3. Gait with horizontal head turns  = 2   (3) Normal: no change in gait, deviates <6 in   (2) Mild impairment: slight change in speed, deviates 6-10 in, OR uses A.D.   (1) Moderate impairment: moderate change in speed, deviates 10-15 in   (0) Severe impairment: severe disruption of gait, deviates >15in  4. Gait with vertical head turns = 3   (3) Normal: no change in gait, deviates <6 in   (2) Mild impairment: slight change in speed, deviates 6-10 in OR uses A.D.   (1) Moderate impairment: moderate change in speed, deviates 10-15 in   (0) Severe impairment: severe disruption of gait, deviates >15 in  5. Gait with pivot turns = 3   (3) Normal: performs safely in 3 sec, no LOB   (2) Mild impairment: performs in >3 sec & no LOB, OR turns safely & requires several steps to regain LOB   (1) Moderate impairment: turns slow, OR requires several small steps for balance following turn & stop   (0) Severe impairment: cannot turn safely, needs assist  6. Step over obstacle = 3   (3) Normal: steps over 2 stacked boxes w/o change in speed or LOB   (2) Mild impairment: able to step over 1 box w/o change in speed or LOB   (1) Moderate impairment:  steps over 1 box but must slow down, may require VC   (0) Severe impairment: cannot perform w/o assist  7. Gait with Narrow YANDY = 2   (3) Normal: 10 steps no staggering   (2) Mild impairment: 7-9 steps   (1) Moderate impairment: 4-7 steps   (0) Severe impairment: < 4 steps or cannot perform w/o assist  8. Gait with eyes closed = 2   (3) Normal: < 7 sec, no A.D., no LOB, normal gait pattern, deviates <6 in   (2) Mild impairment: 7.1-9 sec, mild gait deviations, deviates 6-10 in   (1) Moderate impairment: > 9 sec, abnormal pattern, LOB, deviates 10-15 in   (0) Severe impairment: cannot perform w/o assist, LOB, deviates >15in  9. Ambulating Backwards = 2   (3) Normal: no A.D., no LOB, normal gait pattern, deviates <6in   (2) Mild impairment: uses A.D., slower speed, mild gait deviations, deviates 6-10 in   (1) Moderate impairment: slow speed, abnormal gait pattern, LOB, deviates 10-15 in   (0) Severe impairment: severe gait deviations or LOB, deviates >15in  10. Steps = 2   (3) Normal: alternating feet, no rail   (2) Mild Impairment: alternating feet, uses rail   (1) Moderate impairment: step-to, uses rail   (0) Severe impairment: cannot perform safely    Score 24/30     Score:   <22/30 fall risk   <20/30 fall risk in older adults   <18/30 fall risk in Parkinsons       Treatment     Eboni received the treatments listed below:      therapeutic exercises to develop strength, endurance, ROM, flexibility, posture, and core stabilization for 0 minutes including:      neuromuscular re-education activities to improve: Balance, Coordination, and Sense for 28 minutes. The following activities were included:  In parallel bars:  2 x 30 sec split stance with front foot on soft isde of bosu - head rotations  2 x 30 sec split stance with front foot on sof tisde of bosu - head nods  2 x 30 sec  split stance with front foot on sof tisde of bosu - eyes closed, occ touchdown support    Time above incldues objective measures and  discussion on caregiver reprieve so she is able to take time for her own healthcare     therapeutic activities to improve functional performance for 12 minutes, including:      Hallway ambulation:   2 x 100 feet with right<>left head movements, SBA  2 x 100 feet with up <> down head movements, SBA  2 x 100 feet with diagonal head movements, SBA      Patient Education and Home Exercises       Education provided:   - plan of care (POC), scheduling    Written Home Exercises Provided: to be provided as appropriate    Assessment     Eboni did well with today's session with her left ear/head fullness alleviating throughout the session. She improved on objective measure testing today, with increased time on REJI and improved score on FGA indicating improved balance.  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  Referred to case management for care giver resources to ease burden.  Also discussed decreased frequency to once per week.  She remains appropriate for skilled Physical Therapy.     Eboni Is progressing well towards her goals.   Patient prognosis is Good.     Patient will continue to benefit from skilled outpatient physical therapy to address the deficits listed in the problem list box on initial evaluation, provide pt/family education and to maximize pt's level of independence in the home and community environment.     Patient's spiritual, cultural and educational needs considered and pt agreeable to plan of care and goals.     Anticipated barriers to physical therapy: co-morbidities    Goals:  Short Term Goals: 4 weeks   Patient to be (I) with established home exercise program.  Ongoing  Patient to pass GST condition 2 with no more than min additional sway for improved balance in low vision environments. Ongoing  Patient to improve GST condition 6 to at least 20 seconds for improved balance in low vision environments.  Ongoing  Patient to improve Functional Gait Assessment score to at least 22/20 for improved balance in community environments. Met 5/3/24     Long Term Goals: 8 weeks   Patient to be (I) with advanced home exercise program. Ongoing  Patient to pass GST condition 2 with no more than slight additional sway for improved balance in low vision environments. Ongoing  Patient to pass GST condition 6 to at least 30 seconds with no more than mod additional sway for improved balance in low vision environments. Ongoing  Patient to improve Functional Gait Assessment score to at least 25/20 for improved balance in community environments. Ongoing    Plan     Progress activity tolerance, balance, and motion tolerance as tolerated.     Iza Norman, PT

## 2024-05-03 NOTE — PROGRESS NOTES
"[facility]   Physical Therapy Treatment Note      Name: Eboni BROOKS [facility]  Clinic Number: [id number]    Therapy Diagnosis:   Encounter Diagnosis   Name Primary?    Impairment of balance Yes         Physician: RACHELE Robles MD    Visit Date: 5/6/2024    Physician Orders: PT Eval and Treat   Medical Diagnosis from Referral:   H81.391 (ICD-10-CM) - Peripheral vertigo involving right ear   R26.89 (ICD-10-CM) - Impairment of balance      Evaluation Date: 4/2/2024  Authorization Period Expiration: 12/31/24  Plan of Care Expiration: 05/31/24  Visit # / Visits authorized: 05/ 20 + eval     Time In: 11:15  Time Out: 12:00  Total Billable Time: 45 minutes     Precautions: Standard    PTA Visit #: 0/5       Subjective     Patient reports: that she is doing fine this morning. She feels like she can wrap up therapy POC within the next few sessions. She is unsure if she will attend next session since she will be in and out of town with family graduations over the next few weeks.     Home exercise program provided this date.   Response to previous treatment: no adverse response  Functional change: ongoing    Pain: 1/10  Location: head pressure     Objective    Last performed on 05/03/24.      Treatment     Eboni received the treatments listed below:      therapeutic exercises to develop strength, endurance, ROM, flexibility, posture, and core stabilization for 8 minutes including:  X 8 min Sci Fit recumbent stepper L2 for cardiovascular endurance    neuromuscular re-education activities to improve: Balance, Coordination, and Sense for 12 minutes. The following activities were included:  In parallel bars:  3 x 30" stance on foam pad with eyes closed, CGA, no upper extremity support  2 x 30 sec  split stance with front foot on sof tisde of bosu - eyes closed, occ touchdown support     therapeutic activities to improve functional performance for 25 minutes, including:  Time spent discussing current " progress with therapy, patient's referral to neurology for further assessment into symptoms, potential need to check pressure in vestibular system to rule in/out hydrops if recurrent episodes of vertigo recur following neurology testing.     Hallway ambulation:   2 x 100 feet with right<>left head movements, SBA  2 x 100 feet with up <> down head movements, SBA  2 x 100 feet with diagonal head movements, SBA      Patient Education and Home Exercises       Education provided:   - plan of care (POC), scheduling    Written Home Exercises Provided: to be provided as appropriate    Assessment     Eboni tolerated therapy session well this morning. Time spent during initial portion of therapy spent discussing current progress with therapy, upcoming medical appointment with neurology, and indications for further vestibular therapy in future. Home exercise program provided today since patient endorses she is ready to d/c and she will be in/out of town over the next few weeks for family graduations. Good performance noted with activities performed today with no symptoms of dizziness elicited. Patient instructed to keep final appointment in the event symptoms recur while traveling. If no issues with traveling, then patient can call to cancel appointment if needed.     Eboni Is progressing well towards her goals.   Patient prognosis is Good.     Patient will continue to benefit from skilled outpatient physical therapy to address the deficits listed in the problem list box on initial evaluation, provide pt/family education and to maximize pt's level of independence in the home and community environment.     Patient's spiritual, cultural and educational needs considered and pt agreeable to plan of care and goals.     Anticipated barriers to physical therapy: co-morbidities    Goals:  Short Term Goals: 4 weeks   Patient to be (I) with established home exercise program.  Ongoing  Patient to pass GST condition 2 with no more than  min additional sway for improved balance in low vision environments. Met 05/03/24  Patient to improve GST condition 6 to at least 20 seconds for improved balance in low vision environments. Met 05/03/24  Patient to improve Functional Gait Assessment score to at least 22/20 for improved balance in community environments. Met 05/03/24     Long Term Goals: 8 weeks   Patient to be (I) with advanced home exercise program. Ongoing  Patient to pass GST condition 2 with no more than slight additional sway for improved balance in low vision environments. Met 05/03/24  Patient to pass GST condition 6 to at least 30 seconds with no more than mod additional sway for improved balance in low vision environments. Met 05/03/24  Patient to improve Functional Gait Assessment score to at least 25/20 for improved balance in community environments. Progressing    Plan     Progress activity tolerance, balance, and motion tolerance as tolerated.     Nubia Birmingham, PT

## 2024-05-03 NOTE — PROGRESS NOTES
PT/PTA met face to face to discuss pt's treatment plan and progress towards established goals.  Continue with current PT POC with focus on balance and endurance.  Patient will be seen by physical therapist at least every sixth treatment or 30 days, whichever occurs first.    Laura Adame, PTA  05/03/2024

## 2024-05-06 ENCOUNTER — OUTPATIENT CASE MANAGEMENT (OUTPATIENT)
Dept: ADMINISTRATIVE | Facility: OTHER | Age: 79
End: 2024-05-06
Payer: MEDICARE

## 2024-05-06 ENCOUNTER — CLINICAL SUPPORT (OUTPATIENT)
Dept: REHABILITATION | Facility: HOSPITAL | Age: 79
End: 2024-05-06
Payer: MEDICARE

## 2024-05-06 DIAGNOSIS — R26.89 IMPAIRMENT OF BALANCE: Primary | ICD-10-CM

## 2024-05-06 PROCEDURE — 97530 THERAPEUTIC ACTIVITIES: CPT | Mod: PO

## 2024-05-06 PROCEDURE — 97112 NEUROMUSCULAR REEDUCATION: CPT | Mod: PO

## 2024-05-06 NOTE — PATIENT INSTRUCTIONS
Resource: [employer] [place] [employer] of Balance     Perform over clear pathway of ~30-40 feet with eyes open. Perform 4 laps for each of the following movements:  Right <> left head movements  Up <> down head movements  Right upper <> left lower diagonal head movements  Left upper <> right lower diagonal head movements    Perform in hallway near wall preferably. Take rest breaks as needed.     Goal is to perform at least 3x weekly as needed.

## 2024-05-06 NOTE — LETTER
Eboni Nicholas  [address] [place] [address]      Dear Eboni Nicholas,     I am writing from the Outpatient Care Management Department at [facility]. I have been unsuccessful at reaching you since we spoke on May 6th to discuss the referral. I would love to be of assistance if resources are needed.     Please contact me at [phone] from 8:30AM to 4:30 PM on Monday thru Friday.     As you know, [facility] also has a program with a nurse available 24/7 to answer questions or provide medical advice.  [facility] can be reached at [phone].    Sincerely,       Deborah Mack LCSW  Outpatient Care Management

## 2024-05-08 ENCOUNTER — LAB VISIT (OUTPATIENT)
Dept: LAB | Facility: HOSPITAL | Age: 79
End: 2024-05-08
Attending: STUDENT IN AN ORGANIZED HEALTH CARE EDUCATION/TRAINING PROGRAM
Payer: MEDICARE

## 2024-05-08 DIAGNOSIS — Z12.11 SCREENING FOR COLON CANCER: ICD-10-CM

## 2024-05-08 PROCEDURE — 82274 ASSAY TEST FOR BLOOD FECAL: CPT | Mod: HCNC | Performed by: STUDENT IN AN ORGANIZED HEALTH CARE EDUCATION/TRAINING PROGRAM

## 2024-05-14 LAB — HEMOCCULT STL QL IA: NEGATIVE

## 2024-05-17 ENCOUNTER — DOCUMENTATION ONLY (OUTPATIENT)
Dept: REHABILITATION | Facility: HOSPITAL | Age: 79
End: 2024-05-17
Payer: MEDICARE

## 2024-05-17 DIAGNOSIS — R26.89 IMPAIRMENT OF BALANCE: Primary | ICD-10-CM

## 2024-05-17 NOTE — PROGRESS NOTES
OUTPATIENT PHYSICAL THERAPY DISCHARGE SUMMARY     Name: Eboni Nicholas  Clinic Number: [id number]    Diagnosis:   Encounter Diagnosis   Name Primary?    Impairment of balance Yes     Physician: RACHELE Robles MD   Treatment Orders: PT Eval and Treat  Past Medical History:   Diagnosis Date    Anxiety     Benign essential HTN     Cataract     Depression     GERD (gastroesophageal reflux disease)     Glaucoma     Hyperlipidemia     Hypothyroid     CHIDI (obstructive sleep apnea)     Sarcoidosis        Initial visit: 04/02/24  Date of Last visit: 05/06/24  Date of Discharge Note:  05/17/24  Total Visits Received: 6  Missed Visits: 1 cancelled appointment  ASSESSMENT   Status Towards Goals Met:   Please refer to last follow up note on 05/06/24 for most updated functional status. Formal objective measures performed on 05/03/24, with patient demonstrating good improvement with overall balance. Fluctuations in dizziness episodes appear consistent with potential hydrops/Meniere's disease component considering frequency of acute vertigo episodes, increased head/aural pressure, and increased imbalance when these episodes occur. Symptoms appear to quickly improve within ~2 weeks following these episodes. Patient instructed to follow up with her medical team regarding medical management of these episodes.       Discharge reason : Patient self discharge and Patient has maximized benefit from PT at this time    PLAN   This patient is discharged from Outpatient Physical Therapy Services.     Nubia Birmingham, PT  05/17/2024

## 2024-05-18 DIAGNOSIS — H40.1112 PRIMARY OPEN ANGLE GLAUCOMA (POAG) OF RIGHT EYE, MODERATE STAGE: ICD-10-CM

## 2024-05-18 DIAGNOSIS — H40.1121 PRIMARY OPEN ANGLE GLAUCOMA (POAG) OF LEFT EYE, MILD STAGE: ICD-10-CM

## 2024-05-19 DIAGNOSIS — H40.1112 PRIMARY OPEN ANGLE GLAUCOMA (POAG) OF RIGHT EYE, MODERATE STAGE: ICD-10-CM

## 2024-05-19 DIAGNOSIS — H40.1121 PRIMARY OPEN ANGLE GLAUCOMA (POAG) OF LEFT EYE, MILD STAGE: ICD-10-CM

## 2024-05-20 RX ORDER — DORZOLAMIDE HCL 20 MG/ML
1 SOLUTION/ DROPS OPHTHALMIC 3 TIMES DAILY
Qty: 10 ML | Refills: 6 | Status: SHIPPED | OUTPATIENT
Start: 2024-05-20 | End: 2025-05-20

## 2024-05-20 RX ORDER — DORZOLAMIDE HCL 20 MG/ML
1 SOLUTION/ DROPS OPHTHALMIC 2 TIMES DAILY
Qty: 10 ML | Refills: 6 | Status: SHIPPED | OUTPATIENT
Start: 2024-05-20

## 2024-06-23 DIAGNOSIS — F32.5 MAJOR DEPRESSIVE DISORDER WITH SINGLE EPISODE, IN FULL REMISSION: ICD-10-CM

## 2024-06-24 RX ORDER — ESCITALOPRAM OXALATE 20 MG/1
20 TABLET ORAL DAILY
Qty: 90 TABLET | Refills: 3 | Status: SHIPPED | OUTPATIENT
Start: 2024-06-24

## 2024-06-24 NOTE — TELEPHONE ENCOUNTER
No care due was identified.  [facility] Embedded Care Due Messages. Reference number: [id number].   6/23/2024 9:38:33 PM CDT

## 2024-06-28 ENCOUNTER — OFFICE VISIT (OUTPATIENT)
Dept: NEUROLOGY | Facility: CLINIC | Age: 79
End: 2024-06-28
Payer: MEDICARE

## 2024-06-28 ENCOUNTER — LAB VISIT (OUTPATIENT)
Dept: LAB | Facility: OTHER | Age: 79
End: 2024-06-28
Attending: STUDENT IN AN ORGANIZED HEALTH CARE EDUCATION/TRAINING PROGRAM
Payer: MEDICARE

## 2024-06-28 VITALS
HEART RATE: 64 BPM | HEIGHT: 63 IN | BODY MASS INDEX: 38.27 KG/M2 | SYSTOLIC BLOOD PRESSURE: 127 MMHG | WEIGHT: 216 LBS | DIASTOLIC BLOOD PRESSURE: 79 MMHG

## 2024-06-28 DIAGNOSIS — R26.89 IMBALANCE: ICD-10-CM

## 2024-06-28 DIAGNOSIS — R51.9 PRESSURE IN HEAD: Primary | ICD-10-CM

## 2024-06-28 DIAGNOSIS — R20.8 VIBRATION SENSORY LOSS: ICD-10-CM

## 2024-06-28 DIAGNOSIS — R42 DISEQUILIBRIUM: ICD-10-CM

## 2024-06-28 LAB
ANION GAP SERPL CALC-SCNC: 8 MMOL/L (ref 8–16)
BUN SERPL-MCNC: 16 MG/DL (ref 8–23)
CALCIUM SERPL-MCNC: 9.9 MG/DL (ref 8.7–10.5)
CHLORIDE SERPL-SCNC: 107 MMOL/L (ref 95–110)
CO2 SERPL-SCNC: 26 MMOL/L (ref 23–29)
CREAT SERPL-MCNC: 1 MG/DL (ref 0.5–1.4)
EST. GFR  (NO RACE VARIABLE): 58 ML/MIN/1.73 M^2
GLUCOSE SERPL-MCNC: 135 MG/DL (ref 70–110)
POTASSIUM SERPL-SCNC: 4 MMOL/L (ref 3.5–5.1)
SODIUM SERPL-SCNC: 141 MMOL/L (ref 136–145)
VIT B12 SERPL-MCNC: 898 PG/ML (ref 210–950)

## 2024-06-28 PROCEDURE — 82300 ASSAY OF CADMIUM: CPT | Mod: HCNC | Performed by: STUDENT IN AN ORGANIZED HEALTH CARE EDUCATION/TRAINING PROGRAM

## 2024-06-28 PROCEDURE — 99999 PR PBB SHADOW E&M-EST. PATIENT-LVL IV: CPT | Mod: PBBFAC,HCNC,, | Performed by: STUDENT IN AN ORGANIZED HEALTH CARE EDUCATION/TRAINING PROGRAM

## 2024-06-28 PROCEDURE — 84165 PROTEIN E-PHORESIS SERUM: CPT | Mod: 26,HCNC,, | Performed by: PATHOLOGY

## 2024-06-28 PROCEDURE — 80048 BASIC METABOLIC PNL TOTAL CA: CPT | Mod: HCNC | Performed by: STUDENT IN AN ORGANIZED HEALTH CARE EDUCATION/TRAINING PROGRAM

## 2024-06-28 PROCEDURE — 83655 ASSAY OF LEAD: CPT | Mod: HCNC | Performed by: STUDENT IN AN ORGANIZED HEALTH CARE EDUCATION/TRAINING PROGRAM

## 2024-06-28 PROCEDURE — 82175 ASSAY OF ARSENIC: CPT | Mod: HCNC | Performed by: STUDENT IN AN ORGANIZED HEALTH CARE EDUCATION/TRAINING PROGRAM

## 2024-06-28 PROCEDURE — 84165 PROTEIN E-PHORESIS SERUM: CPT | Mod: HCNC | Performed by: STUDENT IN AN ORGANIZED HEALTH CARE EDUCATION/TRAINING PROGRAM

## 2024-06-28 PROCEDURE — 84630 ASSAY OF ZINC: CPT | Mod: HCNC | Performed by: STUDENT IN AN ORGANIZED HEALTH CARE EDUCATION/TRAINING PROGRAM

## 2024-06-28 PROCEDURE — 82525 ASSAY OF COPPER: CPT | Mod: HCNC | Performed by: STUDENT IN AN ORGANIZED HEALTH CARE EDUCATION/TRAINING PROGRAM

## 2024-06-28 PROCEDURE — 82607 VITAMIN B-12: CPT | Mod: HCNC | Performed by: STUDENT IN AN ORGANIZED HEALTH CARE EDUCATION/TRAINING PROGRAM

## 2024-06-28 PROCEDURE — 36415 COLL VENOUS BLD VENIPUNCTURE: CPT | Mod: HCNC | Performed by: STUDENT IN AN ORGANIZED HEALTH CARE EDUCATION/TRAINING PROGRAM

## 2024-06-28 PROCEDURE — 86334 IMMUNOFIX E-PHORESIS SERUM: CPT | Mod: 26,HCNC,, | Performed by: PATHOLOGY

## 2024-06-28 PROCEDURE — 86334 IMMUNOFIX E-PHORESIS SERUM: CPT | Mod: HCNC | Performed by: STUDENT IN AN ORGANIZED HEALTH CARE EDUCATION/TRAINING PROGRAM

## 2024-06-28 NOTE — PROGRESS NOTES
"        Patient ID: [id number]  Referring Physician: Maricruz Lloyd MD    Chief Complaint/Reason for Consult: "fullness in the head"     Subjective:     HPI  Eboni Nicholas is a very pleasant 78 y.o. RH female with hyperlipidemia, HTN, hyperthyroidism, CHIDI, and Hx of sarcoidosis. she is presenting today for evaluation of imbalance and sensation of fullness in the head. she is accompanied by her .    She has had several years of imbalance and inner ear issues which was attributed to vestibulopathy. She never had true vertigo but sensation of disequilibrium  She has had a few episodes that she had to pull over during driving. Denies ringing in the ears or pain.   within the last 2 months she has been feeling a sensation of fullness in the left occipital area, almost like a pressure sensation.   She has been going through PT for this reason and was told that her new symptoms are not related to the ear and recommended evaluation by neurologist.  Very rarely she has tingling in the right foot otherwise denies numbness or weakness.    Review of Systems   Constitutional:  Negative for unexpected weight change.   HENT:  Negative for tinnitus and trouble swallowing.    Eyes:  Negative for visual disturbance.   Musculoskeletal:  Positive for gait problem.   Neurological:  Positive for dizziness. Negative for syncope, speech difficulty, weakness, numbness and headaches.   Psychiatric/Behavioral:  Negative for agitation and confusion.    All other systems reviewed and are negative.    Past Medical History:  -------------------------------------    Anxiety    Benign essential HTN    Cataract    Depression    GERD (gastroesophageal reflux disease)    Glaucoma    Hyperlipidemia    Hypothyroid    CHIDI (obstructive sleep apnea)    Sarcoidosis     Allergies:  Review of patient's allergies indicates:   Allergen Reactions    Brimonidine Itching     Itchy red eyes with brimonidine    Phenobarbital Rash    Sulfa (sulfonamide " antibiotics) Rash       Pertinent Family History:  Family History   Problem Relation Name Age of Onset    Stroke Mother      Transient ischemic attack Mother      Cancer Father          lung ca    Rheum arthritis Sister Coletha     Heart disease Brother Ronald     Liver disease Brother Ronald     Heart disease Brother Ruperto     Liver disease Brother Blade     Alcohol abuse Brother Blade     Diabetes Maternal Grandmother      Coronary artery disease Maternal Grandfather      No Known Problems Paternal Grandmother      No Known Problems Paternal Grandfather      No Known Problems Daughter Cielo     No Known Problems Daughter Laura     Diabetes type I Son Robe        Pertinent Social History:  Social History     Tobacco Use    Smoking status: Never    Smokeless tobacco: Never   Substance Use Topics    Alcohol use: Not Currently     Comment: none    Drug use: No       Medications:  Current Outpatient Medications   Medication Instructions    aspirin (ECOTRIN) 81 mg, Daily    blood pressure monitor Kit No dose, route, or frequency recorded.    calcium-vitamin D 250-100 mg-unit per tablet 1 tablet, Oral, Daily    co-enzyme Q-10 30 mg, Oral, Daily    dorzolamide (TRUSOPT) 2 % ophthalmic solution 1 drop, Both Eyes, 2 times daily    dorzolamide (TRUSOPT) 2 % ophthalmic solution 1 drop, Both Eyes, 3 times daily    EScitalopram oxalate (LEXAPRO) 20 mg, Oral, Daily    fluticasone propionate (FLONASE) 100 mcg, Each Nostril, Daily    latanoprost 0.005 % ophthalmic solution 1 drop, Both Eyes, Daily    levocetirizine (XYZAL) 5 mg, Oral, Nightly, 1 tablet by mouth every morning    levothyroxine (SYNTHROID) 100 mcg, Oral, Daily    losartan (COZAAR) 50 MG tablet TAKE ONE TABLET BY MOUTH DAILY, THEN MAY REPEAT THE DOSE IF PRESSURE > 140/90    montelukast (SINGULAIR) 10 mg tablet One tablet by mouth every evening    multivitamin (THERAGRAN) per tablet 1 tablet, Daily    promethazine-dextromethorphan (PROMETHAZINE-DM) 6.25-15 mg/5 mL Syrp  5 mLs, Oral, Nightly PRN    simvastatin (ZOCOR) 40 mg, Oral, Nightly    timolol maleate 0.5% (TIMOPTIC) 0.5 % Drop 1 drop, Both Eyes, 2 times daily        Objective:     Vitals:    06/28/24 1050   BP: 127/79   Pulse: 64        General:  Well-appearing, well-nourished, NAD, cooperative    Neurologic Exam:   Awake, alert and oriented x3  Speech spontaneous and fluent, intact comprehension.   Adequate fund of knowledge, vocabulary.    CN II - CN XII:  PERRLA. EOM intact. No Nystagmus. No ophthalmoplegia.   Facial sensation is normal to light touch.   Facial expression is full and symmetric.   Hearing is intact bilaterally.   Palate elevates symmetrically.   SCM and Trapezius full strength bilaterally.   Tongue is midline.     Motor:  Normal bulk and tone in all four limbs.   There are no atrophy or fasciculations. No tremor.     Shoulder  Abd Shoulder Add Elbow   Flex Elbow  Ext Wrist   Flex Wrist  Ext Finger  Flex Finger  Ext Finger  Abd Finger   Add IO Opposition   Right 5 5 5 5       5    Left 5 5 5 5       5       Hip  Flex Hip  Ext Thigh   Abd Thigh  Add Knee  Flex Knee  Ext Plantar  Flex Dorsiflex   Right 5 5   5 5 5 5   Left 5 5   5 5 5 5     Sensory:  Light touch: normal tactile sense throughout  Vibration: vibratory sense reduced on RLE and on LLE  Proprioception: proprioceptive sense present on RUE and on LUE  Romberg is positive.    DTRs:   Biceps Brachioradialis Triceps Akil Patellar Ankle Plantar   Right 2+ 2+  - 2+ 2+    Left 2+ 2+  - 2+ 2+      Coordination:  Finger to nose is normal bilaterally.  Normal fine finger movements and rapid alternating movements.    Gait:  Wide based gait, difficulty with tandem       Pertinent lab results  Lab Results   Component Value Date    QSONJHEW37 697 03/24/2005    SZBEMGLH77HQ 56 04/10/2012     Lab Results   Component Value Date    DSDNA Negative 02/17/2005    RF Negative 02/17/2005    SEDRATE 15 02/25/2006     Lab Results   Component Value Date    HEPBSAG  Negative 01/31/2020     Lab Results   Component Value Date    TSH 1.189 10/13/2023    FREET4 1.02 04/05/2019    WBC 7.18 10/13/2023    LYMPH 2.3 10/13/2023    LYMPH 32.6 10/13/2023    RBC 4.39 10/13/2023    HGB 13.3 10/13/2023    HCT 42.0 10/13/2023    MCV 96 10/13/2023     10/13/2023     10/13/2023    K 4.3 10/13/2023    CO2 24 10/13/2023    BUN 16 10/13/2023    CREATININE 1.2 10/13/2023    CALCIUM 9.4 10/13/2023    AST 46 (H) 10/13/2023    ALT 48 (H) 10/13/2023       Pertinent imaging results  *No relevant imaging available to review     Other pertinent studies  None    Assessment:   Eboni Nicholas is a 78 y.o. RH female with hyperlipidemia, HTN, hyperthyroidism, CHIDI, Hx of sarcoidosis, and vestibulopathy, who presents for evaluation of new sensation of pressure/fullness in the left occipital area with ongoing imbalance and disequilibrium. On thorough neurological exam she is ataxic with a wide based gait and loss of vibratory sensation which is contributing to her ataxic gait. She doesn't have any other focal deficits however due to this new sensation of pressure in the head despite normal BP we will obtain imaging of the brain to rule out intracranial pathologies.     1. Imbalance    2. Dizziness and giddiness    3. Disequilibrium    4. Pressure in head       Plan:     - MRI Brain W WO Contrast; Future  - Copper, Serum; Future  - Zinc; Future  - Vitamin B12; Future  - Protein Electrophoresis, Serum; Future  - Immunofixation Electrophoresis; Future  - Heavy Metals Screen, Blood (Quantitative); Future  - BASIC METABOLIC PANEL; Future      Plan was discussed in detail with the patient, who is in agreement.      Disclaimer: This note was partly generated using dictation software which may occasionally result in transcription errors that are missed on review.      Based on our encounter today, my overall Medical Decision Making is a Level 4     Complexity of Problem: Moderate (1 undiagnosed new problem  with uncertain prognosis)  Complexity of Data: Extensive (Review of >3 unique test results and Ordering >3 unique tests )  Risk of Complications and/or morbidity/mortality of Management: Low risk of morbidity from additional diagnostic testing or treatment          Sailaja Todd MD    [facility]  06/28/2024

## 2024-07-01 ENCOUNTER — CLINICAL SUPPORT (OUTPATIENT)
Dept: OTOLARYNGOLOGY | Facility: CLINIC | Age: 79
End: 2024-07-01
Payer: MEDICARE

## 2024-07-01 DIAGNOSIS — H90.A22 SENSORINEURAL HEARING LOSS (SNHL) OF LEFT EAR WITH RESTRICTED HEARING OF RIGHT EAR: ICD-10-CM

## 2024-07-01 DIAGNOSIS — H69.92 DYSFUNCTION OF LEFT EUSTACHIAN TUBE: Primary | ICD-10-CM

## 2024-07-01 LAB
ALBUMIN SERPL ELPH-MCNC: 4.27 G/DL (ref 3.35–5.55)
ALPHA1 GLOB SERPL ELPH-MCNC: 0.3 G/DL (ref 0.17–0.41)
ALPHA2 GLOB SERPL ELPH-MCNC: 0.65 G/DL (ref 0.43–0.99)
ARSENIC BLD-MCNC: 1 NG/ML
B-GLOBULIN SERPL ELPH-MCNC: 0.81 G/DL (ref 0.5–1.1)
CADMIUM BLD-MCNC: 0.4 NG/ML
CITY: NORMAL
COUNTY: NORMAL
GAMMA GLOB SERPL ELPH-MCNC: 0.88 G/DL (ref 0.67–1.58)
GUARDIAN FIRST NAME: NORMAL
GUARDIAN LAST NAME: NORMAL
HOME PHONE: NORMAL
INTERPRETATION SERPL IFE-IMP: NORMAL
LEAD BLD-MCNC: 2.7 MCG/DL
MERCURY BLD-MCNC: <1 NG/ML
PATHOLOGIST INTERPRETATION IFE: NORMAL
PATHOLOGIST INTERPRETATION SPE: NORMAL
PROT SERPL-MCNC: 6.9 G/DL (ref 6–8.4)
RACE: NORMAL
STATE: NORMAL
STREET ADDRESS: NORMAL
VENOUS/CAPILLARY: NORMAL
ZIP: NORMAL

## 2024-07-01 PROCEDURE — 92557 COMPREHENSIVE HEARING TEST: CPT | Mod: S$GLB,,, | Performed by: AUDIOLOGIST

## 2024-07-01 PROCEDURE — 92567 TYMPANOMETRY: CPT | Mod: S$GLB,,, | Performed by: AUDIOLOGIST

## 2024-07-01 NOTE — PROGRESS NOTES
Eboni Nicholas, a 78 y.o. female, was seen today in the clinic for an audiologic evaluation.  The patient's main complaint was hearing loss.  Mrs. Nicholas reported that she has difficulty understanding speech.  She reported a history of dizziness that is not resolved but currently controlled.  Pt denied otalgia, aural fullness and tinnitus.      Tympanometry revealed Type A in the right ear and Type B in the left ear. Audiogram results revealed normal sloping to moderate-severe SNHL in the right ear and mild sloping to severe SNHL in the left ear.  Speech reception thresholds were noted at 20 dB in the right ear and 30 dB in the left ear.  Speech discrimination scores were 96% in the right ear and 84% in the left ear.    Test results were discussed with Mrs. Nicholas.  Pt should follow up with ENT to follow up to regarding Tympe B tymp and asymmetric hearing loss prior to hearing aid consultation.      Recommendations:  Otologic evaluation  Annual audiogram  Hearing protection when in noise  Hearing aid consultation with ENT clearance

## 2024-07-02 ENCOUNTER — OFFICE VISIT (OUTPATIENT)
Dept: OPTOMETRY | Facility: CLINIC | Age: 79
End: 2024-07-02
Payer: MEDICARE

## 2024-07-02 DIAGNOSIS — Z98.890 HISTORY OF LASER IRIDOTOMY: ICD-10-CM

## 2024-07-02 DIAGNOSIS — H40.1121 PRIMARY OPEN ANGLE GLAUCOMA (POAG) OF LEFT EYE, MILD STAGE: ICD-10-CM

## 2024-07-02 DIAGNOSIS — H40.039 ANATOMICAL NARROW ANGLE: ICD-10-CM

## 2024-07-02 DIAGNOSIS — H52.7 REFRACTIVE ERROR: ICD-10-CM

## 2024-07-02 DIAGNOSIS — H25.13 NUCLEAR SCLEROSIS OF BOTH EYES: ICD-10-CM

## 2024-07-02 DIAGNOSIS — H40.1112 PRIMARY OPEN ANGLE GLAUCOMA (POAG) OF RIGHT EYE, MODERATE STAGE: Primary | ICD-10-CM

## 2024-07-02 LAB — ZINC SERPL-MCNC: 100 UG/DL (ref 60–130)

## 2024-07-02 PROCEDURE — 99999 PR PBB SHADOW E&M-EST. PATIENT-LVL II: CPT | Mod: PBBFAC,HCNC,, | Performed by: OPTOMETRIST

## 2024-07-02 NOTE — PROGRESS NOTES
HPI    BERNARDO: 5/23/2023, Dr. Campbell  Chief complaint (CC): Pt presents today for routine eye exam. Pt states no   vision complaints. Denies eye pain or irritation.  Glasses? +  Contacts? -  H/o eye surgery, injections or laser: -  H/o eye injury: -  Known eye conditions? Glaucoma  Family h/o eye conditions? -  Eye gtts? Dorzolamide BID OU, Timolol BID OU, Latanoprost qHS OU       (-) Flashes (-)  Floaters (-) Mucous   (-)  Tearing (-) Itching (-) Burning   (-) Headaches (-) Eye Pain/discomfort (-) Irritation   (-)  Redness (-) Double vision (-) Blurry vision    Diabetic? -  A1c? -          Last edited by Latoya Naik MA on 7/2/2024 11:02 AM.            Assessment /Plan     For exam results, see Encounter Report.    Primary open angle glaucoma (POAG) of right eye, moderate stage    Primary open angle glaucoma (POAG) of left eye, mild stage    Anatomical narrow angle    History of laser iridotomy    Nuclear sclerosis of both eyes    Refractive error      1,2.  IOP still low and stable at target, Prev allergy to Brimonidine causing redness, cont Latanaprost qhs ou, timolol, bid ou and trusopt 3x/day right and 2x/day left eye, ,prev HVf stable od with inf arcuate and normal os, target 12-14 od. nerve eval stable, prev OCT stable with rnfl loss od, normal os, prev hvf with inf defects od and normal os, fam history of glaucoma but no blindness, pachy normal. RTC 6 mos iop ck with DFE, pt chose to defer today  3,4. IOP low and PI patent  5. Educated pt on presence of cataracts and effects on vision. No surgery at this time. Recheck in one year.  6. New Spectacle Rx given, discussed different options for glasses. RTC 1 year routine eye exam.

## 2024-07-26 DIAGNOSIS — E03.9 ACQUIRED HYPOTHYROIDISM: ICD-10-CM

## 2024-07-26 DIAGNOSIS — J30.9 CHRONIC ALLERGIC RHINITIS: ICD-10-CM

## 2024-07-26 RX ORDER — LEVOTHYROXINE SODIUM 100 UG/1
100 TABLET ORAL DAILY
Qty: 90 TABLET | Refills: 3 | Status: SHIPPED | OUTPATIENT
Start: 2024-07-26

## 2024-07-26 RX ORDER — MONTELUKAST SODIUM 10 MG/1
TABLET ORAL
Qty: 90 TABLET | Refills: 3 | Status: SHIPPED | OUTPATIENT
Start: 2024-07-26

## 2024-07-26 NOTE — TELEPHONE ENCOUNTER
Care Due:                  Date            Visit Type   Department     Provider  --------------------------------------------------------------------------------                                NP -                              PRIMARY      [facility] PRIMARY  Last Visit: 04-      CARE ([facility])   KAYLA Lloyd                              EP -                              PRIMARY      [facility] PRIMARY  Next Visit: 10-      CARE ([facility])   KAYLA Lloyd                                                            Last  Test          Frequency    Reason                     Performed    Due Date  --------------------------------------------------------------------------------    CMP.........  12 months..  simvastatin..............  10-   10-    Lipid Panel.  12 months..  simvastatin..............  10-   10-    TSH.........  12 months..  levothyroxine............  10-   10-    [facility] Embedded Care Due Messages. Reference number: [id number].   7/26/2024 1:11:46 PM CDT

## 2024-07-30 ENCOUNTER — OFFICE VISIT (OUTPATIENT)
Dept: OTOLARYNGOLOGY | Facility: CLINIC | Age: 79
End: 2024-07-30
Payer: MEDICARE

## 2024-07-30 VITALS
SYSTOLIC BLOOD PRESSURE: 113 MMHG | BODY MASS INDEX: 38.33 KG/M2 | WEIGHT: 216.38 LBS | OXYGEN SATURATION: 94 % | DIASTOLIC BLOOD PRESSURE: 62 MMHG | HEART RATE: 75 BPM

## 2024-07-30 DIAGNOSIS — R26.89 IMPAIRMENT OF BALANCE: Primary | ICD-10-CM

## 2024-07-30 DIAGNOSIS — D86.89 SARCOID OF NOSE: ICD-10-CM

## 2024-07-30 DIAGNOSIS — J30.89 NON-SEASONAL ALLERGIC RHINITIS, UNSPECIFIED TRIGGER: ICD-10-CM

## 2024-07-30 DIAGNOSIS — J34.89 NASAL CRUSTING: ICD-10-CM

## 2024-07-30 DIAGNOSIS — H90.A32 MIXED CONDUCTIVE AND SENSORINEURAL HEARING LOSS OF LEFT EAR WITH RESTRICTED HEARING OF RIGHT EAR: ICD-10-CM

## 2024-07-30 DIAGNOSIS — H81.391 PERIPHERAL VERTIGO INVOLVING RIGHT EAR: ICD-10-CM

## 2024-07-30 DIAGNOSIS — G47.33 OSA ON CPAP: ICD-10-CM

## 2024-07-30 PROCEDURE — 99214 OFFICE O/P EST MOD 30 MIN: CPT | Mod: HCNC,S$GLB,, | Performed by: SPECIALIST

## 2024-07-30 PROCEDURE — 1159F MED LIST DOCD IN RCRD: CPT | Mod: HCNC,CPTII,S$GLB, | Performed by: SPECIALIST

## 2024-07-30 PROCEDURE — 1126F AMNT PAIN NOTED NONE PRSNT: CPT | Mod: HCNC,CPTII,S$GLB, | Performed by: SPECIALIST

## 2024-07-30 PROCEDURE — 3078F DIAST BP <80 MM HG: CPT | Mod: HCNC,CPTII,S$GLB, | Performed by: SPECIALIST

## 2024-07-30 PROCEDURE — 3074F SYST BP LT 130 MM HG: CPT | Mod: HCNC,CPTII,S$GLB, | Performed by: SPECIALIST

## 2024-07-30 PROCEDURE — 3288F FALL RISK ASSESSMENT DOCD: CPT | Mod: HCNC,CPTII,S$GLB, | Performed by: SPECIALIST

## 2024-07-30 PROCEDURE — 99999 PR PBB SHADOW E&M-EST. PATIENT-LVL III: CPT | Mod: PBBFAC,HCNC,, | Performed by: SPECIALIST

## 2024-07-30 PROCEDURE — 1160F RVW MEDS BY RX/DR IN RCRD: CPT | Mod: HCNC,CPTII,S$GLB, | Performed by: SPECIALIST

## 2024-07-30 PROCEDURE — 1101F PT FALLS ASSESS-DOCD LE1/YR: CPT | Mod: HCNC,CPTII,S$GLB, | Performed by: SPECIALIST

## 2024-07-30 NOTE — PROGRESS NOTES
Subjective:       Patient ID: Eboni Nicholas is a 78 y.o. female.    Chief Complaint: No chief complaint on file.      The patient is returning for follow-up visit.  I have not seen her in 11 months.  There are multiple issues to discuss:  1. Allergic rhinitis:  The patient, her  and several children and nieces and nephews recently took an extended train trip to [place], [place] in the [place].  She did well sinonasal allergy wise during the trip.  Nasal secretions are clear.    She uses her montelukast and fluticasone sprays every day and supplements with cetirizine when needed.  2. Headaches:  When her allergies are doing well her headaches are controlled .  3. Chronic imbalance/Right peripheral vestibular disorder:  The patient had no issues with balance during her trip to [place] and [place].  Upon her returned to [place] she has had recurrence of chronic imbalance.  She does use a cane for ambulation if she is walking any distance.  She has her furniture at home space so that she can walk from piece of furniture to piece of furniture.  She can not walk for any significant distance, less than 1 city block.  She has great deal of difficulty walking up and particularly down steps.  The neurologist does have her scheduled for an MRI of the brain in the near future  4. Sarcoidosis of the nose:  There is no change in this issue.  She does have chronic crusting in her nose.  If she uses NeilMed nasal gel spray the crusting is relieved but then returns quickly.  5. Obstructive sleep apnea using CPAP:  The patient does use her CPAP every night.  6. Sensorineural hearing loss:  The patient is coming in to discuss the results of her hearing testing.  She does have difficulty understanding, particularly there is background noise            Review of Systems     Constitutional: Positive for fatigue.  Negative for appetite change, chills, fever and unexpected weight loss.      HENT: Positive for  hearing loss, postnasal drip, ringing in the ears, runny nose, sinus pressure and stuffy nose.  Negative for ear discharge, ear infection, ear pain, facial swelling, mouth sores, nosebleeds, sinus infection, sore throat, tonsil infection, dental problems, trouble swallowing and voice change.      Eyes:  Negative for change in eyesight, eye drainage, eye itching and photophobia.     Respiratory:  Positive for cough, sleep apnea and snoring. Negative for shortness of breath and wheezing.      Cardiovascular:  Negative for chest pain, foot swelling, irregular heartbeat and swollen veins.     Gastrointestinal:  Negative for abdominal pain, acid reflux, constipation, diarrhea, heartburn and vomiting.     Genitourinary: Negative for difficulty urinating, sexual problems and frequent urination.     Musc: Positive for aching muscles. Negative for aching joints, back pain and neck pain.     Skin: Negative for rash.     Allergy: Negative for food allergies and seasonal allergies.     Endocrine: Negative for cold intolerance and heat intolerance.      Neurological: Positive for headaches and light-headedness. Negative for dizziness, seizures and tremors.      Hematologic: Negative for bruises/bleeds easily and swollen glands.      Psychiatric: Negative for decreased concentration, depression, nervous/anxious and sleep disturbance.                Objective:      Physical Exam  Vitals and nursing note reviewed. Chaperone present: Patient in her  come together for the visit, both have an appointment.   Constitutional:       General: She is awake.      Appearance: Normal appearance. She is well-developed and well-groomed. She is obese.   HENT:      Head: Normocephalic.      Jaw: There is normal jaw occlusion.      Salivary Glands: Right salivary gland is not diffusely enlarged or tender. Left salivary gland is not diffusely enlarged or tender.      Right Ear: Ear canal and external ear normal. Decreased hearing noted. No  drainage. There is impacted cerumen. Tympanic membrane is retracted.      Left Ear: Ear canal and external ear normal. Decreased hearing noted. No drainage. Tympanic membrane is retracted.      Nose: Septal deviation (to the right), mucosal edema (cyanotic, boggy inferior turbinates bilaterally) and rhinorrhea (clear mucus bilaterally) present. No nasal deformity. Rhinorrhea is clear.      Right Turbinates: Enlarged and pale.      Left Turbinates: Enlarged and pale.      Mouth/Throat:      Lips: No lesions.      Mouth: Mucous membranes are moist. No oral lesions.      Dentition: Abnormal dentition. No gum lesions.      Tongue: No lesions.      Palate: No mass and lesions.      Pharynx: Oropharynx is clear. Uvula midline.      Tonsils: 2+ on the right. 2+ on the left.   Eyes:      General: Lids are normal. Vision grossly intact.         Right eye: No discharge.         Left eye: No discharge.      Extraocular Movements: Extraocular movements intact.      Conjunctiva/sclera: Conjunctivae normal.      Pupils: Pupils are equal, round, and reactive to light.   Neck:      Thyroid: No thyroid mass or thyromegaly.      Trachea: Trachea normal. No tracheal deviation.   Cardiovascular:      Rate and Rhythm: Normal rate and regular rhythm.      Pulses: Normal pulses.      Heart sounds: Normal heart sounds.   Pulmonary:      Effort: Pulmonary effort is normal.      Breath sounds: Normal breath sounds. No stridor. No decreased breath sounds, wheezing, rhonchi or rales.   Abdominal:      General: Bowel sounds are normal.      Palpations: Abdomen is soft.      Tenderness: There is no abdominal tenderness.   Musculoskeletal:      Cervical back: Neck supple. No muscular tenderness. Decreased range of motion.   Lymphadenopathy:      Head:      Right side of head: No submental, submandibular, preauricular, posterior auricular or occipital adenopathy.      Left side of head: No submental, submandibular, preauricular, posterior  auricular or occipital adenopathy.      Cervical: No cervical adenopathy.   Skin:     General: Skin is warm and dry.      Findings: No petechiae or rash.      Nails: There is no clubbing.   Neurological:      Mental Status: She is alert and oriented to person, place, and time.      Cranial Nerves: No cranial nerve deficit.      Sensory: No sensory deficit.      Gait: Gait normal.      Comments: Neuro otologic-Romberg negative, tandem Romberg falls to the left, mildly wide-based gait, tandem gait not performed   Psychiatric:         Speech: Speech normal.         Behavior: Behavior normal. Behavior is cooperative.         Thought Content: Thought content normal.         Judgment: Judgment normal.              I personally reviewed the above audiogram and discussed in full detail with the patient during her visit.  I answered all her questions.  I provided her with a copy of the audiogram    Assessment:       1. Impairment of balance    2. Peripheral vertigo involving right ear    3. Mixed conductive and sensorineural hearing loss of left ear with restricted hearing of right ear    4. Non-seasonal allergic rhinitis, unspecified trigger    5. Nasal crusting    6. CHIDI on CPAP    7. Sarcoid of nose            Plan:       I  will have the patient continue with her present drug regimen.  She is undergoing an MRI of the brain tomorrow.  Limited visualization of temporal bones will be present.  I will order a CT scan of the temporal bones without IV contrast if the temporal bones are inadequately visualized or if they are not clear.  I am recommending that the patient use bilateral amplification/hearing aids.  She is medically cleared for hearing aids to both ears.  I will recheck her in 3 months, or sooner on an as-needed basis                DISCLAIMER: This note was prepared with Votigo voice recognition transcription software. Garbled syntax, mangled pronouns, and other bizarre constructions may be attributed to that  software system. While efforts were made to correct any mistakes made by this voice recognition program, some errors and/or omissions may remain in the note that were missed when the note was originally created.

## 2024-07-31 ENCOUNTER — HOSPITAL ENCOUNTER (OUTPATIENT)
Dept: RADIOLOGY | Facility: HOSPITAL | Age: 79
Discharge: HOME | End: 2024-07-31
Attending: STUDENT IN AN ORGANIZED HEALTH CARE EDUCATION/TRAINING PROGRAM
Payer: MEDICARE

## 2024-07-31 DIAGNOSIS — R42 DISEQUILIBRIUM: ICD-10-CM

## 2024-07-31 DIAGNOSIS — R51.9 PRESSURE IN HEAD: ICD-10-CM

## 2024-07-31 DIAGNOSIS — R26.89 IMBALANCE: ICD-10-CM

## 2024-07-31 PROCEDURE — 25500020 PHARM REV CODE 255: Mod: HCNC | Performed by: STUDENT IN AN ORGANIZED HEALTH CARE EDUCATION/TRAINING PROGRAM

## 2024-07-31 PROCEDURE — 70553 MRI BRAIN STEM W/O & W/DYE: CPT | Mod: 26,HCNC,, | Performed by: RADIOLOGY

## 2024-07-31 PROCEDURE — A9585 GADOBUTROL INJECTION: HCPCS | Mod: HCNC | Performed by: STUDENT IN AN ORGANIZED HEALTH CARE EDUCATION/TRAINING PROGRAM

## 2024-07-31 PROCEDURE — 70553 MRI BRAIN STEM W/O & W/DYE: CPT | Mod: TC,HCNC

## 2024-07-31 RX ORDER — GADOBUTROL 604.72 MG/ML
10 INJECTION INTRAVENOUS
Status: COMPLETED | OUTPATIENT
Start: 2024-07-31 | End: 2024-07-31

## 2024-07-31 RX ADMIN — GADOBUTROL 10 ML: 604.72 INJECTION INTRAVENOUS at 01:07

## 2024-08-06 DIAGNOSIS — H90.A32 MIXED CONDUCTIVE AND SENSORINEURAL HEARING LOSS OF LEFT EAR WITH RESTRICTED HEARING OF RIGHT EAR: Primary | ICD-10-CM

## 2024-08-12 ENCOUNTER — HOSPITAL ENCOUNTER (OUTPATIENT)
Dept: RADIOLOGY | Facility: HOSPITAL | Age: 79
Discharge: HOME | End: 2024-08-12
Attending: SPECIALIST
Payer: MEDICARE

## 2024-08-12 DIAGNOSIS — H90.A32 MIXED CONDUCTIVE AND SENSORINEURAL HEARING LOSS OF LEFT EAR WITH RESTRICTED HEARING OF RIGHT EAR: ICD-10-CM

## 2024-08-12 PROCEDURE — 70480 CT ORBIT/EAR/FOSSA W/O DYE: CPT | Mod: TC,HCNC

## 2024-08-12 PROCEDURE — 70480 CT ORBIT/EAR/FOSSA W/O DYE: CPT | Mod: 26,HCNC,, | Performed by: RADIOLOGY

## 2024-08-20 ENCOUNTER — OFFICE VISIT (OUTPATIENT)
Dept: OTOLARYNGOLOGY | Facility: CLINIC | Age: 79
End: 2024-08-20
Payer: MEDICARE

## 2024-08-20 DIAGNOSIS — H90.A22 SENSORINEURAL HEARING LOSS (SNHL) OF LEFT EAR WITH RESTRICTED HEARING OF RIGHT EAR: Primary | ICD-10-CM

## 2024-08-20 PROCEDURE — 1159F MED LIST DOCD IN RCRD: CPT | Mod: HCNC,CPTII,S$GLB, | Performed by: OTOLARYNGOLOGY

## 2024-08-20 PROCEDURE — 99999 PR PBB SHADOW E&M-EST. PATIENT-LVL III: CPT | Mod: PBBFAC,HCNC,, | Performed by: OTOLARYNGOLOGY

## 2024-08-20 PROCEDURE — 1101F PT FALLS ASSESS-DOCD LE1/YR: CPT | Mod: HCNC,CPTII,S$GLB, | Performed by: OTOLARYNGOLOGY

## 2024-08-20 PROCEDURE — 3288F FALL RISK ASSESSMENT DOCD: CPT | Mod: HCNC,CPTII,S$GLB, | Performed by: OTOLARYNGOLOGY

## 2024-08-20 PROCEDURE — 69433 CREATE EARDRUM OPENING: CPT | Mod: HCNC,LT,S$GLB, | Performed by: OTOLARYNGOLOGY

## 2024-08-20 PROCEDURE — 99214 OFFICE O/P EST MOD 30 MIN: CPT | Mod: 25,HCNC,S$GLB, | Performed by: OTOLARYNGOLOGY

## 2024-08-20 PROCEDURE — 1126F AMNT PAIN NOTED NONE PRSNT: CPT | Mod: HCNC,CPTII,S$GLB, | Performed by: OTOLARYNGOLOGY

## 2024-08-20 RX ORDER — OFLOXACIN 3 MG/ML
5 SOLUTION AURICULAR (OTIC) 2 TIMES DAILY
Qty: 10 ML | Refills: 0 | Status: SHIPPED | OUTPATIENT
Start: 2024-08-20

## 2024-08-20 NOTE — PROGRESS NOTES
Subjective     Patient ID: Eboni Nicholas is a 78 y.o. female.    Chief Complaint: Consult and Ear Fullness    HPI  Eboni Nicholas Is a 78 year old female who was referred from Dr. Robles for a possible tube. Patient reports left ear fullness and muffled hearing x 1 year. She has been using Flonase twice a day for the last year with no improvement. Audiogram from 7/1/24 with type B tymp AS. Denies ear pain, drainage, tinnitus, or vertigo. Has had bilaterally tubes 20 years ago for what she thinks was an ear infection.       Review of Systems   HENT:  Positive for hearing loss. Negative for ear discharge, ear pain and tinnitus.    Eyes: Negative.    Cardiovascular: Negative.    Gastrointestinal: Negative.    Endocrine: Positive for heat intolerance.   Genitourinary: Negative.    Integumentary:  Negative.   Allergic/Immunologic: Negative.    Neurological:  Positive for light-headedness. Negative for dizziness.   Hematological: Negative.    Psychiatric/Behavioral: Negative.            Objective   Physical Exam  Vitals reviewed.   HENT:      Head: Normocephalic.      Jaw: There is normal jaw occlusion. No tenderness or pain on movement.      Right Ear: Tympanic membrane, ear canal and external ear normal.      Left Ear: Tympanic membrane, ear canal and external ear normal.      Nose: Nose normal.   Eyes:      Extraocular Movements: Extraocular movements intact.   Pulmonary:      Effort: Pulmonary effort is normal.   Musculoskeletal:         General: Normal range of motion.      Cervical back: Normal range of motion.   Skin:     General: Skin is warm and dry.   Neurological:      General: No focal deficit present.      Mental Status: She is alert.         Data reviewed:     CT             Procedure: myringotomy with tube placement, left  Details: After informed consent regarding infection, perforation, early extrusion and possible cholesteatoma formation the patient was brought to the minor procedure room and placed in  the supine position. The operating microscope was then brought onto the field and the tympanic membrane was visualized. Using a sterile, single use phenol kit the TM was sterilized and anesthetized. A myringotomy incision was made and the effusion evacuated. A sterile Gomez V tympanostomy tube was placed and the procedure was terminated. The patient tolerated the procedure well.       Water precautions discussed. RTC as directed.       Assessment and Plan     1. Sensorineural hearing loss (SNHL) of left ear with restricted hearing of right ear    Other orders  -     ofloxacin (FLOXIN) 0.3 % otic solution; Place 5 drops into the left ear 2 (two) times daily.  Dispense: 10 mL; Refill: 0        Tube placed AS.  Ofloxacin drops x 1 week.   Follow up in one month with audiogram.         No follow-ups on file.

## 2024-09-05 RX ORDER — LOSARTAN POTASSIUM 50 MG/1
TABLET ORAL
Qty: 180 TABLET | Refills: 1 | Status: SHIPPED | OUTPATIENT
Start: 2024-09-05

## 2024-09-14 DIAGNOSIS — F32.5 MAJOR DEPRESSIVE DISORDER WITH SINGLE EPISODE, IN FULL REMISSION: ICD-10-CM

## 2024-09-15 NOTE — TELEPHONE ENCOUNTER
No care due was identified.  [facility] Embedded Care Due Messages. Reference number: [id number].   9/14/2024 9:13:41 PM CDT

## 2024-09-16 RX ORDER — ESCITALOPRAM OXALATE 20 MG/1
20 TABLET ORAL DAILY
Qty: 90 TABLET | Refills: 3 | Status: SHIPPED | OUTPATIENT
Start: 2024-09-16

## 2024-09-16 NOTE — TELEPHONE ENCOUNTER
LOV 04/25/24    NOV 10/25/24    LVM for pt informing her that the should contact her pharmacy to receive remaining refills.

## 2024-09-19 ENCOUNTER — PATIENT MESSAGE (OUTPATIENT)
Dept: PRIMARY CARE CLINIC | Facility: CLINIC | Age: 79
End: 2024-09-19
Payer: MEDICARE

## 2024-09-20 ENCOUNTER — OFFICE VISIT (OUTPATIENT)
Dept: OTOLARYNGOLOGY | Facility: CLINIC | Age: 79
End: 2024-09-20
Payer: MEDICARE

## 2024-09-20 ENCOUNTER — CLINICAL SUPPORT (OUTPATIENT)
Dept: AUDIOLOGY | Facility: CLINIC | Age: 79
End: 2024-09-20
Payer: MEDICARE

## 2024-09-20 DIAGNOSIS — H90.A32 MIXED CONDUCTIVE AND SENSORINEURAL HEARING LOSS OF LEFT EAR WITH RESTRICTED HEARING OF RIGHT EAR: ICD-10-CM

## 2024-09-20 DIAGNOSIS — H90.A22 SENSORINEURAL HEARING LOSS (SNHL) OF LEFT EAR WITH RESTRICTED HEARING OF RIGHT EAR: Primary | ICD-10-CM

## 2024-09-20 DIAGNOSIS — H69.92 DYSFUNCTION OF LEFT EUSTACHIAN TUBE: ICD-10-CM

## 2024-09-20 DIAGNOSIS — H90.3 SENSORINEURAL HEARING LOSS (SNHL) OF BOTH EARS: Primary | ICD-10-CM

## 2024-09-20 PROCEDURE — 99999 PR PBB SHADOW E&M-EST. PATIENT-LVL I: CPT | Mod: PBBFAC,HCNC,,

## 2024-09-20 PROCEDURE — 99999 PR PBB SHADOW E&M-EST. PATIENT-LVL III: CPT | Mod: PBBFAC,HCNC,, | Performed by: OTOLARYNGOLOGY

## 2024-09-20 NOTE — PROGRESS NOTES
"Eboni Nicholas was seen today in the clinic for an audiologic evaluation. Mrs. Nicholas was last seen for an audiogram on 07/01/2024 and results indicated normal sloping to moderately severe sensorineural hearing loss (SNHL) in the right ear and mild sloping to severe SNHL in the left ear. Patient had a tube placed in the left ear on 08/20/2024 and reported she has been doing well since then. Mrs. Nicholas reported muffled hearing in both ears, worse in the left ear and aural fullness bilaterally. She reported occasional dizziness that she described as "off balance." She denied tinnitus and otalgia.    Tympanometry revealed Type As in the right ear and Type B with large ear canal volume in the left ear.     Audiogram results revealed normal hearing sensitivity sloping to moderately severe sensorineural hearing loss (SNHL) in the right ear and mild sloping to profound SNHL in the left ear.      Speech reception thresholds were noted at 20 dB in the right ear and 30 dB in the left ear.    Speech discrimination scores were 75% in the right ear and 88% in the left ear.    Recommendations:  Otologic evaluation  Annual audiogram  Hearing protection when in noise  Hearing aid consultation following medical clearance          "

## 2024-09-20 NOTE — PROGRESS NOTES
Subjective     Patient ID: Eboni Nicholas is a 78 y.o. female.    Chief Complaint:  PE tube f/u    HPI  Eboni Nicholas Is a 78 year old female with Left OM s/p PE tube. Reports improved aural fullness in left ear. Overall happy with results. Denies otorrhea       Review of Systems   HENT:  Positive for hearing loss. Negative for ear discharge, ear pain and tinnitus.    Eyes: Negative.    Cardiovascular: Negative.    Gastrointestinal: Negative.    Endocrine: Positive for heat intolerance.   Genitourinary: Negative.    Integumentary:  Negative.   Allergic/Immunologic: Negative.    Neurological:  Positive for light-headedness. Negative for dizziness.   Hematological: Negative.    Psychiatric/Behavioral: Negative.            Objective   Physical Exam  Vitals and nursing note reviewed.   Constitutional:       Appearance: Normal appearance.   HENT:      Head: Normocephalic and atraumatic.      Right Ear: Tympanic membrane, ear canal and external ear normal. There is no impacted cerumen.      Left Ear: Tympanic membrane, ear canal and external ear normal. No drainage. There is no impacted cerumen. A PE tube is present.   Neurological:      Mental Status: She is alert.         Data reviewed:     CT                  Audiogram tracings independently reviewed and discussed with patient shows slight improvement in CHL of left ear   Assessment and Plan     1. Sensorineural hearing loss (SNHL) of left ear with restricted hearing of right ear    2. Mixed conductive and sensorineural hearing loss of left ear with restricted hearing of right ear    3. Dysfunction of left eustachian tube          Tube patent  Recommend HAE  F/u 6 mo for tube check can do at Dr. Robles's      No follow-ups on file.

## 2024-09-24 ENCOUNTER — OFFICE VISIT (OUTPATIENT)
Dept: SLEEP MEDICINE | Facility: CLINIC | Age: 79
End: 2024-09-24
Payer: MEDICARE

## 2024-09-24 VITALS
HEIGHT: 63 IN | WEIGHT: 216.06 LBS | BODY MASS INDEX: 38.28 KG/M2 | SYSTOLIC BLOOD PRESSURE: 108 MMHG | HEART RATE: 66 BPM | DIASTOLIC BLOOD PRESSURE: 76 MMHG

## 2024-09-24 DIAGNOSIS — G47.33 OSA (OBSTRUCTIVE SLEEP APNEA): Primary | ICD-10-CM

## 2024-09-24 PROCEDURE — 3074F SYST BP LT 130 MM HG: CPT | Mod: HCNC,CPTII,S$GLB, | Performed by: NURSE PRACTITIONER

## 2024-09-24 PROCEDURE — 99999 PR PBB SHADOW E&M-EST. PATIENT-LVL III: CPT | Mod: PBBFAC,HCNC,, | Performed by: NURSE PRACTITIONER

## 2024-09-24 PROCEDURE — 3288F FALL RISK ASSESSMENT DOCD: CPT | Mod: HCNC,CPTII,S$GLB, | Performed by: NURSE PRACTITIONER

## 2024-09-24 PROCEDURE — 99214 OFFICE O/P EST MOD 30 MIN: CPT | Mod: HCNC,S$GLB,, | Performed by: NURSE PRACTITIONER

## 2024-09-24 PROCEDURE — 3078F DIAST BP <80 MM HG: CPT | Mod: HCNC,CPTII,S$GLB, | Performed by: NURSE PRACTITIONER

## 2024-09-24 PROCEDURE — 1101F PT FALLS ASSESS-DOCD LE1/YR: CPT | Mod: HCNC,CPTII,S$GLB, | Performed by: NURSE PRACTITIONER

## 2024-09-24 PROCEDURE — 1159F MED LIST DOCD IN RCRD: CPT | Mod: HCNC,CPTII,S$GLB, | Performed by: NURSE PRACTITIONER

## 2024-09-24 NOTE — PROGRESS NOTES
"Eboni Nicholas returns today for mgt of CHIDI, accompanied by her     She continues to use PAP (setup 7/2019),  11-20cm. Using qhs with nose mask and heated hose.Wakes up more refreshed. Snoring resolved. Machine stopped blowing several weeks ago and having return of symptoms.   bp stable    Remote: not transmitting anymore, previous AHI 4.0 90 %tile 16.5cm     /76 (BP Location: Left arm, Patient Position: Sitting, BP Method: Medium (Automatic))   Pulse 66   Ht 5' 3" (1.6 m)   Wt 98 kg (216 lb 0.8 oz)   BMI 38.27 kg/m²         02/11/2010 Split  lb. AHI was 91 with an oxygen ashley of 72%. Effective control cpap 16 cm        ASSESSMENT:  CHIDI.  Excellent adherence with autopap, benefiting from therapy. AHI<5. Machine has stopped functioning/motor error on machine  She has medical co-morbidities: depression, glaucoma, HLD, HTN, sarcoidosis      PLAN:  Continue apap 11-20 cm GET new machine. [facility] , wants to trial nose pillow mask this time (reads at bedtime)  Rtc b/t 31-90d after setup adherence monitoring  "

## 2024-10-18 ENCOUNTER — LAB VISIT (OUTPATIENT)
Dept: LAB | Facility: HOSPITAL | Age: 79
End: 2024-10-18
Attending: STUDENT IN AN ORGANIZED HEALTH CARE EDUCATION/TRAINING PROGRAM
Payer: MEDICARE

## 2024-10-18 DIAGNOSIS — N18.31 CHRONIC KIDNEY DISEASE, STAGE 3A: ICD-10-CM

## 2024-10-18 DIAGNOSIS — Z13.6 ENCOUNTER FOR SCREENING FOR CARDIOVASCULAR DISORDERS: ICD-10-CM

## 2024-10-18 DIAGNOSIS — Z86.2 PERSONAL HISTORY OF SARCOIDOSIS: ICD-10-CM

## 2024-10-18 LAB
ALBUMIN SERPL BCP-MCNC: 4 G/DL (ref 3.5–5.2)
ALP SERPL-CCNC: 56 U/L (ref 40–150)
ALT SERPL W/O P-5'-P-CCNC: 16 U/L (ref 10–44)
ANION GAP SERPL CALC-SCNC: 10 MMOL/L (ref 8–16)
AST SERPL-CCNC: 24 U/L (ref 10–40)
BASOPHILS # BLD AUTO: 0.04 K/UL (ref 0–0.2)
BASOPHILS NFR BLD: 0.6 % (ref 0–1.9)
BILIRUB SERPL-MCNC: 0.7 MG/DL (ref 0.1–1)
BUN SERPL-MCNC: 19 MG/DL (ref 8–23)
CALCIUM SERPL-MCNC: 10.3 MG/DL (ref 8.7–10.5)
CHLORIDE SERPL-SCNC: 106 MMOL/L (ref 95–110)
CHOLEST SERPL-MCNC: 184 MG/DL (ref 120–199)
CHOLEST/HDLC SERPL: 3.8 {RATIO} (ref 2–5)
CO2 SERPL-SCNC: 23 MMOL/L (ref 23–29)
CREAT SERPL-MCNC: 1 MG/DL (ref 0.5–1.4)
DIFFERENTIAL METHOD BLD: ABNORMAL
EOSINOPHIL # BLD AUTO: 0.3 K/UL (ref 0–0.5)
EOSINOPHIL NFR BLD: 3.6 % (ref 0–8)
ERYTHROCYTE [DISTWIDTH] IN BLOOD BY AUTOMATED COUNT: 14.4 % (ref 11.5–14.5)
EST. GFR  (NO RACE VARIABLE): 58 ML/MIN/1.73 M^2
GLUCOSE SERPL-MCNC: 106 MG/DL (ref 70–110)
HCT VFR BLD AUTO: 42.4 % (ref 37–48.5)
HDLC SERPL-MCNC: 48 MG/DL (ref 40–75)
HDLC SERPL: 26.1 % (ref 20–50)
HGB BLD-MCNC: 13.3 G/DL (ref 12–16)
IMM GRANULOCYTES # BLD AUTO: 0.03 K/UL (ref 0–0.04)
IMM GRANULOCYTES NFR BLD AUTO: 0.4 % (ref 0–0.5)
LDLC SERPL CALC-MCNC: 99.4 MG/DL (ref 63–159)
LYMPHOCYTES # BLD AUTO: 2.3 K/UL (ref 1–4.8)
LYMPHOCYTES NFR BLD: 33.8 % (ref 18–48)
MCH RBC QN AUTO: 29.4 PG (ref 27–31)
MCHC RBC AUTO-ENTMCNC: 31.4 G/DL (ref 32–36)
MCV RBC AUTO: 94 FL (ref 82–98)
MONOCYTES # BLD AUTO: 0.5 K/UL (ref 0.3–1)
MONOCYTES NFR BLD: 6.5 % (ref 4–15)
NEUTROPHILS # BLD AUTO: 3.8 K/UL (ref 1.8–7.7)
NEUTROPHILS NFR BLD: 55.1 % (ref 38–73)
NONHDLC SERPL-MCNC: 136 MG/DL
NRBC BLD-RTO: 0 /100 WBC
PLATELET # BLD AUTO: 193 K/UL (ref 150–450)
PMV BLD AUTO: 11 FL (ref 9.2–12.9)
POTASSIUM SERPL-SCNC: 4.4 MMOL/L (ref 3.5–5.1)
PROT SERPL-MCNC: 7.2 G/DL (ref 6–8.4)
RBC # BLD AUTO: 4.52 M/UL (ref 4–5.4)
SODIUM SERPL-SCNC: 139 MMOL/L (ref 136–145)
T4 FREE SERPL-MCNC: 1.13 NG/DL (ref 0.71–1.51)
TRIGL SERPL-MCNC: 183 MG/DL (ref 30–150)
TSH SERPL DL<=0.005 MIU/L-ACNC: 0.24 UIU/ML (ref 0.4–4)
WBC # BLD AUTO: 6.9 K/UL (ref 3.9–12.7)

## 2024-10-18 PROCEDURE — 36415 COLL VENOUS BLD VENIPUNCTURE: CPT | Mod: HCNC,PN | Performed by: STUDENT IN AN ORGANIZED HEALTH CARE EDUCATION/TRAINING PROGRAM

## 2024-10-18 PROCEDURE — 80061 LIPID PANEL: CPT | Mod: HCNC | Performed by: STUDENT IN AN ORGANIZED HEALTH CARE EDUCATION/TRAINING PROGRAM

## 2024-10-18 PROCEDURE — 85025 COMPLETE CBC W/AUTO DIFF WBC: CPT | Mod: HCNC | Performed by: STUDENT IN AN ORGANIZED HEALTH CARE EDUCATION/TRAINING PROGRAM

## 2024-10-18 PROCEDURE — 80053 COMPREHEN METABOLIC PANEL: CPT | Mod: HCNC | Performed by: STUDENT IN AN ORGANIZED HEALTH CARE EDUCATION/TRAINING PROGRAM

## 2024-10-18 PROCEDURE — 84443 ASSAY THYROID STIM HORMONE: CPT | Mod: HCNC | Performed by: STUDENT IN AN ORGANIZED HEALTH CARE EDUCATION/TRAINING PROGRAM

## 2024-10-18 PROCEDURE — 84439 ASSAY OF FREE THYROXINE: CPT | Mod: HCNC | Performed by: STUDENT IN AN ORGANIZED HEALTH CARE EDUCATION/TRAINING PROGRAM

## 2024-10-25 ENCOUNTER — OFFICE VISIT (OUTPATIENT)
Dept: PRIMARY CARE CLINIC | Facility: CLINIC | Age: 79
End: 2024-10-25
Payer: MEDICARE

## 2024-10-25 VITALS
TEMPERATURE: 98 F | DIASTOLIC BLOOD PRESSURE: 68 MMHG | HEIGHT: 63 IN | BODY MASS INDEX: 39.06 KG/M2 | SYSTOLIC BLOOD PRESSURE: 104 MMHG | WEIGHT: 220.44 LBS | HEART RATE: 69 BPM | OXYGEN SATURATION: 96 %

## 2024-10-25 DIAGNOSIS — E03.9 ACQUIRED HYPOTHYROIDISM: ICD-10-CM

## 2024-10-25 DIAGNOSIS — Z12.31 ENCOUNTER FOR SCREENING MAMMOGRAM FOR MALIGNANT NEOPLASM OF BREAST: ICD-10-CM

## 2024-10-25 DIAGNOSIS — E78.2 MIXED HYPERLIPIDEMIA: ICD-10-CM

## 2024-10-25 DIAGNOSIS — N18.31 CHRONIC KIDNEY DISEASE, STAGE 3A: ICD-10-CM

## 2024-10-25 DIAGNOSIS — F32.5 MAJOR DEPRESSIVE DISORDER WITH SINGLE EPISODE, IN FULL REMISSION: ICD-10-CM

## 2024-10-25 DIAGNOSIS — I10 HTN (HYPERTENSION), BENIGN: Primary | ICD-10-CM

## 2024-10-25 PROCEDURE — 99999 PR PBB SHADOW E&M-EST. PATIENT-LVL V: CPT | Mod: PBBFAC,HCNC,, | Performed by: STUDENT IN AN ORGANIZED HEALTH CARE EDUCATION/TRAINING PROGRAM

## 2024-10-25 NOTE — PROGRESS NOTES
Office visit  Patient: Eboni Nicholas   10/25/2024       Assessment/Plan:       1. HTN (hypertension), benign         -stable, continue current medication    2. Mixed hyperlipidemia       -stable, continue simvastatin    3. Major depressive disorder with single episode, in full remission        -stable, continue Lexapro    4. Chronic kidney disease, stage 3a       -stable, continue to monitor    5. Acquired hypothyroidism       -stable, continue levothyroxine    6. Encounter for screening mammogram for malignant neoplasm of breast  -     Mammo Digital Screening Bilat w/ [facility]; Future; Expected date: 11/15/2024      Return to clinic in 6 months or sooner as needed.        CHIEF COMPLAINT: follow up of hypertension and other chronic conditions    HPI: Eboni Nicholas is a 79 y.o. female who presents for follow up of hypertension, hypothyroidism, and hyperlipidemia. She reports things are going well and she's taking her medication regularly. She's requesting a form to be filled out so she can get [employer] transportation, as the bus stop is too far from her house.        Current Outpatient Medications   Medication Instructions    aspirin (ECOTRIN) 81 mg, Daily    blood pressure monitor Kit No dose, route, or frequency recorded.    calcium-vitamin D 250-100 mg-unit per tablet 1 tablet, Daily    co-enzyme Q-10 30 mg, Daily    COVID-19 (COMIRNATY 2024-25, 12Y UP,,PF,) 30 mcg/0.3 mL IM vaccine (>/= 13 yo) 0.3 mLs, Intramuscular, Once    dorzolamide (TRUSOPT) 2 % ophthalmic solution 1 drop, Both Eyes, 2 times daily    dorzolamide (TRUSOPT) 2 % ophthalmic solution 1 drop, Both Eyes, 3 times daily    EScitalopram oxalate (LEXAPRO) 20 mg, Oral, Daily    fluticasone propionate (FLONASE) 100 mcg, Each Nostril, Daily    influenza, adjuvanted, (FLUAD TRIV 2024-25,65Y UP,,PF,) 45 mcg (15 mcg x 3)/0.5 mL IM vaccine (> or = 64 yo) 0.5 mLs, Intramuscular, Once    latanoprost 0.005 % ophthalmic solution 1 drop, Both Eyes, Daily     "levocetirizine (XYZAL) 5 mg, Oral, Nightly, 1 tablet by mouth every morning    levothyroxine (SYNTHROID) 100 mcg, Oral, Daily    losartan (COZAAR) 50 MG tablet TAKE ONE TABLET BY MOUTH DAILY, THEN MAY REPEAT THE DOSE IF PRESSURE > 140/90    montelukast (SINGULAIR) 10 mg tablet One tablet by mouth every evening    multivitamin (THERAGRAN) per tablet 1 tablet, Daily    ofloxacin (FLOXIN) 0.3 % otic solution 5 drops, Left Ear, 2 times daily    simvastatin (ZOCOR) 40 mg, Oral, Nightly    timolol maleate 0.5% (TIMOPTIC) 0.5 % Drop 1 drop, Both Eyes, 2 times daily       No results found for: "HGBA1C"  No results found for: "MICALBCREAT"  Lab Results   Component Value Date    LDLCALC 99.4 10/18/2024    LDLCALC 101.6 10/13/2023    CHOL 184 10/18/2024    HDL 48 10/18/2024    TRIG 183 (H) 10/18/2024       Lab Results   Component Value Date     10/18/2024    K 4.4 10/18/2024     10/18/2024    CO2 23 10/18/2024     10/18/2024    BUN 19 10/18/2024    CREATININE 1.0 10/18/2024    CALCIUM 10.3 10/18/2024    PROT 7.2 10/18/2024    ALBUMIN 4.0 10/18/2024    BILITOT 0.7 10/18/2024    ALKPHOS 56 10/18/2024    AST 24 10/18/2024    ALT 16 10/18/2024    ANIONGAP 10 10/18/2024    ESTGFRAFRICA >60.0 08/07/2020    EGFRNONAA 56.4 (A) 08/07/2020    WBC 6.90 10/18/2024    HGB 13.3 10/18/2024    HGB 13.3 10/13/2023    HCT 42.4 10/18/2024    MCV 94 10/18/2024     10/18/2024    TSH 0.244 (L) 10/18/2024    HEPCAB Negative 01/31/2020       Lab Results   Component Value Date    IBTEOXLP79XO 56 04/10/2012    QTOJRGVO33 898 06/28/2024    FERRITIN 108 09/16/2022    IRON 56 09/16/2022    TRANSFERRIN 261 09/16/2022    TIBC 386 09/16/2022    FESATURATED 15 (L) 09/16/2022    ZINC 100 06/28/2024         Past Medical History:   Diagnosis Date    Anxiety     Benign essential HTN     Cataract     Depression     GERD (gastroesophageal reflux disease)     Glaucoma     Hyperlipidemia     Hypothyroid     CHIDI (obstructive sleep apnea)     " "Sarcoidosis      Past Surgical History:   Procedure Laterality Date    HYSTERECTOMY  2004    THYROID SURGERY         Social History     Tobacco Use    Smoking status: Never    Smokeless tobacco: Never   Substance Use Topics    Alcohol use: Not Currently     Comment: none    Drug use: No       family history includes Alcohol abuse in her brother; Cancer in her father; Coronary artery disease in her maternal grandfather; Diabetes in her maternal grandmother; Diabetes type I in her son; Heart disease in her brother and brother; Liver disease in her brother and brother; No Known Problems in her daughter, daughter, paternal grandfather, and paternal grandmother; Rheum arthritis in her sister; Stroke in her mother; Transient ischemic attack in her mother.    Vitals:    10/25/24 1052   BP: 104/68   Pulse: 69   Temp: 98.3 °F (36.8 °C)   TempSrc: Oral   SpO2: 96%   Weight: 100 kg (220 lb 7.4 oz)   Height: 5' 3" (1.6 m)   PainSc: 0-No pain     Objective:   Physical Exam  Vitals reviewed.   Constitutional:       General: She is not in acute distress.     Appearance: Normal appearance. She is well-developed.   HENT:      Head: Normocephalic and atraumatic.      Right Ear: External ear normal.      Left Ear: External ear normal.      Nose: Nose normal.      Mouth/Throat:      Mouth: Mucous membranes are moist.   Eyes:      General: No scleral icterus.        Right eye: No discharge.         Left eye: No discharge.      Conjunctiva/sclera: Conjunctivae normal.   Cardiovascular:      Rate and Rhythm: Normal rate and regular rhythm.      Heart sounds: Normal heart sounds. No murmur heard.     No friction rub. No gallop.   Pulmonary:      Effort: Pulmonary effort is normal. No respiratory distress.      Breath sounds: Normal breath sounds. No wheezing, rhonchi or rales.   Musculoskeletal:         General: No deformity.      Right lower leg: No edema.      Left lower leg: No edema.   Skin:     General: Skin is warm and dry. "   Neurological:      General: No focal deficit present.      Mental Status: She is alert and oriented to person, place, and time.   Psychiatric:         Mood and Affect: Mood normal.         Behavior: Behavior normal.         Thought Content: Thought content normal.             Maricruz Lloyd MD  Internal Medicine and Pediatrics

## 2024-11-01 DIAGNOSIS — H40.1112 PRIMARY OPEN ANGLE GLAUCOMA (POAG) OF RIGHT EYE, MODERATE STAGE: ICD-10-CM

## 2024-11-01 DIAGNOSIS — H40.1121 PRIMARY OPEN ANGLE GLAUCOMA (POAG) OF LEFT EYE, MILD STAGE: ICD-10-CM

## 2024-11-01 DIAGNOSIS — H40.039 ANATOMICAL NARROW ANGLE: ICD-10-CM

## 2024-11-03 ENCOUNTER — PATIENT MESSAGE (OUTPATIENT)
Dept: SLEEP MEDICINE | Facility: CLINIC | Age: 79
End: 2024-11-03
Payer: MEDICARE

## 2024-11-03 ENCOUNTER — PATIENT MESSAGE (OUTPATIENT)
Dept: OTOLARYNGOLOGY | Facility: CLINIC | Age: 79
End: 2024-11-03
Payer: MEDICARE

## 2024-11-04 RX ORDER — LATANOPROST 50 UG/ML
1 SOLUTION/ DROPS OPHTHALMIC DAILY
Qty: 7.5 ML | Refills: 3 | Status: SHIPPED | OUTPATIENT
Start: 2024-11-04 | End: 2025-11-04

## 2024-11-05 ENCOUNTER — TELEPHONE (OUTPATIENT)
Dept: SLEEP MEDICINE | Facility: CLINIC | Age: 79
End: 2024-11-05
Payer: MEDICARE

## 2024-11-19 ENCOUNTER — CLINICAL SUPPORT (OUTPATIENT)
Dept: OTOLARYNGOLOGY | Facility: CLINIC | Age: 79
End: 2024-11-19
Payer: MEDICARE

## 2024-11-19 DIAGNOSIS — Z71.89 ENCOUNTER FOR HEARING AID CONSULTATION: Primary | ICD-10-CM

## 2024-11-19 PROCEDURE — 99499 UNLISTED E&M SERVICE: CPT | Mod: ,,, | Performed by: AUDIOLOGIST-HEARING AID FITTER

## 2024-11-19 NOTE — PROGRESS NOTES
Sandy Maharaj, CCC-A  Audiologist - [facility] [address] [place] [address]  [email or web address]  [phone]    Patient: Eboni Nicholas   MRN: [id number]  [address]   Home Phone [phone]   Work Phone Not on file.   Mobile [phone]   : 1945  HUTSON: 2024      HEARING AID CONSULT      Here with , Robe.  Wants to discuss with [employer] agent.    _______________________________  Sandy Maharaj, KAMRAN-A  Audiologist

## 2024-11-22 ENCOUNTER — PATIENT MESSAGE (OUTPATIENT)
Dept: PRIMARY CARE CLINIC | Facility: CLINIC | Age: 79
End: 2024-11-22
Payer: MEDICARE

## 2024-11-27 ENCOUNTER — PATIENT MESSAGE (OUTPATIENT)
Dept: OTOLARYNGOLOGY | Facility: CLINIC | Age: 79
End: 2024-11-27
Payer: MEDICARE

## 2024-12-03 ENCOUNTER — CLINICAL SUPPORT (OUTPATIENT)
Dept: OTOLARYNGOLOGY | Facility: CLINIC | Age: 79
End: 2024-12-03
Payer: MEDICARE

## 2024-12-03 DIAGNOSIS — Z71.89 ENCOUNTER FOR HEARING AID CONSULTATION: Primary | ICD-10-CM

## 2024-12-03 NOTE — PROGRESS NOTES
Sandy Maharaj, CCC-A  Audiologist - [facility] [address] [place] [address]  [email or web address]  [phone]    Patient: Eboni Nicholas   MRN: [id number]  [address]   Home Phone [phone]   Work Phone Not on file.   Mobile [phone]   : 1945  HUTSON: 12/3/2024      HEARING AID CONSULT      Hearing aid technology, prices, and styles were discussed with patient and her  at length.  Per [employer], this is a yearly benefit  Otoscopy clear Right and patent PE tube Left  Fit date 1/15/25   Order devices on 24  Phonak I50s - OF scanned into media        _______________________________  Sandy Maharaj, KAMRAN-A  Audiologist

## 2024-12-11 ENCOUNTER — PATIENT MESSAGE (OUTPATIENT)
Dept: OTOLARYNGOLOGY | Facility: CLINIC | Age: 79
End: 2024-12-11
Payer: MEDICARE

## 2025-01-09 ENCOUNTER — OFFICE VISIT (OUTPATIENT)
Dept: SLEEP MEDICINE | Facility: CLINIC | Age: 80
End: 2025-01-09
Attending: PSYCHIATRY & NEUROLOGY
Payer: MEDICARE

## 2025-01-09 VITALS
HEART RATE: 69 BPM | WEIGHT: 218.81 LBS | BODY MASS INDEX: 38.76 KG/M2 | SYSTOLIC BLOOD PRESSURE: 141 MMHG | DIASTOLIC BLOOD PRESSURE: 76 MMHG

## 2025-01-09 DIAGNOSIS — G47.33 OSA (OBSTRUCTIVE SLEEP APNEA): Primary | ICD-10-CM

## 2025-01-09 PROCEDURE — 3077F SYST BP >= 140 MM HG: CPT | Mod: CPTII,S$GLB,, | Performed by: NURSE PRACTITIONER

## 2025-01-09 PROCEDURE — 99999 PR PBB SHADOW E&M-EST. PATIENT-LVL III: CPT | Mod: PBBFAC,HCNC,, | Performed by: NURSE PRACTITIONER

## 2025-01-09 PROCEDURE — 1126F AMNT PAIN NOTED NONE PRSNT: CPT | Mod: CPTII,S$GLB,, | Performed by: NURSE PRACTITIONER

## 2025-01-09 PROCEDURE — 99214 OFFICE O/P EST MOD 30 MIN: CPT | Mod: S$GLB,,, | Performed by: NURSE PRACTITIONER

## 2025-01-09 PROCEDURE — 1101F PT FALLS ASSESS-DOCD LE1/YR: CPT | Mod: CPTII,S$GLB,, | Performed by: NURSE PRACTITIONER

## 2025-01-09 PROCEDURE — 3078F DIAST BP <80 MM HG: CPT | Mod: CPTII,S$GLB,, | Performed by: NURSE PRACTITIONER

## 2025-01-09 PROCEDURE — 3288F FALL RISK ASSESSMENT DOCD: CPT | Mod: CPTII,S$GLB,, | Performed by: NURSE PRACTITIONER

## 2025-01-09 PROCEDURE — 1159F MED LIST DOCD IN RCRD: CPT | Mod: CPTII,S$GLB,, | Performed by: NURSE PRACTITIONER

## 2025-01-09 NOTE — PROGRESS NOTES
Eboni Nicholas returns today for mgt of CHIDI, accompanied by her     She continues to use PAP 6-10cm got NEW machine, dislikes having to wear the mask but she does. Not traveling with it due to size. ESS=7. Uses nose mask and heated hose.Wakes up more refreshed. Snoring resolved.     Interrogation 30davg 9.1h/n 90% tile 9.8cm, AHI 1.9    BP (!) 141/76 (BP Location: Left arm, Patient Position: Sitting)   Pulse 69   Wt 99.2 kg (218 lb 12.8 oz)   BMI 38.76 kg/m²         02/11/2010 Split  lb. AHI was 91 with an oxygen ashley of 72%. Effective control cpap 16 cm        ASSESSMENT:  CHIDI.  Excellent adherence with autopap, benefiting from therapy. AHI<5.Meeting Medicare use guidelines      PLAN:  Continue apap 6-10 cm, [facility] D[facility] supplies  RX for travel machine  Discussed control of CHIDI  Rtc annually

## 2025-01-21 ENCOUNTER — PATIENT MESSAGE (OUTPATIENT)
Dept: AUDIOLOGY | Facility: CLINIC | Age: 80
End: 2025-01-21
Payer: MEDICARE

## 2025-01-22 ENCOUNTER — PATIENT MESSAGE (OUTPATIENT)
Dept: AUDIOLOGY | Facility: CLINIC | Age: 80
End: 2025-01-22
Payer: MEDICARE

## 2025-01-29 ENCOUNTER — CLINICAL SUPPORT (OUTPATIENT)
Dept: OTOLARYNGOLOGY | Facility: CLINIC | Age: 80
End: 2025-01-29
Payer: MEDICARE

## 2025-01-29 DIAGNOSIS — Z46.1 ENCOUNTER FOR HEARING AID FITTING OF BOTH EARS: Primary | ICD-10-CM

## 2025-01-29 NOTE — PROGRESS NOTES
Sandy Maharaj, CCC-A  Audiologist - [facility] [address] [place] [address]  [email or web address]  [phone]    Patient: Eboni Nicholas   MRN: [id number]  [address]  Home Phone [phone]   Work Phone Not on file.   Mobile [phone]   : 1945  HUTSON: 2025      HEARING AID FITTING        Right ear:  Serial number: [id number]  :  Model:  Type:  Color:  Battery size:  Tube length:  Speaker power:  Dome size and style:  Warranty expiration:  L and D expiration:    Left ear:  Serial number: [id number]  :  Model:  Type:  Color:  Battery size:  Tube length:  Speaker power:  Dome size and style:  Warranty expiration:  L and D expiration:    Accessory:   #[id number]      _______________________________  Snady Maharaj, KAMRAN-A  Audiologist

## 2025-01-31 ENCOUNTER — TELEPHONE (OUTPATIENT)
Dept: OTOLARYNGOLOGY | Facility: CLINIC | Age: 80
End: 2025-01-31

## 2025-01-31 ENCOUNTER — OFFICE VISIT (OUTPATIENT)
Dept: OTOLARYNGOLOGY | Facility: CLINIC | Age: 80
End: 2025-01-31
Payer: MEDICARE

## 2025-01-31 VITALS
DIASTOLIC BLOOD PRESSURE: 63 MMHG | OXYGEN SATURATION: 95 % | BODY MASS INDEX: 39.09 KG/M2 | HEART RATE: 68 BPM | SYSTOLIC BLOOD PRESSURE: 140 MMHG | WEIGHT: 220.69 LBS

## 2025-01-31 DIAGNOSIS — H81.391 PERIPHERAL VERTIGO INVOLVING RIGHT EAR: ICD-10-CM

## 2025-01-31 DIAGNOSIS — R26.89 IMPAIRMENT OF BALANCE: ICD-10-CM

## 2025-01-31 DIAGNOSIS — H61.23 BILATERAL IMPACTED CERUMEN: ICD-10-CM

## 2025-01-31 DIAGNOSIS — J34.89 NASAL CRUSTING: ICD-10-CM

## 2025-01-31 DIAGNOSIS — J30.89 NON-SEASONAL ALLERGIC RHINITIS, UNSPECIFIED TRIGGER: ICD-10-CM

## 2025-01-31 DIAGNOSIS — G47.33 OSA ON CPAP: ICD-10-CM

## 2025-01-31 DIAGNOSIS — D86.89 SARCOID OF NOSE: ICD-10-CM

## 2025-01-31 DIAGNOSIS — H90.A22 SENSORINEURAL HEARING LOSS (SNHL) OF LEFT EAR WITH RESTRICTED HEARING OF RIGHT EAR: Primary | ICD-10-CM

## 2025-01-31 DIAGNOSIS — H69.92 DYSFUNCTION OF LEFT EUSTACHIAN TUBE: ICD-10-CM

## 2025-01-31 PROCEDURE — 1159F MED LIST DOCD IN RCRD: CPT | Mod: HCNC,CPTII,S$GLB, | Performed by: SPECIALIST

## 2025-01-31 PROCEDURE — 69210 REMOVE IMPACTED EAR WAX UNI: CPT | Mod: HCNC,S$GLB,, | Performed by: SPECIALIST

## 2025-01-31 PROCEDURE — 1101F PT FALLS ASSESS-DOCD LE1/YR: CPT | Mod: HCNC,CPTII,S$GLB, | Performed by: SPECIALIST

## 2025-01-31 PROCEDURE — 99214 OFFICE O/P EST MOD 30 MIN: CPT | Mod: 25,HCNC,S$GLB, | Performed by: SPECIALIST

## 2025-01-31 PROCEDURE — 99999 PR PBB SHADOW E&M-EST. PATIENT-LVL III: CPT | Mod: PBBFAC,HCNC,, | Performed by: SPECIALIST

## 2025-01-31 PROCEDURE — 3077F SYST BP >= 140 MM HG: CPT | Mod: HCNC,CPTII,S$GLB, | Performed by: SPECIALIST

## 2025-01-31 PROCEDURE — 3288F FALL RISK ASSESSMENT DOCD: CPT | Mod: HCNC,CPTII,S$GLB, | Performed by: SPECIALIST

## 2025-01-31 PROCEDURE — 1126F AMNT PAIN NOTED NONE PRSNT: CPT | Mod: HCNC,CPTII,S$GLB, | Performed by: SPECIALIST

## 2025-01-31 PROCEDURE — 1160F RVW MEDS BY RX/DR IN RCRD: CPT | Mod: HCNC,CPTII,S$GLB, | Performed by: SPECIALIST

## 2025-01-31 PROCEDURE — 3078F DIAST BP <80 MM HG: CPT | Mod: HCNC,CPTII,S$GLB, | Performed by: SPECIALIST

## 2025-01-31 NOTE — PROCEDURES
"Ear Cerumen Removal    Date/Time: 1/31/2025 10:40 AM    Performed by: RACHELE Robles MD  Authorized by: RACHELE Robles MD    Time out: Immediately prior to procedure a "time out" was called to verify the correct patient, procedure, equipment, support staff and site/side marked as required.    Consent Done?:  Yes (Verbal)    Local anesthetic:  None  Location details:  Both ears  Procedure type: curette    Procedure type comment:  Wire loop, 8 Occitan suction and bayonet forceps  Cerumen  Removal Results:  Cerumen completely removed  Patient tolerance:  Patient tolerated the procedure well with no immediate complications    "

## 2025-01-31 NOTE — PROGRESS NOTES
Subjective:       Patient ID: Eboni Nicholas is a 79 y.o. female.    Chief Complaint: No chief complaint on file.      The patient is returning for follow-up visit.  I have not seen her in 11 months.  There are multiple issues to discuss:  1. Allergic rhinitis:  The patient is having some nasal congestion and blockage.  She has crusting in both sides of her nose.  Nasal secretions are clear.  She uses her montelukast and fluticasone sprays every day and supplements with cetirizine when needed.  2. Headaches:  The patient is having a sensation of pressure and fullness in the left side of her head in the temporal and occipital areas.  She does do nasal irrigations with a Neti pot.  She produces a large clonidine of nasal cost and headaches her better temporarily.  Headaches were not improved with placement of PE tube in the left ear.  3. Chronic imbalance/Right peripheral vestibular disorder:   Overall, the patient's balance issues have improved.  She continues to have intermittent imbalance.  She has not had any falls since her last visit.  4. Sarcoidosis of the nose: Tthe patient continues to have crusting in both sides of her nose.  She can remove it with a Neti pot or nasal irrigations but it returns within a day.  If she uses NeilMed nasal gel spray the crusting softens but then returns quickly.  5. Obstructive sleep apnea using CPAP:  The patient does use her CPAP every night.  6. Sensorineural hearing loss:  The patient had a PE tube placed in her left ear in September 20, 2024.  She did not find that it made a significant difference.  She  is is now wearing a hearing aid in each ear.  She finds him quiet helpful.          Review of Systems     Constitutional: Positive for fatigue.  Negative for appetite change, chills, fever and unexpected weight loss.      HENT: Positive for hearing loss, postnasal drip, ringing in the ears, runny nose, sinus pressure and stuffy nose.  Negative for ear discharge, ear  infection, ear pain, facial swelling, mouth sores, nosebleeds, sinus infection, sore throat, tonsil infection, dental problems, trouble swallowing and voice change.      Eyes:  Negative for change in eyesight, eye drainage, eye itching and photophobia.     Respiratory:  Positive for cough, sleep apnea and snoring. Negative for shortness of breath and wheezing.      Cardiovascular:  Negative for chest pain, foot swelling, irregular heartbeat and swollen veins.     Gastrointestinal:  Negative for abdominal pain, acid reflux, constipation, diarrhea, heartburn and vomiting.     Genitourinary: Negative for difficulty urinating, sexual problems and frequent urination.     Musc: Positive for aching muscles. Negative for aching joints, back pain and neck pain.     Skin: Negative for rash.     Allergy: Negative for food allergies and seasonal allergies.     Endocrine: Negative for cold intolerance and heat intolerance.      Neurological: Positive for headaches and light-headedness. Negative for dizziness, seizures and tremors.      Hematologic: Negative for bruises/bleeds easily and swollen glands.      Psychiatric: Negative for decreased concentration, depression, nervous/anxious and sleep disturbance.                Objective:      Physical Exam  Vitals and nursing note reviewed. Exam conducted with a chaperone present (Patient in her  come together for the visit, both have an appointment).   Constitutional:       General: She is awake.      Appearance: Normal appearance. She is well-developed and well-groomed. She is obese.   HENT:      Head: Normocephalic.      Jaw: There is normal jaw occlusion.      Salivary Glands: Right salivary gland is not diffusely enlarged or tender. Left salivary gland is not diffusely enlarged or tender.      Right Ear: Ear canal and external ear normal. Decreased hearing noted. No drainage. There is impacted cerumen. Tympanic membrane is retracted.      Left Ear: Ear canal and external  ear normal. Decreased hearing noted. No drainage. There is impacted cerumen. A PE tube is present. Tympanic membrane is retracted.      Nose: Septal deviation (to the right), mucosal edema (cyanotic, boggy inferior turbinates bilaterally) and rhinorrhea (clear mucus bilaterally) present. No nasal deformity or congestion (Nasal mucosa dry, yellow crusting fills both nasal passages in his partially removed with 10 Tongan suction.). Rhinorrhea is clear.      Right Turbinates: Enlarged and pale.      Left Turbinates: Enlarged and pale.      Mouth/Throat:      Lips: No lesions.      Mouth: Mucous membranes are moist. No oral lesions.      Dentition: Abnormal dentition. No gum lesions.      Tongue: No lesions.      Palate: No mass and lesions.      Pharynx: Oropharynx is clear. Uvula midline.      Tonsils: 2+ on the right. 2+ on the left.   Eyes:      General: Lids are normal. Vision grossly intact.         Right eye: No discharge.         Left eye: No discharge.      Extraocular Movements: Extraocular movements intact.      Conjunctiva/sclera: Conjunctivae normal.      Pupils: Pupils are equal, round, and reactive to light.   Neck:      Thyroid: No thyroid mass or thyromegaly.      Trachea: Trachea normal. No tracheal deviation.   Cardiovascular:      Rate and Rhythm: Normal rate and regular rhythm.      Pulses: Normal pulses.      Heart sounds: Normal heart sounds.   Pulmonary:      Effort: Pulmonary effort is normal.      Breath sounds: Normal breath sounds. No stridor. No decreased breath sounds, wheezing, rhonchi or rales.   Abdominal:      General: Bowel sounds are normal.      Palpations: Abdomen is soft.      Tenderness: There is no abdominal tenderness.   Musculoskeletal:      Cervical back: Neck supple. No muscular tenderness. Decreased range of motion.   Lymphadenopathy:      Head:      Right side of head: No submental, submandibular, preauricular, posterior auricular or occipital adenopathy.      Left side of  head: No submental, submandibular, preauricular, posterior auricular or occipital adenopathy.      Cervical: No cervical adenopathy.   Skin:     General: Skin is warm and dry.      Findings: No petechiae or rash.      Nails: There is no clubbing.   Neurological:      Mental Status: She is alert and oriented to person, place, and time.      Cranial Nerves: No cranial nerve deficit.      Sensory: No sensory deficit.      Gait: Gait normal.      Comments: Neuro otologic-Romberg negative, tandem Romberg falls to the left, mildly wide-based gait, tandem gait not performed   Psychiatric:         Speech: Speech normal.         Behavior: Behavior normal. Behavior is cooperative.         Thought Content: Thought content normal.         Judgment: Judgment normal.         Procedure-cerumen impactions removed bilaterally with wire loop, 8 Burkinan suction and bayonet forceps.  The patient tolerated procedure well was discharged post procedure.         Assessment:       1. Sensorineural hearing loss (SNHL) of left ear with restricted hearing of right ear    2. Dysfunction of left eustachian tube    3. Impairment of balance    4. Peripheral vertigo involving right ear    5. Non-seasonal allergic rhinitis, unspecified trigger    6. Sarcoid of nose    7. Nasal crusting    8. CHIDI on CPAP              Plan:       I will have the patient begin daily nasal irrigations with budesonide.  Am placing her on an oral prednisone taper over 9 days I will recheck her in 6 weeks.                        DISCLAIMER: This note was prepared with [employer] voice recognition transcription software. Garbled syntax, mangled pronouns, and other bizarre constructions may be attributed to that software system. While efforts were made to correct any mistakes made by this voice recognition program, some errors and/or omissions may remain in the note that were missed when the note was originally created.

## 2025-02-03 ENCOUNTER — TELEPHONE (OUTPATIENT)
Dept: OTOLARYNGOLOGY | Facility: CLINIC | Age: 80
End: 2025-02-03
Payer: MEDICARE

## 2025-02-18 ENCOUNTER — TELEPHONE (OUTPATIENT)
Dept: PRIMARY CARE CLINIC | Facility: CLINIC | Age: 80
End: 2025-02-18
Payer: MEDICARE

## 2025-02-18 NOTE — TELEPHONE ENCOUNTER
----- Message from Maricruz sent at 2/18/2025  1:13 PM CST -----  Contact: Pt [phone]  .[email or web address] Patient is calling for Medical Advice regarding: tightness in chest on Sunday, it is better now .. How long has patient had these symptoms:Pharmacy name and phone#:[id number] [address] - [address] [address]: [phone] Fax: [phone] wants a call back or thru [facility]:Comments: Will be leaving to go on vacation and would like to know how to handle it if it should happen againPlease advise patient replies from provider may take up to 48 hours.Please call and advise.Thank You

## 2025-02-18 NOTE — TELEPHONE ENCOUNTER
Spoke With Pt schedule her appt 2/21/25  for tightness in chest on Sunday,  Pt is doing fine just to check  before going on Vacations

## 2025-02-19 ENCOUNTER — CLINICAL SUPPORT (OUTPATIENT)
Dept: OTOLARYNGOLOGY | Facility: CLINIC | Age: 80
End: 2025-02-19
Payer: MEDICARE

## 2025-02-19 DIAGNOSIS — Z46.1 ENCOUNTER FOR FITTING AND ADJUSTMENT OF HEARING AID OF BOTH EARS: Primary | ICD-10-CM

## 2025-02-19 PROCEDURE — 99499 UNLISTED E&M SERVICE: CPT | Mod: ,,, | Performed by: AUDIOLOGIST-HEARING AID FITTER

## 2025-02-19 NOTE — PROGRESS NOTES
Sandy Maharaj, CCC-A  Audiologist - [facility] [address] [place] [address]  [email or web address]  [phone]    Patient: Eboni Nicholas   MRN: [id number]  [address]  Home Phone [phone]   Work Phone Not on file.   Mobile [phone]   : 1945  HUTSON: 2025      HEARING AID FOLLOW-UP      Eboni Nicholas is here today with her , Robe, requesting a review of how to change the volume using the buttons on the hearing aid.  States she is doing well with the  but the  wires are too short and continue to pop out of her ears - changed to 3R & 2L M receivers using the same domes.  Clean/check revealed clogged wax filter Left and reviewed with patient how to change, as well as daily cleaning with cloth and brush again.  Advised will connect hearing aids to her iPhone today for easier volume control via [employer].  Eboni Nicholas verbalized understanding and satisfaction with today's visit.  She will call if service is needed before her next appointment.    _______________________________  Sandy Maharaj, KAMRAN-A  Audiologist

## 2025-02-20 ENCOUNTER — PATIENT MESSAGE (OUTPATIENT)
Dept: OTOLARYNGOLOGY | Facility: CLINIC | Age: 80
End: 2025-02-20
Payer: MEDICARE

## 2025-02-21 ENCOUNTER — OFFICE VISIT (OUTPATIENT)
Dept: PRIMARY CARE CLINIC | Facility: CLINIC | Age: 80
End: 2025-02-21
Payer: MEDICARE

## 2025-02-21 VITALS
OXYGEN SATURATION: 96 % | WEIGHT: 216.06 LBS | HEIGHT: 63 IN | SYSTOLIC BLOOD PRESSURE: 132 MMHG | BODY MASS INDEX: 38.28 KG/M2 | HEART RATE: 72 BPM | DIASTOLIC BLOOD PRESSURE: 84 MMHG | TEMPERATURE: 98 F

## 2025-02-21 DIAGNOSIS — F32.5 MAJOR DEPRESSIVE DISORDER WITH SINGLE EPISODE, IN FULL REMISSION: ICD-10-CM

## 2025-02-21 DIAGNOSIS — E66.01 CLASS 2 SEVERE OBESITY WITH SERIOUS COMORBIDITY AND BODY MASS INDEX (BMI) OF 38.0 TO 38.9 IN ADULT, UNSPECIFIED OBESITY TYPE: ICD-10-CM

## 2025-02-21 DIAGNOSIS — I10 HTN (HYPERTENSION), BENIGN: ICD-10-CM

## 2025-02-21 DIAGNOSIS — E66.812 CLASS 2 SEVERE OBESITY WITH SERIOUS COMORBIDITY AND BODY MASS INDEX (BMI) OF 38.0 TO 38.9 IN ADULT, UNSPECIFIED OBESITY TYPE: ICD-10-CM

## 2025-02-21 DIAGNOSIS — N18.31 CHRONIC KIDNEY DISEASE, STAGE 3A: ICD-10-CM

## 2025-02-21 DIAGNOSIS — R10.9 RIGHT FLANK PAIN: Primary | ICD-10-CM

## 2025-02-21 LAB
AMORPH CRY UR QL COMP ASSIST: ABNORMAL
BACTERIA #/AREA URNS AUTO: ABNORMAL /HPF
BILIRUB UR QL STRIP: NEGATIVE
CAOX CRY UR QL COMP ASSIST: ABNORMAL
CLARITY UR REFRACT.AUTO: ABNORMAL
COLOR UR AUTO: ABNORMAL
GLUCOSE UR QL STRIP: NEGATIVE
HGB UR QL STRIP: NEGATIVE
KETONES UR QL STRIP: NEGATIVE
LEUKOCYTE ESTERASE UR QL STRIP: ABNORMAL
MICROSCOPIC COMMENT: ABNORMAL
NITRITE UR QL STRIP: NEGATIVE
PH UR STRIP: 6 [PH] (ref 5–8)
PROT UR QL STRIP: ABNORMAL
RBC #/AREA URNS AUTO: 9 /HPF (ref 0–4)
SP GR UR STRIP: 1.02 (ref 1–1.03)
SQUAMOUS #/AREA URNS AUTO: 9 /HPF
URN SPEC COLLECT METH UR: ABNORMAL
WBC #/AREA URNS AUTO: 10 /HPF (ref 0–5)

## 2025-02-21 PROCEDURE — 1101F PT FALLS ASSESS-DOCD LE1/YR: CPT | Mod: HCNC,CPTII,S$GLB, | Performed by: STUDENT IN AN ORGANIZED HEALTH CARE EDUCATION/TRAINING PROGRAM

## 2025-02-21 PROCEDURE — 3079F DIAST BP 80-89 MM HG: CPT | Mod: HCNC,CPTII,S$GLB, | Performed by: STUDENT IN AN ORGANIZED HEALTH CARE EDUCATION/TRAINING PROGRAM

## 2025-02-21 PROCEDURE — 3288F FALL RISK ASSESSMENT DOCD: CPT | Mod: HCNC,CPTII,S$GLB, | Performed by: STUDENT IN AN ORGANIZED HEALTH CARE EDUCATION/TRAINING PROGRAM

## 2025-02-21 PROCEDURE — 81001 URINALYSIS AUTO W/SCOPE: CPT | Mod: HCNC | Performed by: STUDENT IN AN ORGANIZED HEALTH CARE EDUCATION/TRAINING PROGRAM

## 2025-02-21 PROCEDURE — 1159F MED LIST DOCD IN RCRD: CPT | Mod: HCNC,CPTII,S$GLB, | Performed by: STUDENT IN AN ORGANIZED HEALTH CARE EDUCATION/TRAINING PROGRAM

## 2025-02-21 PROCEDURE — 1160F RVW MEDS BY RX/DR IN RCRD: CPT | Mod: HCNC,CPTII,S$GLB, | Performed by: STUDENT IN AN ORGANIZED HEALTH CARE EDUCATION/TRAINING PROGRAM

## 2025-02-21 PROCEDURE — 3075F SYST BP GE 130 - 139MM HG: CPT | Mod: HCNC,CPTII,S$GLB, | Performed by: STUDENT IN AN ORGANIZED HEALTH CARE EDUCATION/TRAINING PROGRAM

## 2025-02-21 PROCEDURE — 1126F AMNT PAIN NOTED NONE PRSNT: CPT | Mod: HCNC,CPTII,S$GLB, | Performed by: STUDENT IN AN ORGANIZED HEALTH CARE EDUCATION/TRAINING PROGRAM

## 2025-02-21 PROCEDURE — 99999 PR PBB SHADOW E&M-EST. PATIENT-LVL V: CPT | Mod: PBBFAC,HCNC,, | Performed by: STUDENT IN AN ORGANIZED HEALTH CARE EDUCATION/TRAINING PROGRAM

## 2025-02-21 PROCEDURE — 99214 OFFICE O/P EST MOD 30 MIN: CPT | Mod: HCNC,S$GLB,, | Performed by: STUDENT IN AN ORGANIZED HEALTH CARE EDUCATION/TRAINING PROGRAM

## 2025-02-21 NOTE — PROGRESS NOTES
Office visit  Patient: Eboni Nicholas   2/21/2025       Assessment/Plan:       1. Right flank pain  -     Urinalysis, Reflex to Urine Culture Urine, Clean Catch        -pain is now improved, but will check UA for signs of infection    2. Class 2 severe obesity with serious comorbidity and body mass index (BMI) of 38.0 to 38.9 in adult, unspecified obesity type        -lifestyle modifications    3. Chronic kidney disease, stage 3a       -stable, continue to monitor       -avoid nephrotoxins    4. HTN (hypertension), benign       -stable, continue losartan 50 mg daily     5. Major depressive disorder with single episode, in full remission        -stable, continue escitalopram 20 mg daily    Other orders  -     Urinalysis Microscopic      Return to clinic in 3 months or sooner as needed.          CHIEF COMPLAINT: flank pain    HPI: Eboni Nicholas is a 79 y.o. female who presents for pain in her right side 4 days ago.  She didn't have significant shortness of breath.  She also had decreased appetite.  The next day, she also had decreased appetite.  She had a little nausea but no vomiting.  She also had fatigue and myalgias.  She had a tightness in her chest on the first day.  She had a little diarrhea.  She denies palpitations.  No dysuria, hematuria, urinary frequency, fever, chills.    Her other chronic medical conditions are well controlled. She takes her medication regularly.    Current Outpatient Medications   Medication Instructions    aspirin (ECOTRIN) 81 mg, Daily    blood pressure monitor Kit No dose, route, or frequency recorded.    calcium-vitamin D 250-100 mg-unit per tablet 1 tablet, Daily    co-enzyme Q-10 30 mg, Daily    dorzolamide (TRUSOPT) 2 % ophthalmic solution 1 drop, Both Eyes, 2 times daily    dorzolamide (TRUSOPT) 2 % ophthalmic solution 1 drop, Both Eyes, 3 times daily    EScitalopram oxalate (LEXAPRO) 20 mg, Oral, Daily    fluticasone propionate (FLONASE) 100 mcg, Each Nostril, Daily     "latanoprost 0.005 % ophthalmic solution 1 drop, Both Eyes, Daily    levocetirizine (XYZAL) 5 mg, Oral, Nightly, 1 tablet by mouth every morning    levothyroxine (SYNTHROID) 100 mcg, Oral, Daily    losartan (COZAAR) 50 MG tablet TAKE ONE TABLET BY MOUTH DAILY, THEN MAY REPEAT THE DOSE IF PRESSURE > 140/90    montelukast (SINGULAIR) 10 mg tablet One tablet by mouth every evening    multivitamin (THERAGRAN) per tablet 1 tablet, Daily    ofloxacin (FLOXIN) 0.3 % otic solution 5 drops, Left Ear, 2 times daily    simvastatin (ZOCOR) 40 mg, Oral, Nightly    timolol maleate 0.5% (TIMOPTIC) 0.5 % Drop 1 drop, Both Eyes, 2 times daily       No results found for: "HGBA1C"  No results found for: "MICALBCREAT"  Lab Results   Component Value Date    LDLCALC 99.4 10/18/2024    LDLCALC 101.6 10/13/2023    CHOL 184 10/18/2024    HDL 48 10/18/2024    TRIG 183 (H) 10/18/2024       Lab Results   Component Value Date     10/18/2024    K 4.4 10/18/2024     10/18/2024    CO2 23 10/18/2024     10/18/2024    BUN 19 10/18/2024    CREATININE 1.0 10/18/2024    CALCIUM 10.3 10/18/2024    PROT 7.2 10/18/2024    ALBUMIN 4.0 10/18/2024    BILITOT 0.7 10/18/2024    ALKPHOS 56 10/18/2024    AST 24 10/18/2024    ALT 16 10/18/2024    ANIONGAP 10 10/18/2024    ESTGFRAFRICA >60.0 08/07/2020    EGFRNONAA 56.4 (A) 08/07/2020    WBC 6.90 10/18/2024    HGB 13.3 10/18/2024    HGB 13.3 10/13/2023    HCT 42.4 10/18/2024    MCV 94 10/18/2024     10/18/2024    TSH 0.244 (L) 10/18/2024    HEPCAB Negative 01/31/2020       Lab Results   Component Value Date    UIGFQHNK73EQ 56 04/10/2012    YBZTDNTB56 898 06/28/2024    FERRITIN 108 09/16/2022    IRON 56 09/16/2022    TRANSFERRIN 261 09/16/2022    TIBC 386 09/16/2022    FESATURATED 15 (L) 09/16/2022    ZINC 100 06/28/2024         Past Medical History:   Diagnosis Date    Anxiety     Benign essential HTN     Cataract     Depression     GERD (gastroesophageal reflux disease)     Glaucoma     " "Hyperlipidemia     Hypothyroid     CHIDI (obstructive sleep apnea)     Sarcoidosis      Past Surgical History:   Procedure Laterality Date    HYSTERECTOMY  2004    THYROID SURGERY         Social History[1]    family history includes Alcohol abuse in her brother; Cancer in her father; Coronary artery disease in her maternal grandfather; Diabetes in her maternal grandmother; Diabetes type I in her son; Heart disease in her brother and brother; Liver disease in her brother and brother; No Known Problems in her daughter, daughter, paternal grandfather, and paternal grandmother; Rheum arthritis in her sister; Stroke in her mother; Transient ischemic attack in her mother.    Vitals:    02/21/25 0835   BP: 132/84   Pulse: 72   Temp: 98.3 °F (36.8 °C)   TempSrc: Oral   SpO2: 96%   Weight: 98 kg (216 lb 0.8 oz)   Height: 5' 3" (1.6 m)   PainSc: 0-No pain       Body mass index is 38.27 kg/m².      Objective:   Physical Exam  Vitals reviewed.   Constitutional:       General: She is not in acute distress.     Appearance: Normal appearance. She is well-developed.   HENT:      Head: Normocephalic and atraumatic.      Right Ear: External ear normal.      Left Ear: External ear normal.      Nose: Nose normal.      Mouth/Throat:      Mouth: Mucous membranes are moist.   Eyes:      General: No scleral icterus.        Right eye: No discharge.         Left eye: No discharge.      Conjunctiva/sclera: Conjunctivae normal.   Cardiovascular:      Rate and Rhythm: Normal rate and regular rhythm.      Heart sounds: Normal heart sounds. No murmur heard.     No friction rub. No gallop.   Pulmonary:      Effort: Pulmonary effort is normal. No respiratory distress.      Breath sounds: Normal breath sounds. No wheezing, rhonchi or rales.   Abdominal:      General: Abdomen is flat. Bowel sounds are normal. There is no distension.      Palpations: Abdomen is soft. There is no mass.      Tenderness: There is no abdominal tenderness. There is no right " CVA tenderness, left CVA tenderness, guarding or rebound.      Hernia: No hernia is present.   Musculoskeletal:         General: No deformity.      Right lower leg: No edema.      Left lower leg: No edema.   Skin:     General: Skin is warm and dry.   Neurological:      General: No focal deficit present.      Mental Status: She is alert and oriented to person, place, and time.   Psychiatric:         Mood and Affect: Mood normal.         Behavior: Behavior normal.         Thought Content: Thought content normal.             Maricruz Lloyd MD  Internal Medicine and Pediatrics                                 [1]   Social History  Tobacco Use    Smoking status: Never    Smokeless tobacco: Never   Substance Use Topics    Alcohol use: Not Currently     Comment: none    Drug use: No

## 2025-02-24 ENCOUNTER — RESULTS FOLLOW-UP (OUTPATIENT)
Dept: PRIMARY CARE CLINIC | Facility: CLINIC | Age: 80
End: 2025-02-24

## 2025-03-18 ENCOUNTER — OFFICE VISIT (OUTPATIENT)
Dept: OTOLARYNGOLOGY | Facility: CLINIC | Age: 80
End: 2025-03-18
Payer: MEDICARE

## 2025-03-18 VITALS
BODY MASS INDEX: 37.84 KG/M2 | DIASTOLIC BLOOD PRESSURE: 64 MMHG | HEART RATE: 69 BPM | WEIGHT: 213.63 LBS | SYSTOLIC BLOOD PRESSURE: 121 MMHG | OXYGEN SATURATION: 95 %

## 2025-03-18 DIAGNOSIS — J30.89 NON-SEASONAL ALLERGIC RHINITIS, UNSPECIFIED TRIGGER: ICD-10-CM

## 2025-03-18 DIAGNOSIS — J34.89 NASAL CRUSTING: ICD-10-CM

## 2025-03-18 DIAGNOSIS — R51.9 NONINTRACTABLE EPISODIC HEADACHE, UNSPECIFIED HEADACHE TYPE: ICD-10-CM

## 2025-03-18 DIAGNOSIS — H81.391 PERIPHERAL VERTIGO INVOLVING RIGHT EAR: ICD-10-CM

## 2025-03-18 DIAGNOSIS — R26.89 IMPAIRMENT OF BALANCE: ICD-10-CM

## 2025-03-18 DIAGNOSIS — D86.89 SARCOID OF NOSE: Primary | ICD-10-CM

## 2025-03-18 DIAGNOSIS — G47.33 OSA ON CPAP: ICD-10-CM

## 2025-03-18 PROCEDURE — 3288F FALL RISK ASSESSMENT DOCD: CPT | Mod: HCNC,CPTII,S$GLB, | Performed by: SPECIALIST

## 2025-03-18 PROCEDURE — 1126F AMNT PAIN NOTED NONE PRSNT: CPT | Mod: HCNC,CPTII,S$GLB, | Performed by: SPECIALIST

## 2025-03-18 PROCEDURE — 1159F MED LIST DOCD IN RCRD: CPT | Mod: HCNC,CPTII,S$GLB, | Performed by: SPECIALIST

## 2025-03-18 PROCEDURE — 3074F SYST BP LT 130 MM HG: CPT | Mod: HCNC,CPTII,S$GLB, | Performed by: SPECIALIST

## 2025-03-18 PROCEDURE — 3078F DIAST BP <80 MM HG: CPT | Mod: HCNC,CPTII,S$GLB, | Performed by: SPECIALIST

## 2025-03-18 PROCEDURE — 99999 PR PBB SHADOW E&M-EST. PATIENT-LVL III: CPT | Mod: PBBFAC,HCNC,, | Performed by: SPECIALIST

## 2025-03-18 PROCEDURE — 1101F PT FALLS ASSESS-DOCD LE1/YR: CPT | Mod: HCNC,CPTII,S$GLB, | Performed by: SPECIALIST

## 2025-03-18 PROCEDURE — 99214 OFFICE O/P EST MOD 30 MIN: CPT | Mod: HCNC,S$GLB,, | Performed by: SPECIALIST

## 2025-03-18 NOTE — PROGRESS NOTES
Subjective:       Patient ID: Eboni Nicholas is a 79 y.o. female.    Chief Complaint: No chief complaint on file.      The patient is returning for follow-up visit.  I have not seen her in six weeks.  There are multiple issues to discuss:  1. Allergic rhinitis:  Nasal secretions are clear.  She is having some nasal stuffiness.  She does develop left occipital pressure and headaches intermittently.  All nasal symptoms have improved since she started using budesonide/saline nasal rinses.  She uses her montelukast and fluticasone sprays every day and supplements with levo cetirizine most other days.    2. Headaches:   Left occipital headaches have improved since using nasal irrigations with budesonide and saline..  3. Chronic imbalance/Right peripheral vestibular disorder:    She continues to have intermittent imbalance.  Overall, the patient feels that her balance is significantly more stable than it has been.  4. Sarcoidosis of the nose: Tthe patient continues to have crusting in both sides of her nose.  Since she started using daily saline/budesonide nasal irrigations the crusting problem has become much less of an issue.    5. Obstructive sleep apnea using CPAP:  The patient does use her CPAP every night.  If she does not wear CPAP she is extremely fatigued the next day.  6. Sensorineural hearing loss: She  is is now wearing a hearing aid in each ear.             Review of Systems     Constitutional: Positive for fatigue.  Negative for appetite change, chills, fever and unexpected weight loss.      HENT: Positive for hearing loss, postnasal drip, ringing in the ears, runny nose, sinus pressure and stuffy nose.  Negative for ear discharge, ear infection, ear pain, facial swelling, mouth sores, nosebleeds, sinus infection, sore throat, tonsil infection, dental problems, trouble swallowing and voice change.      Eyes:  Negative for change in eyesight, eye drainage, eye itching and photophobia.     Respiratory:   Positive for cough, sleep apnea and snoring. Negative for shortness of breath and wheezing.      Cardiovascular:  Negative for chest pain, foot swelling, irregular heartbeat and swollen veins.     Gastrointestinal:  Negative for abdominal pain, acid reflux, constipation, diarrhea, heartburn and vomiting.     Genitourinary: Negative for difficulty urinating, sexual problems and frequent urination.     Musc: Positive for aching muscles. Negative for aching joints, back pain and neck pain.     Skin: Negative for rash.     Allergy: Negative for food allergies and seasonal allergies.     Endocrine: Negative for cold intolerance and heat intolerance.      Neurological: Positive for headaches and light-headedness. Negative for dizziness, seizures and tremors.      Hematologic: Negative for bruises/bleeds easily and swollen glands.      Psychiatric: Negative for decreased concentration, depression, nervous/anxious and sleep disturbance.                Objective:      Physical Exam  Vitals and nursing note reviewed. Exam conducted with a chaperone present (Patient in her  come together for the visit, both have an appointment).   Constitutional:       General: She is awake.      Appearance: Normal appearance. She is well-developed and well-groomed. She is obese.   HENT:      Head: Normocephalic.      Jaw: There is normal jaw occlusion.      Salivary Glands: Right salivary gland is not diffusely enlarged or tender. Left salivary gland is not diffusely enlarged or tender.      Right Ear: Ear canal and external ear normal. Decreased hearing noted. No drainage. There is impacted cerumen. Tympanic membrane is retracted.      Left Ear: Ear canal and external ear normal. Decreased hearing noted. No drainage. There is impacted cerumen. A PE tube is present. Tympanic membrane is retracted.      Nose: Septal deviation (to the right), mucosal edema (cyanotic, boggy inferior turbinates bilaterally) and rhinorrhea (clear mucus  bilaterally) present. No nasal deformity or congestion (Crusting in the nasal passages bilaterally is significantly less in amount, significant improvement from last visit). Rhinorrhea is clear.      Right Turbinates: Enlarged and pale.      Left Turbinates: Enlarged and pale.      Mouth/Throat:      Lips: No lesions.      Mouth: Mucous membranes are moist. No oral lesions.      Dentition: Abnormal dentition. No gum lesions.      Tongue: No lesions.      Palate: No mass and lesions.      Pharynx: Oropharynx is clear. Uvula midline.      Tonsils: 2+ on the right. 2+ on the left.   Eyes:      General: Lids are normal. Vision grossly intact.         Right eye: No discharge.         Left eye: No discharge.      Extraocular Movements: Extraocular movements intact.      Conjunctiva/sclera: Conjunctivae normal.      Pupils: Pupils are equal, round, and reactive to light.   Neck:      Thyroid: No thyroid mass or thyromegaly.      Trachea: Trachea normal. No tracheal deviation.   Cardiovascular:      Rate and Rhythm: Normal rate and regular rhythm.      Pulses: Normal pulses.      Heart sounds: Normal heart sounds.   Pulmonary:      Effort: Pulmonary effort is normal.      Breath sounds: Normal breath sounds. No stridor. No decreased breath sounds, wheezing, rhonchi or rales.   Abdominal:      General: Bowel sounds are normal.      Palpations: Abdomen is soft.      Tenderness: There is no abdominal tenderness.   Musculoskeletal:      Cervical back: Neck supple. No muscular tenderness. Decreased range of motion.   Lymphadenopathy:      Head:      Right side of head: No submental, submandibular, preauricular, posterior auricular or occipital adenopathy.      Left side of head: No submental, submandibular, preauricular, posterior auricular or occipital adenopathy.      Cervical: No cervical adenopathy.   Skin:     General: Skin is warm and dry.      Findings: No petechiae or rash.      Nails: There is no clubbing.    Neurological:      Mental Status: She is alert and oriented to person, place, and time.      Cranial Nerves: No cranial nerve deficit.      Sensory: No sensory deficit.      Gait: Gait normal.      Comments: Neuro otologic-Romberg negative, tandem Romberg falls to the left, mildly wide-based gait, tandem gait not performed   Psychiatric:         Speech: Speech normal.         Behavior: Behavior normal. Behavior is cooperative.         Thought Content: Thought content normal.         Judgment: Judgment normal.                   Assessment:       1. Sarcoid of nose    2. Nasal crusting    3. Non-seasonal allergic rhinitis, unspecified trigger    4. CHIDI on CPAP    5. Impairment of balance    6. Peripheral vertigo involving right ear    7. Nonintractable episodic headache, unspecified headache type          Plan:       I  will have the patient continue with her present drug regimen.  I will recheck her in 3 months, or sooner on an as-needed basis.                    DISCLAIMER: This note was prepared with [employer] voice recognition transcription software. Garbled syntax, mangled pronouns, and other bizarre constructions may be attributed to that software system. While efforts were made to correct any mistakes made by this voice recognition program, some errors and/or omissions may remain in the note that were missed when the note was originally created.

## 2025-03-23 DIAGNOSIS — H40.1112 PRIMARY OPEN ANGLE GLAUCOMA (POAG) OF RIGHT EYE, MODERATE STAGE: ICD-10-CM

## 2025-03-23 DIAGNOSIS — F32.5 MAJOR DEPRESSIVE DISORDER WITH SINGLE EPISODE, IN FULL REMISSION: ICD-10-CM

## 2025-03-23 DIAGNOSIS — H40.1121 PRIMARY OPEN ANGLE GLAUCOMA (POAG) OF LEFT EYE, MILD STAGE: ICD-10-CM

## 2025-03-23 RX ORDER — ESCITALOPRAM OXALATE 20 MG/1
20 TABLET ORAL DAILY
Qty: 90 TABLET | Refills: 3 | Status: CANCELLED | OUTPATIENT
Start: 2025-03-23

## 2025-03-23 NOTE — TELEPHONE ENCOUNTER
No care due was identified.  [facility] Embedded Care Due Messages. Reference number: [id number].   3/23/2025 6:25:55 PM CDT

## 2025-03-24 RX ORDER — TIMOLOL MALEATE 5 MG/ML
1 SOLUTION/ DROPS OPHTHALMIC 2 TIMES DAILY
Qty: 15 ML | Refills: 3 | Status: SHIPPED | OUTPATIENT
Start: 2025-03-24

## 2025-03-26 ENCOUNTER — CLINICAL SUPPORT (OUTPATIENT)
Dept: OTOLARYNGOLOGY | Facility: CLINIC | Age: 80
End: 2025-03-26
Payer: MEDICARE

## 2025-03-26 DIAGNOSIS — Z46.1 ENCOUNTER FOR FITTING AND ADJUSTMENT OF HEARING AID OF BOTH EARS: Primary | ICD-10-CM

## 2025-03-26 PROCEDURE — 99499 UNLISTED E&M SERVICE: CPT | Mod: ,,, | Performed by: AUDIOLOGIST-HEARING AID FITTER

## 2025-03-26 NOTE — PROGRESS NOTES
Sandy Maharaj, CCC-A  Audiologist - [facility] [address] [place] [address]  [email or web address]  [phone]    Patient: Eboni Nicholas   MRN: [id number]  [address]  Home Phone [phone]   Work Phone Not on file.   Mobile [phone]   : 1945  HUTSON: 3/26/2025      HEARING AID FOLLOW-UP      Eboni Nicholas is here today with her , Robe, reporting that she is doing well with insertion and cleaning of her new hearing aids.  Gave [employer] anchors for extra retention of receivers in ears and dispensed/paired [employer] #[id number], then verified with [employer]as.  Eboni Nicholas verbalized understanding and satisfaction with today's visit.  She will call/send a message if service is needed before her next appointment.    _______________________________  Sandy Maharaj, KAMRAN-A  Audiologist

## 2025-04-02 ENCOUNTER — PATIENT MESSAGE (OUTPATIENT)
Dept: OTOLARYNGOLOGY | Facility: CLINIC | Age: 80
End: 2025-04-02
Payer: MEDICARE

## 2025-04-19 DIAGNOSIS — H69.92 DYSFUNCTION OF LEFT EUSTACHIAN TUBE: ICD-10-CM

## 2025-04-21 RX ORDER — LEVOCETIRIZINE DIHYDROCHLORIDE 5 MG/1
5 TABLET, FILM COATED ORAL NIGHTLY
Qty: 90 TABLET | Refills: 3 | Status: SHIPPED | OUTPATIENT
Start: 2025-04-21

## 2025-04-24 DIAGNOSIS — I10 PRIMARY HYPERTENSION: Primary | ICD-10-CM

## 2025-04-24 NOTE — TELEPHONE ENCOUNTER
No care due was identified.  [facility] Embedded Care Due Messages. Reference number: [id number].   4/24/2025 10:33:24 AM CDT

## 2025-04-25 RX ORDER — LOSARTAN POTASSIUM 50 MG/1
TABLET ORAL
Qty: 180 TABLET | Refills: 1 | Status: SHIPPED | OUTPATIENT
Start: 2025-04-25

## 2025-05-25 DIAGNOSIS — H40.1112 PRIMARY OPEN ANGLE GLAUCOMA (POAG) OF RIGHT EYE, MODERATE STAGE: ICD-10-CM

## 2025-05-25 DIAGNOSIS — H40.1121 PRIMARY OPEN ANGLE GLAUCOMA (POAG) OF LEFT EYE, MILD STAGE: ICD-10-CM

## 2025-05-26 RX ORDER — DORZOLAMIDE HCL 20 MG/ML
SOLUTION/ DROPS OPHTHALMIC
Qty: 10 ML | Refills: 6 | Status: SHIPPED | OUTPATIENT
Start: 2025-05-26

## 2025-06-19 ENCOUNTER — OFFICE VISIT (OUTPATIENT)
Dept: OTOLARYNGOLOGY | Facility: CLINIC | Age: 80
End: 2025-06-19
Payer: MEDICARE

## 2025-06-19 VITALS — HEART RATE: 64 BPM | DIASTOLIC BLOOD PRESSURE: 70 MMHG | SYSTOLIC BLOOD PRESSURE: 148 MMHG

## 2025-06-19 DIAGNOSIS — J30.89 NON-SEASONAL ALLERGIC RHINITIS, UNSPECIFIED TRIGGER: Primary | ICD-10-CM

## 2025-06-19 DIAGNOSIS — R51.9 NONINTRACTABLE EPISODIC HEADACHE, UNSPECIFIED HEADACHE TYPE: ICD-10-CM

## 2025-06-19 DIAGNOSIS — D86.89 SARCOID OF NOSE: ICD-10-CM

## 2025-06-19 DIAGNOSIS — G47.33 OSA ON CPAP: ICD-10-CM

## 2025-06-19 DIAGNOSIS — H90.A32 MIXED CONDUCTIVE AND SENSORINEURAL HEARING LOSS OF LEFT EAR WITH RESTRICTED HEARING OF RIGHT EAR: ICD-10-CM

## 2025-06-19 DIAGNOSIS — Z97.4 HEARING AID WORN: ICD-10-CM

## 2025-06-19 PROCEDURE — 1101F PT FALLS ASSESS-DOCD LE1/YR: CPT | Mod: CPTII,HCNC,S$GLB, | Performed by: SPECIALIST

## 2025-06-19 PROCEDURE — 3077F SYST BP >= 140 MM HG: CPT | Mod: CPTII,HCNC,S$GLB, | Performed by: SPECIALIST

## 2025-06-19 PROCEDURE — 99214 OFFICE O/P EST MOD 30 MIN: CPT | Mod: HCNC,S$GLB,, | Performed by: SPECIALIST

## 2025-06-19 PROCEDURE — 3078F DIAST BP <80 MM HG: CPT | Mod: CPTII,HCNC,S$GLB, | Performed by: SPECIALIST

## 2025-06-19 PROCEDURE — 1160F RVW MEDS BY RX/DR IN RCRD: CPT | Mod: CPTII,HCNC,S$GLB, | Performed by: SPECIALIST

## 2025-06-19 PROCEDURE — 1159F MED LIST DOCD IN RCRD: CPT | Mod: CPTII,HCNC,S$GLB, | Performed by: SPECIALIST

## 2025-06-19 PROCEDURE — 1126F AMNT PAIN NOTED NONE PRSNT: CPT | Mod: CPTII,HCNC,S$GLB, | Performed by: SPECIALIST

## 2025-06-19 PROCEDURE — 99999 PR PBB SHADOW E&M-EST. PATIENT-LVL III: CPT | Mod: PBBFAC,HCNC,, | Performed by: SPECIALIST

## 2025-06-19 PROCEDURE — 3288F FALL RISK ASSESSMENT DOCD: CPT | Mod: CPTII,HCNC,S$GLB, | Performed by: SPECIALIST

## 2025-06-19 RX ORDER — MECLIZINE HCL 12.5 MG 12.5 MG/1
TABLET ORAL
Qty: 50 TABLET | Refills: 5 | Status: SHIPPED | OUTPATIENT
Start: 2025-06-19

## 2025-06-19 NOTE — PROGRESS NOTES
Subjective:       Patient ID: Eboni Nicholas is a 79 y.o. female.    Chief Complaint: Follow-up      The patient is returning for follow-up visit.  I have not seen her in three months.  There are multiple issues to discuss:  1. Allergic rhinitis:    All nasal symptoms have improved since she started using budesonide/saline nasal rinses.  She is now using them every Monday, Wednesday and Friday.  Doing so she is able to control crusting in her nose.  Nasal secretions are clear.  She uses her montelukast and fluticasone sprays every day and supplements with levo cetirizine most other days.    2. Headaches:  She still has some mild pressure symptoms in her left temporal area; however, she has not really had a severe headache since doing the nasal rinses.    3. Chronic imbalance/Right peripheral vestibular disorder:  She continues to have chronic imbalance.  She is not taking any medications for it     4. Sarcoidosis of the nose:  Nasal crusting is controlled with the saline/budesonide nasal irrigations.  5. Obstructive sleep apnea using CPAP:  The patient does use her CPAP every night.  If she does not wear CPAP she is extremely fatigued the next day.  6. Sensorineural hearing loss: She  is is now wearing a hearing aid in each ear.   She has been having some itching intermittently in her ears recently.  There is no drainage or tenderness associated.          Review of Systems     Constitutional: Positive for fatigue.  Negative for appetite change, chills, fever and unexpected weight loss.      HENT: Positive for hearing loss, postnasal drip, ringing in the ears, runny nose, sinus pressure and stuffy nose.  Negative for ear discharge, ear infection, ear pain, facial swelling, mouth sores, nosebleeds, sinus infection, sore throat, tonsil infection, dental problems, trouble swallowing and voice change.      Eyes:  Negative for change in eyesight, eye drainage, eye itching and photophobia.     Respiratory:  Positive for  cough, sleep apnea and snoring. Negative for shortness of breath and wheezing.      Cardiovascular:  Negative for chest pain, foot swelling, irregular heartbeat and swollen veins.     Gastrointestinal:  Negative for abdominal pain, acid reflux, constipation, diarrhea, heartburn and vomiting.     Genitourinary: Negative for difficulty urinating, sexual problems and frequent urination.     Musc: Positive for aching muscles. Negative for aching joints, back pain and neck pain.     Skin: Negative for rash.     Allergy: Negative for food allergies and seasonal allergies.     Endocrine: Positive for heat intolerance. Negative for cold intolerance.      Neurological: Positive for headaches and light-headedness. Negative for dizziness, seizures and tremors.      Hematologic: Negative for bruises/bleeds easily and swollen glands.      Psychiatric: Negative for decreased concentration, depression, nervous/anxious and sleep disturbance.                Objective:      Physical Exam  Vitals and nursing note reviewed. Exam conducted with a chaperone present (Patient in her  come together for the visit, both have an appointment).   Constitutional:       General: She is awake.      Appearance: Normal appearance. She is well-developed and well-groomed. She is obese.   HENT:      Head: Normocephalic.      Jaw: There is normal jaw occlusion.      Salivary Glands: Right salivary gland is not diffusely enlarged or tender. Left salivary gland is not diffusely enlarged or tender.      Right Ear: Ear canal and external ear normal. Decreased hearing noted. No drainage. Tympanic membrane is retracted.      Left Ear: Ear canal and external ear normal. Decreased hearing noted. No drainage. A PE tube is present. Tympanic membrane is retracted.      Nose: Septal deviation (to the right), mucosal edema (cyanotic, boggy inferior turbinates bilaterally) and rhinorrhea (clear mucus bilaterally) present. No nasal deformity or congestion  (Crusting in the nasal passages bilaterally is significantly less in amount, significant improvement from last visit). Rhinorrhea is clear.      Right Turbinates: Enlarged and pale.      Left Turbinates: Enlarged and pale.      Mouth/Throat:      Lips: No lesions.      Mouth: Mucous membranes are moist. No oral lesions.      Dentition: Abnormal dentition. No gum lesions.      Tongue: No lesions.      Palate: No mass and lesions.      Pharynx: Oropharynx is clear. Uvula midline.      Tonsils: 2+ on the right. 2+ on the left.   Eyes:      General: Lids are normal. Vision grossly intact.         Right eye: No discharge.         Left eye: No discharge.      Extraocular Movements: Extraocular movements intact.      Conjunctiva/sclera: Conjunctivae normal.      Pupils: Pupils are equal, round, and reactive to light.   Neck:      Thyroid: No thyroid mass or thyromegaly.      Trachea: Trachea normal. No tracheal deviation.   Cardiovascular:      Rate and Rhythm: Normal rate and regular rhythm.      Pulses: Normal pulses.      Heart sounds: Normal heart sounds.   Pulmonary:      Effort: Pulmonary effort is normal.      Breath sounds: Normal breath sounds. No stridor. No decreased breath sounds, wheezing, rhonchi or rales.   Abdominal:      General: Bowel sounds are normal.      Palpations: Abdomen is soft.      Tenderness: There is no abdominal tenderness.   Musculoskeletal:      Cervical back: Neck supple. No muscular tenderness. Decreased range of motion.   Lymphadenopathy:      Head:      Right side of head: No submental, submandibular, preauricular, posterior auricular or occipital adenopathy.      Left side of head: No submental, submandibular, preauricular, posterior auricular or occipital adenopathy.      Cervical: No cervical adenopathy.   Skin:     General: Skin is warm and dry.      Findings: No petechiae or rash.      Nails: There is no clubbing.   Neurological:      Mental Status: She is alert and oriented to  person, place, and time.      Cranial Nerves: No cranial nerve deficit.      Sensory: No sensory deficit.      Gait: Gait normal.      Comments: Neuro otologic-Romberg negative, tandem Romberg falls to the left, mildly wide-based gait, tandem gait not performed   Psychiatric:         Speech: Speech normal.         Behavior: Behavior normal. Behavior is cooperative.         Thought Content: Thought content normal.         Judgment: Judgment normal.                   Assessment:       1. Non-seasonal allergic rhinitis, unspecified trigger    2. Mixed conductive and sensorineural hearing loss of left ear with restricted hearing of right ear    3. Nonintractable episodic headache, unspecified headache type    4. CHIDI on CPAP    5. Sarcoid of nose    6. Hearing aid worn            Plan:       I  will have the patient continue with her present drug regimen.  I am suggestion that she placed 3 drops of Spike Spike Baby oil in both ear canals at night before going to bed followed by placement of a cotton ball at the external auditory canal.  This should be done once or twice weekly to control itching.  She should not put her hearing aids in after using the baby oil for at least 3 or 4 hours.  I will recheck her in 3 months, or sooner on an as-needed basis.                    DISCLAIMER: This note was prepared with [employer] voice recognition transcription software. Garbled syntax, mangled pronouns, and other bizarre constructions may be attributed to that software system. While efforts were made to correct any mistakes made by this voice recognition program, some errors and/or omissions may remain in the note that were missed when the note was originally created.

## 2025-06-26 ENCOUNTER — OFFICE VISIT (OUTPATIENT)
Dept: PRIMARY CARE CLINIC | Facility: CLINIC | Age: 80
End: 2025-06-26
Payer: MEDICARE

## 2025-06-26 ENCOUNTER — HOSPITAL ENCOUNTER (OUTPATIENT)
Dept: RADIOLOGY | Facility: HOSPITAL | Age: 80
Discharge: HOME | End: 2025-06-26
Attending: STUDENT IN AN ORGANIZED HEALTH CARE EDUCATION/TRAINING PROGRAM
Payer: MEDICARE

## 2025-06-26 VITALS
DIASTOLIC BLOOD PRESSURE: 70 MMHG | OXYGEN SATURATION: 95 % | HEIGHT: 63 IN | SYSTOLIC BLOOD PRESSURE: 118 MMHG | HEART RATE: 62 BPM | BODY MASS INDEX: 38.13 KG/M2 | WEIGHT: 215.19 LBS

## 2025-06-26 DIAGNOSIS — M25.551 RIGHT HIP PAIN: ICD-10-CM

## 2025-06-26 DIAGNOSIS — I10 HTN (HYPERTENSION), BENIGN: ICD-10-CM

## 2025-06-26 DIAGNOSIS — E03.9 ACQUIRED HYPOTHYROIDISM: ICD-10-CM

## 2025-06-26 DIAGNOSIS — G89.29 CHRONIC PAIN OF RIGHT KNEE: Primary | ICD-10-CM

## 2025-06-26 DIAGNOSIS — M25.561 CHRONIC PAIN OF RIGHT KNEE: ICD-10-CM

## 2025-06-26 DIAGNOSIS — F32.5 MAJOR DEPRESSIVE DISORDER WITH SINGLE EPISODE, IN FULL REMISSION: ICD-10-CM

## 2025-06-26 DIAGNOSIS — G47.33 OSA ON CPAP: ICD-10-CM

## 2025-06-26 DIAGNOSIS — E78.2 MIXED HYPERLIPIDEMIA: ICD-10-CM

## 2025-06-26 DIAGNOSIS — G89.29 CHRONIC PAIN OF RIGHT KNEE: ICD-10-CM

## 2025-06-26 DIAGNOSIS — M25.561 CHRONIC PAIN OF RIGHT KNEE: Primary | ICD-10-CM

## 2025-06-26 PROCEDURE — 73562 X-RAY EXAM OF KNEE 3: CPT | Mod: 26,HCNC,LT, | Performed by: RADIOLOGY

## 2025-06-26 PROCEDURE — 1101F PT FALLS ASSESS-DOCD LE1/YR: CPT | Mod: CPTII,HCNC,S$GLB, | Performed by: STUDENT IN AN ORGANIZED HEALTH CARE EDUCATION/TRAINING PROGRAM

## 2025-06-26 PROCEDURE — 73562 X-RAY EXAM OF KNEE 3: CPT | Mod: TC,HCNC,PN,LT

## 2025-06-26 PROCEDURE — 99214 OFFICE O/P EST MOD 30 MIN: CPT | Mod: HCNC,S$GLB,, | Performed by: STUDENT IN AN ORGANIZED HEALTH CARE EDUCATION/TRAINING PROGRAM

## 2025-06-26 PROCEDURE — 1159F MED LIST DOCD IN RCRD: CPT | Mod: CPTII,HCNC,S$GLB, | Performed by: STUDENT IN AN ORGANIZED HEALTH CARE EDUCATION/TRAINING PROGRAM

## 2025-06-26 PROCEDURE — 73564 X-RAY EXAM KNEE 4 OR MORE: CPT | Mod: 26,HCNC,RT, | Performed by: RADIOLOGY

## 2025-06-26 PROCEDURE — 1126F AMNT PAIN NOTED NONE PRSNT: CPT | Mod: CPTII,HCNC,S$GLB, | Performed by: STUDENT IN AN ORGANIZED HEALTH CARE EDUCATION/TRAINING PROGRAM

## 2025-06-26 PROCEDURE — 1160F RVW MEDS BY RX/DR IN RCRD: CPT | Mod: CPTII,HCNC,S$GLB, | Performed by: STUDENT IN AN ORGANIZED HEALTH CARE EDUCATION/TRAINING PROGRAM

## 2025-06-26 PROCEDURE — 99999 PR PBB SHADOW E&M-EST. PATIENT-LVL V: CPT | Mod: PBBFAC,HCNC,, | Performed by: STUDENT IN AN ORGANIZED HEALTH CARE EDUCATION/TRAINING PROGRAM

## 2025-06-26 PROCEDURE — 73502 X-RAY EXAM HIP UNI 2-3 VIEWS: CPT | Mod: TC,HCNC,PN,RT

## 2025-06-26 PROCEDURE — 3078F DIAST BP <80 MM HG: CPT | Mod: CPTII,HCNC,S$GLB, | Performed by: STUDENT IN AN ORGANIZED HEALTH CARE EDUCATION/TRAINING PROGRAM

## 2025-06-26 PROCEDURE — 3288F FALL RISK ASSESSMENT DOCD: CPT | Mod: CPTII,HCNC,S$GLB, | Performed by: STUDENT IN AN ORGANIZED HEALTH CARE EDUCATION/TRAINING PROGRAM

## 2025-06-26 PROCEDURE — 73502 X-RAY EXAM HIP UNI 2-3 VIEWS: CPT | Mod: 26,HCNC,RT, | Performed by: RADIOLOGY

## 2025-06-26 PROCEDURE — 3074F SYST BP LT 130 MM HG: CPT | Mod: CPTII,HCNC,S$GLB, | Performed by: STUDENT IN AN ORGANIZED HEALTH CARE EDUCATION/TRAINING PROGRAM

## 2025-06-26 NOTE — PROGRESS NOTES
Office visit  Patient: Eobni Nicholas   6/26/2025       Assessment/Plan:       1. Chronic pain of right knee  -     Ambulatory referral/consult to Orthopedics; Future; Expected date: 07/03/2025  -     X-ray Knee Ortho Right with Flexion (XPD); Future; Expected date: 06/26/2025        -will obtain x-ray and then send to ortho for consideration of knee injections    2. Right hip pain  -     X-Ray Hip 2 or 3 views Right with Pelvis when performed; Future; Expected date: 06/26/2025        -obtain x-ray, consider ortho referral    3. HTN (hypertension), benign  -     Comprehensive Metabolic Panel; Future; Expected date: 06/26/2025        -stable, continue current medication    4. Major depressive disorder with single episode, in full remission       -stable, continue current medication    5. Mixed hyperlipidemia       -stable, continue current medication    6. Acquired hypothyroidism  -     TSH; Future; Expected date: 06/26/2025  -     T4, Free; Future; Expected date: 06/26/2025        -stable, continue current medication    7. CHIDI on CPAP       -stable, continue CPAP      Return to clinic in 6 months or sooner as needed.        CHIEF COMPLAINT: follow up    HPI: Eboni Nicholas is a 79 y.o. female who presents for follow up.    She reports pain in her right groin and her right knee.  She uses a cane occasionally.  She has not noticed any particular triggers.  She has morning stiffness, but after she gets moving it gets better.  This has been going on for about 2 weeks.  She denies any history of trauma or new activity.  She had knee pain in the past and had steroid injections, which helped.    Everything else is going well.  Blood pressure is well controlled. Depression is well controlled.  No symptoms of hyper- or hypo-thyroidism.        Current Outpatient Medications   Medication Instructions    aspirin (ECOTRIN) 81 mg, Daily    blood pressure monitor Kit No dose, route, or frequency recorded.    calcium-vitamin D  "250-100 mg-unit per tablet 1 tablet, Daily    co-enzyme Q-10 30 mg, Daily    diphth,pertus,acell,,tetanus (BOOSTRIX TDAP) 2.5-8-5 Lf-mcg-Lf/0.5mL Syrg injection Intramuscular    dorzolamide (TRUSOPT) 2 % ophthalmic solution INSTILL 1 DROP INTO BOTH EYES 3 TIMES A DAY    EScitalopram oxalate (LEXAPRO) 20 mg, Oral, Daily    fluticasone propionate (FLONASE) 100 mcg, Each Nostril, Daily    latanoprost 0.005 % ophthalmic solution 1 drop, Both Eyes, Daily    levocetirizine (XYZAL) 5 mg, Oral, Nightly, 1 tablet by mouth every morning    levothyroxine (SYNTHROID) 100 mcg, Oral, Daily    losartan (COZAAR) 50 MG tablet TAKE ONE TABLET BY MOUTH DAILY, THEN MAY REPEAT THE DOSE IF PRESSURE > 140/90    meclizine (ANTIVERT) 12.5 mg tablet 1 or 2 tablets by mouth every 6 hours, as needed to control imbalance or dizziness    montelukast (SINGULAIR) 10 mg tablet One tablet by mouth every evening    multivitamin (THERAGRAN) per tablet 1 tablet, Daily    ofloxacin (FLOXIN) 0.3 % otic solution 5 drops, Left Ear, 2 times daily    RSVPreF3 antigen-AS01E, PF, (AREXVY) 120 mcg/0.5 mL SusR vaccine Intramuscular    RSVPreF3 antigen-AS01E, PF, (AREXVY, PF,) 120 mcg/0.5 mL SusR vaccine Intramuscular    simvastatin (ZOCOR) 40 mg, Oral, Nightly    timolol maleate 0.5% (TIMOPTIC) 0.5 % Drop 1 drop, Both Eyes, 2 times daily       No results found for: "HGBA1C"  No results found for: "MICALBCREAT"  Lab Results   Component Value Date    LDLCALC 99.4 10/18/2024    LDLCALC 101.6 10/13/2023    CHOL 184 10/18/2024    HDL 48 10/18/2024    TRIG 183 (H) 10/18/2024       Lab Results   Component Value Date     06/26/2025    K 4.3 06/26/2025     06/26/2025    CO2 22 (L) 06/26/2025     (H) 06/26/2025    BUN 15 06/26/2025    CREATININE 1.1 06/26/2025    CALCIUM 9.9 06/26/2025    PROT 7.4 06/26/2025    ALBUMIN 4.2 06/26/2025    BILITOT 0.4 06/26/2025    ALKPHOS 67 06/26/2025    AST 20 06/26/2025    ALT 13 06/26/2025    ANIONGAP 11 06/26/2025    " "ESTGFRAFRICA >60.0 08/07/2020    EGFRNONAA 56.4 (A) 08/07/2020    WBC 6.90 10/18/2024    HGB 13.3 10/18/2024    HGB 13.3 10/13/2023    HCT 42.4 10/18/2024    MCV 94 10/18/2024     10/18/2024    TSH 0.230 (L) 06/26/2025    HEPCAB Negative 01/31/2020       Lab Results   Component Value Date    XATTVIFG32OL 56 04/10/2012    HQYASATL39 898 06/28/2024    FERRITIN 108 09/16/2022    IRON 56 09/16/2022    TRANSFERRIN 261 09/16/2022    TIBC 386 09/16/2022    FESATURATED 15 (L) 09/16/2022    ZINC 100 06/28/2024         Past Medical History:   Diagnosis Date    Anxiety     Benign essential HTN     Cataract     Depression     GERD (gastroesophageal reflux disease)     Glaucoma     Hyperlipidemia     Hypothyroid     CHIDI (obstructive sleep apnea)     Sarcoidosis      Past Surgical History:   Procedure Laterality Date    HYSTERECTOMY  2004    THYROID SURGERY         Social History[1]    family history includes Alcohol abuse in her brother; Cancer in her father; Coronary artery disease in her maternal grandfather; Diabetes in her maternal grandmother; Diabetes type I in her son; Heart disease in her brother and brother; Liver disease in her brother and brother; No Known Problems in her daughter, daughter, paternal grandfather, and paternal grandmother; Rheum arthritis in her sister; Stroke in her mother; Transient ischemic attack in her mother.    Vitals:    06/26/25 1337   BP: 118/70   Pulse: 62   SpO2: 95%   Weight: 97.6 kg (215 lb 2.7 oz)   Height: 5' 3" (1.6 m)   PainSc: 0-No pain       Body mass index is 38.12 kg/m².      Objective:   Physical Exam  Vitals reviewed.   Constitutional:       General: She is not in acute distress.     Appearance: Normal appearance. She is well-developed.   HENT:      Head: Normocephalic and atraumatic.      Right Ear: External ear normal.      Left Ear: External ear normal.      Nose: Nose normal.      Mouth/Throat:      Mouth: Mucous membranes are moist.   Eyes:      General: No scleral " icterus.        Right eye: No discharge.         Left eye: No discharge.      Conjunctiva/sclera: Conjunctivae normal.   Cardiovascular:      Rate and Rhythm: Normal rate and regular rhythm.      Heart sounds: Normal heart sounds. No murmur heard.     No friction rub. No gallop.   Pulmonary:      Effort: Pulmonary effort is normal. No respiratory distress.      Breath sounds: Normal breath sounds. No wheezing, rhonchi or rales.   Musculoskeletal:         General: No deformity.      Right hip: Normal. No deformity. Normal range of motion.      Right knee: Crepitus present. No swelling, deformity, effusion, erythema, ecchymosis, lacerations or bony tenderness. Normal range of motion. No tenderness.      Right lower leg: No edema.      Left lower leg: No edema.   Skin:     General: Skin is warm and dry.   Neurological:      General: No focal deficit present.      Mental Status: She is alert and oriented to person, place, and time.   Psychiatric:         Mood and Affect: Mood normal.         Behavior: Behavior normal.         Thought Content: Thought content normal.             Maricruz Lloyd MD  Internal Medicine and Pediatrics                                 [1]   Social History  Tobacco Use    Smoking status: Never    Smokeless tobacco: Never   Substance Use Topics    Alcohol use: Not Currently     Comment: none    Drug use: No

## 2025-06-27 ENCOUNTER — RESULTS FOLLOW-UP (OUTPATIENT)
Dept: PRIMARY CARE CLINIC | Facility: CLINIC | Age: 80
End: 2025-06-27

## 2025-07-14 DIAGNOSIS — E78.5 HYPERLIPIDEMIA, UNSPECIFIED HYPERLIPIDEMIA TYPE: ICD-10-CM

## 2025-07-14 RX ORDER — SIMVASTATIN 40 MG/1
40 TABLET, FILM COATED ORAL NIGHTLY
Qty: 90 TABLET | Refills: 3 | Status: SHIPPED | OUTPATIENT
Start: 2025-07-14

## 2025-07-14 NOTE — TELEPHONE ENCOUNTER
No care due was identified.  [facility] Embedded Care Due Messages. Reference number: [id number].   7/14/2025 10:12:22 AM CDT

## 2025-07-29 NOTE — PROGRESS NOTES
CC: Right Hip Pain    HISTORY OF PRESENT ILLNESS     8/4/25  Eboni Nicholas, a 79 y.o. female, presents today for evaluation of her right HIP. Pt reports the pain has been present since the end of May 2025. Pt states the pain is dull achy sore. The pain is located on the lateral R hip but radiates to the groin. Pain intermittently radiates down lateral. Pt states pain is 9/10 at its worst, 3/10 today. Pt reports she feels the pain after prolonged sitting then stands, as well as walking. Pt states she gets really sore when walking in the R knee and hip. Pt denies any trauma, chronic onset. Pt has had previous injections in her knee, but not in her hip. Pt is hoping to receive hip CSI today for relief. Pt reports the pain limits her ADLs, especially with prolonged standing.      Review of systems (ROS):  A 10+ review of systems was performed with pertinent positives and negatives noted above in the history of present illness. Other systems were negative unless otherwise specified.    PHYSICAL EXAMINATION  General:  The patient is alert and oriented x 3.  Mood is pleasant.  Observation of ears, eyes and nose reveal no gross abnormalities.  HEENT: NCAT, sclera nonicteric  Lungs: Respirations are equal and unlabored.   Gait is coordinated. Patient can toe walk and heel walk without difficulty.    HIP/PELVIS EXAMINATION    Observation/Inspection  Gait:   Antalgic   Alignment:  Neutral   Scars:   None   Muscle atrophy: None   Effusion:  None   Warmth:  None   Discoloration:   None   Leg lengths:   Equal   Pelvis:    Level     Tenderness/Crepitus (T/C):      T / C  Lateral Gluteal region  + / -  Trochanteric bursa   + / -  Piriformis    - / -  SI joint    - / -  Psoas tendon   - / -  Rectus insertion  - / -  Adductor insertion  - / -  Pubic symphysis  - / -    ROM: (* = pain)    Flexion:      120 degrees  External rotation:   40 degrees  Internal rotation with axial load:  30 degrees  Internal rotation without axial  load:  40 degrees  Abduction:    45 degrees  Adduction:     20 degrees    Special Tests:  Pain w/ forced internal rotation (FADIR):  +  Pain w/ forced external rotation (FAMILIA):  +   Circumduction test:     -  Stinchfield test:     -   Log roll:       -   Snapping hip (internal):    -   Sit-up pain:      NT  Resisted sit-up pain:     NT  Resisted sit-up with adductor contraction pain:  NT  Step-down test:     NT  Trendelenburg test:     NT  Bridge test      NT    Extremity Neuro-vascular Examination:   Sensation:  Grossly intact to light touch all dermatomal regions.   Motor Function:  Fully intact motor function at hip, knee, foot and ankle    DTRs;  quadriceps and  achilles 2+.  No clonus and downgoing Babinski.    Vascular status:  DP and PT pulses 2+, brisk capillary refill, symmetric.    Skin:  intact, compartments soft.    Other Findings:    ASSESSMENT & PLAN  Assessment  #1 Tonnis Grade III osteoarthritis of hip, right   W/ tendinosis of lateral gluteal tendons    No evidence of neurologic pathology  No evidence of vascular pathology    Imaging studies reviewed:   X-ray pelvis and hip, bilat 25.06    Plan  We discussed the importance of appropriate diet, weight, and regular exercise    We discussed options including    Watchful waiting / relative rest    Physical therapy    Injection therapy Csi iahip right  Csi lat glut tends right   Consultation    The patient chooses As above   x = prescribed  CSI = corticosteroid injection  VSI = viscosupplement injection  PRPI = platelet rich plasma injection  ia = intra articular  R = right  L = left  B = bilateral   nfSx = surgical consultation was recommended, but patient is not interested in consultation at this time    Physical Therapy        Formal (fPT), @ [facility] facility    Formal (fPT), @ [facility] facility        Homegoing (hgPT), per concurrent fPT recommendations    Homegoing (hgPT), per prior fPT recommendations    Homegoing (hgPT), handout provided        w/   (atPT)    [blank] = not prescribed  x = prescribed  b = prescribed, and begin as indicated  t = continue as indicated  r = prescribed, and restart as indicated  p = completed prior as indicated  hs = prescribed, and with high school   col = prescribed, and with college or university   nfPT = physical therapy was recommended, but patient is not interested in PT at this time    Activity (e.g. sports, work) restrictions    [blank] = as tolerated  pt = per physical therapist  at = per   NWB = non weight bearing on affected lower extremity, with crutches assistance for ambulation    Bracing    [blank] = not prescribed  r = recommended, but not fit with at todays visit  f = prescribed and fit with at todays visit  t = continue as indicated  d = d/c  p = as needed  rare = use on rare, as-needed basis; advised against chronic use    Pain management    [blank] = No prescription necessary. A handout detailing dosing of appropriate   over-the-counter musculoskeletal analgesics was made available to the patient.   m = meloxicam x 14 days  mp = 14 day course of meloxicam prescribed prior    Follow up 12  Did csi x 2 hip right work?   [blank] = as needed  [number] = in [number] weeks  CSI = for corticosteroid injection  VSI = for viscosupplement injection or injection series  PRP = for platelet rich plasma injection or injection series  MRI = after MRI imaging  ns = should surgical options be deferred (no surgery)  o = appointment offered, deferred by patient    Should symptoms worsen or fail to resolve, consider    Revisiting the above options and / or JENY     Vocation:

## 2025-08-04 ENCOUNTER — OFFICE VISIT (OUTPATIENT)
Dept: SPORTS MEDICINE | Facility: CLINIC | Age: 80
End: 2025-08-04
Payer: MEDICARE

## 2025-08-04 VITALS
DIASTOLIC BLOOD PRESSURE: 79 MMHG | SYSTOLIC BLOOD PRESSURE: 135 MMHG | WEIGHT: 213.88 LBS | HEART RATE: 67 BPM | BODY MASS INDEX: 37.88 KG/M2

## 2025-08-04 DIAGNOSIS — M67.80 TENDINOSIS: ICD-10-CM

## 2025-08-04 DIAGNOSIS — G89.29 CHRONIC PAIN OF RIGHT KNEE: ICD-10-CM

## 2025-08-04 DIAGNOSIS — R53.81 PHYSICAL DECONDITIONING: ICD-10-CM

## 2025-08-04 DIAGNOSIS — M16.11 PRIMARY OSTEOARTHRITIS OF RIGHT HIP: Primary | ICD-10-CM

## 2025-08-04 DIAGNOSIS — G89.29 CHRONIC RIGHT HIP PAIN: ICD-10-CM

## 2025-08-04 DIAGNOSIS — S76.011S TEAR OF RIGHT GLUTEUS MINIMUS TENDON, SEQUELA: ICD-10-CM

## 2025-08-04 DIAGNOSIS — M25.561 CHRONIC PAIN OF RIGHT KNEE: ICD-10-CM

## 2025-08-04 DIAGNOSIS — M25.551 CHRONIC RIGHT HIP PAIN: ICD-10-CM

## 2025-08-04 DIAGNOSIS — S76.011S TEAR OF RIGHT GLUTEUS MEDIUS TENDON, SEQUELA: ICD-10-CM

## 2025-08-04 PROCEDURE — 1101F PT FALLS ASSESS-DOCD LE1/YR: CPT | Mod: CPTII,HCNC,S$GLB, | Performed by: FAMILY MEDICINE

## 2025-08-04 PROCEDURE — 1125F AMNT PAIN NOTED PAIN PRSNT: CPT | Mod: CPTII,HCNC,S$GLB, | Performed by: FAMILY MEDICINE

## 2025-08-04 PROCEDURE — 99214 OFFICE O/P EST MOD 30 MIN: CPT | Mod: 25,HCNC,S$GLB, | Performed by: FAMILY MEDICINE

## 2025-08-04 PROCEDURE — 1159F MED LIST DOCD IN RCRD: CPT | Mod: CPTII,HCNC,S$GLB, | Performed by: FAMILY MEDICINE

## 2025-08-04 PROCEDURE — 3075F SYST BP GE 130 - 139MM HG: CPT | Mod: CPTII,HCNC,S$GLB, | Performed by: FAMILY MEDICINE

## 2025-08-04 PROCEDURE — 3288F FALL RISK ASSESSMENT DOCD: CPT | Mod: CPTII,HCNC,S$GLB, | Performed by: FAMILY MEDICINE

## 2025-08-04 PROCEDURE — 20611 DRAIN/INJ JOINT/BURSA W/US: CPT | Mod: HCNC,RT,S$GLB, | Performed by: FAMILY MEDICINE

## 2025-08-04 PROCEDURE — 99999 PR PBB SHADOW E&M-EST. PATIENT-LVL V: CPT | Mod: PBBFAC,HCNC,, | Performed by: FAMILY MEDICINE

## 2025-08-04 PROCEDURE — 3078F DIAST BP <80 MM HG: CPT | Mod: CPTII,HCNC,S$GLB, | Performed by: FAMILY MEDICINE

## 2025-08-04 PROCEDURE — 1160F RVW MEDS BY RX/DR IN RCRD: CPT | Mod: CPTII,HCNC,S$GLB, | Performed by: FAMILY MEDICINE

## 2025-08-04 RX ORDER — TRIAMCINOLONE ACETONIDE 40 MG/ML
20 INJECTION, SUSPENSION INTRA-ARTICULAR; INTRAMUSCULAR
Status: DISCONTINUED | OUTPATIENT
Start: 2025-08-04 | End: 2025-08-04 | Stop reason: HOSPADM

## 2025-08-04 RX ADMIN — TRIAMCINOLONE ACETONIDE 20 MG: 40 INJECTION, SUSPENSION INTRA-ARTICULAR; INTRAMUSCULAR at 01:08

## 2025-08-04 NOTE — PROCEDURES
"Large Joint Aspiration/Injection: R hip joint    Date/Time: 8/4/2025 1:30 PM    Performed by: Wm Lewis MD  Authorized by: Wm Lewis MD    Site marked: the procedure site was marked    Timeout: prior to procedure the correct patient, procedure, and site was verified    Prep: patient was prepped and draped in usual sterile fashion    Local anesthesia used?: No      Details:  Needle Size:  22 G  Ultrasonic Guidance for needle placement?: Yes    Images are saved and documented.  Approach:  Anterior  Location:  Hip  Site:  R hip joint  Medications:  20 mg triamcinolone acetonide 40 mg/mL  Patient tolerance:  Patient tolerated the procedure well with no immediate complications     Description of ultrasound utilization for needle guidance:   Ultrasound guidance used for needle localization. Images saved and stored for documentation. The hip joint was visualized. Dynamic visualization of the 22g x 3.5" needle was continuous throughout the procedure.    Precautionary:  The patient's femoral artery, vein, and nerve were identified, and visualized throughout the procedure, so as to avoid needle contact with those structures.     "

## 2025-08-04 NOTE — PROCEDURES
"Tendon Origin    Date/Time: 8/4/2025 1:30 PM    Performed by: Wm Lewis MD  Authorized by: Wm Lewis MD    Timeout: prior to procedure the correct patient, procedure, and site was verified    Indications:  Pain  Site marked: the procedure site was marked    Timeout: prior to procedure the correct patient, procedure, and site was verified    Location:  Hip  Prep: patient was prepped and draped in usual sterile fashion    Ultrasonic Guidance for Needle Placement?: Yes    Needle size:  22 G  Approach:  Posterolateral  Medications:  20 mg triamcinolone acetonide 40 mg/mL  Patient tolerance:  Patient tolerated the procedure well with no immediate complications   Gluteus medius and gluteus minimus muscles, tendon sheaths, and tendon insertions injection    Description of ultrasound utilization for needle guidance:   Ultrasound guidance used for needle localization. Images saved and stored for documentation. The gluteus medius and minimus muscles and tendons were visualized at their insertions on the greater trochanter. Dynamic visualization of the 22g x 3.5" needle was continuous throughout the procedure.    "

## 2025-08-13 ENCOUNTER — TELEPHONE (OUTPATIENT)
Dept: SPORTS MEDICINE | Facility: CLINIC | Age: 80
End: 2025-08-13
Payer: MEDICARE

## 2025-08-19 ENCOUNTER — OFFICE VISIT (OUTPATIENT)
Dept: SPORTS MEDICINE | Facility: CLINIC | Age: 80
End: 2025-08-19
Payer: MEDICARE

## 2025-08-19 VITALS
DIASTOLIC BLOOD PRESSURE: 74 MMHG | WEIGHT: 213.88 LBS | HEART RATE: 60 BPM | SYSTOLIC BLOOD PRESSURE: 112 MMHG | BODY MASS INDEX: 37.88 KG/M2

## 2025-08-19 DIAGNOSIS — M17.11 PRIMARY OSTEOARTHRITIS OF RIGHT KNEE: ICD-10-CM

## 2025-08-19 DIAGNOSIS — M16.11 PRIMARY OSTEOARTHRITIS OF RIGHT HIP: ICD-10-CM

## 2025-08-19 DIAGNOSIS — G89.29 CHRONIC PAIN OF RIGHT KNEE: Primary | ICD-10-CM

## 2025-08-19 DIAGNOSIS — M25.561 CHRONIC PAIN OF RIGHT KNEE: Primary | ICD-10-CM

## 2025-08-19 DIAGNOSIS — R26.89 ANTALGIC GAIT: ICD-10-CM

## 2025-08-19 PROCEDURE — 3078F DIAST BP <80 MM HG: CPT | Mod: CPTII,HCNC,S$GLB, | Performed by: FAMILY MEDICINE

## 2025-08-19 PROCEDURE — 3288F FALL RISK ASSESSMENT DOCD: CPT | Mod: CPTII,HCNC,S$GLB, | Performed by: FAMILY MEDICINE

## 2025-08-19 PROCEDURE — 99214 OFFICE O/P EST MOD 30 MIN: CPT | Mod: 25,HCNC,S$GLB, | Performed by: FAMILY MEDICINE

## 2025-08-19 PROCEDURE — 1159F MED LIST DOCD IN RCRD: CPT | Mod: CPTII,HCNC,S$GLB, | Performed by: FAMILY MEDICINE

## 2025-08-19 PROCEDURE — 1125F AMNT PAIN NOTED PAIN PRSNT: CPT | Mod: CPTII,HCNC,S$GLB, | Performed by: FAMILY MEDICINE

## 2025-08-19 PROCEDURE — 99999 PR PBB SHADOW E&M-EST. PATIENT-LVL IV: CPT | Mod: PBBFAC,HCNC,, | Performed by: FAMILY MEDICINE

## 2025-08-19 PROCEDURE — 1160F RVW MEDS BY RX/DR IN RCRD: CPT | Mod: CPTII,HCNC,S$GLB, | Performed by: FAMILY MEDICINE

## 2025-08-19 PROCEDURE — 20611 DRAIN/INJ JOINT/BURSA W/US: CPT | Mod: HCNC,RT,S$GLB, | Performed by: FAMILY MEDICINE

## 2025-08-19 PROCEDURE — 1101F PT FALLS ASSESS-DOCD LE1/YR: CPT | Mod: CPTII,HCNC,S$GLB, | Performed by: FAMILY MEDICINE

## 2025-08-19 PROCEDURE — 3074F SYST BP LT 130 MM HG: CPT | Mod: CPTII,HCNC,S$GLB, | Performed by: FAMILY MEDICINE

## 2025-08-19 RX ORDER — TRIAMCINOLONE ACETONIDE 40 MG/ML
40 INJECTION, SUSPENSION INTRA-ARTICULAR; INTRAMUSCULAR
Status: DISCONTINUED | OUTPATIENT
Start: 2025-08-19 | End: 2025-08-19 | Stop reason: HOSPADM

## 2025-08-19 RX ADMIN — TRIAMCINOLONE ACETONIDE 40 MG: 40 INJECTION, SUSPENSION INTRA-ARTICULAR; INTRAMUSCULAR at 11:08
